# Patient Record
Sex: FEMALE | Race: BLACK OR AFRICAN AMERICAN | Employment: OTHER | URBAN - METROPOLITAN AREA
[De-identification: names, ages, dates, MRNs, and addresses within clinical notes are randomized per-mention and may not be internally consistent; named-entity substitution may affect disease eponyms.]

---

## 2018-03-01 PROBLEM — M54.42 CHRONIC BILATERAL LOW BACK PAIN WITH BILATERAL SCIATICA: Status: ACTIVE | Noted: 2018-03-01

## 2018-03-01 PROBLEM — G89.29 CHRONIC BILATERAL LOW BACK PAIN WITH BILATERAL SCIATICA: Status: ACTIVE | Noted: 2018-03-01

## 2018-03-01 PROBLEM — M54.41 CHRONIC BILATERAL LOW BACK PAIN WITH BILATERAL SCIATICA: Status: ACTIVE | Noted: 2018-03-01

## 2018-07-02 DIAGNOSIS — M79.641 RIGHT HAND PAIN: Primary | ICD-10-CM

## 2018-07-03 ENCOUNTER — OFFICE VISIT (OUTPATIENT)
Dept: ORTHOPEDIC SURGERY | Age: 68
End: 2018-07-03
Payer: MEDICARE

## 2018-07-03 VITALS — HEIGHT: 64 IN | TEMPERATURE: 98 F | WEIGHT: 230 LBS | BODY MASS INDEX: 39.27 KG/M2

## 2018-07-03 DIAGNOSIS — M65.311 TRIGGER THUMB OF RIGHT HAND: Primary | ICD-10-CM

## 2018-07-03 PROCEDURE — 1123F ACP DISCUSS/DSCN MKR DOCD: CPT | Performed by: ORTHOPAEDIC SURGERY

## 2018-07-03 PROCEDURE — 1090F PRES/ABSN URINE INCON ASSESS: CPT | Performed by: ORTHOPAEDIC SURGERY

## 2018-07-03 PROCEDURE — G8427 DOCREV CUR MEDS BY ELIG CLIN: HCPCS | Performed by: ORTHOPAEDIC SURGERY

## 2018-07-03 PROCEDURE — 99204 OFFICE O/P NEW MOD 45 MIN: CPT | Performed by: ORTHOPAEDIC SURGERY

## 2018-07-03 PROCEDURE — 4004F PT TOBACCO SCREEN RCVD TLK: CPT | Performed by: ORTHOPAEDIC SURGERY

## 2018-07-03 PROCEDURE — 3017F COLORECTAL CA SCREEN DOC REV: CPT | Performed by: ORTHOPAEDIC SURGERY

## 2018-07-03 PROCEDURE — 4040F PNEUMOC VAC/ADMIN/RCVD: CPT | Performed by: ORTHOPAEDIC SURGERY

## 2018-07-03 PROCEDURE — G8400 PT W/DXA NO RESULTS DOC: HCPCS | Performed by: ORTHOPAEDIC SURGERY

## 2018-07-03 PROCEDURE — G8417 CALC BMI ABV UP PARAM F/U: HCPCS | Performed by: ORTHOPAEDIC SURGERY

## 2018-07-03 RX ORDER — GLIPIZIDE 5 MG/1
TABLET ORAL
COMMUNITY
Start: 2018-05-09 | End: 2022-10-05

## 2018-07-03 RX ORDER — OMEPRAZOLE 20 MG/1
CAPSULE, DELAYED RELEASE ORAL
Refills: 1 | COMMUNITY
Start: 2018-05-26 | End: 2022-11-02

## 2018-07-03 RX ORDER — MELOXICAM 15 MG/1
TABLET ORAL
Refills: 0 | COMMUNITY
Start: 2018-06-05 | End: 2018-08-10 | Stop reason: ALTCHOICE

## 2018-07-03 NOTE — PROGRESS NOTES
lower extremities. Upon palpation there is no induration noted. Neurologic:    Gait: normal;  Motor exam of the upper extremities show: The reflexes in biceps/triceps/brachioradialis are equal and symmetric. Sensory exam C5-T1 are normal bilaterally. Cardiovascular: The vascular exam is normal and is well perfused to distal extremities. There are 2+ radial pulses bilaterally, and motor and sensation is intact to median, ulnar, and radial, musclocutaneus, and axillary nerve distribution and grossly symmetric bilaterally. There is cap refill noted less than two seconds in all digits. There is not edema of the bilateral lower extremities. There is not varicosities noted in the distal extremities. Lymph:    Upon palpation,  there is no lymphadenopathy noted in bilateral lower extremities. Musculoskeletal:  Gait: normal; examination of the nails and digits reveal no cyanosis or clubbing. Cervical Exam:    On physical exam, Dougie Silveira is well-developed, well-nourished, oriented to person, place and time. her gait is normal.  On evaluation of her cervical spine, she has full range of motion of the cervical spine without pain. There is no cervical tenderness to palpation. Shoulder Exam:    On evaluation of her bilaterally upper extremities, her bilateral shoulder has no deformity. There is not evidence of scapular dyskinesis. There is not muscle atrophy in shoulder girdle. The range of motion for the Right Shoulder is 160/45/T8 and for the Left shoulder is 160/45/T8. Right shoulder Motor strength is 5/5 in the supraspinatus, 5/5 internal rotation and 5/5 in external rotation, and Left shoulder motor strength 5/5 in supraspinatus, 5/5 in internal rotation, 5/5 in external rotation. Elbow exam:    Evaluation of the elbow, reveals no signs of swelling or deformity. ROM is 0-150. There is not instability with varus/valgus stresses.   Motor strength is 5/5 with compression, and elevation (RICE) therapy. I discussed the treatment options with the patient. I suggest releasing the pulley in her thumb and she agrees. We will perform a Right trigger finger release of the right thumb on 7/31/2018. The risks and benefits were reviewed with the patient such as:  DVT, infection,  injuries to blood vessels and nerves, non relief of symptoms, continued pain, worsening of symptoms.

## 2018-08-13 ENCOUNTER — ANESTHESIA EVENT (OUTPATIENT)
Dept: OPERATING ROOM | Age: 68
End: 2018-08-13
Payer: MEDICARE

## 2018-08-13 ENCOUNTER — HOSPITAL ENCOUNTER (OUTPATIENT)
Age: 68
Discharge: HOME | End: 2018-08-15
Payer: MEDICARE

## 2018-08-13 LAB
HCT VFR BLD CALC: 38.5 % (ref 34–48)
HEMOGLOBIN: 11.6 G/DL (ref 11.5–15.5)
MCH RBC QN AUTO: 25.5 PG (ref 26–35)
MCHC RBC AUTO-ENTMCNC: 30.1 % (ref 32–34.5)
MCV RBC AUTO: 84.6 FL (ref 80–99.9)
PDW BLD-RTO: 15.1 FL (ref 11.5–15)
PLATELET # BLD: 338 E9/L (ref 130–450)
PMV BLD AUTO: 10 FL (ref 7–12)
RBC # BLD: 4.55 E12/L (ref 3.5–5.5)
WBC # BLD: 7.6 E9/L (ref 4.5–11.5)

## 2018-08-13 PROCEDURE — 36415 COLL VENOUS BLD VENIPUNCTURE: CPT

## 2018-08-13 PROCEDURE — 85027 COMPLETE CBC AUTOMATED: CPT

## 2018-08-13 NOTE — ANESTHESIA PRE PROCEDURE
72 hours. Coags: No results found for: PROTIME, INR, APTT    HCG (If Applicable): No results found for: PREGTESTUR, PREGSERUM, HCG, HCGQUANT     ABGs: No results found for: PHART, PO2ART, KKU6DSM, VHL9SFQ, BEART, K8PDHGRZ     Type & Screen (If Applicable):  No results found for: LABABO, [address]    Anesthesia Evaluation  Patient summary reviewed  Airway: Mallampati: II  TM distance: >3 FB   Neck ROM: full  Mouth opening: > = 3 FB Dental:    (+) upper dentures      Pulmonary: breath sounds clear to auscultation  (+) current smoker                          ROS comment: Current Smoker . 5 ppd     Cardiovascular:  Exercise tolerance: good (>4 METS),   (+) hypertension: moderate,       ECG reviewed  Rhythm: regular  Rate: normal           Beta Blocker:  Dose within 24 Hrs         Neuro/Psych:   (+) neuromuscular disease:,              ROS comment: H/O Scoliosis. GI/Hepatic/Renal: Neg GI/Hepatic/Renal ROS            Endo/Other:    (+) DiabetesType II DM, using insulin, . Abdominal:   (+) obese,         Vascular: negative vascular ROS. Anesthesia Plan      Bendon block     ASA 3     (Medical clearance was done)  Induction: intravenous. Anesthetic plan and risks discussed with patient. Plan discussed with CRNA. Sylvie Calvillo MD   8/13/2018    DOS STAFF ADDENDUM:    Pt seen and examined, chart reviewed (including anesthesia, drug and allergy history). Anesthetic plan, risks, benefits, alternatives, and personnel involved discussed with patient. Patient verbalized an understanding and agrees to proceed. Plan discussed with care team members and agreed upon.     Mazin Sykes MD  Staff Anesthesiologist  6:26 AM

## 2018-08-14 ENCOUNTER — ANESTHESIA (OUTPATIENT)
Dept: OPERATING ROOM | Age: 68
End: 2018-08-14
Payer: MEDICARE

## 2018-08-14 ENCOUNTER — HOSPITAL ENCOUNTER (OUTPATIENT)
Age: 68
Setting detail: OUTPATIENT SURGERY
Discharge: HOME | End: 2018-08-14
Attending: ORTHOPAEDIC SURGERY | Admitting: ORTHOPAEDIC SURGERY
Payer: MEDICARE

## 2018-08-14 VITALS
WEIGHT: 237 LBS | HEART RATE: 78 BPM | RESPIRATION RATE: 14 BRPM | HEIGHT: 64 IN | OXYGEN SATURATION: 94 % | DIASTOLIC BLOOD PRESSURE: 76 MMHG | BODY MASS INDEX: 40.46 KG/M2 | SYSTOLIC BLOOD PRESSURE: 129 MMHG

## 2018-08-14 VITALS
SYSTOLIC BLOOD PRESSURE: 142 MMHG | RESPIRATION RATE: 20 BRPM | DIASTOLIC BLOOD PRESSURE: 104 MMHG | TEMPERATURE: 98.6 F | OXYGEN SATURATION: 94 %

## 2018-08-14 DIAGNOSIS — M65.311 TRIGGER THUMB OF RIGHT HAND: Primary | ICD-10-CM

## 2018-08-14 LAB — GLUCOSE BLD-MCNC: NORMAL MG/DL

## 2018-08-14 PROCEDURE — 7100000011 HC PHASE II RECOVERY - ADDTL 15 MIN: Performed by: ORTHOPAEDIC SURGERY

## 2018-08-14 PROCEDURE — 26055 INCISE FINGER TENDON SHEATH: CPT | Performed by: ORTHOPAEDIC SURGERY

## 2018-08-14 PROCEDURE — 7100000010 HC PHASE II RECOVERY - FIRST 15 MIN: Performed by: ORTHOPAEDIC SURGERY

## 2018-08-14 PROCEDURE — 2500000003 HC RX 250 WO HCPCS: Performed by: NURSE ANESTHETIST, CERTIFIED REGISTERED

## 2018-08-14 PROCEDURE — 6360000002 HC RX W HCPCS: Performed by: NURSE PRACTITIONER

## 2018-08-14 PROCEDURE — 3700000000 HC ANESTHESIA ATTENDED CARE: Performed by: ORTHOPAEDIC SURGERY

## 2018-08-14 PROCEDURE — 3600000002 HC SURGERY LEVEL 2 BASE: Performed by: ORTHOPAEDIC SURGERY

## 2018-08-14 PROCEDURE — C1713 ANCHOR/SCREW BN/BN,TIS/BN: HCPCS | Performed by: ORTHOPAEDIC SURGERY

## 2018-08-14 PROCEDURE — 2580000003 HC RX 258: Performed by: ANESTHESIOLOGY

## 2018-08-14 PROCEDURE — 82962 GLUCOSE BLOOD TEST: CPT | Performed by: ORTHOPAEDIC SURGERY

## 2018-08-14 PROCEDURE — 3600000012 HC SURGERY LEVEL 2 ADDTL 15MIN: Performed by: ORTHOPAEDIC SURGERY

## 2018-08-14 PROCEDURE — 3700000001 HC ADD 15 MINUTES (ANESTHESIA): Performed by: ORTHOPAEDIC SURGERY

## 2018-08-14 PROCEDURE — 2709999900 HC NON-CHARGEABLE SUPPLY: Performed by: ORTHOPAEDIC SURGERY

## 2018-08-14 PROCEDURE — 6360000002 HC RX W HCPCS: Performed by: NURSE ANESTHETIST, CERTIFIED REGISTERED

## 2018-08-14 RX ORDER — LIDOCAINE HYDROCHLORIDE 20 MG/ML
INJECTION, SOLUTION INFILTRATION; PERINEURAL PRN
Status: DISCONTINUED | OUTPATIENT
Start: 2018-08-14 | End: 2018-08-14 | Stop reason: SDUPTHER

## 2018-08-14 RX ORDER — KETOROLAC TROMETHAMINE 30 MG/ML
INJECTION, SOLUTION INTRAMUSCULAR; INTRAVENOUS PRN
Status: DISCONTINUED | OUTPATIENT
Start: 2018-08-14 | End: 2018-08-14 | Stop reason: SDUPTHER

## 2018-08-14 RX ORDER — MIDAZOLAM HYDROCHLORIDE 1 MG/ML
INJECTION INTRAMUSCULAR; INTRAVENOUS PRN
Status: DISCONTINUED | OUTPATIENT
Start: 2018-08-14 | End: 2018-08-14 | Stop reason: SDUPTHER

## 2018-08-14 RX ORDER — SODIUM CHLORIDE, SODIUM LACTATE, POTASSIUM CHLORIDE, CALCIUM CHLORIDE 600; 310; 30; 20 MG/100ML; MG/100ML; MG/100ML; MG/100ML
INJECTION, SOLUTION INTRAVENOUS CONTINUOUS
Status: DISCONTINUED | OUTPATIENT
Start: 2018-08-14 | End: 2018-08-14 | Stop reason: HOSPADM

## 2018-08-14 RX ORDER — HYDROCODONE BITARTRATE AND ACETAMINOPHEN 5; 325 MG/1; MG/1
1 TABLET ORAL EVERY 6 HOURS PRN
Qty: 20 TABLET | Refills: 0 | Status: SHIPPED | OUTPATIENT
Start: 2018-08-14 | End: 2018-08-19

## 2018-08-14 RX ORDER — ALFENTANIL HYDROCHLORIDE 500 UG/ML
INJECTION INTRAVENOUS PRN
Status: DISCONTINUED | OUTPATIENT
Start: 2018-08-14 | End: 2018-08-14 | Stop reason: SDUPTHER

## 2018-08-14 RX ORDER — PROPOFOL 10 MG/ML
INJECTION, EMULSION INTRAVENOUS CONTINUOUS PRN
Status: DISCONTINUED | OUTPATIENT
Start: 2018-08-14 | End: 2018-08-14 | Stop reason: SDUPTHER

## 2018-08-14 RX ADMIN — ALFENTANIL HYDROCHLORIDE 250 MCG: 500 INJECTION INTRAVENOUS at 07:27

## 2018-08-14 RX ADMIN — LIDOCAINE HYDROCHLORIDE 25 MG: 20 INJECTION, SOLUTION INFILTRATION; PERINEURAL at 07:32

## 2018-08-14 RX ADMIN — PROPOFOL 50 MCG/KG/MIN: 10 INJECTION, EMULSION INTRAVENOUS at 07:32

## 2018-08-14 RX ADMIN — SODIUM CHLORIDE, POTASSIUM CHLORIDE, SODIUM LACTATE AND CALCIUM CHLORIDE: 600; 310; 30; 20 INJECTION, SOLUTION INTRAVENOUS at 06:08

## 2018-08-14 RX ADMIN — KETOROLAC TROMETHAMINE 30 MG: 30 INJECTION, SOLUTION INTRAMUSCULAR; INTRAVENOUS at 07:48

## 2018-08-14 RX ADMIN — MIDAZOLAM 2 MG: 1 INJECTION INTRAMUSCULAR; INTRAVENOUS at 07:27

## 2018-08-14 RX ADMIN — ALFENTANIL HYDROCHLORIDE 250 MCG: 500 INJECTION INTRAVENOUS at 07:48

## 2018-08-14 RX ADMIN — ALFENTANIL HYDROCHLORIDE 250 MCG: 500 INJECTION INTRAVENOUS at 07:35

## 2018-08-14 RX ADMIN — CEFAZOLIN SODIUM 2 G: 2 SOLUTION INTRAVENOUS at 07:24

## 2018-08-14 ASSESSMENT — PULMONARY FUNCTION TESTS
PIF_VALUE: 0

## 2018-08-14 ASSESSMENT — PAIN DESCRIPTION - DESCRIPTORS: DESCRIPTORS: DISCOMFORT

## 2018-08-14 ASSESSMENT — PAIN SCALES - GENERAL
PAINLEVEL_OUTOF10: 0
PAINLEVEL_OUTOF10: 0
PAINLEVEL_OUTOF10: 3

## 2018-08-14 ASSESSMENT — PAIN - FUNCTIONAL ASSESSMENT: PAIN_FUNCTIONAL_ASSESSMENT: 0-10

## 2018-08-14 ASSESSMENT — LIFESTYLE VARIABLES: SMOKING_STATUS: 1

## 2018-08-14 NOTE — ANESTHESIA POSTPROCEDURE EVALUATION
Department of Anesthesiology  Postprocedure Note    Patient: Farrukh Garcia  MRN: [id number]  YOB: 1950  Date of evaluation: 8/14/2018  Time:  9:54 AM     Procedure Summary     Date:  08/14/18 Room / Location:  [address] [place] [address] 01 / [address] [facility]    Anesthesia Start:  0724 Anesthesia Stop:  6234    Procedure:  RIGHT THUMB TRIGGER THUMB RELEASE (Right ) Diagnosis:  (RIGHT TRIGGER THUMB)    Surgeon:  Shlomo Christian DO Responsible Provider:  Marek Jarvis MD    Anesthesia Type:  Maxwell block ASA Status:  3          Anesthesia Type: Maxwell block    Jd Phase I: Jd Score: 10    Jd Phase II: Jd Score: 10    Last vitals: Reviewed and per EMR flowsheets.        Anesthesia Post Evaluation    Patient location during evaluation: PACU  Patient participation: complete - patient participated  Level of consciousness: awake and alert  Airway patency: patent  Nausea & Vomiting: no vomiting and no nausea  Complications: no  Cardiovascular status: hemodynamically stable  Respiratory status: acceptable  Hydration status: stable

## 2018-08-20 NOTE — H&P
Chief Complaint   Patient presents with    Trigger finger       Right hand Thumb, pops, locks, and hard to bend. States of throbbing. Is Right handed.           Shaji Pepper is a 74y.o. year old  female who presents for evaluation of right hand pain. she reports this started several weeks ago with locking of her thumb.  she does not remember a specific injury that started the pain. The injury was repetitive injury. Her pain is located mainly in the thumb. The pain is worse with activity and better with rest.  The patient has tried nothing specific. The treatment has not been effective. The patient is Right hand dominant.       Past Medical History        Past Medical History:   Diagnosis Date    Chronic back pain 2018     10/10 on the pain scale    Type 2 diabetes mellitus without complication ([facility] [address]           Past Surgical History   History reviewed. No pertinent surgical history.        Current Medication      Current Outpatient Prescriptions:     glipiZIDE (GLUCOTROL) 5 MG tablet, , Disp: , Rfl:     meloxicam (MOBIC) 15 MG tablet, , Disp: , Rfl: 0    omeprazole (PRILOSEC) 20 MG delayed release capsule, TAKE ONE CAPSULE BY MOUTH EVERY DAY, NEED TO MAKE APPOINTMENT, Disp: , Rfl: 1    aspirin 325 MG tablet, Take 325 mg by mouth daily, Disp: , Rfl:     HYDROcodone-acetaminophen (NORCO) 5-325 MG per tablet, Take 1 tablet by mouth as needed for Pain., Disp: , Rfl:     metFORMIN (GLUCOPHAGE) 500 MG tablet, Take 500 mg by mouth 2 times daily (with meals), Disp: , Rfl:     quinapril-hydrochlorothiazide (ACCURETIC) 20-12.5 MG per tablet, Take 1 tablet by mouth 2 times daily, Disp: , Rfl:     topiramate (TOPAMAX) 100 MG tablet, Take 200 mg by mouth nightly, Disp: , Rfl:     atenolol (TENORMIN) 50 MG tablet, Take 50 mg by mouth 2 times daily, Disp: , Rfl:     Insulin Detemir (LEVEMIR FLEXTOUCH SC), Inject 10 mg into the skin daily, Disp: , Rfl:      No Known Allergies  Social History with patient     Impression:        Encounter Diagnosis   Name Primary?  Trigger thumb of right hand Yes         Plan: Natural history and expected course discussed. Questions answered. Educational materials distributed. Rest, ice, compression, and elevation (RICE) therapy. I discussed the treatment options with the patient. I suggest releasing the pulley in her thumb and she agrees. We will perform a Right trigger finger release of the right thumb . The risks and benefits were reviewed with the patient such as:  DVT, infection,  injuries to blood vessels and nerves, non relief of symptoms, continued pain, worsening of symptoms.

## 2018-08-29 ENCOUNTER — OFFICE VISIT (OUTPATIENT)
Dept: ORTHOPEDIC SURGERY | Age: 68
End: 2018-08-29

## 2018-08-29 VITALS — WEIGHT: 230 LBS | BODY MASS INDEX: 39.27 KG/M2 | TEMPERATURE: 98 F | HEIGHT: 64 IN

## 2018-08-29 DIAGNOSIS — M65.311 TRIGGER THUMB OF RIGHT HAND: Primary | ICD-10-CM

## 2018-08-29 PROCEDURE — 99024 POSTOP FOLLOW-UP VISIT: CPT | Performed by: NURSE PRACTITIONER

## 2021-12-22 LAB
ALBUMIN SERPL-MCNC: NORMAL G/DL
ALP BLD-CCNC: NORMAL U/L
ALT SERPL-CCNC: NORMAL U/L
ANION GAP SERPL CALCULATED.3IONS-SCNC: NORMAL MMOL/L
AST SERPL-CCNC: NORMAL U/L
BASOPHILS ABSOLUTE: NORMAL
BASOPHILS RELATIVE PERCENT: NORMAL
BILIRUB SERPL-MCNC: NORMAL MG/DL
BUN BLDV-MCNC: NORMAL MG/DL
CALCIUM SERPL-MCNC: NORMAL MG/DL
CHLORIDE BLD-SCNC: NORMAL MMOL/L
CHOLESTEROL, TOTAL: NORMAL
CHOLESTEROL/HDL RATIO: NORMAL
CO2: NORMAL
CREAT SERPL-MCNC: NORMAL MG/DL
EOSINOPHILS ABSOLUTE: NORMAL
EOSINOPHILS RELATIVE PERCENT: NORMAL
GFR CALCULATED: NORMAL
GLUCOSE BLD-MCNC: NORMAL MG/DL
HCT VFR BLD CALC: NORMAL %
HDLC SERPL-MCNC: NORMAL MG/DL
HEMOGLOBIN: NORMAL
LDL CHOLESTEROL CALCULATED: NORMAL
LYMPHOCYTES ABSOLUTE: NORMAL
LYMPHOCYTES RELATIVE PERCENT: NORMAL
MCH RBC QN AUTO: NORMAL PG
MCHC RBC AUTO-ENTMCNC: NORMAL G/DL
MCV RBC AUTO: NORMAL FL
MONOCYTES ABSOLUTE: NORMAL
MONOCYTES RELATIVE PERCENT: NORMAL
NEUTROPHILS ABSOLUTE: NORMAL
NEUTROPHILS RELATIVE PERCENT: NORMAL
NONHDLC SERPL-MCNC: NORMAL MG/DL
PDW BLD-RTO: NORMAL %
PLATELET # BLD: NORMAL 10*3/UL
PMV BLD AUTO: NORMAL FL
POTASSIUM SERPL-SCNC: NORMAL MMOL/L
RBC # BLD: NORMAL 10*6/UL
SODIUM BLD-SCNC: NORMAL MMOL/L
TOTAL PROTEIN: NORMAL
TRIGL SERPL-MCNC: NORMAL MG/DL
VLDLC SERPL CALC-MCNC: NORMAL MG/DL
WBC # BLD: NORMAL 10*3/UL

## 2022-04-08 LAB
ALBUMIN SERPL-MCNC: NORMAL G/DL
ALP BLD-CCNC: NORMAL U/L
ALT SERPL-CCNC: NORMAL U/L
ANION GAP SERPL CALCULATED.3IONS-SCNC: NORMAL MMOL/L
AST SERPL-CCNC: NORMAL U/L
AVERAGE GLUCOSE: 128
BASOPHILS ABSOLUTE: NORMAL
BASOPHILS RELATIVE PERCENT: NORMAL
BILIRUB SERPL-MCNC: NORMAL MG/DL
BUN BLDV-MCNC: NORMAL MG/DL
CALCIUM SERPL-MCNC: NORMAL MG/DL
CHLORIDE BLD-SCNC: NORMAL MMOL/L
CHOLESTEROL, TOTAL: NORMAL
CHOLESTEROL/HDL RATIO: NORMAL
CO2: NORMAL
CREAT SERPL-MCNC: NORMAL MG/DL
EOSINOPHILS ABSOLUTE: NORMAL
EOSINOPHILS RELATIVE PERCENT: NORMAL
GFR CALCULATED: NORMAL
GLUCOSE BLD-MCNC: NORMAL MG/DL
HBA1C MFR BLD: 6.1 %
HCT VFR BLD CALC: NORMAL %
HDLC SERPL-MCNC: NORMAL MG/DL
HEMOGLOBIN: NORMAL
LDL CHOLESTEROL CALCULATED: NORMAL
LYMPHOCYTES ABSOLUTE: NORMAL
LYMPHOCYTES RELATIVE PERCENT: NORMAL
MCH RBC QN AUTO: NORMAL PG
MCHC RBC AUTO-ENTMCNC: NORMAL G/DL
MCV RBC AUTO: NORMAL FL
MONOCYTES ABSOLUTE: NORMAL
MONOCYTES RELATIVE PERCENT: NORMAL
NEUTROPHILS ABSOLUTE: NORMAL
NEUTROPHILS RELATIVE PERCENT: NORMAL
NONHDLC SERPL-MCNC: NORMAL MG/DL
PLATELET # BLD: NORMAL 10*3/UL
PMV BLD AUTO: NORMAL FL
POTASSIUM SERPL-SCNC: NORMAL MMOL/L
RBC # BLD: NORMAL 10*6/UL
SODIUM BLD-SCNC: NORMAL MMOL/L
TOTAL PROTEIN: NORMAL
TRIGL SERPL-MCNC: NORMAL MG/DL
VLDLC SERPL CALC-MCNC: NORMAL MG/DL
WBC # BLD: NORMAL 10*3/UL

## 2022-09-01 ENCOUNTER — HOSPITAL ENCOUNTER (INPATIENT)
Age: 72
LOS: 12 days | Discharge: OTHER INSTITUTION | DRG: 291 | End: 2022-09-14
Attending: STUDENT IN AN ORGANIZED HEALTH CARE EDUCATION/TRAINING PROGRAM | Admitting: INTERNAL MEDICINE
Payer: MEDICARE

## 2022-09-01 ENCOUNTER — APPOINTMENT (OUTPATIENT)
Dept: GENERAL RADIOLOGY | Age: 72
DRG: 291 | End: 2022-09-01
Payer: MEDICARE

## 2022-09-01 DIAGNOSIS — I50.9 CONGESTIVE HEART FAILURE, UNSPECIFIED HF CHRONICITY, UNSPECIFIED HEART FAILURE TYPE (HCC): ICD-10-CM
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
## 2022-09-01 LAB
ANION GAP SERPL CALCULATED.3IONS-SCNC: 7 MMOL/L (ref 7–16)
ANISOCYTOSIS: ABNORMAL
BACTERIA: ABNORMAL /HPF
BASOPHILIC STIPPLING: ABNORMAL
BASOPHILS ABSOLUTE: 0 E9/L (ref 0–0.2)
BASOPHILS RELATIVE PERCENT: 0.5 % (ref 0–2)
BILIRUBIN URINE: NEGATIVE
BLOOD, URINE: ABNORMAL
BUN BLDV-MCNC: 14 MG/DL (ref 6–23)
CALCIUM SERPL-MCNC: 8.8 MG/DL (ref 8.6–10.2)
CHLORIDE BLD-SCNC: 103 MMOL/L (ref 98–107)
CLARITY: ABNORMAL
CO2: 32 MMOL/L (ref 22–29)
COLOR: YELLOW
CREAT SERPL-MCNC: 1.1 MG/DL (ref 0.5–1)
EOSINOPHILS ABSOLUTE: 0.13 E9/L (ref 0.05–0.5)
EOSINOPHILS RELATIVE PERCENT: 1.7 % (ref 0–6)
EPITHELIAL CELLS, UA: ABNORMAL /HPF
GFR AFRICAN AMERICAN: 59
GFR NON-AFRICAN AMERICAN: 59 ML/MIN/1.73
GLUCOSE BLD-MCNC: 85 MG/DL (ref 74–99)
GLUCOSE URINE: NEGATIVE MG/DL
HCT VFR BLD CALC: 33 % (ref 34–48)
HEMOGLOBIN: 8.9 G/DL (ref 11.5–15.5)
HYPOCHROMIA: ABNORMAL
KETONES, URINE: NEGATIVE MG/DL
LEUKOCYTE ESTERASE, URINE: ABNORMAL
LYMPHOCYTES ABSOLUTE: 1.09 E9/L (ref 1.5–4)
LYMPHOCYTES RELATIVE PERCENT: 13.9 % (ref 20–42)
MCH RBC QN AUTO: 19.8 PG (ref 26–35)
MCHC RBC AUTO-ENTMCNC: 27 % (ref 32–34.5)
MCV RBC AUTO: 73.3 FL (ref 80–99.9)
MONOCYTES ABSOLUTE: 0.08 E9/L (ref 0.1–0.95)
MONOCYTES RELATIVE PERCENT: 0.9 % (ref 2–12)
NEUTROPHILS ABSOLUTE: 6.55 E9/L (ref 1.8–7.3)
NEUTROPHILS RELATIVE PERCENT: 83.5 % (ref 43–80)
NITRITE, URINE: NEGATIVE
OVALOCYTES: ABNORMAL
PDW BLD-RTO: 20.2 FL (ref 11.5–15)
PH UA: 6 (ref 5–9)
PLATELET # BLD: 364 E9/L (ref 130–450)
PMV BLD AUTO: 9.8 FL (ref 7–12)
POIKILOCYTES: ABNORMAL
POLYCHROMASIA: ABNORMAL
POTASSIUM REFLEX MAGNESIUM: 3.8 MMOL/L (ref 3.5–5)
PRO-BNP: ABNORMAL PG/ML (ref 0–125)
PROTEIN UA: 30 MG/DL
RBC # BLD: 4.5 E12/L (ref 3.5–5.5)
RBC UA: ABNORMAL /HPF (ref 0–2)
SCHISTOCYTES: ABNORMAL
SODIUM BLD-SCNC: 142 MMOL/L (ref 132–146)
SPECIFIC GRAVITY UA: 1.02 (ref 1–1.03)
TARGET CELLS: ABNORMAL
TEAR DROP CELLS: ABNORMAL
TOTAL CK: 112 U/L (ref 20–180)
TROPONIN, HIGH SENSITIVITY: 27 NG/L (ref 0–9)
UROBILINOGEN, URINE: 0.2 E.U./DL
WBC # BLD: 7.8 E9/L (ref 4.5–11.5)
WBC UA: ABNORMAL /HPF (ref 0–5)

## 2022-09-01 PROCEDURE — 84484 ASSAY OF TROPONIN QUANT: CPT

## 2022-09-01 PROCEDURE — 71045 X-RAY EXAM CHEST 1 VIEW: CPT

## 2022-09-01 PROCEDURE — 81001 URINALYSIS AUTO W/SCOPE: CPT

## 2022-09-01 PROCEDURE — 80048 BASIC METABOLIC PNL TOTAL CA: CPT

## 2022-09-01 PROCEDURE — 6360000002 HC RX W HCPCS: Performed by: STUDENT IN AN ORGANIZED HEALTH CARE EDUCATION/TRAINING PROGRAM

## 2022-09-01 PROCEDURE — 96374 THER/PROPH/DIAG INJ IV PUSH: CPT

## 2022-09-01 PROCEDURE — 85025 COMPLETE CBC W/AUTO DIFF WBC: CPT

## 2022-09-01 PROCEDURE — 99285 EMERGENCY DEPT VISIT HI MDM: CPT

## 2022-09-01 PROCEDURE — 93005 ELECTROCARDIOGRAM TRACING: CPT | Performed by: STUDENT IN AN ORGANIZED HEALTH CARE EDUCATION/TRAINING PROGRAM

## 2022-09-01 PROCEDURE — 83880 ASSAY OF NATRIURETIC PEPTIDE: CPT

## 2022-09-01 PROCEDURE — 82550 ASSAY OF CK (CPK): CPT

## 2022-09-01 PROCEDURE — 87088 URINE BACTERIA CULTURE: CPT

## 2022-09-01 RX ORDER — FUROSEMIDE 10 MG/ML
40 INJECTION INTRAMUSCULAR; INTRAVENOUS ONCE
Status: COMPLETED | OUTPATIENT
Start: 2022-09-01 | End: 2022-09-01

## 2022-09-01 RX ADMIN — FUROSEMIDE 40 MG: 10 INJECTION INTRAMUSCULAR; INTRAVENOUS at 23:25

## 2022-09-01 ASSESSMENT — PAIN - FUNCTIONAL ASSESSMENT: PAIN_FUNCTIONAL_ASSESSMENT: NONE - DENIES PAIN

## 2022-09-01 NOTE — ED PROVIDER NOTES
ED  Provider Note  Admit Date/RoomTime: 9/1/2022  7:43 PM  ED Room: 9717/3178-30      History of Present Illness:  9/1/22, Time: 7:53 PM EDT  Chief Complaint   Patient presents with    Asha Sanchez at 930am was not able to get up. Estelita Mahmood is a 73 y.o. female presenting to the ED for fall at home. Patient states that her left leg gave out on her and she slowly lowered herself to the floor, denies head strike or loss consciousness, no headache vision changes neck pain or neck stiffness. No chest pain or arm/jaw/back pain. Patient has chronic shortness of breath. Positive for lower extremity edema. States that it is chronic but increased recently. Denies any focal weakness or numbness. No new back pain issue is chronic. No weakness or numbness of lower extremities, no saddle anaesthesia, no bowel or bladder incontinence, no urinary retention. No complaints at this time, fall was one episode, sudden onset, couldn't get up after. Relieved by none, exacerbated by none. Son reportedly called 911 and concerned about patient's hoarding. No dizziness lightheadedness. No vision changes. No melena or hematochezia. No diarrhea or n/v/c. Review of Systems:   A complete review of systems was performed and pertinent positives and negatives are stated within HPI, all other systems reviewed and are negative.        --------------------------------------------- PATIENT HISTORY--------------------------------------------  Past Medical History:  has a past medical history of Chronic back pain, Hypertension, Scoliosis, and Type 2 diabetes mellitus without complication ([facility] [address] Past Surgical History:  has a past surgical history that includes Hysterectomy; shoulder surgery (Right, 2002); [place] tooth extraction; Finger trigger release (Right, 08/14/2018); and pr incise finger tendon sheath (Right, 8/14/2018). Family History: family history includes Cancer in her father. . Unless otherwise noted, family history is non contributory    Social History:  reports that she has been smoking. She has been smoking an average of .5 packs per day. She has never used smokeless tobacco. She reports that she does not drink alcohol and does not use drugs. The patients home medications have been reviewed. Allergies: Patient has no known allergies. I have reviewed the past medical history, past surgical history, social history, and family history    ---------------------------------------------------PHYSICAL EXAM--------------------------------------    Constitutional/General: Alert and oriented x3  Head: Normocephalic and atraumatic  Eyes: PERRL, EOMI, sclera non icteric  ENT: Oropharynx clear, handling secretions, no trismus  Neck: Supple, full ROM, no stridor, no meningismus  Respiratory: lctab  Cardiovascular: RRR, no R/G/M, 2+ peripheral pulses  Chest: No chest wall tenderness, equal chest rise  Gastrointestinal:  sntnd  Musculoskeletal: Extremities warm and well perfused, moving all extremities, 2+ pitting edema bilateral LEs  Skin: skin warm and dry. No rashes. Neurologic: No focal deficits, strength and sensation grossly intact   Psychiatric: Normal Affect, behavior normal           -------------------------------------------------- RESULTS -------------------------------------------------  I have personally reviewed all laboratory and imaging results for this patient. Results are listed below.      LABS: (Lab results interpreted by me)  Results for orders placed or performed during the hospital encounter of 09/01/22   CBC with Auto Differential   Result Value Ref Range    WBC 7.8 4.5 - 11.5 E9/L    RBC 4.50 3.50 - 5.50 E12/L    Hemoglobin 8.9 (L) 11.5 - 15.5 g/dL    Hematocrit 33.0 (L) 34.0 - 48.0 %    MCV 73.3 (L) 80.0 - 99.9 fL    MCH 19.8 (L) 26.0 - 35.0 pg    MCHC 27.0 (L) 32.0 - 34.5 %    RDW 20.2 (H) 11.5 - 15.0 fL    Platelets 310 174 - 209 E9/L    MPV 9.8 7.0 - 12.0 fL    Neutrophils % 83.5 (H) 43.0 - 80.0 %    Lymphocytes % 13.9 (L) 20.0 - 42.0 %    Monocytes % 0.9 (L) 2.0 - 12.0 %    Eosinophils % 1.7 0.0 - 6.0 %    Basophils % 0.5 0.0 - 2.0 %    Neutrophils Absolute 6.55 1.80 - 7.30 E9/L    Lymphocytes Absolute 1.09 (L) 1.50 - 4.00 E9/L    Monocytes Absolute 0.08 (L) 0.10 - 0.95 E9/L    Eosinophils Absolute 0.13 0.05 - 0.50 E9/L    Basophils Absolute 0.00 0.00 - 0.20 E9/L    Anisocytosis 1+     Polychromasia 1+     Hypochromia 2+     Poikilocytes 1+     Schistocytes 1+     Ovalocytes 1+     Target Cells 1+     Tear Drop Cells 1+     Basophilic Stippling 1+    Basic Metabolic Panel w/ Reflex to MG   Result Value Ref Range    Sodium 142 132 - 146 mmol/L    Potassium reflex Magnesium 3.8 3.5 - 5.0 mmol/L    Chloride 103 98 - 107 mmol/L    CO2 32 (H) 22 - 29 mmol/L    Anion Gap 7 7 - 16 mmol/L    Glucose 85 74 - 99 mg/dL    BUN 14 6 - 23 mg/dL    Creatinine 1.1 (H) 0.5 - 1.0 mg/dL    GFR Non-African American 59 >=60 mL/min/1.73    GFR African American 59     Calcium 8.8 8.6 - 10.2 mg/dL   Troponin   Result Value Ref Range    Troponin, High Sensitivity 27 (H) 0 - 9 ng/L   Brain Natriuretic Peptide   Result Value Ref Range    Pro-BNP 10,646 (H) 0 - 125 pg/mL   Urinalysis with Microscopic   Result Value Ref Range    Color, UA Yellow Straw/Yellow    Clarity, UA SLCLOUDY Clear    Glucose, Ur Negative Negative mg/dL    Bilirubin Urine Negative Negative    Ketones, Urine Negative Negative mg/dL    Specific Gravity, UA 1.025 1.005 - 1.030    Blood, Urine SMALL (A) Negative    pH, UA 6.0 5.0 - 9.0    Protein, UA 30 (A) Negative mg/dL    Urobilinogen, Urine 0.2 <2.0 E.U./dL    Nitrite, Urine Negative Negative    Leukocyte Esterase, Urine TRACE (A) Negative    WBC, UA 0-1 0 - 5 /HPF    RBC, UA NONE 0 - 2 /HPF    Epithelial Cells, UA RARE /HPF    Bacteria, UA RARE (A) None Seen /HPF   CK   Result Value Ref Range    Total  20 - 180 U/L   Troponin   Result Value Ref Range    Troponin, High Sensitivity 28 (H) 0 - 9 ng/L Blood Gas, Arterial   Result Value Ref Range    Date Analyzed [id number]     Time Analyzed 0309     Source: Blood Arterial     pH, Blood Gas 7.312 (L) 7.350 - 7.450    PCO2 54.8 (H) 35.0 - 45.0 mmHg    PO2 65.3 (L) 75.0 - 100.0 mmHg    HCO3 27.1 (H) 22.0 - 26.0 mmol/L    B.E. 0.3 -3.0 - 3.0 mmol/L    O2 Sat 90.5 (L) 92.0 - 98.5 %    O2Hb 89.1 (L) 94.0 - 97.0 %    COHb 0.6 0.0 - 1.5 %    MetHb 0.9 0.0 - 1.5 %    O2 Content 13.1 mL/dL    HHb 9.4 (H) 0.0 - 5.0 %    tHb (est) 10.4 (L) 11.5 - 16.5 g/dL    Mode NC- 4 L     Date Of Collection      Time Collected      Pt Temp 37.0 C     ID [id number]     Lab [id number]     Critical(s) Notified . No Critical Values    Basic Metabolic Panel   Result Value Ref Range    Sodium 143 132 - 146 mmol/L    Potassium 3.8 3.5 - 5.0 mmol/L    Chloride 102 98 - 107 mmol/L    CO2 28 22 - 29 mmol/L    Anion Gap 13 7 - 16 mmol/L    Glucose 94 74 - 99 mg/dL    BUN 13 6 - 23 mg/dL    Creatinine 1.1 (H) 0.5 - 1.0 mg/dL    GFR Non-African American 59 >=60 mL/min/1.73    GFR African American 59     Calcium 8.8 8.6 - 10.2 mg/dL   Lipid panel   Result Value Ref Range    Cholesterol, Total 101 0 - 199 mg/dL    Triglycerides 85 0 - 149 mg/dL    HDL 38 >40 mg/dL    LDL Calculated 46 0 - 99 mg/dL    VLDL Cholesterol Calculated 17 mg/dL   POCT Glucose   Result Value Ref Range    Meter Glucose 74 74 - 99 mg/dL   POCT Glucose   Result Value Ref Range    Meter Glucose 83 74 - 99 mg/dL   POCT Glucose   Result Value Ref Range    Meter Glucose 86 74 - 99 mg/dL   POCT Glucose   Result Value Ref Range    Meter Glucose 99 74 - 99 mg/dL   EKG 12 Lead   Result Value Ref Range    Ventricular Rate 79 BPM    Atrial Rate 79 BPM    P-R Interval 132 ms    QRS Duration 82 ms    Q-T Interval 338 ms    QTc Calculation (Bazett) 387 ms    P Axis 58 degrees    R Axis 131 degrees    T Axis 42 degrees   ,       RADIOLOGY:  Imaging interpreted by Radiologist unless otherwise specified  CTA CHEST W CONTRAST   Final Result   1.  No Not Given 9/2/22 0951)   metFORMIN (GLUCOPHAGE) tablet 1,000 mg (1,000 mg Oral Given 9/2/22 1805)   ferrous sulfate (IRON 325) tablet 325 mg (325 mg Oral Given 9/2/22 1805)   cefTRIAXone (ROCEPHIN) 1,000 mg in sterile water 10 mL IV syringe (1,000 mg IntraVENous Given 9/2/22 0614)   perflutren lipid microspheres (DEFINITY) injection 1.65 mg (has no administration in time range)   pantoprazole (PROTONIX) 40 mg in sodium chloride (PF) 10 mL injection (40 mg IntraVENous Given 9/2/22 1320)   furosemide (LASIX) injection 40 mg (40 mg IntraVENous Given 9/1/22 2325)   iopamidol (ISOVUE-370) 76 % injection 75 mL (75 mLs IntraVENous Given 9/2/22 1627)       I, Dr. Allen Banuelos, am the primary provider of record    Medical Decision Making:   CHF exacerbation, pulmonary edema, patient is stable on 2 to 4 L here in the emergency department, no acute respiratory distress. Given 40 of Lasix. Will monitor urine output. Troponin 27, delta pending. Patient has no chest pain at this time or arm/jaw/back pain that is new. Discussed admission with patient and patient's son who is here to provide collateral.  They are amenable to plan for admit. Spoke to hospitalist who will admit patient. EKG interpreted by me normal sinus rhythm right axis deviation rate 79, IA interval 132 QRS duration 82 , no ST elevations or ST depressions no prior              ED Counseling: This emergency provider has spoken with the patient and any family present to discuss clinical status, results, plan of care, diagnosis and prognosis as able to be determined at this time. Any questions were answered and patient and/or family/POA are agreeable with the plan.       --------------------------------- IMPRESSION AND DISPOSITION ---------------------------------    IMPRESSION  1. Fall, initial encounter    2.  Congestive heart failure, unspecified HF chronicity, unspecified heart failure type ([facility] [address]        DISPOSITION  Disposition: Admit to med/surg

## 2022-09-02 ENCOUNTER — APPOINTMENT (OUTPATIENT)
Dept: CT IMAGING | Age: 72
DRG: 291 | End: 2022-09-02
Payer: MEDICARE

## 2022-09-02 PROBLEM — D50.0 IRON DEFICIENCY ANEMIA DUE TO CHRONIC BLOOD LOSS: Status: ACTIVE | Noted: 2022-09-02

## 2022-09-02 PROBLEM — E11.9 TYPE 2 DIABETES MELLITUS WITHOUT COMPLICATION, WITHOUT LONG-TERM CURRENT USE OF INSULIN (HCC): Status: ACTIVE | Noted: 2022-09-02

## 2022-09-02 PROBLEM — I50.9 CHF WITH UNKNOWN LVEF (HCC): Status: ACTIVE | Noted: 2022-09-02

## 2022-09-02 PROBLEM — I50.33 ACUTE ON CHRONIC CONGESTIVE HEART FAILURE WITH RIGHT VENTRICULAR DIASTOLIC DYSFUNCTION (HCC): Status: ACTIVE | Noted: 2022-09-02

## 2022-09-02 PROBLEM — M65.311 TRIGGER THUMB OF RIGHT HAND: Status: RESOLVED | Noted: 2018-07-03 | Resolved: 2022-09-02

## 2022-09-02 PROBLEM — I10 PRIMARY HYPERTENSION: Status: ACTIVE | Noted: 2022-09-02

## 2022-09-02 PROBLEM — Z72.0 TOBACCO ABUSE: Status: ACTIVE | Noted: 2022-09-02

## 2022-09-02 PROBLEM — D64.9 ANEMIA: Status: ACTIVE | Noted: 2022-09-02

## 2022-09-02 PROBLEM — N30.00 ACUTE CYSTITIS WITHOUT HEMATURIA: Status: ACTIVE | Noted: 2022-09-02

## 2022-09-02 PROBLEM — I50.82 ACUTE ON CHRONIC CONGESTIVE HEART FAILURE WITH RIGHT VENTRICULAR DIASTOLIC DYSFUNCTION (HCC): Status: ACTIVE | Noted: 2022-09-02

## 2022-09-02 PROBLEM — I27.20 PULMONARY HTN (HCC): Status: ACTIVE | Noted: 2022-09-02

## 2022-09-02 LAB
ANION GAP SERPL CALCULATED.3IONS-SCNC: 13 MMOL/L (ref 7–16)
B.E.: 0.3 MMOL/L (ref -3–3)
BUN BLDV-MCNC: 13 MG/DL (ref 6–23)
CALCIUM SERPL-MCNC: 8.8 MG/DL (ref 8.6–10.2)
CHLORIDE BLD-SCNC: 102 MMOL/L (ref 98–107)
CHOLESTEROL, TOTAL: 101 MG/DL (ref 0–199)
CO2: 28 MMOL/L (ref 22–29)
COHB: 0.6 % (ref 0–1.5)
CREAT SERPL-MCNC: 1.1 MG/DL (ref 0.5–1)
CRITICAL: ABNORMAL
DATE ANALYZED: ABNORMAL
DATE OF COLLECTION: ABNORMAL
EKG ATRIAL RATE: 79 BPM
EKG P AXIS: 58 DEGREES
EKG P-R INTERVAL: 132 MS
EKG Q-T INTERVAL: 338 MS
EKG QRS DURATION: 82 MS
EKG QTC CALCULATION (BAZETT): 387 MS
EKG R AXIS: 131 DEGREES
EKG T AXIS: 42 DEGREES
EKG VENTRICULAR RATE: 79 BPM
GFR AFRICAN AMERICAN: 59
GFR NON-AFRICAN AMERICAN: 59 ML/MIN/1.73
GLUCOSE BLD-MCNC: 94 MG/DL (ref 74–99)
HBA1C MFR BLD: 5.9 % (ref 4–5.6)
HCO3: 27.1 MMOL/L (ref 22–26)
HDLC SERPL-MCNC: 38 MG/DL
HHB: 9.4 % (ref 0–5)
LAB: ABNORMAL
LDL CHOLESTEROL CALCULATED: 46 MG/DL (ref 0–99)
LV EF: 55 %
LVEF MODALITY: NORMAL
Lab: ABNORMAL
METER GLUCOSE: 123 MG/DL (ref 74–99)
METER GLUCOSE: 74 MG/DL (ref 74–99)
METER GLUCOSE: 83 MG/DL (ref 74–99)
METER GLUCOSE: 86 MG/DL (ref 74–99)
METER GLUCOSE: 99 MG/DL (ref 74–99)
METHB: 0.9 % (ref 0–1.5)
MODE: ABNORMAL
O2 CONTENT: 13.1 ML/DL
O2 SATURATION: 90.5 % (ref 92–98.5)
O2HB: 89.1 % (ref 94–97)
OPERATOR ID: 2245
PATIENT TEMP: 37 C
PCO2: 54.8 MMHG (ref 35–45)
PH BLOOD GAS: 7.31 (ref 7.35–7.45)
PO2: 65.3 MMHG (ref 75–100)
POTASSIUM SERPL-SCNC: 3.8 MMOL/L (ref 3.5–5)
SODIUM BLD-SCNC: 143 MMOL/L (ref 132–146)
SOURCE, BLOOD GAS: ABNORMAL
THB: 10.4 G/DL (ref 11.5–16.5)
TIME ANALYZED: 309
TRIGL SERPL-MCNC: 85 MG/DL (ref 0–149)
TROPONIN, HIGH SENSITIVITY: 28 NG/L (ref 0–9)
VLDLC SERPL CALC-MCNC: 17 MG/DL

## 2022-09-02 PROCEDURE — 99223 1ST HOSP IP/OBS HIGH 75: CPT | Performed by: INTERNAL MEDICINE

## 2022-09-02 PROCEDURE — A4216 STERILE WATER/SALINE, 10 ML: HCPCS | Performed by: INTERNAL MEDICINE

## 2022-09-02 PROCEDURE — 80048 BASIC METABOLIC PNL TOTAL CA: CPT

## 2022-09-02 PROCEDURE — 6360000002 HC RX W HCPCS: Performed by: INTERNAL MEDICINE

## 2022-09-02 PROCEDURE — 82805 BLOOD GASES W/O2 SATURATION: CPT

## 2022-09-02 PROCEDURE — 80061 LIPID PANEL: CPT

## 2022-09-02 PROCEDURE — 2580000003 HC RX 258: Performed by: INTERNAL MEDICINE

## 2022-09-02 PROCEDURE — 6360000004 HC RX CONTRAST MEDICATION: Performed by: RADIOLOGY

## 2022-09-02 PROCEDURE — C9113 INJ PANTOPRAZOLE SODIUM, VIA: HCPCS | Performed by: INTERNAL MEDICINE

## 2022-09-02 PROCEDURE — 83036 HEMOGLOBIN GLYCOSYLATED A1C: CPT

## 2022-09-02 PROCEDURE — 6370000000 HC RX 637 (ALT 250 FOR IP): Performed by: INTERNAL MEDICINE

## 2022-09-02 PROCEDURE — APPSS60 APP SPLIT SHARED TIME 46-60 MINUTES: Performed by: PHYSICIAN ASSISTANT

## 2022-09-02 PROCEDURE — 71275 CT ANGIOGRAPHY CHEST: CPT

## 2022-09-02 PROCEDURE — 36415 COLL VENOUS BLD VENIPUNCTURE: CPT

## 2022-09-02 PROCEDURE — 97161 PT EVAL LOW COMPLEX 20 MIN: CPT | Performed by: PHYSICAL THERAPIST

## 2022-09-02 PROCEDURE — 82962 GLUCOSE BLOOD TEST: CPT

## 2022-09-02 PROCEDURE — 84484 ASSAY OF TROPONIN QUANT: CPT

## 2022-09-02 PROCEDURE — 93306 TTE W/DOPPLER COMPLETE: CPT

## 2022-09-02 PROCEDURE — 1200000000 HC SEMI PRIVATE

## 2022-09-02 RX ORDER — ONDANSETRON 4 MG/1
4 TABLET, ORALLY DISINTEGRATING ORAL EVERY 8 HOURS PRN
Status: DISCONTINUED | OUTPATIENT
Start: 2022-09-02 | End: 2022-09-14 | Stop reason: HOSPADM

## 2022-09-02 RX ORDER — ATENOLOL 25 MG/1
50 TABLET ORAL 2 TIMES DAILY
Status: DISCONTINUED | OUTPATIENT
Start: 2022-09-02 | End: 2022-09-11

## 2022-09-02 RX ORDER — FERROUS SULFATE 325(65) MG
325 TABLET ORAL 2 TIMES DAILY WITH MEALS
Status: DISCONTINUED | OUTPATIENT
Start: 2022-09-02 | End: 2022-09-14 | Stop reason: HOSPADM

## 2022-09-02 RX ORDER — HYDROCODONE BITARTRATE AND ACETAMINOPHEN 5; 325 MG/1; MG/1
1 TABLET ORAL EVERY 6 HOURS PRN
Status: DISCONTINUED | OUTPATIENT
Start: 2022-09-02 | End: 2022-09-06

## 2022-09-02 RX ORDER — DEXTROSE MONOHYDRATE 100 MG/ML
INJECTION, SOLUTION INTRAVENOUS CONTINUOUS PRN
Status: DISCONTINUED | OUTPATIENT
Start: 2022-09-02 | End: 2022-09-14 | Stop reason: HOSPADM

## 2022-09-02 RX ORDER — PANTOPRAZOLE SODIUM 40 MG/1
40 TABLET, DELAYED RELEASE ORAL
Refills: 1 | Status: DISCONTINUED | OUTPATIENT
Start: 2022-09-02 | End: 2022-09-02

## 2022-09-02 RX ORDER — ONDANSETRON 2 MG/ML
4 INJECTION INTRAMUSCULAR; INTRAVENOUS EVERY 6 HOURS PRN
Status: DISCONTINUED | OUTPATIENT
Start: 2022-09-02 | End: 2022-09-14 | Stop reason: HOSPADM

## 2022-09-02 RX ORDER — QUINAPRIL HCL AND HYDROCHLOROTHIAZIDE 20; 12.5 MG/1; MG/1
1 TABLET ORAL 2 TIMES DAILY
Status: DISCONTINUED | OUTPATIENT
Start: 2022-09-02 | End: 2022-09-02 | Stop reason: CLARIF

## 2022-09-02 RX ORDER — FUROSEMIDE 10 MG/ML
40 INJECTION INTRAMUSCULAR; INTRAVENOUS DAILY
Status: DISCONTINUED | OUTPATIENT
Start: 2022-09-02 | End: 2022-09-04

## 2022-09-02 RX ORDER — INSULIN LISPRO 100 [IU]/ML
0-8 INJECTION, SOLUTION INTRAVENOUS; SUBCUTANEOUS
Status: DISCONTINUED | OUTPATIENT
Start: 2022-09-02 | End: 2022-09-14 | Stop reason: HOSPADM

## 2022-09-02 RX ORDER — ASPIRIN 81 MG/1
81 TABLET, CHEWABLE ORAL DAILY
Status: DISCONTINUED | OUTPATIENT
Start: 2022-09-02 | End: 2022-09-14 | Stop reason: HOSPADM

## 2022-09-02 RX ORDER — HYDROCHLOROTHIAZIDE 12.5 MG/1
12.5 TABLET ORAL 2 TIMES DAILY
Status: DISCONTINUED | OUTPATIENT
Start: 2022-09-02 | End: 2022-09-02

## 2022-09-02 RX ORDER — LISINOPRIL 20 MG/1
20 TABLET ORAL DAILY
Status: DISCONTINUED | OUTPATIENT
Start: 2022-09-02 | End: 2022-09-13

## 2022-09-02 RX ORDER — ENOXAPARIN SODIUM 150 MG/ML
1 INJECTION SUBCUTANEOUS DAILY
Status: DISCONTINUED | OUTPATIENT
Start: 2022-09-02 | End: 2022-09-03

## 2022-09-02 RX ADMIN — FERROUS SULFATE TAB 325 MG (65 MG ELEMENTAL FE) 325 MG: 325 (65 FE) TAB at 18:05

## 2022-09-02 RX ADMIN — CEFTRIAXONE SODIUM 1000 MG: 1 INJECTION, POWDER, FOR SOLUTION INTRAMUSCULAR; INTRAVENOUS at 06:14

## 2022-09-02 RX ADMIN — IOPAMIDOL 75 ML: 755 INJECTION, SOLUTION INTRAVENOUS at 16:27

## 2022-09-02 RX ADMIN — HYDROCODONE BITARTRATE AND ACETAMINOPHEN 1 TABLET: 5; 325 TABLET ORAL at 18:06

## 2022-09-02 RX ADMIN — ONDANSETRON 4 MG: 4 TABLET, ORALLY DISINTEGRATING ORAL at 03:44

## 2022-09-02 RX ADMIN — ENOXAPARIN SODIUM 105 MG: 150 INJECTION SUBCUTANEOUS at 13:20

## 2022-09-02 RX ADMIN — PANTOPRAZOLE SODIUM 40 MG: 40 INJECTION, POWDER, FOR SOLUTION INTRAVENOUS at 13:20

## 2022-09-02 RX ADMIN — METFORMIN HYDROCHLORIDE 1000 MG: 1000 TABLET ORAL at 18:05

## 2022-09-02 RX ADMIN — ONDANSETRON 4 MG: 4 TABLET, ORALLY DISINTEGRATING ORAL at 18:05

## 2022-09-02 RX ADMIN — FUROSEMIDE 40 MG: 10 INJECTION, SOLUTION INTRAMUSCULAR; INTRAVENOUS at 09:52

## 2022-09-02 RX ADMIN — HYDROCODONE BITARTRATE AND ACETAMINOPHEN 1 TABLET: 5; 325 TABLET ORAL at 04:28

## 2022-09-02 ASSESSMENT — PAIN DESCRIPTION - LOCATION
LOCATION: LEG
LOCATION: GENERALIZED

## 2022-09-02 ASSESSMENT — PAIN DESCRIPTION - ONSET
ONSET: SUDDEN
ONSET: GRADUAL

## 2022-09-02 ASSESSMENT — PAIN SCALES - GENERAL
PAINLEVEL_OUTOF10: 9
PAINLEVEL_OUTOF10: 9
PAINLEVEL_OUTOF10: 5

## 2022-09-02 ASSESSMENT — PAIN DESCRIPTION - DESCRIPTORS
DESCRIPTORS: ACHING;DISCOMFORT;SORE
DESCRIPTORS: ACHING;DISCOMFORT

## 2022-09-02 ASSESSMENT — PAIN DESCRIPTION - ORIENTATION: ORIENTATION: RIGHT;LEFT

## 2022-09-02 ASSESSMENT — PAIN - FUNCTIONAL ASSESSMENT
PAIN_FUNCTIONAL_ASSESSMENT: PREVENTS OR INTERFERES SOME ACTIVE ACTIVITIES AND ADLS
PAIN_FUNCTIONAL_ASSESSMENT: PREVENTS OR INTERFERES SOME ACTIVE ACTIVITIES AND ADLS

## 2022-09-02 ASSESSMENT — PAIN DESCRIPTION - FREQUENCY
FREQUENCY: INTERMITTENT
FREQUENCY: INTERMITTENT

## 2022-09-02 ASSESSMENT — PAIN DESCRIPTION - PAIN TYPE
TYPE: ACUTE PAIN
TYPE: CHRONIC PAIN

## 2022-09-02 NOTE — PROGRESS NOTES
Physical Therapy  Physical Therapy Initial Evaluation/Plan of Care    Room #:  4034/5204-58  Patient Name: Basilio Parra  YOB: 1950  MRN: [id number]    Date of Service: 9/2/2022     Tentative placement recommendation: Subacute rehab  Equipment recommendation: To be determined      Evaluating Physical Therapist: Arie Camarena PT, DPT #[id number]      Specific Provider Orders/Date/Referring Provider :     09/02/22 0330    PT eval and treat  Start:  09/02/22 0330,   End:  09/02/22 0330,   ONE TIME,   Standing Count:  1 Occurrences,   Cindi Fuller MD Acknowledge New     Admitting Diagnosis:   CHF with unknown LVEF ([facility] [I50.9]      Surgery: none      Patient Active Problem List   Diagnosis    Chronic bilateral low back pain with bilateral sciatica    CHF with unknown LVEF ([facility] [address]    Primary hypertension    Type 2 diabetes mellitus without complication, without long-term current use of insulin (HCC)    Iron deficiency anemia due to chronic blood loss    Acute cystitis without hematuria        ASSESSMENT of Current Deficits Patient exhibits decreased strength, balance, and endurance impairing functional mobility, transfers, gait , gait distance, and tolerance to activity. Pt required max encouragement to participate in session and declined standing d/t tireness. Pt agreeable to seated exercises sitting EOB then wanted to be returned to bed to nap.         PHYSICAL THERAPY  PLAN OF CARE       Physical therapy plan of care is established based on physician order,  patient diagnosis and clinical assessment    Current Treatment Recommendations:    -Bed Mobility: Lower extremity exercises  and Trunk control activities   -Sitting Balance: Incorporate reaching activities to activate trunk muscles , Hands on support to maintain midline , Facilitate active trunk muscle engagement , Facilitate postural control in all planes , and Engage in core activities to allow for movement within base of support -Standing Balance: Perform strengthening exercises in standing to promote motor control with or without upper extremity support , Instruct patient on adequate base of support to maintain balance, and Challenge balance utilizing reaching  activities beyond center of gravity    -Transfers: Provide instruction on proper hand and foot position for adequate transfer of weight onto lower extremities and use of gait device if needed, Cues for hand placement, technique and safety. Provide stabilization to prevent fall , Facilitate weight shift forward on to lower extremities and provide necessary stabilization of bilateral lower extremities , Support transfer of weight on to lower extremities, and Assist with extension of knees trunk and hip to accept weight transfer   -Gait: Gait training, Standing activities to improve: base of support, weight shift, weight bearing , Exercises to improve trunk control, Exercises to improve hip and knee control, Performance of protected weight bearing activities, and Activities to increase weight bearing   -Endurance: Utilize Supervised activities to increase level of endurance to allow for safe functional mobility including transfers and gait  and Use graduated activities to promote good breathing techniques and provide support and education to maximize respiratory function  -Stairs: Stair training with instruction on proper technique and hand placement on rail    PT long term treatment goals are located in below grid    Patient and or family understand(s) diagnosis, prognosis, and plan of care. Frequency of treatments: Patient will be seen  daily.          Prior Level of Function: Patient ambulated independently   Rehab Potential: fair + for baseline    Past medical history:   Past Medical History:   Diagnosis Date    Chronic back pain 2018    10/10 on the pain scale    Hypertension     Scoliosis     Type 2 diabetes mellitus without complication ([facility] [address]      Past Surgical History: Procedure Laterality Date    FINGER TRIGGER RELEASE Right 08/14/2018    right thumb    HYSTERECTOMY (CERVIX STATUS UNKNOWN)      [place] INCISE FINGER TENDON SHEATH Right 8/14/2018    RIGHT THUMB TRIGGER THUMB RELEASE performed by Dimitris Mario DO at [facility] Right 2002    RCR    WISDOM TOOTH EXTRACTION         SUBJECTIVE:    Precautions: Up with assistance, falls, alarm, and L knee karel      Social history: Patient lives alone in a two story home bedroom and bathroom 2nd floor full flight stairs with Rail  with 1 step  to enter without [address] owned: [place],      [address] [facility] [address]   How much difficulty turning over in bed?: A Lot  How much difficulty sitting down on / standing up from a chair with arms?: Unable  How much difficulty moving from lying on back to sitting on side of bed?: A Lot  How much help from another person moving to and from a bed to a chair?: Total  How much help from another person needed to walk in hospital room?: Total  How much help from another person for climbing 3-5 steps with a railing?: Total  AM-PAC Inpatient Mobility Raw Score : 8  AM-PAC Inpatient T-Scale Score : 28.52  Mobility Inpatient CMS 0-100% Score: 86.62  Mobility Inpatient CMS G-Code Modifier : CM    Nursing cleared patient for PT evaluation. The admitting diagnosis and active problem list as listed above have been reviewed prior to the initiation of this evaluation. OBJECTIVE;   Initial Evaluation  Date: 9/2/2022 Treatment Date:     Short Term/ Long Term   Goals   Was pt agreeable to Eval/treatment? Yes  To be met in 3 days   Pain level   0/10       Bed Mobility    Rolling: Moderate assist of 1    Supine to sit: Moderate assist of 1    Sit to supine: Moderate assist of 1    Scooting: Moderate assist of 1    Rolling: Independent    Supine to sit:  Independent    Sit to supine: Independent    Scooting: Independent     Transfers Sit to stand: Not assessed  Pt declined  Sit to stand: Supervision     Ambulation    not assessed     100 feet using  least restrictive device versus no device with Supervision     Stair negotiation: ascended and descended   Not assessed    12 stairs with 1 HR with Supervision   ROM Within functional limits    Increase range of motion 10% of affected joints    Strength BUE:  refer to OT eval  RLE:  3+/5  LLE:  3+/5  Increase strength in affected mm groups by 1/3 grade   Balance Sitting EOB:  fair -  Dynamic Standing:  not assessed   Sitting EOB:  fair +  Dynamic Standing: fair      Patient is Alert & Oriented x person and place and follows directions but pleasantly confused  Sensation:  Patient  denies numbness/tingling   Edema:  no   Endurance: poor +    Vitals: room air   Blood Pressure at rest  Blood Pressure during session    Heart Rate at rest  Heart Rate during session    SPO2 at rest %  SPO2 during session %     Patient education  Patient educated on role of Physical Therapy, risks of immobility, safety and plan of care, energy conservation,  importance of mobility while in hospital , ankle pumps, quad set and glut set for edema control, blood clot prevention, importance and purpose of adaptive device and adjusted to proper height for the patient. , safety , and stair training      Patient response to education:   Pt verbalized understanding Pt demonstrated skill Pt requires further education in this area   Yes Partial Yes      Treatment:  Patient practiced and was instructed/facilitated in the following treatment: Patient Sat edge of bed 15 minutes with Minimal assist of 1 to increase dynamic sitting balance and activity tolerance. Pt performed bed mobility, transfers, sat EOB, seated exercises sitting EOB. Therapeutic Exercises:  ankle pumps, heel raises, long arc quad, and seated marching  x 10-15 reps.        At end of session, patient in bed with alarm call light and phone within reach,  all lines and tubes intact, nursing notified. Patient would benefit from continued skilled Physical Therapy to improve functional independence and quality of life. Patient's/ family goals   rehab    Time in  1200  Time out  1220    Total Treatment Time  0 minutes    Evaluation time includes thorough review of current medical information, gathering information on past medical history/social history and prior level of function, completion of standardized testing/informal observation of tasks, assessment of data, and development of Plan of care and goals.      CPT codes:  Low Complexity PT evaluation (14630)  No treatment billed    Rocco Peralta PT

## 2022-09-02 NOTE — PROGRESS NOTES
Date:2022  Patient Name: Brenda Pickens  MRN: [id number]  : 1950  ROOM #: 0428/8889-20    Occupational Therapy order received, chart reviewed and evaluation attempted x 2 this date. Patient initially declined this AM due to n/v and patient difficult to awaken from sleep when re-attempted in the PM. Will continue to follow-up.      Everardo Sanches OTR/L #[id number]

## 2022-09-02 NOTE — H&P
History and Physical      CHIEF COMPLAINT:  sob    History of Present Illness: pt is a 73years old that fell at home was brought by ems and was found to be in chf and high troponin so it was decided to admit pt ,start on diuretics and consult cardiology       Past Medical History:   Diagnosis Date    Chronic back pain 2018    10/10 on the pain scale    Hypertension     Scoliosis     Type 2 diabetes mellitus without complication ([address]        Past Surgical History:   Procedure Laterality Date    FINGER TRIGGER RELEASE Right 08/14/2018    right thumb    HYSTERECTOMY (CERVIX STATUS UNKNOWN)      [place] INCISE FINGER TENDON SHEATH Right 8/14/2018    RIGHT THUMB TRIGGER THUMB RELEASE performed by Mariah Vasquez DO at [facility] Right 2002    RCR    WISDOM TOOTH EXTRACTION         Medications Prior to Admission:    Medications Prior to Admission: glipiZIDE (GLUCOTROL) 5 MG tablet,   omeprazole (PRILOSEC) 20 MG delayed release capsule, TAKE ONE CAPSULE BY MOUTH EVERY DAY, NEED TO MAKE APPOINTMENT  aspirin 325 MG tablet, Take 325 mg by mouth daily  HYDROcodone-acetaminophen (NORCO) 5-325 MG per tablet, Take 1 tablet by mouth every 6 hours as needed for Pain.  metFORMIN (GLUCOPHAGE) 500 MG tablet, Take 500 mg by mouth 2 times daily (with meals)  quinapril-hydrochlorothiazide (ACCURETIC) 20-12.5 MG per tablet, Take 1 tablet by mouth 2 times daily  topiramate (TOPAMAX) 100 MG tablet, Take 200 mg by mouth nightly  atenolol (TENORMIN) 50 MG tablet, Take 50 mg by mouth 2 times daily  Insulin Detemir (LEVEMIR FLEXTOUCH SC), Inject 10 mg into the skin daily    Allergies:    Patient has no known allergies. Social History:    reports that she has been smoking. She has been smoking an average of .5 packs per day. She has never used smokeless tobacco. She reports that she does not drink alcohol and does not use drugs. Family History:   family history includes Cancer in her father.     REVIEW OF SYSTEMS:  As above in the HPI, otherwise negative. PHYSICAL EXAM:    Vitals:  /61   Pulse 77   Temp 97.9 °F (36.6 °C) (Oral)   Resp 22   Ht 5' 2\" (1.575 m)   Wt 245 lb (111.1 kg)   SpO2 93%   BMI 44.81 kg/m²     General:  Awake, alert, oriented X 3. Well developed, well nourished, well groomed. No apparent distress. HEENT:  Normocephalic, atraumatic. Pupils equal, round, reactive to light. No scleral icterus. No conjunctival injection. Normal lips, teeth, and gums. No nasal discharge. Neck:  Supple,positive jvd and hjr  Heart:  RRR, no murmurs, gallops, or rubs  Lungs:  bilateral rales  Abdomen:   Bowel sounds present, soft, nontender, no masses, no organomegaly, no peritoneal signs  Extremities:  No clubbing, cyanosis, or edema  Skin:  Warm and dry, no open lesions or rash  Neuro:  Cranial nerves 2-12 intact, no focal deficits  Breast: deferred  Rectal: deferred  Genitalia:  deferred    LABS:  CBC with Differential:    Lab Results   Component Value Date/Time    WBC 7.8 09/01/2022 08:24 PM    RBC 4.50 09/01/2022 08:24 PM    HGB 8.9 09/01/2022 08:24 PM    HCT 33.0 09/01/2022 08:24 PM     09/01/2022 08:24 PM    MCV 73.3 09/01/2022 08:24 PM    MCH 19.8 09/01/2022 08:24 PM    MCHC 27.0 09/01/2022 08:24 PM    RDW 20.2 09/01/2022 08:24 PM    LYMPHOPCT 13.9 09/01/2022 08:24 PM    MONOPCT 0.9 09/01/2022 08:24 PM    BASOPCT 0.5 09/01/2022 08:24 PM    MONOSABS 0.08 09/01/2022 08:24 PM    LYMPHSABS 1.09 09/01/2022 08:24 PM    EOSABS 0.13 09/01/2022 08:24 PM    BASOSABS 0.00 09/01/2022 08:24 PM     CMP:    Lab Results   Component Value Date/Time     09/01/2022 08:24 PM    K 3.8 09/01/2022 08:24 PM     09/01/2022 08:24 PM    CO2 32 09/01/2022 08:24 PM    BUN 14 09/01/2022 08:24 PM    CREATININE 1.1 09/01/2022 08:24 PM    GFRAA 59 09/01/2022 08:24 PM    LABGLOM 59 09/01/2022 08:24 PM    GLUCOSE 85 09/01/2022 08:24 PM    CALCIUM 8.8 09/01/2022 08:24 PM     Troponin:  No results found for: TROPONINI      ASSESSMENT:      Principal Problem:    CHF with unknown LVEF (HCC)  Active Problems:    Primary hypertension    Type 2 diabetes mellitus without complication, without long-term current use of insulin (HCC)    Iron deficiency anemia due to chronic blood loss    Acute cystitis without hematuria    Chronic bilateral low back pain with bilateral sciatica  Resolved Problems:    * No resolved hospital problems.  *      PLAN:    1) Chf:start on lasix and get an echo  2) iron def anemia:we will consult gi as an outpatient  3)uti:start on rocephin          Please note that over 50 minutes was spent in evaluating the patient, review of records and results, discussion with staff/family, etc.      Electronically signed by Erica Gabriel MD on 9/2/2022 at 5:50 AM

## 2022-09-02 NOTE — CONSULTS
Inpatient [facility] Consultation      Reason for Consult: CHF    Consulting Physician: Dr. Artie Caballero    Requesting Physician:  Dr. Rah Mata    Date of Consultation: 9/2/2022    HISTORY OF PRESENT ILLNESS:   Patient is a 73year old AAF not previously known to [facility]. She is being seen in consultation this hospital admission by Dr. Artie Caballero for evaluation and recommendations regarding CHF. PMH: HTN, current tobacco abuse, insulin requiring T2DM, GERD, morbid obesity, questionable CKD/chronic anemia. Denies history of HLD, SHAVONNE, CAD, MI, CHF, VHD or cardiac arrhythmia. Denies prior cardiac testing    Patient presented to [facility] on September 1, 2022 after a fall. Of note, patient is somewhat a poor historian regarding her reason for hospital presentation. States day of ED evaluation, \"she somehow ended up on the floor and couldn't get up\". She then denies fall or LOC, but is unsure how she ended up on the floor in the first place. Denies injury  Notes of [place] and peripheral edema over the past 2-3 weeks. Denies CP, nausea, diaphoresis, dizziness, palpitations, near syncope. Denies orthopnea, PND    Please note: past medical records were reviewed per electronic medical record (EMR) - see detailed reports under Past Medical/ Surgical History. PAST MEDICAL HISTORY:    Morbid obesity   GERD  Insulin requiring T2DM  HTN  Current tobacco abuse   ? CKD  ? Chronic anemia    PAST SURGICAL HISTORY:    Past Surgical History:   Procedure Laterality Date    FINGER TRIGGER RELEASE Right 08/14/2018    right thumb    HYSTERECTOMY (CERVIX STATUS UNKNOWN)      FL INCISE FINGER TENDON SHEATH Right 8/14/2018    RIGHT THUMB TRIGGER THUMB RELEASE performed by Adalgisa Schroeder DO at [facility] Right 2002    RCR    WISDOM TOOTH EXTRACTION         HOME MEDICATIONS:  Prior to Admission medications    Medication Sig Start Date End Date Taking?  Authorizing Provider   glipiZIDE (GLUCOTROL) 5 MG tablet  5/9/18   Historical Provider, MD   omeprazole (PRILOSEC) 20 MG delayed release capsule TAKE ONE CAPSULE BY MOUTH EVERY DAY, NEED TO MAKE APPOINTMENT 5/26/18   Historical Provider, MD   aspirin 325 MG tablet Take 325 mg by mouth daily    Historical Provider, MD   HYDROcodone-acetaminophen (NORCO) 5-325 MG per tablet Take 1 tablet by mouth every 6 hours as needed for Pain.     Historical Provider, MD   metFORMIN (GLUCOPHAGE) 500 MG tablet Take 500 mg by mouth 2 times daily (with meals)    Historical Provider, MD   quinapril-hydrochlorothiazide (ACCURETIC) 20-12.5 MG per tablet Take 1 tablet by mouth 2 times daily    Historical Provider, MD   topiramate (TOPAMAX) 100 MG tablet Take 200 mg by mouth nightly    Historical Provider, MD   atenolol (TENORMIN) 50 MG tablet Take 50 mg by mouth 2 times daily    Historical Provider, MD   Insulin Detemir (LEVEMIR FLEXTOUCH SC) Inject 10 mg into the skin daily    Historical Provider, MD       CURRENT MEDICATIONS:      Current Facility-Administered Medications:     enoxaparin (LOVENOX) injection 105 mg, 1 mg/kg, SubCUTAneous, Daily, Silvana Braga MD    furosemide (LASIX) injection 40 mg, 40 mg, IntraVENous, Daily, Silvana Braga MD    aspirin chewable tablet 81 mg, 81 mg, Oral, Daily, Silvana Braga MD    insulin lispro (HUMALOG) injection vial 0-8 Units, 0-8 Units, SubCUTAneous, TID WC, Silvana Braga MD    glucose chewable tablet 16 g, 4 tablet, Oral, PRN, Silvana Braga MD    dextrose bolus 10% 125 mL, 125 mL, IntraVENous, PRN **OR** dextrose bolus 10% 250 mL, 250 mL, IntraVENous, PRN, Silvana Braga MD    glucagon (rDNA) injection 1 mg, 1 mg, SubCUTAneous, PRN, Silvana Braga MD    dextrose 10 % infusion, , IntraVENous, Continuous PRN, Silvana Braga MD    ondansetron (ZOFRAN) injection 4 mg, 4 mg, IntraVENous, Q6H PRN **OR** ondansetron (ZOFRAN-ODT) disintegrating tablet 4 mg, 4 mg, Oral, Q8H PRN, [place] Mariela Braga MD, 4 mg at 09/02/22 0344    atenolol (TENORMIN) tablet 50 mg, 50 mg, Oral, BID, Silvana Braga MD    HYDROcodone-acetaminophen (NORCO) 5-325 MG per tablet 1 tablet, 1 tablet, Oral, Q6H PRN, Kanchan Rodriguez MD, 1 tablet at 09/02/22 0428    pantoprazole (PROTONIX) tablet 40 mg, 40 mg, Oral, QAM , Silvana Braga MD    lisinopril (PRINIVIL;ZESTRIL) tablet 20 mg, 20 mg, Oral, Daily **AND** hydroCHLOROthiazide (HYDRODIURIL) tablet 12.5 mg, 12.5 mg, Oral, BID, Silvana Braga MD    metFORMIN (GLUCOPHAGE) tablet 1,000 mg, 1,000 mg, Oral, BID , Silvana Braga MD    ferrous sulfate (IRON 325) tablet 325 mg, 325 mg, Oral, BID , Silvana Braga MD    cefTRIAXone (ROCEPHIN) 1,000 mg in sterile water 10 mL IV syringe, 1,000 mg, IntraVENous, Q24H, Silvana Braga MD, 1,000 mg at 09/02/22 9309      ALLERGIES:  Patient has no known allergies. SOCIAL HISTORY:    Lives alone, does not require assistance with ambulation. Current tobacco smoker, smokes 1 pack every 2-3 days x 20 years. Denies former/current alcohol or illicit drug use     FAMILY HISTORY:   Denies pertinent cardiac family medical history to me at this time       REVIEW OF SYSTEMS:     Negative except as noted above in HPI      PHYSICAL EXAM:   /62   Pulse 70   Temp 97.8 °F (36.6 °C) (Oral)   Resp 18   Ht 5' 2\" (1.575 m)   Wt 245 lb (111.1 kg)   SpO2 94%   BMI 44.81 kg/m²   CONST:  Well developed, morbidly obese AAF who appears stated age. Awake, alert, cooperative, no apparent distress. HEENT:   Head- Normocephalic, atraumatic. Eyes- Conjunctivae pink, anicteric. Neck-  No stridor, trachea midline, no apparent jugular venous distention. CHEST: Chest symmetrical and non-tender to palpation. No accessory muscle use or intercostal retractions. RESPIRATORY: Lung sounds - diminished with rare rales  CARDIOVASCULAR:     No noted carotid bruit.   Heart Ausculation- Regular rate and rhythm, no significant murmur appreciated   PV: 1-2+ bilateral lower extremity edema. Pedal pulses palpable, no clubbing or cyanosis. ABDOMEN: Soft, non-tender to light palpation. Bowel sounds present. MS: Good muscle strength and tone. No atrophy or abnormal movements. SKIN: Warm and dry. NEURO / PSYCH: Oriented to person, place and time. Speech clear and appropriate. Follows all commands. Pleasant affect. DATA:    Telemetry: SR with HR in the 70s    Diagnostic:  All diagnostic testing and lab work thus far this admission reviewed in detail. CXR 9/1/2022  Impression  Pattern of perihilar vascular congestion/interstitial edema of  mild-to-moderate degree of severity. Please correlate clinically.       Intake/Output Summary (Last 24 hours) at 9/2/2022 0742  Last data filed at 9/2/2022 0432  Gross per 24 hour   Intake --   Output 140 ml   Net -140 ml       Labs:   CBC:   Recent Labs     09/01/22 2024   WBC 7.8   HGB 8.9*   HCT 33.0*        BMP:   Recent Labs     09/01/22 2024 09/02/22  0540    143   K 3.8 3.8   CO2 32* 28   BUN 14 13   CREATININE 1.1* 1.1*   LABGLOM 59 59   CALCIUM 8.8 8.8     CARDIAC ENZYMES:  Recent Labs     09/01/22 2024   CKTOTAL 112     FASTING LIPID PANEL:  Lab Results   Component Value Date/Time    CHOL 101 09/02/2022 05:40 AM    HDL 38 09/02/2022 05:40 AM    LDLCALC 46 09/02/2022 05:40 AM    TRIG 85 09/02/2022 05:40 AM      Ref Range & Units 09/01/22 2024   Troponin, High Sensitivity 0 - 9 ng/L 27 High       Component Ref Range & Units 09/02/22 0540   Troponin, High Sensitivity 0 - 9 ng/L 28 High          Ref Range & Units 09/01/22 2024    Total CK 20 - 180 U/L 112       Ref Range & Units 09/01/22 2024   Pro-BNP 0 - 125 pg/mL 10,646 High        09/01/22 2016    Color, UA Straw/Yellow Yellow    Clarity, UA Clear SLCLOUDY    Glucose, Ur Negative mg/dL Negative    Bilirubin Urine Negative Negative    Ketones, Urine Negative mg/dL Negative    Specific Gravity, UA 1.005 - 1.030 1.025    Blood, Urine Negative SMALL Abnormal     pH, UA 5.0 - 9.0 ____________________________________________________________________  I independently interviewed and examined the patient. I have reviewed the above documentation completed by the MIKA. Please see my additional contributions to the HPI, physical exam, and assessment / medical decision making. HPI, ROS, PMH, PSH, [address], [place], and medications independently reviewed (agree; see above documentation)    History of Present Illness:  Currently with no chest pain, respiratory distress, or palpitations. SR on EKG and telemetry.     Review of Systems:   Cardiac: As per HPI  General: No fever, chills  Pulmonary: As per HPI  HEENT: No visual disturbances, difficult swallowing  GI: No nausea, vomiting  : No dysuria, hematuria  Endocrine: No thyroid disease, +DM  Musculoskeletal: CASILLAS x 4, no focal motor deficits  Skin: Intact, no rashes  Neuro: No headache, seizures  Psych: Currently with no depression, anxiety    Physical Exam:  /60   Pulse 81   Temp 97.8 °F (36.6 °C) (Oral)   Resp 18   Ht 5' 2\" (1.575 m)   Wt 243 lb 3.2 oz (110.3 kg)   SpO2 94%   BMI 44.48 kg/m²   Wt Readings from Last 3 Encounters:   09/02/22 243 lb 3.2 oz (110.3 kg)   08/29/18 230 lb (104.3 kg)   08/14/18 237 lb (107.5 kg)     Appearance: Awake, alert, no acute respiratory distress  Skin: Intact, no rash  Head: Normocephalic, atraumatic  Eyes: EOMI, no conjunctival erythema  ENMT: No pharyngeal erythema, MMM, no rhinorrhea  Neck: Supple, no carotid bruits  Lungs: Decreased BS B/L, no wheezing  Cardiac: Regular rate and rhythm, +S1S2, no murmurs apparent  Abdomen: Soft, nontender, +bowel sounds  Extremities: Moves all extremities x 4, +lower extremity edema  Neurologic: No focal motor deficits apparent, normal mood and affect    Laboratory Tests:  Recent Labs     09/01/22 2024 09/02/22  0540    143   K 3.8 3.8    102   CO2 32* 28   BUN 14 13   CREATININE 1.1* 1.1*   GLUCOSE 85 94   CALCIUM 8.8 8.8     No results found for: MG  No results for input(s): ALKPHOS, ALT, AST, PROT, BILITOT, BILIDIR, LABALBU in the last 72 hours. Recent Labs     09/01/22 2024   WBC 7.8   RBC 4.50   HGB 8.9*   HCT 33.0*   MCV 73.3*   MCH 19.8*   MCHC 27.0*   RDW 20.2*      MPV 9.8     Lab Results   Component Value Date    CKTOTAL 112 09/01/2022     Recent Labs     09/01/22 2024 09/02/22  0540   CKTOTAL 112  --    TROPHS 27* 28*       Lab Results   Component Value Date    CHOL 101 09/02/2022     Lab Results   Component Value Date    TRIG 85 09/02/2022     Lab Results   Component Value Date    HDL 38 09/02/2022     Lab Results   Component Value Date    LDLCALC 46 09/02/2022     Lab Results   Component Value Date    LABVLDL 17 09/02/2022     No results found for: [facility]  Recent Labs     09/01/22 2024   PROBNP 10,646*       Cardiac Tests:  EKG personally reviewed: SR, rate 79, NSSTT changes    Telemetry personally reviewed (date: 9/2/2022): SR, rate 60's    Echocardiogram reviewed: 9/2/22   Normal left ventricular systolic function. Ejection fraction is visually estimated at > 55%. Dilated right ventricle (RV:LV diastolic diameter ratio > 1) and reduced right ventricular function. There is doppler evidence of stage II diastolic dysfunction. Mild tricuspid regurgitation. PASP is estimated at 62 mmHg. - Results of today's echocardiogram outlined above  - Will order CTA chest  - Monitor BMP and I/O's with IV diuresis  - Outpatient sleep study  - Aggressive risk factor modifications / counseled re: tobacco cessation  - Monitor CBC  - Remainder of plan as outlined above    Thank you for allowing me to participate in your patient's care. Please feel free to contact me if you have any questions or concerns.     Thao Reed MD  [facility]) Cardiology

## 2022-09-02 NOTE — ED NOTES
Pt unable to ambulate independently. Grossly Incontinent of urine. States she lives home alone.       Kwasi Bearden RN  09/01/22 [id number]

## 2022-09-02 NOTE — CARE COORDINATION
Ss note:9/2/202211:36 AM No covid testing. Pt states she received the J [employer]J covid vaccine and one booster. Met with pt, she is alert/oriented times 3, resides home alone in 2 story 1 step to enter, bed/bath on upper level. PTA pt has cane, doesn't use it, does own homemaking chores, relays she drives. Pt is on 4 liters of oxygen at this time, does NOT have home 02. Therapy has been ordered. Pt relays she will discharge home, will await evals to revisit with pt at this time states she is not interested in rehab (medicare primary insurance.) No hx of HHC or SNF, pt relays she would be receptive to [facility] at discharge. Per pt primary decision maker if she becomes confused is her son Saurav Dacosta, ([phone]) sw used my cell phone to connect pt with her son today. Pt relays her son lives in [place] and works at [employer]. SW will follow. Plan at this time is home alone, receptive to [facility], on 4 liters NO HHOME 02, awaiting therapy evals.  MOHAMUD Rudd

## 2022-09-02 NOTE — ED NOTES
This RN assumed care of pt. Pt resting in bed, in no apparent distress. Son has left for the night.       Tayler Wong RN  09/02/22 [id number]

## 2022-09-03 ENCOUNTER — APPOINTMENT (OUTPATIENT)
Dept: ULTRASOUND IMAGING | Age: 72
DRG: 291 | End: 2022-09-03
Payer: MEDICARE

## 2022-09-03 LAB
ANION GAP SERPL CALCULATED.3IONS-SCNC: 14 MMOL/L (ref 7–16)
BUN BLDV-MCNC: 13 MG/DL (ref 6–23)
CALCIUM SERPL-MCNC: 8.4 MG/DL (ref 8.6–10.2)
CHLORIDE BLD-SCNC: 101 MMOL/L (ref 98–107)
CO2: 27 MMOL/L (ref 22–29)
CREAT SERPL-MCNC: 1.1 MG/DL (ref 0.5–1)
FERRITIN: 20 NG/ML
FOLATE: 3.9 NG/ML (ref 4.8–24.2)
GFR AFRICAN AMERICAN: 59
GFR NON-AFRICAN AMERICAN: 59 ML/MIN/1.73
GLUCOSE BLD-MCNC: 105 MG/DL (ref 74–99)
HCT VFR BLD CALC: 34.9 % (ref 34–48)
HEMOGLOBIN: 8.9 G/DL (ref 11.5–15.5)
IMMATURE RETIC FRACT: 35.9 % (ref 3–15.9)
IRON SATURATION: 23 % (ref 15–50)
IRON: 69 MCG/DL (ref 37–145)
MCH RBC QN AUTO: 19.6 PG (ref 26–35)
MCHC RBC AUTO-ENTMCNC: 25.5 % (ref 32–34.5)
MCV RBC AUTO: 76.7 FL (ref 80–99.9)
METER GLUCOSE: 108 MG/DL (ref 74–99)
METER GLUCOSE: 110 MG/DL (ref 74–99)
METER GLUCOSE: 115 MG/DL (ref 74–99)
METER GLUCOSE: 126 MG/DL (ref 74–99)
PDW BLD-RTO: 20.9 FL (ref 11.5–15)
PLATELET # BLD: 348 E9/L (ref 130–450)
PMV BLD AUTO: 10.6 FL (ref 7–12)
POTASSIUM SERPL-SCNC: 5.5 MMOL/L (ref 3.5–5)
RBC # BLD: 4.55 E12/L (ref 3.5–5.5)
RETIC HGB EQUIVALENT: 18.1 PG (ref 28.2–36.6)
RETICULOCYTE ABSOLUTE COUNT: 0.09 E12/L
RETICULOCYTE COUNT PCT: 2 % (ref 0.4–1.9)
SODIUM BLD-SCNC: 142 MMOL/L (ref 132–146)
TOTAL IRON BINDING CAPACITY: 299 MCG/DL (ref 250–450)
VITAMIN B-12: 311 PG/ML (ref 211–946)
WBC # BLD: 7.8 E9/L (ref 4.5–11.5)

## 2022-09-03 PROCEDURE — 2700000000 HC OXYGEN THERAPY PER DAY

## 2022-09-03 PROCEDURE — 82962 GLUCOSE BLOOD TEST: CPT

## 2022-09-03 PROCEDURE — 6360000002 HC RX W HCPCS: Performed by: INTERNAL MEDICINE

## 2022-09-03 PROCEDURE — 93970 EXTREMITY STUDY: CPT

## 2022-09-03 PROCEDURE — 36415 COLL VENOUS BLD VENIPUNCTURE: CPT

## 2022-09-03 PROCEDURE — 82746 ASSAY OF FOLIC ACID SERUM: CPT

## 2022-09-03 PROCEDURE — 83540 ASSAY OF IRON: CPT

## 2022-09-03 PROCEDURE — 82728 ASSAY OF FERRITIN: CPT

## 2022-09-03 PROCEDURE — 83550 IRON BINDING TEST: CPT

## 2022-09-03 PROCEDURE — 85027 COMPLETE CBC AUTOMATED: CPT

## 2022-09-03 PROCEDURE — C9113 INJ PANTOPRAZOLE SODIUM, VIA: HCPCS | Performed by: INTERNAL MEDICINE

## 2022-09-03 PROCEDURE — A4216 STERILE WATER/SALINE, 10 ML: HCPCS | Performed by: INTERNAL MEDICINE

## 2022-09-03 PROCEDURE — 85045 AUTOMATED RETICULOCYTE COUNT: CPT

## 2022-09-03 PROCEDURE — 2580000003 HC RX 258: Performed by: INTERNAL MEDICINE

## 2022-09-03 PROCEDURE — 82607 VITAMIN B-12: CPT

## 2022-09-03 PROCEDURE — 99233 SBSQ HOSP IP/OBS HIGH 50: CPT | Performed by: INTERNAL MEDICINE

## 2022-09-03 PROCEDURE — 1200000000 HC SEMI PRIVATE

## 2022-09-03 PROCEDURE — 80048 BASIC METABOLIC PNL TOTAL CA: CPT

## 2022-09-03 PROCEDURE — 6370000000 HC RX 637 (ALT 250 FOR IP): Performed by: INTERNAL MEDICINE

## 2022-09-03 RX ORDER — ENOXAPARIN SODIUM 100 MG/ML
40 INJECTION SUBCUTANEOUS DAILY
Status: DISCONTINUED | OUTPATIENT
Start: 2022-09-04 | End: 2022-09-06

## 2022-09-03 RX ADMIN — HYDROCODONE BITARTRATE AND ACETAMINOPHEN 1 TABLET: 5; 325 TABLET ORAL at 02:38

## 2022-09-03 RX ADMIN — FERROUS SULFATE TAB 325 MG (65 MG ELEMENTAL FE) 325 MG: 325 (65 FE) TAB at 16:50

## 2022-09-03 RX ADMIN — METFORMIN HYDROCHLORIDE 1000 MG: 1000 TABLET ORAL at 16:51

## 2022-09-03 RX ADMIN — CEFTRIAXONE SODIUM 1000 MG: 1 INJECTION, POWDER, FOR SOLUTION INTRAMUSCULAR; INTRAVENOUS at 05:34

## 2022-09-03 RX ADMIN — HYDROCODONE BITARTRATE AND ACETAMINOPHEN 1 TABLET: 5; 325 TABLET ORAL at 23:39

## 2022-09-03 RX ADMIN — HYDROCODONE BITARTRATE AND ACETAMINOPHEN 1 TABLET: 5; 325 TABLET ORAL at 16:50

## 2022-09-03 RX ADMIN — PANTOPRAZOLE SODIUM 40 MG: 40 INJECTION, POWDER, FOR SOLUTION INTRAVENOUS at 10:38

## 2022-09-03 RX ADMIN — FUROSEMIDE 40 MG: 10 INJECTION, SOLUTION INTRAMUSCULAR; INTRAVENOUS at 10:38

## 2022-09-03 RX ADMIN — HYDROCODONE BITARTRATE AND ACETAMINOPHEN 1 TABLET: 5; 325 TABLET ORAL at 10:37

## 2022-09-03 RX ADMIN — FERROUS SULFATE TAB 325 MG (65 MG ELEMENTAL FE) 325 MG: 325 (65 FE) TAB at 09:02

## 2022-09-03 RX ADMIN — ASPIRIN 81 MG CHEWABLE TABLET 81 MG: 81 TABLET CHEWABLE at 09:02

## 2022-09-03 RX ADMIN — ATENOLOL 50 MG: 25 TABLET ORAL at 09:00

## 2022-09-03 RX ADMIN — LISINOPRIL 20 MG: 20 TABLET ORAL at 09:02

## 2022-09-03 RX ADMIN — METFORMIN HYDROCHLORIDE 1000 MG: 1000 TABLET ORAL at 09:02

## 2022-09-03 RX ADMIN — ENOXAPARIN SODIUM 105 MG: 150 INJECTION SUBCUTANEOUS at 09:02

## 2022-09-03 ASSESSMENT — PAIN DESCRIPTION - ORIENTATION
ORIENTATION: LOWER;MID
ORIENTATION: RIGHT
ORIENTATION: RIGHT
ORIENTATION: OTHER (COMMENT)
ORIENTATION: RIGHT

## 2022-09-03 ASSESSMENT — PAIN SCALES - GENERAL
PAINLEVEL_OUTOF10: 9
PAINLEVEL_OUTOF10: 8
PAINLEVEL_OUTOF10: 0
PAINLEVEL_OUTOF10: 0
PAINLEVEL_OUTOF10: 10

## 2022-09-03 ASSESSMENT — PAIN DESCRIPTION - LOCATION
LOCATION: LEG
LOCATION: BACK
LOCATION: LEG
LOCATION: HEAD;LEG
LOCATION: HIP

## 2022-09-03 ASSESSMENT — PAIN DESCRIPTION - ONSET: ONSET: GRADUAL

## 2022-09-03 ASSESSMENT — PAIN DESCRIPTION - DESCRIPTORS
DESCRIPTORS: ACHING;DISCOMFORT;CRAMPING
DESCRIPTORS: ACHING;SORE
DESCRIPTORS: ACHING;DISCOMFORT;DULL
DESCRIPTORS: ACHING;DISCOMFORT;DULL;TIGHTNESS

## 2022-09-03 ASSESSMENT — PAIN DESCRIPTION - FREQUENCY: FREQUENCY: INTERMITTENT

## 2022-09-03 ASSESSMENT — PAIN - FUNCTIONAL ASSESSMENT
PAIN_FUNCTIONAL_ASSESSMENT: ACTIVITIES ARE NOT PREVENTED
PAIN_FUNCTIONAL_ASSESSMENT: PREVENTS OR INTERFERES SOME ACTIVE ACTIVITIES AND ADLS

## 2022-09-03 NOTE — PROGRESS NOTES
°C); Max - Temp  Av.5 °F (36.9 °C)  Min: 97.8 °F (36.6 °C)  Max: 98.8 °F (37.1 °C)  RESPIRATIONS RANGE: Resp  Av  Min: 16  Max: 18  PULSE RANGE: Pulse  Av.7  Min: 75  Max: 85  BLOOD PRESSURE RANGE:  Systolic (29IAM), XDD:346 , Min:111 , QSS:559   ; Diastolic (40QVA), HGQ:46, Min:51, Max:54    PULSE OXIMETRY RANGE: SpO2  Av %  Min: 94 %  Max: 97 %  24HR INTAKE/OUTPUT:    Intake/Output Summary (Last 24 hours) at 9/3/2022 1141  Last data filed at 9/3/2022 0437  Gross per 24 hour   Intake 600 ml   Output 2400 ml   Net -1800 ml       CONSTITUTIONAL: Awake, no distress, appears as stated age. HEENT: Normocephalic atraumatic. Pupils are equal and reactive bilaterally. Extraocular movements are intact. Pallor+  NECK: Supple, no JVD , no lymphadenopathy, no bruits, no thyromegaly  LUNGS: Diminished over the bases bilaterally. CARDIOVASCULAR: Regular rate and rhythm, no murmur rub or gallop. ABDOMEN: Soft, nontender, bowel sounds are positive, no organomegaly appreciated. Obese. NEUROLOGIC: Awake, alert, oriented x 3 , no focal deficits noted. EXT: 2+ edema bilateral lower extremities.     CBC with Differential:    Lab Results   Component Value Date/Time    WBC 7.8 2022 08:24 PM    RBC 4.50 2022 08:24 PM    HGB 8.9 2022 08:24 PM    HCT 33.0 2022 08:24 PM     2022 08:24 PM    MCV 73.3 2022 08:24 PM    MCH 19.8 2022 08:24 PM    MCHC 27.0 2022 08:24 PM    RDW 20.2 2022 08:24 PM    LYMPHOPCT 13.9 2022 08:24 PM    MONOPCT 0.9 2022 08:24 PM    BASOPCT 0.5 2022 08:24 PM    MONOSABS 0.08 2022 08:24 PM    LYMPHSABS 1.09 2022 08:24 PM    EOSABS 0.13 2022 08:24 PM    BASOSABS 0.00 2022 08:24 PM     CMP:    Lab Results   Component Value Date/Time     2022 05:45 AM    K 5.5 2022 05:45 AM    K 3.8 2022 08:24 PM     2022 05:45 AM    CO2 27 2022 05:45 AM    BUN 13 2022 05:45 AM    CREATININE 1.1 09/03/2022 05:45 AM    GFRAA 59 09/03/2022 05:45 AM    LABGLOM 59 09/03/2022 05:45 AM    GLUCOSE 105 09/03/2022 05:45 AM    CALCIUM 8.4 09/03/2022 05:45 AM             ASSESSMENT AND PLAN  1. Acute on chronic diastolic heart failure. Her 2D echo reveals an ejection fraction of 56% with diastolic dysfunction and right-sided heart failure with pulmonary hypertension. Continue with IV Lasix per cardiology. Her fluid balance is negative by about 1900 cc. Patient feels better today. Will monitor creatinine closely on IV diuretics. 2.  Acute respiratory failure secondary to above. She is requiring oxygen at 4 L/min. CTA of the chest without any evidence of pulmonary embolism. Discontinue therapeutic doses of Lovenox and put her on DVT prophylaxis if ultrasound of the legs is negative. 3.  Right-sided heart failure. Questionable secondary to obstructive sleep apnea. Patient will need a sleep study as outpatient. 4.  Diabetes mellitus type 2. Would continue with metformin p.o. And sliding scale insulin  5. Anemia most likely iron deficiency in which is chronic. Will check iron studies serum ferritin level B12 and folate level and occult blood in the stools. She will probably need a GI work-up as an outpatient. 6  Hypertension monitor blood pressure closely. 7.  Edema of bilateral lower extremities. Will check ultrasound rule out DVT.     Delfina Davis MD, MD.  11:41 AM  9/3/2022

## 2022-09-03 NOTE — PROGRESS NOTES
INPATIENT CARDIOLOGY FOLLOW-UP    Name: Gem Zapata    Age: 73 y.o. Date of Admission: 9/1/2022  7:43 PM    Date of Service: 9/3/2022    Chief Complaint: Follow-up for acute HFpEF/right-sided CHF, PHTN    Interim History:  No new overnight cardiac complaints. Currently with no complaints of CP, SOB, palpitations, dizziness, or lightheadedness. SR on EKG and telemetry.     Review of Systems:   Cardiac: As per HPI  Cardiac: As per HPI  General: No fever, chills  Pulmonary: As per HPI  HEENT: No visual disturbances, difficult swallowing  GI: No nausea, vomiting  : No dysuria, hematuria  Endocrine: No thyroid disease, +DM  Musculoskeletal: CASILLAS x 4, no focal motor deficits  Skin: Intact, no rashes  Neuro: No headache, seizures  Psych: Currently with no depression, anxiety    Problem List:  Patient Active Problem List   Diagnosis    Chronic bilateral low back pain with bilateral sciatica    CHF with unknown LVEF (HCC)    Primary hypertension    Type 2 diabetes mellitus without complication, without long-term current use of insulin (HCC)    Iron deficiency anemia due to chronic blood loss    Acute cystitis without hematuria    Acute on chronic congestive heart failure with right ventricular diastolic dysfunction (HCC)    Pulmonary HTN (HCC)    Tobacco abuse    Anemia       Allergies:  No Known Allergies    Current Medications:  Current Facility-Administered Medications   Medication Dose Route Frequency Provider Last Rate Last Admin    enoxaparin (LOVENOX) injection 105 mg  1 mg/kg SubCUTAneous Daily Silvana Braga MD   105 mg at 09/02/22 1320    furosemide (LASIX) injection 40 mg  40 mg IntraVENous Daily Silvana Braga MD   40 mg at 09/02/22 8889    aspirin chewable tablet 81 mg  81 mg Oral Daily Silvana Braga MD        insulin lispro (HUMALOG) injection vial 0-8 Units  0-8 Units SubCUTAneous TID  Silvana Braga MD        glucose chewable tablet 16 g  4 tablet Oral PRN Kassie Herring MD dextrose bolus 10% 125 mL  125 mL IntraVENous PRN Silvana Braga MD        Or    dextrose bolus 10% 250 mL  250 mL IntraVENous PRN Silvana Braga MD        glucagon (rDNA) injection 1 mg  1 mg SubCUTAneous PRN Silvana Braga MD        dextrose 10 % infusion   IntraVENous Continuous PRN Silvana Braga MD        ondansetron (ZOFRAN) injection 4 mg  4 mg IntraVENous Q6H PRN Silvana Braga MD        Or    ondansetron (ZOFRAN-ODT) disintegrating tablet 4 mg  4 mg Oral Q8H PRN Clyde Cavazos MD   4 mg at 09/02/22 1805    atenolol (TENORMIN) tablet 50 mg  50 mg Oral BID Clyde Cavazos MD        HYDROcodone-acetaminophen (NORCO) 5-325 MG per tablet 1 tablet  1 tablet Oral Q6H PRN lCyde Cavazos MD   1 tablet at 09/03/22 0238    lisinopril (PRINIVIL;ZESTRIL) tablet 20 mg  20 mg Oral Daily Silvana Braga MD        metFORMIN (GLUCOPHAGE) tablet 1,000 mg  1,000 mg Oral BID  Silvana Braga MD   1,000 mg at 09/02/22 1805    ferrous sulfate (IRON 325) tablet 325 mg  325 mg Oral BID  Silvana Braga MD   325 mg at 09/02/22 1805    cefTRIAXone (ROCEPHIN) 1,000 mg in sterile water 10 mL IV syringe  1,000 mg IntraVENous Q24H Marcial Braga MD   1,000 mg at 09/03/22 0534    perflutren lipid microspheres (DEFINITY) injection 1.65 mg  1.5 mL IntraVENous ONCE PRN Zane Pineda PA-C        pantoprazole (PROTONIX) 40 mg in sodium chloride (PF) 10 mL injection  40 mg IntraVENous Daily Silvana Braga MD   40 mg at 09/02/22 1320      dextrose         Physical Exam:  BP (!) 117/54   Pulse 85   Temp 98.8 °F (37.1 °C) (Oral)   Resp 16   Ht 5' 2\" (1.575 m)   Wt 243 lb 3.2 oz (110.3 kg)   SpO2 94%   BMI 44.48 kg/m²   Wt Readings from Last 3 Encounters:   09/02/22 243 lb 3.2 oz (110.3 kg)   08/29/18 230 lb (104.3 kg)   08/14/18 237 lb (107.5 kg)     Appearance: Awake, alert, no acute respiratory distress  Skin: Intact, no rash  Head: Normocephalic, atraumatic  Eyes: EOMI, no conjunctival erythema  ENMT: No reviewed: 9/2/22   Normal left ventricular systolic function. Ejection fraction is visually estimated at > 55%. Dilated right ventricle (RV:LV diastolic diameter ratio > 1) and reduced right ventricular function. There is doppler evidence of stage II diastolic dysfunction. Mild tricuspid regurgitation. PASP is estimated at 62 mmHg. CXR: 9/1/22  Pattern of perihilar vascular congestion/interstitial edema of   mild-to-moderate degree of severity. Please correlate clinically. CTA chest: 9/2/22  1. No acute pulmonary embolus is identified   2.  Suspicious for interstitial pulmonary edema, and a suggestion of prominent   subcutaneous edema laterally most evident in the abdomen       ASSESSMENT / PLAN:  Acute HFpEF/right-sided CHF, PHTN  No prior TTE for review  Appears hypervolemic on exam, proBNP 10,000  Acute hypoxic and hypercapnic respiratory failure, currently on 4L NC  Mildly elevated hs-troponin (27 --> 28) -- no chest pain  Coronary artery calcifications on CTA chest  Fall with inability to get up prior to admission  MIKAELA versus CKD -- Cr 1.1 --> 1.1 --> 1.1  Anemia -- Hgb 8.9  HTN, controlled  Insulin requiring T2DM  Morbid obesity   Probable SHAVONNE  GERD   Ongoing tobacco abuse  Mild TR  Pulmonary HTN  Hyperkalemia -- K 3.8 --> 3.8 --> 5.5 (hemolyzed specimen)     - Results of 9/2/22 echocardiogram and 9/2/22 CTA chest reviewed with the patient today  - LE US pending  - Anemia work-up / monitor CBC  - Monitor BMP and I/O's with ongoing IV diuresis (net negative 2.1 L)  - Continue BB / on ACE-I (already received dose this AM --> will hold further doses until repeat K performed; K 5.5 this AM, but specimen hemolyzed)  - Outpatient sleep study  - Aggressive risk factor modifications / counseled re: tobacco cessation  - Telemetry reviewed (SR)  - Serial echocardiograms  - Reassess for ischemic work-up once clinically improved    Greater than 35 minutes was spent counseling the patient, reviewing the rationale for the above recommendations and reviewing the patient's current medication list, problem list and results of all previously ordered testing.     Niecy Bettencourt MD  [facility]) Cardiology

## 2022-09-04 LAB
ALBUMIN SERPL-MCNC: 2.8 G/DL (ref 3.5–5.2)
ALP BLD-CCNC: 71 U/L (ref 35–104)
ALT SERPL-CCNC: 8 U/L (ref 0–32)
AMPHETAMINE SCREEN, URINE: NOT DETECTED
ANION GAP SERPL CALCULATED.3IONS-SCNC: 6 MMOL/L (ref 7–16)
ANISOCYTOSIS: ABNORMAL
AST SERPL-CCNC: 11 U/L (ref 0–31)
BARBITURATE SCREEN URINE: NOT DETECTED
BASOPHILS ABSOLUTE: 0 E9/L (ref 0–0.2)
BASOPHILS RELATIVE PERCENT: 0.4 % (ref 0–2)
BENZODIAZEPINE SCREEN, URINE: NOT DETECTED
BILIRUB SERPL-MCNC: <0.2 MG/DL (ref 0–1.2)
BUN BLDV-MCNC: 13 MG/DL (ref 6–23)
CALCIUM SERPL-MCNC: 8.5 MG/DL (ref 8.6–10.2)
CANNABINOID SCREEN URINE: NOT DETECTED
CHLORIDE BLD-SCNC: 97 MMOL/L (ref 98–107)
CO2: 34 MMOL/L (ref 22–29)
COCAINE METABOLITE SCREEN URINE: NOT DETECTED
CREAT SERPL-MCNC: 1.5 MG/DL (ref 0.5–1)
EOSINOPHILS ABSOLUTE: 0 E9/L (ref 0.05–0.5)
EOSINOPHILS RELATIVE PERCENT: 1.5 % (ref 0–6)
FENTANYL SCREEN, URINE: NOT DETECTED
GFR AFRICAN AMERICAN: 42
GFR NON-AFRICAN AMERICAN: 42 ML/MIN/1.73
GLUCOSE BLD-MCNC: 96 MG/DL (ref 74–99)
HCT VFR BLD CALC: 33.4 % (ref 34–48)
HEMOGLOBIN: 8.5 G/DL (ref 11.5–15.5)
HYPOCHROMIA: ABNORMAL
LYMPHOCYTES ABSOLUTE: 0.81 E9/L (ref 1.5–4)
LYMPHOCYTES RELATIVE PERCENT: 10.6 % (ref 20–42)
Lab: ABNORMAL
MAGNESIUM: 1.4 MG/DL (ref 1.6–2.6)
MCH RBC QN AUTO: 19.4 PG (ref 26–35)
MCHC RBC AUTO-ENTMCNC: 25.4 % (ref 32–34.5)
MCV RBC AUTO: 76.3 FL (ref 80–99.9)
METER GLUCOSE: 115 MG/DL (ref 74–99)
METER GLUCOSE: 86 MG/DL (ref 74–99)
METER GLUCOSE: 97 MG/DL (ref 74–99)
METHADONE SCREEN, URINE: NOT DETECTED
MONOCYTES ABSOLUTE: 0.07 E9/L (ref 0.1–0.95)
MONOCYTES RELATIVE PERCENT: 0.9 % (ref 2–12)
NEUTROPHILS ABSOLUTE: 6.59 E9/L (ref 1.8–7.3)
NEUTROPHILS RELATIVE PERCENT: 88.5 % (ref 43–80)
OPIATE SCREEN URINE: POSITIVE
OXYCODONE URINE: NOT DETECTED
PDW BLD-RTO: 20.5 FL (ref 11.5–15)
PHENCYCLIDINE SCREEN URINE: NOT DETECTED
PLATELET # BLD: 328 E9/L (ref 130–450)
PMV BLD AUTO: 9.7 FL (ref 7–12)
POIKILOCYTES: ABNORMAL
POLYCHROMASIA: ABNORMAL
POTASSIUM SERPL-SCNC: 3.4 MMOL/L (ref 3.5–5)
RBC # BLD: 4.38 E12/L (ref 3.5–5.5)
SODIUM BLD-SCNC: 137 MMOL/L (ref 132–146)
TARGET CELLS: ABNORMAL
TOTAL PROTEIN: 5.9 G/DL (ref 6.4–8.3)
URINE CULTURE, ROUTINE: NORMAL
WBC # BLD: 7.4 E9/L (ref 4.5–11.5)

## 2022-09-04 PROCEDURE — 97165 OT EVAL LOW COMPLEX 30 MIN: CPT

## 2022-09-04 PROCEDURE — 6370000000 HC RX 637 (ALT 250 FOR IP): Performed by: INTERNAL MEDICINE

## 2022-09-04 PROCEDURE — 99233 SBSQ HOSP IP/OBS HIGH 50: CPT | Performed by: INTERNAL MEDICINE

## 2022-09-04 PROCEDURE — 80053 COMPREHEN METABOLIC PANEL: CPT

## 2022-09-04 PROCEDURE — 2580000003 HC RX 258: Performed by: INTERNAL MEDICINE

## 2022-09-04 PROCEDURE — 97110 THERAPEUTIC EXERCISES: CPT

## 2022-09-04 PROCEDURE — 83735 ASSAY OF MAGNESIUM: CPT

## 2022-09-04 PROCEDURE — 97530 THERAPEUTIC ACTIVITIES: CPT

## 2022-09-04 PROCEDURE — 80307 DRUG TEST PRSMV CHEM ANLYZR: CPT

## 2022-09-04 PROCEDURE — 2700000000 HC OXYGEN THERAPY PER DAY

## 2022-09-04 PROCEDURE — 6360000002 HC RX W HCPCS: Performed by: INTERNAL MEDICINE

## 2022-09-04 PROCEDURE — 82962 GLUCOSE BLOOD TEST: CPT

## 2022-09-04 PROCEDURE — 85025 COMPLETE CBC W/AUTO DIFF WBC: CPT

## 2022-09-04 PROCEDURE — 1200000000 HC SEMI PRIVATE

## 2022-09-04 PROCEDURE — 97535 SELF CARE MNGMENT TRAINING: CPT

## 2022-09-04 PROCEDURE — 36415 COLL VENOUS BLD VENIPUNCTURE: CPT

## 2022-09-04 RX ORDER — BUMETANIDE 1 MG/1
1 TABLET ORAL DAILY
Status: DISCONTINUED | OUTPATIENT
Start: 2022-09-04 | End: 2022-09-14 | Stop reason: HOSPADM

## 2022-09-04 RX ORDER — POTASSIUM CHLORIDE 20 MEQ/1
40 TABLET, EXTENDED RELEASE ORAL ONCE
Status: COMPLETED | OUTPATIENT
Start: 2022-09-04 | End: 2022-09-04

## 2022-09-04 RX ADMIN — HYDROCODONE BITARTRATE AND ACETAMINOPHEN 1 TABLET: 5; 325 TABLET ORAL at 06:08

## 2022-09-04 RX ADMIN — POTASSIUM CHLORIDE 40 MEQ: 1500 TABLET, EXTENDED RELEASE ORAL at 11:19

## 2022-09-04 RX ADMIN — ASPIRIN 81 MG CHEWABLE TABLET 81 MG: 81 TABLET CHEWABLE at 08:24

## 2022-09-04 RX ADMIN — BUMETANIDE 1 MG: 1 TABLET ORAL at 11:19

## 2022-09-04 RX ADMIN — ENOXAPARIN SODIUM 40 MG: 100 INJECTION SUBCUTANEOUS at 08:24

## 2022-09-04 RX ADMIN — CEFTRIAXONE SODIUM 1000 MG: 1 INJECTION, POWDER, FOR SOLUTION INTRAMUSCULAR; INTRAVENOUS at 06:09

## 2022-09-04 RX ADMIN — FERROUS SULFATE TAB 325 MG (65 MG ELEMENTAL FE) 325 MG: 325 (65 FE) TAB at 08:24

## 2022-09-04 RX ADMIN — HYDROCODONE BITARTRATE AND ACETAMINOPHEN 1 TABLET: 5; 325 TABLET ORAL at 19:43

## 2022-09-04 RX ADMIN — METFORMIN HYDROCHLORIDE 1000 MG: 1000 TABLET ORAL at 16:24

## 2022-09-04 RX ADMIN — METFORMIN HYDROCHLORIDE 1000 MG: 1000 TABLET ORAL at 08:24

## 2022-09-04 RX ADMIN — FERROUS SULFATE TAB 325 MG (65 MG ELEMENTAL FE) 325 MG: 325 (65 FE) TAB at 16:25

## 2022-09-04 ASSESSMENT — PAIN SCALES - GENERAL: PAINLEVEL_OUTOF10: 7

## 2022-09-04 ASSESSMENT — PAIN DESCRIPTION - ORIENTATION: ORIENTATION: LOWER;MID

## 2022-09-04 ASSESSMENT — PAIN DESCRIPTION - DESCRIPTORS: DESCRIPTORS: ACHING;THROBBING

## 2022-09-04 ASSESSMENT — PAIN DESCRIPTION - LOCATION: LOCATION: BACK

## 2022-09-04 NOTE — PROGRESS NOTES
Department of Internal Medicine  General Internal Medicine  Attending Progress Note      SUBJECTIVE:    Patient seen and examined. Breathing is getting better. No chest pain. No cough  No nausea vomiting or diarrhea. No urinary complaints. Medications    Current Facility-Administered Medications: bumetanide (BUMEX) tablet 1 mg, 1 mg, Oral, Daily  enoxaparin (LOVENOX) injection 40 mg, 40 mg, SubCUTAneous, Daily  aspirin chewable tablet 81 mg, 81 mg, Oral, Daily  insulin lispro (HUMALOG) injection vial 0-8 Units, 0-8 Units, SubCUTAneous, TID   glucose chewable tablet 16 g, 4 tablet, Oral, PRN  dextrose bolus 10% 125 mL, 125 mL, IntraVENous, PRN **OR** dextrose bolus 10% 250 mL, 250 mL, IntraVENous, PRN  glucagon (rDNA) injection 1 mg, 1 mg, SubCUTAneous, PRN  dextrose 10 % infusion, , IntraVENous, Continuous PRN  ondansetron (ZOFRAN) injection 4 mg, 4 mg, IntraVENous, Q6H PRN **OR** ondansetron (ZOFRAN-ODT) disintegrating tablet 4 mg, 4 mg, Oral, Q8H PRN  atenolol (TENORMIN) tablet 50 mg, 50 mg, Oral, BID  HYDROcodone-acetaminophen (NORCO) 5-325 MG per tablet 1 tablet, 1 tablet, Oral, Q6H PRN  [Held by provider] lisinopril (PRINIVIL;ZESTRIL) tablet 20 mg, 20 mg, Oral, Daily **AND** [DISCONTINUED] hydroCHLOROthiazide (HYDRODIURIL) tablet 12.5 mg, 12.5 mg, Oral, BID  metFORMIN (GLUCOPHAGE) tablet 1,000 mg, 1,000 mg, Oral, BID   ferrous sulfate (IRON 325) tablet 325 mg, 325 mg, Oral, BID   perflutren lipid microspheres (DEFINITY) injection 1.65 mg, 1.5 mL, IntraVENous, ONCE PRN  pantoprazole (PROTONIX) 40 mg in sodium chloride (PF) 10 mL injection, 40 mg, IntraVENous, Daily    Physical    VITALS:  BP (!) 93/40 Comment: TREVER parada aware  Pulse 74   Temp 98.4 °F (36.9 °C) (Oral)   Resp 17   Ht 5' 2\" (1.575 m)   Wt 243 lb 6.4 oz (110.4 kg)   SpO2 100%   BMI 44.52 kg/m²   TEMPERATURE:  Current - Temp: 98.4 °F (36.9 °C);  Max - Temp  Av.2 °F (36.8 °C)  Min: 98 °F (36.7 °C)  Max: 98.4 °F (36.9 °C)  RESPIRATIONS RANGE: Resp  Av.8  Min: 17  Max: 18  PULSE RANGE: Pulse  Av  Min: 74  Max: 88  BLOOD PRESSURE RANGE:  Systolic (27ASY), QIY:389 , Min:93 , SNN:157   ; Diastolic (12HPX), CUN:33, Min:40, Max:62    PULSE OXIMETRY RANGE: SpO2  Av %  Min: 94 %  Max: 100 %  24HR INTAKE/OUTPUT:    Intake/Output Summary (Last 24 hours) at 2022 1355  Last data filed at 2022 1126  Gross per 24 hour   Intake 600 ml   Output 1200 ml   Net -600 ml       CONSTITUTIONAL: Awake, no distress, appears as stated age. HEENT: Normocephalic atraumatic. Pupils are equal and reactive bilaterally. Extraocular movements are intact. Pallor+  NECK: Supple, no JVD , no lymphadenopathy, no bruits, no thyromegaly  LUNGS: Diminished over the bases bilaterally. CARDIOVASCULAR: Regular rate and rhythm, no murmur rub or gallop. ABDOMEN: Soft, nontender, bowel sounds are positive, no organomegaly appreciated. Obese. NEUROLOGIC: Awake, alert, oriented x 3 , no focal deficits noted. EXT: 1+ edema bilateral lower extremities.     CBC with Differential:    Lab Results   Component Value Date/Time    WBC 7.4 2022 04:58 AM    RBC 4.38 2022 04:58 AM    HGB 8.5 2022 04:58 AM    HCT 33.4 2022 04:58 AM     2022 04:58 AM    MCV 76.3 2022 04:58 AM    MCH 19.4 2022 04:58 AM    MCHC 25.4 2022 04:58 AM    RDW 20.5 2022 04:58 AM    LYMPHOPCT 10.6 2022 04:58 AM    MONOPCT 0.9 2022 04:58 AM    BASOPCT 0.4 2022 04:58 AM    MONOSABS 0.07 2022 04:58 AM    LYMPHSABS 0.81 2022 04:58 AM    EOSABS 0.00 2022 04:58 AM    BASOSABS 0.00 2022 04:58 AM     CMP:    Lab Results   Component Value Date/Time     2022 04:58 AM    K 3.4 2022 04:58 AM    K 3.8 2022 08:24 PM    CL 97 2022 04:58 AM    CO2 34 2022 04:58 AM    BUN 13 2022 04:58 AM    CREATININE 1.5 2022 04:58 AM    GFRAA 42 2022 04:58 AM LABGLOM 42 09/04/2022 04:58 AM    GLUCOSE 96 09/04/2022 04:58 AM    PROT 5.9 09/04/2022 04:58 AM    LABALBU 2.8 09/04/2022 04:58 AM    CALCIUM 8.5 09/04/2022 04:58 AM    BILITOT <0.2 09/04/2022 04:58 AM    ALKPHOS 71 09/04/2022 04:58 AM    AST 11 09/04/2022 04:58 AM    ALT 8 09/04/2022 04:58 AM             ASSESSMENT AND PLAN  1. Acute on chronic diastolic heart failure. Her 2D echo reveals an ejection fraction of 84% with diastolic dysfunction and right-sided heart failure with pulmonary hypertension. Her fluid balance is negative by about 2800 cc  Patient better today. Creatinine up to 1.5 and iv Lasix changed to po bumex per Cardiology  2. Acute respiratory failure secondary to above. She is requiring oxygen at 4 L/min. CTA of the chest without any evidence of pulmonary embolism. Ultrasound of lower extremities negative for DVT. Lovenox therapeutic dose discontinued and placed on Lovenox for DVT prophylaxis. 3.  Right-sided heart failure. Questionable secondary to obstructive sleep apnea. Patient will need a sleep study as outpatient. 4.  Diabetes mellitus type 2. Would continue with metformin p.o. And sliding scale insulin  5. Anemia most likely iron deficiency in which is chronic. Iron studies with low ferritin and normal TIBC and iron levels. She will need a GI evaluation to rule out GI etiology of her iron deficiency anemia. 6  Hypertension monitor blood pressure closely. Lisinopril on hold because of her renal insufficiency. 7.  Edema of bilateral lower extremities. Ultrasound lower extremities negative for DVT. 8.  Acute kidney injury secondary to IV diuretics. Creatinine up to 1.5. Lasix IV discontinued and changed to Bumex p.o. Recheck labs in AM.  9.  Hypokalemia. Replace potassium.     Rebeca Metcalf MD, MD.  1:55 PM  9/4/2022

## 2022-09-04 NOTE — PROGRESS NOTES
INPATIENT CARDIOLOGY FOLLOW-UP    Name: Surjit Fu    Age: 73 y.o. Date of Admission: 9/1/2022  7:43 PM    Date of Service: 9/4/2022    Chief Complaint: Follow-up for acute HFpEF/right-sided CHF, PHTN    Interim History:  No new overnight cardiac complaints. Currently with no complaints of CP, SOB, palpitations, dizziness, or lightheadedness. SR on EKG and telemetry.     Review of Systems:   Cardiac: As per HPI  Cardiac: As per HPI  General: No fever, chills  Pulmonary: As per HPI  HEENT: No visual disturbances, difficult swallowing  GI: No nausea, vomiting  : No dysuria, hematuria  Endocrine: No thyroid disease, +DM  Musculoskeletal: CASILLAS x 4, no focal motor deficits  Skin: Intact, no rashes  Neuro: No headache, seizures  Psych: Currently with no depression, anxiety    Problem List:  Patient Active Problem List   Diagnosis    Chronic bilateral low back pain with bilateral sciatica    CHF with unknown LVEF (HCC)    Primary hypertension    Type 2 diabetes mellitus without complication, without long-term current use of insulin (HCC)    Iron deficiency anemia due to chronic blood loss    Acute cystitis without hematuria    Acute on chronic congestive heart failure with right ventricular diastolic dysfunction (HCC)    Pulmonary HTN (HCC)    Tobacco abuse    Anemia       Allergies:  No Known Allergies    Current Medications:  Current Facility-Administered Medications   Medication Dose Route Frequency Provider Last Rate Last Admin    potassium chloride (KLOR-CON M) extended release tablet 40 mEq  40 mEq Oral Once Elier Terry MD        enoxaparin (LOVENOX) injection 40 mg  40 mg SubCUTAneous Daily Elier Terry MD   40 mg at 09/04/22 0824    furosemide (LASIX) injection 40 mg  40 mg IntraVENous Daily Silvana Braga MD   40 mg at 09/03/22 1038    aspirin chewable tablet 81 mg  81 mg Oral Daily Silvana Braga MD   81 mg at 09/04/22 0824    insulin lispro (HUMALOG) injection vial 0-8 Units  0-8 Units SubCUTAneous TID  Silvana Braga MD        glucose chewable tablet 16 g  4 tablet Oral PRN Silvana Braga MD        dextrose bolus 10% 125 mL  125 mL IntraVENous PRN Silvana Braga MD        Or    dextrose bolus 10% 250 mL  250 mL IntraVENous PRN Silvana Braga MD        glucagon (rDNA) injection 1 mg  1 mg SubCUTAneous PRN Silvana Braga MD        dextrose 10 % infusion   IntraVENous Continuous PRN Silvana Braga MD        ondansetron (ZOFRAN) injection 4 mg  4 mg IntraVENous Q6H PRN Silvana Braga MD        Or    ondansetron (ZOFRAN-ODT) disintegrating tablet 4 mg  4 mg Oral Q8H PRN Basilio Hussein MD   4 mg at 09/02/22 1805    atenolol (TENORMIN) tablet 50 mg  50 mg Oral BID Bernadette Braga MD   50 mg at 09/03/22 0900    HYDROcodone-acetaminophen (NORCO) 5-325 MG per tablet 1 tablet  1 tablet Oral Q6H PRN Basilio Hussein MD   1 tablet at 09/04/22 0608    [Held by provider] lisinopril (PRINIVIL;ZESTRIL) tablet 20 mg  20 mg Oral Daily Silvana Braga MD   20 mg at 09/03/22 0902    metFORMIN (GLUCOPHAGE) tablet 1,000 mg  1,000 mg Oral BID  Silvana Braga MD   1,000 mg at 09/04/22 0824    ferrous sulfate (IRON 325) tablet 325 mg  325 mg Oral BID  Silvana Braga MD   325 mg at 09/04/22 0824    perflutren lipid microspheres (DEFINITY) injection 1.65 mg  1.5 mL IntraVENous ONCE PRN [facility] MICHELL Pineda        pantoprazole (PROTONIX) 40 mg in sodium chloride (PF) 10 mL injection  40 mg IntraVENous Daily Silvana Braga MD   40 mg at 09/03/22 1038      dextrose         Physical Exam:  BP (!) 93/40 Comment: TREVER parada aware  Pulse 74   Temp 98.4 °F (36.9 °C) (Oral)   Resp 17   Ht 5' 2\" (1.575 m)   Wt 243 lb 6.4 oz (110.4 kg)   SpO2 100%   BMI 44.52 kg/m²   Wt Readings from Last 3 Encounters:   09/03/22 243 lb 6.4 oz (110.4 kg)   08/29/18 230 lb (104.3 kg)   08/14/18 237 lb (107.5 kg)     Appearance: Awake, alert, no acute respiratory distress  Skin: Intact, no rash  Head: Normocephalic, atraumatic  Eyes: EOMI, no conjunctival erythema  ENMT: No pharyngeal erythema, MMM, no rhinorrhea  Neck: Supple, no carotid bruits  Lungs: Decreased BS B/L, no wheezing  Cardiac: Regular rate and rhythm, +S1S2, no murmurs apparent  Abdomen: Soft, nontender, +bowel sounds  Extremities: Moves all extremities x 4, +lower extremity edema  Neurologic: No focal motor deficits apparent, normal mood and affect    Intake/Output:    Intake/Output Summary (Last 24 hours) at 9/4/2022 0903  Last data filed at 9/4/2022 0639  Gross per 24 hour   Intake 485 ml   Output 1200 ml   Net -715 ml     No intake/output data recorded.     Laboratory Tests:  Recent Labs     09/02/22 0540 09/03/22 0545 09/04/22 0458    142 137   K 3.8 5.5* 3.4*    101 97*   CO2 28 27 34*   BUN 13 13 13   CREATININE 1.1* 1.1* 1.5*   GLUCOSE 94 105* 96   CALCIUM 8.8 8.4* 8.5*     No results found for: MG  Recent Labs     09/04/22 0458   ALKPHOS 71   ALT 8   AST 11   PROT 5.9*   BILITOT <0.2   LABALBU 2.8*     Recent Labs     09/01/22 2024 09/03/22 0545 09/04/22 0458   WBC 7.8 7.8 7.4   RBC 4.50 4.55 4.38   HGB 8.9* 8.9* 8.5*   HCT 33.0* 34.9 33.4*   MCV 73.3* 76.7* 76.3*   MCH 19.8* 19.6* 19.4*   MCHC 27.0* 25.5* 25.4*   RDW 20.2* 20.9* 20.5*    348 328   MPV 9.8 10.6 9.7     Lab Results   Component Value Date    CKTOTAL 112 09/01/2022     Recent Labs     09/01/22 2024 09/02/22  0540   CKTOTAL 112  --    TROPHS 27* 28*     No results found for: INR, PROTIME  No results found for: TSHFT4, TSH  Lab Results   Component Value Date    LABA1C 5.9 (H) 09/02/2022     No results found for: EAG  Lab Results   Component Value Date    CHOL 101 09/02/2022     Lab Results   Component Value Date    TRIG 85 09/02/2022     Lab Results   Component Value Date    HDL 38 09/02/2022     Lab Results   Component Value Date    LDLCALC 46 09/02/2022     Lab Results   Component Value Date    LABVLDL 17 09/02/2022     No results found for: [facility]  Recent Labs     09/01/22 2024   PROBNP 10,646*       Cardiac Tests:  EKG personally reviewed: SR, rate 79, NSSTT changes     Telemetry personally reviewed (date: 9/4/2022): SR, rate 80's     Echocardiogram reviewed: 9/2/22   Normal left ventricular systolic function. Ejection fraction is visually estimated at > 55%. Dilated right ventricle (RV:LV diastolic diameter ratio > 1) and reduced right ventricular function. There is doppler evidence of stage II diastolic dysfunction. Mild tricuspid regurgitation. PASP is estimated at 62 mmHg. CXR: 9/1/22  Pattern of perihilar vascular congestion/interstitial edema of   mild-to-moderate degree of severity. Please correlate clinically. CTA chest: 9/2/22  1. No acute pulmonary embolus is identified   2. Suspicious for interstitial pulmonary edema, and a suggestion of prominent   subcutaneous edema laterally most evident in the abdomen     LE ultrasound: 9/3/22  No evidence of DVT in either lower extremity.     ASSESSMENT / PLAN:  Acute HFpEF/right-sided CHF  Hypervolemic on presentation, proBNP 10,646  Acute hypoxic and hypercapnic respiratory failure -- currently on 4L NC  Pulmonary HTN -- estimated PASP 62 mmHg  Mildly elevated hs-troponin (27 --> 28) -- no chest pain  Coronary artery calcifications on CTA chest  Fall with inability to get up prior to admission  Acute on CKD -- Cr 1.1 --> 1.1 --> 1.1 --> 1.5  Anemia -- Hgb 8.9 --> 8.5  HTN -- controlled  Insulin requiring T2DM -- Hgb A1c 5.9  Morbid obesity   Probable SHAVONNE  GERD   Ongoing tobacco abuse  Mild TR  Pulmonary HTN  Hyperkalemia -- K 3.8 --> 3.8 --> 5.5 (hemolyzed specimen) --> 3.4     - Results of 9/2/22 echocardiogram and 9/2/22 CTA chest reviewed with the patient  - Anemia work-up / monitor CBC  - Monitor BMP and I/O's with ongoing diuresis (net negative 2.8 L) -- will switch to po diuretic  - Continue BB / on ACE-I (hold until renal function stabilizes)  - Replace K today / will check Mg level  - Outpatient sleep study  - Aggressive risk factor modifications / counseled re: tobacco cessation  - Telemetry reviewed (SR)  - Serial echocardiograms  - Reassess for ischemic work-up once clinically improved    Greater than 35 minutes was spent counseling the patient, reviewing the rationale for the above recommendations and reviewing the patient's current medication list, problem list and results of all previously ordered testing.     Loretta Villarreal MD  [facility]) Cardiology

## 2022-09-04 NOTE — PROGRESS NOTES
[address] [facility]  [facility] [address]        Date:2022                                                  Patient Name: Vianey Wu    MRN: [id number]    : 1950    Room: [address] [place], [place] [id number]-      Evaluating OT: Jose Galvan OTR/L; [id number]     Referring Provider and Specific Provider Orders/Date:      22  OT eval and treat  Start:  22,   End:  22,   ONE TIME,   Standing Count:  1 Occurrences,   Pastora Pham MD      Placement Recommendation: Subacute, currently home is not a safe discharge d/t level of physical assistance needed. Diagnosis:   1. Fall, initial encounter    2.  Congestive heart failure, unspecified HF chronicity, unspecified heart failure type [facility])         Surgery: none       Pertinent Medical History:       Past Medical History:   Diagnosis Date    Chronic back pain 2018    10/10 on the pain scale    Hypertension     Scoliosis     Type 2 diabetes mellitus without complication [facility])          Past Surgical History:   Procedure Laterality Date    FINGER TRIGGER RELEASE Right 2018    right thumb    HYSTERECTOMY (CERVIX STATUS UNKNOWN)      NC INCISE FINGER TENDON SHEATH Right 2018    RIGHT THUMB TRIGGER THUMB RELEASE performed by My Vizcarra DO at [facility] [address] Right 2002    RCR    WISDOM TOOTH EXTRACTION        Precautions:  Fall Risk, alarm, lethargy, maximal verbal cues to keep eyes open      Assessment of current deficits    [x] Functional mobility  [x]ADLs  [x] Strength               []Cognition    [x] Functional transfers   [x] IADLs         [x] Safety Awareness   [x]Endurance    [] Fine Coordination              [x] Balance      [] Vision/perception   []Sensation     []Gross Motor Coordination  [x] ROM  [] Delirium                   [] Motor Control     OT PLAN OF CARE   OT POC based on physician Lot  How much help for taking care of personal grooming?: A Little  How much help for eating meals?: A Little  AM-PAC Inpatient Daily Activity Raw Score: 11  AM-PAC Inpatient ADL T-Scale Score : 29.04  ADL Inpatient CMS 0-100% Score: 70.42  ADL Inpatient CMS G-Code Modifier : CL     Functional Assessment:    Initial Eval Status  Date: 9/4/22 Treatment Status  Date: STGs = LTGs  Time frame: 10-14 days   Feeding Minimal Assist    Independent    Grooming Minimal Assist   Independent    UB Dressing Maximal Assist  to [facility] and don hospital gown   Independent    LB Dressing Dependent   Minimal Assist    Bathing Dependent to sponge bathe while supine, side lying and seated EOB. Dependent to apply deodorant and baby powder  Minimal Assist    Toileting Dependent   Minimal Assist    Bed Mobility  Supine to sit: Moderate Assist   Sit to supine: Moderate Assist x 2   Supine to sit: Supervision   Sit to supine: Supervision    Functional Transfers Moderate Assist x 2 from EOB to wheeled walker with bed height elevated x 2 reps. Transfer training with verbal cues for hand placement throughout session to improve safety. Minimal Assist    Functional Mobility Moderate Assist x 2 with wheeled walker to improve balance to side step towards head of bed, verbal cues for walker sequence and safety. Minimal Assist    Balance Sitting:     Static: fair at EOB    Dynamic: fair minus  Standing: fair minus with wheeled walker     Activity Tolerance Fair minus  good    Visual/  Perceptual Glasses: yes                 Hand Dominance: right      AROM (PROM) Strength Additional Info:    RUE  WFL 3/5 good  and wfl FMC/dexterity noted during ADL tasks     LUE WFL 3/5 good  and wfl FMC/dexterity noted during ADL tasks       Hearing: [facility]   Sensation:  No c/o numbness or tingling  Tone: WFL   Edema: yes, B LE's     Comments: Upon arrival the patient was supine.   At end of session, patient was supine and left in care of PTA with call light and phone within reach, all lines and tubes intact. Overall patient demonstrated decreased independence and safety during completion of ADL/functional transfer/mobility tasks. Pt would benefit from continued skilled OT to increase safety and independence with completion of ADL/IADL tasks for functional independence and quality of life. Treatment: OT treatment provided this date includes:   Instruction/training on safety and adapted techniques for completion of ADLs   Instruction/training on safe functional mobility/transfer techniques   Instruction/training on energy conservation/work simplification for completion of ADLs   Proper Positioning/Alignment    Rehab Potential: Good for established goals. Patient / Family Goal: return home       Patient and/or family were instructed on functional diagnosis, prognosis/goals and OT plan of care. Demonstrated good understanding. Eval Complexity: Low    Time In: 10:15am  Time Out: 11:02am    Total Treatment Time: 23      Min Units   OT Eval Low 97165  X  1    OT Eval Medium 47735      OT Eval High 13644      OT Re-Eval 36024            ADL/Self Care 75305  15  1    Therapeutic Activities 12897  8  1    Therapeutic Ex 97633       Orthotic Management 83765       Manual [id number]     Neuro Re-Ed 80021       Non-Billable Time        Evaluation Time additionally includes thorough review of current medical information, gathering information on past medical history/social history and prior level of function, interpretation of standardized testing/informal observation of tasks, assessment of data and development of plan of care and goals.         Evaluating OT: Valeria Rodriguez OTR/L; [id number]

## 2022-09-04 NOTE — PROGRESS NOTES
Physical Therapy  Physical Therapy Treatment Note/Plan of Care    Room #:  4053/7612-05  Patient Name: Dagmar Anthony  YOB: 1950  MRN: [id number]    Date of Service: 9/4/2022     Tentative placement recommendation: Subacute rehab  Equipment recommendation: To be determined      Evaluating Physical Therapist: Yasir Worthington PT, DPT #[id number]      Specific Provider Orders/Date/Referring Provider :     09/02/22 0330    PT eval and treat  Start:  09/02/22 0330,   End:  09/02/22 0330,   ONE TIME,   Standing Count:  1 Occurrences,   Donald Bright MD Acknowledge New     Admitting Diagnosis:   CHF with unknown LVEF ([facility] [address] [I50.9]      Surgery: none  Visit Diagnoses         Codes    Fall, initial encounter    -  Primary W19. XXXA    Congestive heart failure, unspecified HF chronicity, unspecified heart failure type ([address]     I50.9            Patient Active Problem List   Diagnosis    Chronic bilateral low back pain with bilateral sciatica    CHF with unknown LVEF ([facility] [address]    Primary hypertension    Type 2 diabetes mellitus without complication, without long-term current use of insulin (HCC)    Iron deficiency anemia due to chronic blood loss    Acute cystitis without hematuria    Acute on chronic congestive heart failure with right ventricular diastolic dysfunction (HCC)    Pulmonary HTN (HCC)    Tobacco abuse    Anemia        ASSESSMENT of Current Deficits Patient exhibits decreased strength, balance, and endurance impairing functional mobility, transfers, gait , gait distance, and tolerance to activity. Partial cotreat with OT. Pt required max encouragement to participate in session, patient very tired cues for eyes open throughout session. Progressed this session with increased tolerance to sitting edge of bed and able to perform multiple sit to stands with assist of 2, tactile cues needed for taking steps at edge of bed; max walker control.  Patient needing subacute rehab at time of discharge due to patient unsafe due to level of physical assistance needed to complete tasks. PHYSICAL THERAPY  PLAN OF CARE       Physical therapy plan of care is established based on physician order,  patient diagnosis and clinical assessment    Current Treatment Recommendations:    -Bed Mobility: Lower extremity exercises  and Trunk control activities   -Sitting Balance: Incorporate reaching activities to activate trunk muscles , Hands on support to maintain midline , Facilitate active trunk muscle engagement , Facilitate postural control in all planes , and Engage in core activities to allow for movement within base of support   -Standing Balance: Perform strengthening exercises in standing to promote motor control with or without upper extremity support , Instruct patient on adequate base of support to maintain balance, and Challenge balance utilizing reaching  activities beyond center of gravity    -Transfers: Provide instruction on proper hand and foot position for adequate transfer of weight onto lower extremities and use of gait device if needed, Cues for hand placement, technique and safety.  Provide stabilization to prevent fall , Facilitate weight shift forward on to lower extremities and provide necessary stabilization of bilateral lower extremities , Support transfer of weight on to lower extremities, and Assist with extension of knees trunk and hip to accept weight transfer   -Gait: Gait training, Standing activities to improve: base of support, weight shift, weight bearing , Exercises to improve trunk control, Exercises to improve hip and knee control, Performance of protected weight bearing activities, and Activities to increase weight bearing   -Endurance: Utilize Supervised activities to increase level of endurance to allow for safe functional mobility including transfers and gait  and Use graduated activities to promote good breathing techniques and provide support and education to maximize respiratory function  -Stairs: Stair training with instruction on proper technique and hand placement on rail    PT long term treatment goals are located in below grid    Patient and or family understand(s) diagnosis, prognosis, and plan of care. Frequency of treatments: Patient will be seen  daily. Prior Level of Function: Patient ambulated independently   Rehab Potential: fair + for baseline    Past medical history:   Past Medical History:   Diagnosis Date    Chronic back pain 2018    10/10 on the pain scale    Hypertension     Scoliosis     Type 2 diabetes mellitus without complication [facility])      Past Surgical History:   Procedure Laterality Date    FINGER TRIGGER RELEASE Right 08/14/2018    right thumb    HYSTERECTOMY (CERVIX STATUS UNKNOWN)      MN INCISE FINGER TENDON SHEATH Right 8/14/2018    RIGHT THUMB TRIGGER THUMB RELEASE performed by Franca Bowman DO at [place] [id number] Right 2002    RCR    WISDOM TOOTH EXTRACTION         SUBJECTIVE:    Precautions:  Up with assistance, falls, alarm, and L knee karel      Social history: Patient lives alone in a two story home bedroom and bathroom 2nd floor full flight stairs with Rail  with 1 step  to enter without [address]: [address] [facility] [address]   How much difficulty turning over in bed?: A Lot  How much difficulty sitting down on / standing up from a chair with arms?: A Lot  How much difficulty moving from lying on back to sitting on side of bed?: A Lot  How much help from another person moving to and from a bed to a chair?: A Lot  How much help from another person needed to walk in hospital room?: A Lot  How much help from another person for climbing 3-5 steps with a railing?: Total  AM-PAC Inpatient Mobility Raw Score : 11  AM-PAC Inpatient T-Scale Score : 33.86  Mobility Inpatient CMS 0-100% Score: 72.57  Mobility Inpatient CMS G-Code Modifier : CL    Nursing cleared patient for PT treatment. OBJECTIVE;   Initial Evaluation  Date: 9/2/2022 Treatment Date:  9/4/2022     Short Term/ Long Term   Goals   Was pt agreeable to Eval/treatment? Yes Yes  To be met in 3 days   Pain level   0/10   5/10    Bed Mobility    Rolling: Moderate assist of 1    Supine to sit: Moderate assist of 1    Sit to supine: Moderate assist of 1    Scooting: Moderate assist of 1   Rolling: Moderate assist of  2   Supine to sit: Moderate assist of  2   Sit to supine: Moderate assist of  2   Scooting: Moderate assist of  2    Rolling: Independent    Supine to sit: Independent    Sit to supine: Independent    Scooting: Independent     Transfers Sit to stand: Not assessed  Pt declined Sit to stand: Moderate assist of  2 cues for hand placement.    Sit to stand: Supervision     Ambulation    not assessed  2x5-6 side steps using  wheeled walker with Moderate assist of  2   for walker control, balance, weight shift, and safety and cues for sequencing, walker approximation, safety, and proper hand placement     100 feet using  least restrictive device versus no device with Supervision     Stair negotiation: ascended and descended   Not assessed  not assessed    12 stairs with 1 HR with Supervision   ROM Within functional limits    Increase range of motion 10% of affected joints    Strength BUE:  refer to OT eval  RLE:  3+/5  LLE:  3+/5  Increase strength in affected mm groups by 1/3 grade   Balance Sitting EOB:  fair -  Dynamic Standing:  not assessed  Sitting EOB: fair minus   Dynamic Standing: fair  minus    Sitting EOB:  fair +  Dynamic Standing: fair      Patient is Alert & Oriented x person and place and follows directions but pleasantly confused  Sensation:  Patient  denies numbness/tingling   Edema:  no   Endurance: poor +    Vitals: room air   Blood Pressure at rest  Blood Pressure during session    Heart Rate at rest  Heart Rate during session    SPO2 at rest %  SPO2 during session %     Patient education  Patient educated on role of Physical Therapy, risks of immobility, safety and plan of care, energy conservation,  importance of mobility while in hospital , ankle pumps, quad set and glut set for edema control, blood clot prevention, importance and purpose of adaptive device and adjusted to proper height for the patient. , safety , and stair training      Patient response to education:   Pt verbalized understanding Pt demonstrated skill Pt requires further education in this area   Yes Partial Yes      Treatment:  Patient practiced and was instructed/facilitated in the following treatment: Patient Sat edge of bed 15 minutes with Minimal assist of 1 to increase dynamic sitting balance and activity tolerance. Pt performed supine exercise, bed mobility, transfers, ambulation as in chart. Therapeutic Exercises:  ankle pumps, quad sets, glut sets, heel slide, hip abduction/adduction, and straight leg raise  x 10-15 reps. At end of session, patient in bed with alarm call light and phone within reach,  all lines and tubes intact, nursing notified. Patient would benefit from continued skilled Physical Therapy to improve functional independence and quality of life.          Patient's/ family goals   rehab    Time in  1040  Time out  1118    Total Treatment Time   38 minutes    CPT codes:    Therapeutic activities (78878)   10 minutes  1 unit(s)  Therapeutic exercises (57436)   13 minutes  1 unit(s)  Non billable time 15 minutes    JOSE Barahona [facility]#[id number]

## 2022-09-05 ENCOUNTER — APPOINTMENT (OUTPATIENT)
Dept: GENERAL RADIOLOGY | Age: 72
DRG: 291 | End: 2022-09-05
Payer: MEDICARE

## 2022-09-05 ENCOUNTER — APPOINTMENT (OUTPATIENT)
Dept: CT IMAGING | Age: 72
DRG: 291 | End: 2022-09-05
Payer: MEDICARE

## 2022-09-05 PROBLEM — J96.21 ACUTE ON CHRONIC RESPIRATORY FAILURE WITH HYPOXIA AND HYPERCAPNIA (HCC): Status: ACTIVE | Noted: 2022-09-05

## 2022-09-05 PROBLEM — J96.22 ACUTE ON CHRONIC RESPIRATORY FAILURE WITH HYPOXIA AND HYPERCAPNIA (HCC): Status: ACTIVE | Noted: 2022-09-05
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
## 2022-09-05 LAB
AADO2: 106.6 MMHG
AADO2: 63.5 MMHG
ALBUMIN SERPL-MCNC: 2.8 G/DL (ref 3.5–5.2)
ALP BLD-CCNC: 69 U/L (ref 35–104)
ALT SERPL-CCNC: 8 U/L (ref 0–32)
ANION GAP SERPL CALCULATED.3IONS-SCNC: 9 MMOL/L (ref 7–16)
ANISOCYTOSIS: ABNORMAL
AST SERPL-CCNC: 13 U/L (ref 0–31)
B.E.: 13.9 MMOL/L (ref -3–3)
B.E.: 19.5 MMOL/L (ref -3–3)
B.E.: 20.3 MMOL/L (ref -3–3)
B.E.: 20.6 MMOL/L (ref -3–3)
BASOPHILS ABSOLUTE: 0.06 E9/L (ref 0–0.2)
BASOPHILS RELATIVE PERCENT: 0.9 % (ref 0–2)
BILIRUB SERPL-MCNC: 0.4 MG/DL (ref 0–1.2)
BUN BLDV-MCNC: 12 MG/DL (ref 6–23)
CALCIUM SERPL-MCNC: 8.9 MG/DL (ref 8.6–10.2)
CHLORIDE BLD-SCNC: 100 MMOL/L (ref 98–107)
CO2: 34 MMOL/L (ref 22–29)
COHB: 0.3 % (ref 0–1.5)
COHB: 0.3 % (ref 0–1.5)
COHB: 0.4 % (ref 0–1.5)
COHB: 0.5 % (ref 0–1.5)
COMMENT: ABNORMAL
COMMENT: ABNORMAL
CREAT SERPL-MCNC: 1.1 MG/DL (ref 0.5–1)
CRITICAL: ABNORMAL
DATE ANALYZED: ABNORMAL
DATE OF COLLECTION: ABNORMAL
EOSINOPHILS ABSOLUTE: 0.06 E9/L (ref 0.05–0.5)
EOSINOPHILS RELATIVE PERCENT: 0.9 % (ref 0–6)
FIO2: 30 %
FIO2: 80 %
GFR AFRICAN AMERICAN: 59
GFR NON-AFRICAN AMERICAN: 59 ML/MIN/1.73
GLUCOSE BLD-MCNC: 69 MG/DL (ref 74–99)
HCO3: 40.4 MMOL/L (ref 22–26)
HCO3: 46.7 MMOL/L (ref 22–26)
HCO3: 47.3 MMOL/L (ref 22–26)
HCO3: 49.6 MMOL/L (ref 22–26)
HCT VFR BLD CALC: 32.3 % (ref 34–48)
HEMOGLOBIN: 8.5 G/DL (ref 11.5–15.5)
HHB: 0.6 % (ref 0–5)
HHB: 10.9 % (ref 0–5)
HHB: 19.7 % (ref 0–5)
HHB: 9.1 % (ref 0–5)
HYPOCHROMIA: ABNORMAL
LAB: ABNORMAL
LYMPHOCYTES ABSOLUTE: 0.58 E9/L (ref 1.5–4)
LYMPHOCYTES RELATIVE PERCENT: 7.8 % (ref 20–42)
Lab: ABNORMAL
MAGNESIUM: 1.3 MG/DL (ref 1.6–2.6)
MCH RBC QN AUTO: 19.3 PG (ref 26–35)
MCHC RBC AUTO-ENTMCNC: 26.3 % (ref 32–34.5)
MCV RBC AUTO: 73.4 FL (ref 80–99.9)
METER GLUCOSE: 109 MG/DL (ref 74–99)
METER GLUCOSE: 126 MG/DL (ref 74–99)
METER GLUCOSE: 82 MG/DL (ref 74–99)
METER GLUCOSE: 94 MG/DL (ref 74–99)
METHB: 0.8 % (ref 0–1.5)
METHB: 0.9 % (ref 0–1.5)
METHB: 0.9 % (ref 0–1.5)
METHB: 1 % (ref 0–1.5)
MODE: ABNORMAL
MONOCYTES ABSOLUTE: 0.29 E9/L (ref 0.1–0.95)
MONOCYTES RELATIVE PERCENT: 3.5 % (ref 2–12)
NEUTROPHILS ABSOLUTE: 6.26 E9/L (ref 1.8–7.3)
NEUTROPHILS RELATIVE PERCENT: 87 % (ref 43–80)
O2 CONTENT: 10.9 ML/DL
O2 CONTENT: 11.8 ML/DL
O2 CONTENT: 12 ML/DL
O2 CONTENT: 14 ML/DL
O2 SATURATION: 80.1 % (ref 92–98.5)
O2 SATURATION: 88.9 % (ref 92–98.5)
O2 SATURATION: 90.8 % (ref 92–98.5)
O2 SATURATION: 99.4 % (ref 92–98.5)
O2HB: 79.1 % (ref 94–97)
O2HB: 87.7 % (ref 94–97)
O2HB: 89.7 % (ref 94–97)
O2HB: 98.1 % (ref 94–97)
OPERATOR ID: 2873
OPERATOR ID: 8097
OPERATOR ID: ABNORMAL
OPERATOR ID: ABNORMAL
OVALOCYTES: ABNORMAL
PATIENT TEMP: 37 C
PCO2: 63.9 MMHG (ref 35–45)
PCO2: 70 MMHG (ref 35–45)
PCO2: 72.1 MMHG (ref 35–45)
PCO2: 91.9 MMHG (ref 35–45)
PDW BLD-RTO: 20.2 FL (ref 11.5–15)
PEEP/CPAP: 10 CMH2O
PEEP/CPAP: 10 CMH2O
PFO2: 1.94 MMHG/%
PFO2: 4.34 MMHG/%
PH BLOOD GAS: 7.35 (ref 7.35–7.45)
PH BLOOD GAS: 7.42 (ref 7.35–7.45)
PH BLOOD GAS: 7.43 (ref 7.35–7.45)
PH BLOOD GAS: 7.45 (ref 7.35–7.45)
PIP: 20 CMH2O
PLATELET # BLD: 324 E9/L (ref 130–450)
PMV BLD AUTO: 9.7 FL (ref 7–12)
PO2: 347.3 MMHG (ref 75–100)
PO2: 46.2 MMHG (ref 75–100)
PO2: 58.2 MMHG (ref 75–100)
PO2: 64.7 MMHG (ref 75–100)
POIKILOCYTES: ABNORMAL
POLYCHROMASIA: ABNORMAL
POTASSIUM SERPL-SCNC: 3.8 MMOL/L (ref 3.5–5)
PS: 10 CMH20
PS: 20 CMH20
RBC # BLD: 4.4 E12/L (ref 3.5–5.5)
RI(T): 0.31
RI(T): 1.09
RR MECHANICAL: 20 B/MIN
SODIUM BLD-SCNC: 143 MMOL/L (ref 132–146)
SOURCE, BLOOD GAS: ABNORMAL
TARGET CELLS: ABNORMAL
TEAR DROP CELLS: ABNORMAL
THB: 9.5 G/DL (ref 11.5–16.5)
THB: 9.8 G/DL (ref 11.5–16.5)
TIME ANALYZED: 1748
TIME ANALYZED: 2056
TIME ANALYZED: 2310
TIME ANALYZED: 932
TOTAL PROTEIN: 6.1 G/DL (ref 6.4–8.3)
WBC # BLD: 7.2 E9/L (ref 4.5–11.5)

## 2022-09-05 PROCEDURE — 99233 SBSQ HOSP IP/OBS HIGH 50: CPT | Performed by: INTERNAL MEDICINE

## 2022-09-05 PROCEDURE — 82962 GLUCOSE BLOOD TEST: CPT

## 2022-09-05 PROCEDURE — 6370000000 HC RX 637 (ALT 250 FOR IP): Performed by: INTERNAL MEDICINE

## 2022-09-05 PROCEDURE — 94660 CPAP INITIATION&MGMT: CPT

## 2022-09-05 PROCEDURE — 6360000002 HC RX W HCPCS: Performed by: INTERNAL MEDICINE

## 2022-09-05 PROCEDURE — 36600 WITHDRAWAL OF ARTERIAL BLOOD: CPT

## 2022-09-05 PROCEDURE — 80053 COMPREHEN METABOLIC PANEL: CPT

## 2022-09-05 PROCEDURE — 2700000000 HC OXYGEN THERAPY PER DAY

## 2022-09-05 PROCEDURE — 2580000003 HC RX 258: Performed by: INTERNAL MEDICINE

## 2022-09-05 PROCEDURE — 2000000000 HC ICU R&B

## 2022-09-05 PROCEDURE — 71046 X-RAY EXAM CHEST 2 VIEWS: CPT

## 2022-09-05 PROCEDURE — 85025 COMPLETE CBC W/AUTO DIFF WBC: CPT

## 2022-09-05 PROCEDURE — 71045 X-RAY EXAM CHEST 1 VIEW: CPT

## 2022-09-05 PROCEDURE — 82805 BLOOD GASES W/O2 SATURATION: CPT

## 2022-09-05 PROCEDURE — 83735 ASSAY OF MAGNESIUM: CPT

## 2022-09-05 PROCEDURE — 70450 CT HEAD/BRAIN W/O DYE: CPT

## 2022-09-05 PROCEDURE — 99291 CRITICAL CARE FIRST HOUR: CPT | Performed by: INTERNAL MEDICINE

## 2022-09-05 PROCEDURE — 36415 COLL VENOUS BLD VENIPUNCTURE: CPT

## 2022-09-05 RX ORDER — SODIUM CHLORIDE 0.9 % (FLUSH) 0.9 %
5-40 SYRINGE (ML) INJECTION PRN
Status: DISCONTINUED | OUTPATIENT
Start: 2022-09-05 | End: 2022-09-14 | Stop reason: HOSPADM

## 2022-09-05 RX ORDER — HEPARIN SODIUM (PORCINE) LOCK FLUSH IV SOLN 100 UNIT/ML 100 UNIT/ML
1 SOLUTION INTRAVENOUS PRN
Status: DISCONTINUED | OUTPATIENT
Start: 2022-09-05 | End: 2022-09-14 | Stop reason: HOSPADM

## 2022-09-05 RX ORDER — HEPARIN SODIUM (PORCINE) LOCK FLUSH IV SOLN 100 UNIT/ML 100 UNIT/ML
1 SOLUTION INTRAVENOUS EVERY 12 HOURS SCHEDULED
Status: DISCONTINUED | OUTPATIENT
Start: 2022-09-05 | End: 2022-09-14 | Stop reason: HOSPADM

## 2022-09-05 RX ORDER — SODIUM CHLORIDE 9 MG/ML
INJECTION, SOLUTION INTRAVENOUS PRN
Status: DISCONTINUED | OUTPATIENT
Start: 2022-09-05 | End: 2022-09-14 | Stop reason: HOSPADM

## 2022-09-05 RX ORDER — SODIUM CHLORIDE 0.9 % (FLUSH) 0.9 %
5-40 SYRINGE (ML) INJECTION EVERY 12 HOURS SCHEDULED
Status: DISCONTINUED | OUTPATIENT
Start: 2022-09-05 | End: 2022-09-14 | Stop reason: HOSPADM

## 2022-09-05 RX ORDER — MAGNESIUM SULFATE IN WATER 40 MG/ML
2000 INJECTION, SOLUTION INTRAVENOUS ONCE
Status: COMPLETED | OUTPATIENT
Start: 2022-09-05 | End: 2022-09-05

## 2022-09-05 RX ORDER — ALBUTEROL SULFATE 2.5 MG/3ML
2.5 SOLUTION RESPIRATORY (INHALATION) EVERY 4 HOURS PRN
Status: DISCONTINUED | OUTPATIENT
Start: 2022-09-05 | End: 2022-09-14 | Stop reason: HOSPADM

## 2022-09-05 RX ORDER — NALOXONE HYDROCHLORIDE 0.4 MG/ML
0.4 INJECTION, SOLUTION INTRAMUSCULAR; INTRAVENOUS; SUBCUTANEOUS EVERY 5 MIN PRN
Status: DISCONTINUED | OUTPATIENT
Start: 2022-09-05 | End: 2022-09-14 | Stop reason: HOSPADM

## 2022-09-05 RX ORDER — PANTOPRAZOLE SODIUM 40 MG/1
40 TABLET, DELAYED RELEASE ORAL
Status: DISCONTINUED | OUTPATIENT
Start: 2022-09-06 | End: 2022-09-08

## 2022-09-05 RX ADMIN — ASPIRIN 81 MG CHEWABLE TABLET 81 MG: 81 TABLET CHEWABLE at 08:39

## 2022-09-05 RX ADMIN — BUMETANIDE 1 MG: 1 TABLET ORAL at 08:39

## 2022-09-05 RX ADMIN — ENOXAPARIN SODIUM 40 MG: 100 INJECTION SUBCUTANEOUS at 08:39

## 2022-09-05 RX ADMIN — ATENOLOL 50 MG: 25 TABLET ORAL at 08:39

## 2022-09-05 RX ADMIN — NALOXONE HYDROCHLORIDE 0.4 MG/HR: 1 INJECTION PARENTERAL at 21:07

## 2022-09-05 RX ADMIN — FERROUS SULFATE TAB 325 MG (65 MG ELEMENTAL FE) 325 MG: 325 (65 FE) TAB at 08:39

## 2022-09-05 RX ADMIN — MAGNESIUM SULFATE HEPTAHYDRATE 2000 MG: 40 INJECTION, SOLUTION INTRAVENOUS at 12:06

## 2022-09-05 RX ADMIN — NALOXONE HYDROCHLORIDE 0.4 MG: 0.4 INJECTION, SOLUTION INTRAMUSCULAR; INTRAVENOUS; SUBCUTANEOUS at 20:47

## 2022-09-05 RX ADMIN — NALOXONE HYDROCHLORIDE 0.4 MG: 0.4 INJECTION, SOLUTION INTRAMUSCULAR; INTRAVENOUS; SUBCUTANEOUS at 20:36

## 2022-09-05 RX ADMIN — NALOXONE HYDROCHLORIDE 0.4 MG: 0.4 INJECTION, SOLUTION INTRAMUSCULAR; INTRAVENOUS; SUBCUTANEOUS at 20:40

## 2022-09-05 RX ADMIN — METFORMIN HYDROCHLORIDE 1000 MG: 1000 TABLET ORAL at 10:02

## 2022-09-05 NOTE — PROGRESS NOTES
INPATIENT CARDIOLOGY FOLLOW-UP    Name: Tahira Purdy    Age: 73 y.o. Date of Admission: 9/1/2022  7:43 PM    Date of Service: 9/5/2022    Chief Complaint: Follow-up for acute HFpEF/right-sided CHF, PHTN    Interim History:  No new overnight cardiac complaints. Currently with no complaints of CP, SOB, palpitations, dizziness, or lightheadedness. SR on EKG and telemetry.     Review of Systems:   Cardiac: As per HPI  Cardiac: As per HPI  General: No fever, chills  Pulmonary: As per HPI  HEENT: No visual disturbances, difficult swallowing  GI: No nausea, vomiting  : No dysuria, hematuria  Endocrine: No thyroid disease, +DM  Musculoskeletal: CASILLAS x 4, no focal motor deficits  Skin: Intact, no rashes  Neuro: No headache, seizures  Psych: Currently with no depression, anxiety    Problem List:  Patient Active Problem List   Diagnosis    Chronic bilateral low back pain with bilateral sciatica    CHF with unknown LVEF (HCC)    Primary hypertension    Type 2 diabetes mellitus without complication, without long-term current use of insulin (HCC)    Iron deficiency anemia due to chronic blood loss    Acute cystitis without hematuria    Acute on chronic congestive heart failure with right ventricular diastolic dysfunction (HCC)    Pulmonary HTN (HCC)    Tobacco abuse    Anemia       Allergies:  No Known Allergies    Current Medications:  Current Facility-Administered Medications   Medication Dose Route Frequency Provider Last Rate Last Admin    [START ON 9/6/2022] pantoprazole (PROTONIX) tablet 40 mg  40 mg Oral QAM AC Bahaa A Awadalla, MD        bumetanide (BUMEX) tablet 1 mg  1 mg Oral Daily Beth Espitia MD   1 mg at 09/05/22 0839    enoxaparin (LOVENOX) injection 40 mg  40 mg SubCUTAneous Daily Myron Shahid MD   40 mg at 09/05/22 0839    aspirin chewable tablet 81 mg  81 mg Oral Daily Silvana Braga MD   81 mg at 09/05/22 0839    insulin lispro (HUMALOG) injection vial 0-8 Units  0-8 Units SubCUTAneous TID  Silvana Braga MD        glucose chewable tablet 16 g  4 tablet Oral PRN Silvana Braga MD        dextrose bolus 10% 125 mL  125 mL IntraVENous PRN Silvana Braga MD        Or    dextrose bolus 10% 250 mL  250 mL IntraVENous PRN Silvana Braga MD        glucagon (rDNA) injection 1 mg  1 mg SubCUTAneous PRN Silvana Braga MD        dextrose 10 % infusion   IntraVENous Continuous PRN Silvana Braga MD        ondansetron (ZOFRAN) injection 4 mg  4 mg IntraVENous Q6H PRN Silvana Braga MD        Or    ondansetron (ZOFRAN-ODT) disintegrating tablet 4 mg  4 mg Oral Q8H PRN Sade Wells MD   4 mg at 09/02/22 1805    atenolol (TENORMIN) tablet 50 mg  50 mg Oral BID Sade Wells MD   50 mg at 09/05/22 0839    HYDROcodone-acetaminophen (NORCO) 5-325 MG per tablet 1 tablet  1 tablet Oral Q6H PRN Sade Wells MD   1 tablet at 09/04/22 1943    [Held by provider] lisinopril (PRINIVIL;ZESTRIL) tablet 20 mg  20 mg Oral Daily Silvaan Braga MD   20 mg at 09/03/22 0902    metFORMIN (GLUCOPHAGE) tablet 1,000 mg  1,000 mg Oral BID  Silvana Braga MD   1,000 mg at 09/04/22 1624    ferrous sulfate (IRON 325) tablet 325 mg  325 mg Oral BID  Silvana Braga MD   325 mg at 09/05/22 0839    perflutren lipid microspheres (DEFINITY) injection 1.65 mg  1.5 mL IntraVENous ONCE PRN Bo Pineda PA-C          dextrose         Physical Exam:  BP (!) 142/76   Pulse 68   Temp 98.8 °F (37.1 °C) (Oral)   Resp 18   Ht 5' 2\" (1.575 m)   Wt 243 lb 6.4 oz (110.4 kg)   SpO2 98%   BMI 44.52 kg/m²   Wt Readings from Last 3 Encounters:   09/03/22 243 lb 6.4 oz (110.4 kg)   08/29/18 230 lb (104.3 kg)   08/14/18 237 lb (107.5 kg)     Appearance: Awake, alert, no acute respiratory distress  Skin: Intact, no rash  Head: Normocephalic, atraumatic  Eyes: EOMI, no conjunctival erythema  ENMT: No pharyngeal erythema, MMM, no rhinorrhea  Neck: Supple, no carotid bruits  Lungs: Decreased BS B/L, no wheezing  Cardiac: Regular rate and rhythm, +S1S2, no murmurs apparent  Abdomen: Soft, nontender, +bowel sounds  Extremities: Moves all extremities x 4, +lower extremity edema  Neurologic: No focal motor deficits apparent, normal mood and affect    Intake/Output:    Intake/Output Summary (Last 24 hours) at 9/5/2022 0932  Last data filed at 9/5/2022 0805  Gross per 24 hour   Intake 240 ml   Output 2800 ml   Net -2560 ml     I/O this shift:  In: -   Out: 2800 [Urine:2800]    Laboratory Tests:  Recent Labs     09/03/22 0545 09/04/22 0458 09/05/22  0503    137 143   K 5.5* 3.4* 3.8    97* 100   CO2 27 34* 34*   BUN 13 13 12   CREATININE 1.1* 1.5* 1.1*   GLUCOSE 105* 96 69*   CALCIUM 8.4* 8.5* 8.9     Lab Results   Component Value Date/Time    MG 1.4 09/04/2022 04:58 AM     Recent Labs     09/04/22 0458 09/05/22  0503   ALKPHOS 71 69   ALT 8 8   AST 11 13   PROT 5.9* 6.1*   BILITOT <0.2 0.4   LABALBU 2.8* 2.8*     Recent Labs     09/03/22 0545 09/04/22  0458 09/05/22  0503   WBC 7.8 7.4 7.2   RBC 4.55 4.38 4.40   HGB 8.9* 8.5* 8.5*   HCT 34.9 33.4* 32.3*   MCV 76.7* 76.3* 73.4*   MCH 19.6* 19.4* 19.3*   MCHC 25.5* 25.4* 26.3*   RDW 20.9* 20.5* 20.2*    328 324   MPV 10.6 9.7 9.7     Lab Results   Component Value Date    CKTOTAL 112 09/01/2022     No results for input(s): CKTOTAL, CKMB, CKMBINDEX, TROPHS in the last 72 hours. No results found for: INR, PROTIME  No results found for: TSHFT4, TSH  Lab Results   Component Value Date    LABA1C 5.9 (H) 09/02/2022     No results found for: EAG  Lab Results   Component Value Date    CHOL 101 09/02/2022     Lab Results   Component Value Date    TRIG 85 09/02/2022     Lab Results   Component Value Date    HDL 38 09/02/2022     Lab Results   Component Value Date    LDLCALC 46 09/02/2022     Lab Results   Component Value Date    LABVLDL 17 09/02/2022     No results found for: CHOLHDLRATIO  No results for input(s): PROBNP in the last 72 hours.       Cardiac Tests:  EKG personally reviewed: SR, rate 79, NSSTT changes     Telemetry personally reviewed (date: 9/5/2022): SR, rate 80's     Echocardiogram reviewed: 9/2/22   Normal left ventricular systolic function. Ejection fraction is visually estimated at > 55%. Dilated right ventricle (RV:LV diastolic diameter ratio > 1) and reduced right ventricular function. There is doppler evidence of stage II diastolic dysfunction. Mild tricuspid regurgitation. PASP is estimated at 62 mmHg. CXR: 9/1/22  Pattern of perihilar vascular congestion/interstitial edema of   mild-to-moderate degree of severity. Please correlate clinically. CTA chest: 9/2/22  1. No acute pulmonary embolus is identified   2. Suspicious for interstitial pulmonary edema, and a suggestion of prominent   subcutaneous edema laterally most evident in the abdomen     LE ultrasound: 9/3/22  No evidence of DVT in either lower extremity.     ASSESSMENT / PLAN:  Acute HFpEF/right-sided CHF  Hypervolemic on presentation, proBNP 10,646  Acute hypoxic and hypercapnic respiratory failure -- currently on 4L NC  Pulmonary HTN -- estimated PASP 62 mmHg  Mildly elevated hs-troponin (27 --> 28) -- no chest pain  Coronary artery calcifications on CTA chest  Fall with inability to get up prior to admission  Acute on CKD -- Cr 1.1 --> 1.1 --> 1.1 --> 1.5 --> 1.1  Anemia -- Hgb 8.9 --> 8.5 --> 8.5  HTN -- controlled  Insulin requiring T2DM -- Hgb A1c 5.9  Morbid obesity   Probable SHAVONNE  GERD   Ongoing tobacco abuse  Mild TR  Pulmonary HTN  Hyperkalemia -- K 3.8 --> 3.8 --> 5.5 (hemolyzed specimen) --> 3.4 --> 3.8 (Mg 1.4)     - Results of 9/2/22 echocardiogram and 9/2/22 CTA chest reviewed with the patient  - Anemia work-up per primary service / monitor CBC  - Monitor BMP and I/O's with ongoing diuresis (net negative 5.4 L) -- switched to po diuretic on 9/4/22 (adequate diuresis overnight)  - Continue BB / on ACE-I (on hold until renal function stabilizes)  - Will replace Mg today / continue to replace K as needed  - Outpatient sleep study  - Aggressive risk factor modifications / counseled re: tobacco cessation  - Telemetry reviewed (SR)  - Serial echocardiograms  - Reassess for ischemic work-up once clinically improved    Greater than 35 minutes was spent counseling the patient, reviewing the rationale for the above recommendations and reviewing the patient's current medication list, problem list and results of all previously ordered testing.     Nazanin Murphy MD  [facility]) Cardiology

## 2022-09-05 NOTE — PROGRESS NOTES
Department of Internal Medicine  General Internal Medicine  Attending Progress Note      SUBJECTIVE:    Patient seen and examined. Lethargic this morning, but easy to arouse  Denies any shortness of breath or chest pains.     Medications    Current Facility-Administered Medications: [START ON 9/6/2022] pantoprazole (PROTONIX) tablet 40 mg, 40 mg, Oral, QAM AC  magnesium sulfate 2000 mg in 50 mL IVPB premix, 2,000 mg, IntraVENous, Once  lidocaine 1 % injection 5 mL, 5 mL, IntraDERmal, Once  sodium chloride flush 0.9 % injection 5-40 mL, 5-40 mL, IntraVENous, 2 times per day  sodium chloride flush 0.9 % injection 5-40 mL, 5-40 mL, IntraVENous, PRN  0.9 % sodium chloride infusion, , IntraVENous, PRN  heparin flush 100 UNIT/ML injection 100 Units, 1 mL, IntraVENous, 2 times per day  heparin flush 100 UNIT/ML injection 100 Units, 1 mL, IntraCATHeter, PRN  bumetanide (BUMEX) tablet 1 mg, 1 mg, Oral, Daily  enoxaparin (LOVENOX) injection 40 mg, 40 mg, SubCUTAneous, Daily  aspirin chewable tablet 81 mg, 81 mg, Oral, Daily  insulin lispro (HUMALOG) injection vial 0-8 Units, 0-8 Units, SubCUTAneous, TID WC  glucose chewable tablet 16 g, 4 tablet, Oral, PRN  dextrose bolus 10% 125 mL, 125 mL, IntraVENous, PRN **OR** dextrose bolus 10% 250 mL, 250 mL, IntraVENous, PRN  glucagon (rDNA) injection 1 mg, 1 mg, SubCUTAneous, PRN  dextrose 10 % infusion, , IntraVENous, Continuous PRN  ondansetron (ZOFRAN) injection 4 mg, 4 mg, IntraVENous, Q6H PRN **OR** ondansetron (ZOFRAN-ODT) disintegrating tablet 4 mg, 4 mg, Oral, Q8H PRN  atenolol (TENORMIN) tablet 50 mg, 50 mg, Oral, BID  HYDROcodone-acetaminophen (NORCO) 5-325 MG per tablet 1 tablet, 1 tablet, Oral, Q6H PRN  [Held by provider] lisinopril (PRINIVIL;ZESTRIL) tablet 20 mg, 20 mg, Oral, Daily **AND** [DISCONTINUED] hydroCHLOROthiazide (HYDRODIURIL) tablet 12.5 mg, 12.5 mg, Oral, BID  metFORMIN (GLUCOPHAGE) tablet 1,000 mg, 1,000 mg, Oral, BID   ferrous sulfate (IRON 325) tablet 325 mg, 325 mg, Oral, BID WC  perflutren lipid microspheres (DEFINITY) injection 1.65 mg, 1.5 mL, IntraVENous, ONCE PRN    Physical    VITALS:  BP (!) 142/76   Pulse 68   Temp 98.8 °F (37.1 °C) (Oral)   Resp 18   Ht 5' 2\" (1.575 m)   Wt 243 lb 6.4 oz (110.4 kg)   SpO2 98%   BMI 44.52 kg/m²   TEMPERATURE:  Current - Temp: 98.8 °F (37.1 °C); Max - Temp  Av.5 °F (37.5 °C)  Min: 98.8 °F (37.1 °C)  Max: 100.1 °F (37.8 °C)  RESPIRATIONS RANGE: Resp  Av  Min: 18  Max: 18  PULSE RANGE: Pulse  Av.5  Min: 68  Max: 73  BLOOD PRESSURE RANGE:  Systolic (97IYT), XEV:103 , Min:125 , YWI:475   ; Diastolic (36TMP), NYU:34, Min:57, Max:76    PULSE OXIMETRY RANGE: SpO2  Av.5 %  Min: 98 %  Max: 99 %  24HR INTAKE/OUTPUT:    Intake/Output Summary (Last 24 hours) at 2022 1133  Last data filed at 2022 0805  Gross per 24 hour   Intake 0 ml   Output 2800 ml   Net -2800 ml       CONSTITUTIONAL: lethargic but easy to arouse , no distress, appears as stated age. HEENT: Normocephalic atraumatic. Pupils are equal and reactive bilaterally. Extraocular movements are intact. Pallor+  NECK: Supple, no JVD , no lymphadenopathy, no bruits, no thyromegaly  LUNGS: Diminished over the bases bilaterally. CARDIOVASCULAR: Regular rate and rhythm, no murmur rub or gallop. ABDOMEN: Soft, nontender, bowel sounds are positive, no organomegaly appreciated. Obese. NEUROLOGIC: lethargic, easy to arouse , no focal deficits noted. EXT: 1+ edema bilateral lower extremities.     CBC with Differential:    Lab Results   Component Value Date/Time    WBC 7.2 2022 05:03 AM    RBC 4.40 2022 05:03 AM    HGB 8.5 2022 05:03 AM    HCT 32.3 2022 05:03 AM     2022 05:03 AM    MCV 73.4 2022 05:03 AM    MCH 19.3 2022 05:03 AM    MCHC 26.3 2022 05:03 AM    RDW 20.2 2022 05:03 AM    LYMPHOPCT 7.8 2022 05:03 AM    MONOPCT 3.5 2022 05:03 AM    BASOPCT 0.9 2022 05:03 AM MONOSABS 0.29 09/05/2022 05:03 AM    LYMPHSABS 0.58 09/05/2022 05:03 AM    EOSABS 0.06 09/05/2022 05:03 AM    BASOSABS 0.06 09/05/2022 05:03 AM     CMP:    Lab Results   Component Value Date/Time     09/05/2022 05:03 AM    K 3.8 09/05/2022 05:03 AM    K 3.8 09/01/2022 08:24 PM     09/05/2022 05:03 AM    CO2 34 09/05/2022 05:03 AM    BUN 12 09/05/2022 05:03 AM    CREATININE 1.1 09/05/2022 05:03 AM    GFRAA 59 09/05/2022 05:03 AM    LABGLOM 59 09/05/2022 05:03 AM    GLUCOSE 69 09/05/2022 05:03 AM    PROT 6.1 09/05/2022 05:03 AM    LABALBU 2.8 09/05/2022 05:03 AM    CALCIUM 8.9 09/05/2022 05:03 AM    BILITOT 0.4 09/05/2022 05:03 AM    ALKPHOS 69 09/05/2022 05:03 AM    AST 13 09/05/2022 05:03 AM    ALT 8 09/05/2022 05:03 AM             ASSESSMENT AND PLAN  1. Acute on chronic diastolic heart failure. Her 2D echo reveals an ejection fraction of 16% with diastolic dysfunction and right-sided heart failure with pulmonary hypertension. Her fluid balance is negative by about 2800 cc    On po bumex per Cardiology  2. Acute respiratory failure secondary to above. She is requiring oxygen at 4 L/min. CTA of the chest without any evidence of pulmonary embolism. Ultrasound of lower extremities negative for DVT. Lovenox therapeutic dose discontinued and placed on Lovenox for DVT prophylaxis. Check ABG's today as she is lethargic rule out CO2 narcosis  3. Right-sided heart failure. Questionable secondary to obstructive sleep apnea. Patient will need a sleep study as outpatient. 4.  Diabetes mellitus type 2. Would continue with metformin p.o. And sliding scale insulin  5. Anemia most likely iron deficiency in which is chronic. Iron studies with low ferritin and normal TIBC and iron levels. She will need a GI evaluation to rule out GI etiology of her iron deficiency anemia. 6  Hypertension monitor blood pressure closely. Lisinopril on hold because of her renal insufficiency.   7.  Edema of bilateral lower extremities. Ultrasound lower extremities negative for DVT. 8.  Acute kidney injury secondary to IV diuretics. Creatinine 1.1 today. Now on po diuretics. 9.  Hypokalemia. Potassium 3.8 today  10. Lethargy, possible CO2 narcosis , check ABG's and CXR     Matias King MD, MD.  11:33 AM  9/5/2022

## 2022-09-05 NOTE — PROGRESS NOTES
ABG drawn x 1 from Right Radial. Patient had NormalAllen's Test.  Patient was on 4 liters/min via nasal cannula  at time of puncture. Pressure held for 5. No bleeding or bruising noted at puncture site.   Patient tolerated procedure with difficulty      Performed by Melanie Giron RCP

## 2022-09-05 NOTE — SIGNIFICANT EVENT
[address] [place], [place] [address]ist RRT/Code Blue Note    Subjective:    Called to bedside for RRT for patient with decreased level of consciousness as well as respiratory distress. Patient had just been put on Bipap 14/8 20-30 minutes prior to RRT. RT changed over to AVAPS as she was not taking in adequate TV. RT then trial higher pressure with Bipap ie 20/10 with rate of 22. With this change, her O2 sats improved. TV were 300-350 when more stimulated but only 260 range when more somnolent. ICU team including RN and CNP up to RRT. They concur that needs to be transferred to ICU for closer monitoring. With current Bipap settings can hold off on intubation but will need to see what next ABG shows. [START ON 9/6/2022] pantoprazole  40 mg Oral QAM AC    lidocaine  5 mL IntraDERmal Once    sodium chloride flush  5-40 mL IntraVENous 2 times per day    heparin flush  1 mL IntraVENous 2 times per day    bumetanide  1 mg Oral Daily    enoxaparin  40 mg SubCUTAneous Daily    aspirin  81 mg Oral Daily    insulin lispro  0-8 Units SubCUTAneous TID WC    atenolol  50 mg Oral BID    [Held by provider] lisinopril  20 mg Oral Daily    metFORMIN  1,000 mg Oral BID WC    ferrous sulfate  325 mg Oral BID WC     sodium chloride flush, 5-40 mL, PRN  sodium chloride, , PRN  heparin flush, 1 mL, PRN  albuterol, 2.5 mg, Q4H PRN  glucose, 4 tablet, PRN  dextrose bolus, 125 mL, PRN   Or  dextrose bolus, 250 mL, PRN  glucagon (rDNA), 1 mg, PRN  dextrose, , Continuous PRN  ondansetron, 4 mg, Q6H PRN   Or  ondansetron, 4 mg, Q8H PRN  HYDROcodone-acetaminophen, 1 tablet, Q6H PRN  perflutren lipid microspheres, 1.5 mL, ONCE PRN         Objective:    /60   Pulse 76   Temp 98.9 °F (37.2 °C) (Oral)   Resp 20   Ht 5' 2\" (1.575 m)   Wt 243 lb 6.4 oz (110.4 kg)   SpO2 92%   BMI 44.52 kg/m²   General Appearance: wearing Bipap.  She is somnolent but responds to noxious stimuli  Skin: warm and dry, no rash or erythema  Head: normocephalic and atraumatic  Eyes: pupils equal, round, and reactive to light, extraocular eye movements intact, conjunctivae normal  ENT: external ear and ear canal normal bilaterally, nose without deformity  Neck: supple and non-tender without mass, no cervical lymphadenopathy  Pulmonary/Chest: coarse moist breath sounds. Cardiovascular: normal rate, regular rhythm, normal S1 and S2, no murmurs, rubs, clicks, or gallops  Abdomen: soft, non-tender, non-distended, normal bowel sounds, no masses or organomegaly  Extremities: no cyanosis, clubbing or edema  Musculoskeletal: normal range of motion, no joint swelling, deformity or tenderness  Neurologic: reflexes normal and symmetric, no cranial nerve deficit, gait, coordination and speech normal        Recent Labs     09/03/22 0545 09/04/22  0458 09/05/22  0503    137 143   K 5.5* 3.4* 3.8    97* 100   CO2 27 34* 34*   BUN 13 13 12   CREATININE 1.1* 1.5* 1.1*   GLUCOSE 105* 96 69*   CALCIUM 8.4* 8.5* 8.9       Recent Labs     09/03/22 0545 09/04/22  0458 09/05/22  0503   WBC 7.8 7.4 7.2   RBC 4.55 4.38 4.40   HGB 8.9* 8.5* 8.5*   HCT 34.9 33.4* 32.3*   MCV 76.7* 76.3* 73.4*   MCH 19.6* 19.4* 19.3*   MCHC 25.5* 25.4* 26.3*   RDW 20.9* 20.5* 20.2*    328 324   MPV 10.6 9.7 9.7       Blood Gas, Arterial [[id number]] (Abnormal) Resulted: 09/05/22 1748     Specimen: Blood Updated: 09/05/22 1756      Date Analyzed [id number]      Time Analyzed 1748      Source: Blood Arterial      pH, Blood Gas 7.448      PCO2 70.0 mmHg       PO2 46.2 mmHg       HCO3 47.3 mmol/L       B.E. 20.3 mmol/L       O2 Sat 80.1 %       O2Hb 79.1 %       COHb 0.4 %       MetHb 0.8 %       O2 Content 10.9 mL/dL       HHb 19.7 %       tHb (est) 9.8 g/dL       Mode NC- 4 L      Comment with readback. SpO2 was 88%. Good pulsed flow w/ABG.       Date Of Collection --      Time Collected --      Pt Temp 37.0 C        ID [id number]      Lab 14414      Critical(s) Notified Called to Dr. Deric Lindsey         I/O last 3 completed shifts: In: 240 [P.O.:240]  Out: 2800 [Urine:2800]  I/O this shift:  In: -   Out: [address]      Radiology: pCXR pending. Assessment:    Principal Problem:    CHF with unknown LVEF (HCC)  Active Problems:    Primary hypertension    Type 2 diabetes mellitus without complication, without long-term current use of insulin (HCC)    Iron deficiency anemia due to chronic blood loss    Acute cystitis without hematuria    Acute on chronic congestive heart failure with right ventricular diastolic dysfunction (HCC)    Pulmonary HTN (HCC)    Tobacco abuse    Anemia    Chronic bilateral low back pain with bilateral sciatica  Resolved Problems:    * No resolved hospital problems. *      Plan:    Acute on chronic hypoxic and hypercapnic respiratory failure  -continue Bipap with higher setting of 20/10. She will remain on continuous Bipap. Repeat ABG 30-60 min after last setting changes.  -Transfer ICU as may very well need intubation  -CXR from earlier today reviewed. Case discussed with critical care CNP in the room who will update intensivist      Critical care time 31 minutes not including procedures. NOTE: This report was transcribed using voice recognition software. Every effort was made to ensure accuracy; however, inadvertent computerized transcription errors may be present.      Electronically signed by Suzy Pyle MD on 9/5/2022 at 7:50 PM

## 2022-09-05 NOTE — CONSULTS
PULMONARY MEDICINE CONSULT    Consult requested by: Dr Tricia Lazcano  Reason for consult : Abnormal ABG    HPI  73year old person with PMH of morbid obesity with BMI 44.5, hypertension, type 2 DM, nicotine dependence, pulmonary hypertension, HFpEF as described below admitted to hospital for management of acute heart failure. We are consulted for evaluation of acute hypoxemic and hypercapnic respiratory failure. The patient has been managed with diuresis (acetazolamide and bumetanide), afterload reduction and oxygen supplementation. Today I was called for evaluation of abnormal ABG and possible SHAVONNE. The patient had and ABG demonstrating respiratory acidosis with pH 7.31 and pCO2 of 54 while on 4L NC on 9/2/22 at 3:09; today the ABG demonstrate normal pH of 7.41 and pCO2 of 63, now compensated. Lab work remarkable for microcytic anemia, hypomagnesemia, hypoproteinemia. CXR with bibasilar interstitial prominence with possible left pleural effusion. Review of Systems -Unable to obtain due to altered mentation    The patient was placed on BPAP as trial but did not improve. To receive naloxone and head CT scan.        BP (!) 142/76   Pulse 68   Temp 98.8 °F (37.1 °C) (Oral)   Resp 18   Ht 5' 2\" (1.575 m)   Wt 243 lb 6.4 oz (110.4 kg)   SpO2 98%   BMI 44.52 kg/m²   General: Lethargic, wakes up when touched  HEENT: No head lesions, PERRL, EOMI, mouth without lesions, no nasal lesions, no cervical adenopathy palpated  Respiratory: Lungs with equal breath sounds bilaterally, no adventitious sounds auscultated, no accessory muscle use  CV: Regular rate, no murmurs, JVD up to ear lobe, 1+ leg edema  Abdomen: Soft, non tender, + bowel sounds, no lesions  Skin: Hydrated, adequate turgor, no rash, capillary refill <2 seconds  Extremities: Muscular strength 2/5 in 4 limbs, moves 4 limbs when stimulated with pain, distal pulses present  Neurology: Lethargic, moves 4 limbs when stimulated with pain, neck is supple, no meningitic signs present. A/P:  1) Acute hypoxemic and hypercapnic respiratory failure secondary to acute on chronic diastolic dysfunction with pulmonary edema and pleural effusion. --Oxygen supplementation to maintain sats > 89%. If her mentation worsens, then please obtain ABG and start BPAP 14/8 if worsening respiratory acidosis. Last ABG does not warrant use of BPAP. --Albuterol PRN  --Diuresis and afterload reduction as per cardiology  --Pleural effusions are probably secondary to heart failure. If euvolemic and pleural effusion persists, then will need thoracentesis.      2) Possible obstructive sleep apnea/obesity hypoventilation syndrome  --Treat with oxygen as needed  --Recommend to keep patient in continuous pulse oximetry  --Avoid opioids and benzodiazepines as these medications cause hypoventilation and respiratory failure in patients with SHAVONNE/OHS  --Needs polysomnogram    3) Encephalopathy -Toxic/metabolic  --Suspect secondary to opioid use vs neurological disease, needs head CT  --Trial of naloxone    DVT prophylaxis with enoxaparin    4) Diabetes mellitus  --Management with insulin administration     Full code    Past Medical History:   Diagnosis Date    Chronic back pain 2018    10/10 on the pain scale    Hypertension     Scoliosis     Type 2 diabetes mellitus without complication ([facility] [address]      Family History   Problem Relation Age of Onset    Cancer Father         Pancreatic     Social History     Socioeconomic History    Marital status: Single     Spouse name: Not on file    Number of children: Not on file    Years of education: Not on file    Highest education level: Not on file   Occupational History    Not on file   Tobacco Use    Smoking status: Every Day     Packs/day: 0.50     Types: Cigarettes    Smokeless tobacco: Never   Substance and Sexual Activity    Alcohol use: No    Drug use: No    Sexual activity: Not on file   Other Topics Concern    Not on file   Social History Narrative    Not on file     Social Determinants of Health     Financial Resource Strain: Not on file   Food Insecurity: Not on file   Transportation Needs: Not on file   Physical Activity: Not on file   Stress: Not on file   Social Connections: Not on file   Intimate Partner Violence: Not on file   Housing Stability: Not on file     Current Facility-Administered Medications   Medication Dose Route Frequency Provider Last Rate Last Admin    [START ON 9/6/2022] pantoprazole (PROTONIX) tablet 40 mg  40 mg Oral QAM AC Porsche Gonzales MD        magnesium sulfate 2000 mg in 50 mL IVPB premix  2,000 mg IntraVENous Once Renee Anaya MD 25 mL/hr at 09/05/22 1206 2,000 mg at 09/05/22 1206    lidocaine 1 % injection 5 mL  5 mL IntraDERmal Once Porsche Gonzales MD        sodium chloride flush 0.9 % injection 5-40 mL  5-40 mL IntraVENous 2 times per day Porsche Gonzales MD        sodium chloride flush 0.9 % injection 5-40 mL  5-40 mL IntraVENous PRN Porsche Gonzales MD        0.9 % sodium chloride infusion   IntraVENous PRN Porsche Gonzales MD        heparin flush 100 UNIT/ML injection 100 Units  1 mL IntraVENous 2 times per day Porsche Gonzales MD        heparin flush 100 UNIT/ML injection 100 Units  1 mL IntraCATHeter PRN Porsche Gonzales MD        bumetanide (BUMEX) tablet 1 mg  1 mg Oral Daily Renee Anaya MD   1 mg at 09/05/22 0839    enoxaparin (LOVENOX) injection 40 mg  40 mg SubCUTAneous Daily Porsche Gonzales MD   40 mg at 09/05/22 9130    aspirin chewable tablet 81 mg  81 mg Oral Daily Silvana Braga MD   81 mg at 09/05/22 0839    insulin lispro (HUMALOG) injection vial 0-8 Units  0-8 Units SubCUTAneous TID  Silvana Braga MD        glucose chewable tablet 16 g  4 tablet Oral PRN Silvana Braga MD        dextrose bolus 10% 125 mL  125 mL IntraVENous PRN Silvana Braga MD        Or    dextrose bolus 10% 250 mL  250 mL IntraVENous PRN Silvana Braga MD        glucagon (rDNA) injection 1 mg  1 mg SubCUTAneous PRN Silvana Braga MD        dextrose 10 % infusion   IntraVENous Continuous PRN Silvana Braga MD        ondansetron (ZOFRAN) injection 4 mg  4 mg IntraVENous Q6H PRN Christen Baron MD        Or    ondansetron (ZOFRAN-ODT) disintegrating tablet 4 mg  4 mg Oral Q8H PRN Christen Baron MD   4 mg at 09/02/22 1805    atenolol (TENORMIN) tablet 50 mg  50 mg Oral BID Christen Baron MD   50 mg at 09/05/22 0839    HYDROcodone-acetaminophen (NORCO) 5-325 MG per tablet 1 tablet  1 tablet Oral Q6H PRN Christen Baron MD   1 tablet at 09/04/22 1943    [Held by provider] lisinopril (PRINIVIL;ZESTRIL) tablet 20 mg  20 mg Oral Daily Silvana Braga MD   20 mg at 09/03/22 0902    metFORMIN (GLUCOPHAGE) tablet 1,000 mg  1,000 mg Oral BID  Silvana Braga MD   1,000 mg at 09/05/22 1002    ferrous sulfate (IRON 325) tablet 325 mg  325 mg Oral BID  Silvana Braga MD   325 mg at 09/05/22 0839    perflutren lipid microspheres (DEFINITY) injection 1.65 mg  1.5 mL IntraVENous ONCE PRN Jose Ansari PA-C       MEDICATIONS:   [START ON 9/6/2022] pantoprazole  40 mg Oral QAM AC    magnesium sulfate  2,000 mg IntraVENous Once    lidocaine  5 mL IntraDERmal Once    sodium chloride flush  5-40 mL IntraVENous 2 times per day    heparin flush  1 mL IntraVENous 2 times per day    bumetanide  1 mg Oral Daily    enoxaparin  40 mg SubCUTAneous Daily    aspirin  81 mg Oral Daily    insulin lispro  0-8 Units SubCUTAneous TID     atenolol  50 mg Oral BID    [Held by provider] lisinopril  20 mg Oral Daily    metFORMIN  1,000 mg Oral BID     ferrous sulfate  325 mg Oral BID       sodium chloride      dextrose       sodium chloride flush, sodium chloride, heparin flush, glucose, dextrose bolus **OR** dextrose bolus, glucagon (rDNA), dextrose, ondansetron **OR** ondansetron, HYDROcodone-acetaminophen, perflutren lipid microspheres    OBJECTIVE:  Vitals:    09/05/22 0746   BP: (!) 142/76   Pulse: 68   Resp: 18   Temp: 98.8 °F (37.1 °C)   SpO2: 98%        O2 Flow Rate (L/min):  (4)  O2 Device: Nasal cannula        LABS:  WBC   Date Value Ref Range Status   09/05/2022 7.2 4.5 - 11.5 E9/L Final   09/04/2022 7.4 4.5 - 11.5 E9/L Final   09/03/2022 7.8 4.5 - 11.5 E9/L Final     Hemoglobin   Date Value Ref Range Status   09/05/2022 8.5 (L) 11.5 - 15.5 g/dL Final   09/04/2022 8.5 (L) 11.5 - 15.5 g/dL Final   09/03/2022 8.9 (L) 11.5 - 15.5 g/dL Final     Hematocrit   Date Value Ref Range Status   09/05/2022 32.3 (L) 34.0 - 48.0 % Final   09/04/2022 33.4 (L) 34.0 - 48.0 % Final   09/03/2022 34.9 34.0 - 48.0 % Final     MCV   Date Value Ref Range Status   09/05/2022 73.4 (L) 80.0 - 99.9 fL Final   09/04/2022 76.3 (L) 80.0 - 99.9 fL Final   09/03/2022 76.7 (L) 80.0 - 99.9 fL Final     Platelets   Date Value Ref Range Status   09/05/2022 324 130 - 450 E9/L Final   09/04/2022 328 130 - 450 E9/L Final   09/03/2022 348 130 - 450 E9/L Final     Sodium   Date Value Ref Range Status   09/05/2022 143 132 - 146 mmol/L Final   09/04/2022 137 132 - 146 mmol/L Final   09/03/2022 142 132 - 146 mmol/L Final     Potassium   Date Value Ref Range Status   09/05/2022 3.8 3.5 - 5.0 mmol/L Final   09/04/2022 3.4 (L) 3.5 - 5.0 mmol/L Final   09/03/2022 5.5 (H) 3.5 - 5.0 mmol/L Final     Comment:     Specimen is moderately Hemolyzed. Result may be artificially increased.      Potassium reflex Magnesium   Date Value Ref Range Status   09/01/2022 3.8 3.5 - 5.0 mmol/L Final     Chloride   Date Value Ref Range Status   09/05/2022 100 98 - 107 mmol/L Final   09/04/2022 97 (L) 98 - 107 mmol/L Final   09/03/2022 101 98 - 107 mmol/L Final     CO2   Date Value Ref Range Status   09/05/2022 34 (H) 22 - 29 mmol/L Final   09/04/2022 34 (H) 22 - 29 mmol/L Final   09/03/2022 27 22 - 29 mmol/L Final     BUN   Date Value Ref Range Status   09/05/2022 12 6 - 23 mg/dL Final   09/04/2022 13 6 - 23 mg/dL Final   09/03/2022 13 6 - 23 mg/dL Final     Creatinine   Date Value Ref Range Status   09/05/2022 1.1 (H) 0.5 - 1.0 mg/dL Final   09/04/2022 1.5 (H) 0.5 - 1.0 mg/dL Final   09/03/2022 1.1 (H) 0.5 - 1.0 mg/dL Final     Glucose   Date Value Ref Range Status   09/05/2022 69 (L) 74 - 99 mg/dL Final   09/04/2022 96 74 - 99 mg/dL Final   09/03/2022 105 (H) 74 - 99 mg/dL Final     Calcium   Date Value Ref Range Status   09/05/2022 8.9 8.6 - 10.2 mg/dL Final   09/04/2022 8.5 (L) 8.6 - 10.2 mg/dL Final   09/03/2022 8.4 (L) 8.6 - 10.2 mg/dL Final     Total Protein   Date Value Ref Range Status   09/05/2022 6.1 (L) 6.4 - 8.3 g/dL Final   09/04/2022 5.9 (L) 6.4 - 8.3 g/dL Final     Albumin   Date Value Ref Range Status   09/05/2022 2.8 (L) 3.5 - 5.2 g/dL Final   09/04/2022 2.8 (L) 3.5 - 5.2 g/dL Final     Total Bilirubin   Date Value Ref Range Status   09/05/2022 0.4 0.0 - 1.2 mg/dL Final   09/04/2022 <0.2 0.0 - 1.2 mg/dL Final     Alkaline Phosphatase   Date Value Ref Range Status   09/05/2022 69 35 - 104 U/L Final   09/04/2022 71 35 - 104 U/L Final     AST   Date Value Ref Range Status   09/05/2022 13 0 - 31 U/L Final   09/04/2022 11 0 - 31 U/L Final     ALT   Date Value Ref Range Status   09/05/2022 8 0 - 32 U/L Final   09/04/2022 8 0 - 32 U/L Final     GFR Non-   Date Value Ref Range Status   09/05/2022 59 >=60 mL/min/1.73 Final     Comment:     Chronic Kidney Disease: less than 60 ml/min/1.73 sq.m. Kidney Failure: less than 15 ml/min/1.73 sq.m. Results valid for patients 18 years and older. 09/04/2022 42 >=60 mL/min/1.73 Final     Comment:     Chronic Kidney Disease: less than 60 ml/min/1.73 sq.m. Kidney Failure: less than 15 ml/min/1.73 sq.m. Results valid for patients 18 years and older. 09/03/2022 59 >=60 mL/min/1.73 Final     Comment:     Chronic Kidney Disease: less than 60 ml/min/1.73 sq.m. Kidney Failure: less than 15 ml/min/1.73 sq.m. Results valid for patients 18 years and older.        GFR    Date Value Ref Range Status   09/05/2022 59  Final   09/04/2022 42  Final   09/03/2022 59  Final     Magnesium   Date Value Ref Range Status   09/05/2022 1.3 (L) 1.6 - 2.6 mg/dL Final   09/04/2022 1.4 (L) 1.6 - 2.6 mg/dL Final     No results found for: PHOS  Recent Labs     09/05/22  0932   PH 7.419   PO2 64.7*   PCO2 63.9*   HCO3 40.4*   BE 13.9*   O2SAT 90.8*       RADIOLOGY:  XR CHEST (2 VW)   Final Result   Opacifications at the left lung base somewhat gradient or layering could   represent a layering small to moderate left pleural effusion with adjacent   atelectasis with background interstitial prominence of likely interstitial   edema pattern. US DUP LOWER EXTREMITIES BILATERAL VENOUS   Final Result   No evidence of DVT in either lower extremity. CTA CHEST W CONTRAST   Final Result   1. No acute pulmonary embolus is identified   2. Suspicious for interstitial pulmonary edema, and a suggestion of prominent   subcutaneous edema laterally most evident in the abdomen         XR CHEST 1 VIEW   Final Result   Pattern of perihilar vascular congestion/interstitial edema of   mild-to-moderate degree of severity. Please correlate clinically. PROBLEM LIST:  Principal Problem:    CHF with unknown LVEF (HCC)  Active Problems:    Primary hypertension    Type 2 diabetes mellitus without complication, without long-term current use of insulin (HCC)    Iron deficiency anemia due to chronic blood loss    Acute cystitis without hematuria    Acute on chronic congestive heart failure with right ventricular diastolic dysfunction (HCC)    Pulmonary HTN (HCC)    Tobacco abuse    Anemia    Chronic bilateral low back pain with bilateral sciatica  Resolved Problems:    * No resolved hospital problems.  *          Rafael Velez MD  Pulmonary and Critical Care Medicine

## 2022-09-05 NOTE — PROGRESS NOTES
[employer] Jase from respiratory came to the nurses station and said due to respiratory distress while on continuous bipap that an RRT needed to be called on the patient. Rapid Response initiated. Patient being transferred to ICU; report given.

## 2022-09-06 PROBLEM — E87.6 HYPOKALEMIA: Status: ACTIVE | Noted: 2022-09-06

## 2022-09-06 PROBLEM — E66.01 MORBID OBESITY (HCC): Status: ACTIVE | Noted: 2022-09-06

## 2022-09-06 PROBLEM — G47.33 OBSTRUCTIVE SLEEP APNEA: Status: ACTIVE | Noted: 2022-09-06

## 2022-09-06 LAB
AADO2: 61.3 MMHG
ANION GAP SERPL CALCULATED.3IONS-SCNC: 7 MMOL/L (ref 7–16)
ANISOCYTOSIS: ABNORMAL
B.E.: 18.8 MMOL/L (ref -3–3)
BASOPHILS ABSOLUTE: 0 E9/L (ref 0–0.2)
BASOPHILS RELATIVE PERCENT: 0.6 % (ref 0–2)
BUN BLDV-MCNC: 9 MG/DL (ref 6–23)
CALCIUM SERPL-MCNC: 8.4 MG/DL (ref 8.6–10.2)
CHLORIDE BLD-SCNC: 96 MMOL/L (ref 98–107)
CO2: 40 MMOL/L (ref 22–29)
COHB: 0.3 % (ref 0–1.5)
CREAT SERPL-MCNC: 0.8 MG/DL (ref 0.5–1)
CRITICAL: ABNORMAL
DATE ANALYZED: ABNORMAL
DATE OF COLLECTION: ABNORMAL
EOSINOPHILS ABSOLUTE: 0 E9/L (ref 0.05–0.5)
EOSINOPHILS RELATIVE PERCENT: 0.2 % (ref 0–6)
FIO2: 30 %
GFR AFRICAN AMERICAN: >60
GFR NON-AFRICAN AMERICAN: >60 ML/MIN/1.73
GLUCOSE BLD-MCNC: 83 MG/DL (ref 74–99)
HCO3: 45 MMOL/L (ref 22–26)
HCT VFR BLD CALC: 30.1 % (ref 34–48)
HEMOGLOBIN: 8.2 G/DL (ref 11.5–15.5)
HHB: 7.2 % (ref 0–5)
HYPOCHROMIA: ABNORMAL
LAB: ABNORMAL
LYMPHOCYTES ABSOLUTE: 0.49 E9/L (ref 1.5–4)
LYMPHOCYTES RELATIVE PERCENT: 8.7 % (ref 20–42)
Lab: ABNORMAL
MAGNESIUM: 1.3 MG/DL (ref 1.6–2.6)
MCH RBC QN AUTO: 19.7 PG (ref 26–35)
MCHC RBC AUTO-ENTMCNC: 27.2 % (ref 32–34.5)
MCV RBC AUTO: 72.2 FL (ref 80–99.9)
METER GLUCOSE: 76 MG/DL (ref 74–99)
METER GLUCOSE: 82 MG/DL (ref 74–99)
METER GLUCOSE: 98 MG/DL (ref 74–99)
METHB: 0.8 % (ref 0–1.5)
MODE: ABNORMAL
MONOCYTES ABSOLUTE: 0.38 E9/L (ref 0.1–0.95)
MONOCYTES RELATIVE PERCENT: 7 % (ref 2–12)
NEUTROPHILS ABSOLUTE: 4.54 E9/L (ref 1.8–7.3)
NEUTROPHILS RELATIVE PERCENT: 84.3 % (ref 43–80)
O2 CONTENT: 12.1 ML/DL
O2 SATURATION: 92.7 % (ref 92–98.5)
O2HB: 91.7 % (ref 94–97)
OPERATOR ID: 797
PATIENT TEMP: 37 C
PCO2: 63.7 MMHG (ref 35–45)
PDW BLD-RTO: 20.5 FL (ref 11.5–15)
PEEP/CPAP: 10 CMH2O
PFO2: 2.34 MMHG/%
PH BLOOD GAS: 7.47 (ref 7.35–7.45)
PHOSPHORUS: 3 MG/DL (ref 2.5–4.5)
PIP: 20 CMH2O
PLATELET # BLD: 276 E9/L (ref 130–450)
PMV BLD AUTO: 9.7 FL (ref 7–12)
PO2: 70.3 MMHG (ref 75–100)
POIKILOCYTES: ABNORMAL
POLYCHROMASIA: ABNORMAL
POTASSIUM SERPL-SCNC: 3.3 MMOL/L (ref 3.5–5)
RBC # BLD: 4.17 E12/L (ref 3.5–5.5)
RI(T): 0.87
RR MECHANICAL: 20 B/MIN
SODIUM BLD-SCNC: 143 MMOL/L (ref 132–146)
SOURCE, BLOOD GAS: ABNORMAL
TARGET CELLS: ABNORMAL
THB: 9.3 G/DL (ref 11.5–16.5)
TIME ANALYZED: 1016
TSH SERPL DL<=0.05 MIU/L-ACNC: 2.46 UIU/ML (ref 0.27–4.2)
WBC # BLD: 5.4 E9/L (ref 4.5–11.5)

## 2022-09-06 PROCEDURE — 82805 BLOOD GASES W/O2 SATURATION: CPT

## 2022-09-06 PROCEDURE — 82962 GLUCOSE BLOOD TEST: CPT

## 2022-09-06 PROCEDURE — 80048 BASIC METABOLIC PNL TOTAL CA: CPT

## 2022-09-06 PROCEDURE — 2700000000 HC OXYGEN THERAPY PER DAY

## 2022-09-06 PROCEDURE — 2500000003 HC RX 250 WO HCPCS: Performed by: INTERNAL MEDICINE

## 2022-09-06 PROCEDURE — C1751 CATH, INF, PER/CENT/MIDLINE: HCPCS

## 2022-09-06 PROCEDURE — 05HC33Z INSERTION OF INFUSION DEVICE INTO LEFT BASILIC VEIN, PERCUTANEOUS APPROACH: ICD-10-PCS | Performed by: INTERNAL MEDICINE

## 2022-09-06 PROCEDURE — 84100 ASSAY OF PHOSPHORUS: CPT

## 2022-09-06 PROCEDURE — 76937 US GUIDE VASCULAR ACCESS: CPT

## 2022-09-06 PROCEDURE — 6360000002 HC RX W HCPCS: Performed by: INTERNAL MEDICINE

## 2022-09-06 PROCEDURE — 2580000003 HC RX 258: Performed by: INTERNAL MEDICINE

## 2022-09-06 PROCEDURE — 85025 COMPLETE CBC W/AUTO DIFF WBC: CPT

## 2022-09-06 PROCEDURE — 87081 CULTURE SCREEN ONLY: CPT

## 2022-09-06 PROCEDURE — 83735 ASSAY OF MAGNESIUM: CPT

## 2022-09-06 PROCEDURE — 84443 ASSAY THYROID STIM HORMONE: CPT

## 2022-09-06 PROCEDURE — 6370000000 HC RX 637 (ALT 250 FOR IP): Performed by: INTERNAL MEDICINE

## 2022-09-06 PROCEDURE — 99232 SBSQ HOSP IP/OBS MODERATE 35: CPT | Performed by: INTERNAL MEDICINE

## 2022-09-06 PROCEDURE — 94660 CPAP INITIATION&MGMT: CPT

## 2022-09-06 PROCEDURE — 36410 VNPNXR 3YR/> PHY/QHP DX/THER: CPT

## 2022-09-06 PROCEDURE — 87040 BLOOD CULTURE FOR BACTERIA: CPT

## 2022-09-06 PROCEDURE — 1200000000 HC SEMI PRIVATE

## 2022-09-06 RX ORDER — POTASSIUM CHLORIDE 7.45 MG/ML
10 INJECTION INTRAVENOUS
Status: COMPLETED | OUTPATIENT
Start: 2022-09-06 | End: 2022-09-06

## 2022-09-06 RX ORDER — ENOXAPARIN SODIUM 100 MG/ML
30 INJECTION SUBCUTANEOUS 2 TIMES DAILY
Status: DISCONTINUED | OUTPATIENT
Start: 2022-09-06 | End: 2022-09-14 | Stop reason: HOSPADM

## 2022-09-06 RX ORDER — ENOXAPARIN SODIUM 100 MG/ML
0.5 INJECTION SUBCUTANEOUS 2 TIMES DAILY
Status: DISCONTINUED | OUTPATIENT
Start: 2022-09-06 | End: 2022-09-06 | Stop reason: DRUGHIGH

## 2022-09-06 RX ORDER — MAGNESIUM SULFATE IN WATER 40 MG/ML
2000 INJECTION, SOLUTION INTRAVENOUS ONCE
Status: COMPLETED | OUTPATIENT
Start: 2022-09-06 | End: 2022-09-06

## 2022-09-06 RX ADMIN — POTASSIUM CHLORIDE 10 MEQ: 7.46 INJECTION, SOLUTION INTRAVENOUS at 11:13

## 2022-09-06 RX ADMIN — Medication 10 ML: at 10:58

## 2022-09-06 RX ADMIN — PANTOPRAZOLE SODIUM 40 MG: 40 TABLET, DELAYED RELEASE ORAL at 10:47

## 2022-09-06 RX ADMIN — POTASSIUM CHLORIDE 10 MEQ: 7.46 INJECTION, SOLUTION INTRAVENOUS at 12:51

## 2022-09-06 RX ADMIN — ASPIRIN 81 MG CHEWABLE TABLET 81 MG: 81 TABLET CHEWABLE at 10:45

## 2022-09-06 RX ADMIN — LIDOCAINE HYDROCHLORIDE 5 ML: 10 SOLUTION INTRAVENOUS at 08:12

## 2022-09-06 RX ADMIN — ENOXAPARIN SODIUM 30 MG: 100 INJECTION SUBCUTANEOUS at 21:31

## 2022-09-06 RX ADMIN — POTASSIUM CHLORIDE 10 MEQ: 7.46 INJECTION, SOLUTION INTRAVENOUS at 13:44

## 2022-09-06 RX ADMIN — BUMETANIDE 1 MG: 1 TABLET ORAL at 10:47

## 2022-09-06 RX ADMIN — MAGNESIUM SULFATE HEPTAHYDRATE 2000 MG: 40 INJECTION, SOLUTION INTRAVENOUS at 12:55

## 2022-09-06 RX ADMIN — NALOXONE HYDROCHLORIDE 0.4 MG/HR: 1 INJECTION PARENTERAL at 03:21

## 2022-09-06 RX ADMIN — FERROUS SULFATE TAB 325 MG (65 MG ELEMENTAL FE) 325 MG: 325 (65 FE) TAB at 17:06

## 2022-09-06 RX ADMIN — HEPARIN 100 UNITS: 100 SYRINGE at 21:35

## 2022-09-06 RX ADMIN — ATENOLOL 50 MG: 25 TABLET ORAL at 21:30

## 2022-09-06 RX ADMIN — ENOXAPARIN SODIUM 40 MG: 100 INJECTION SUBCUTANEOUS at 10:49

## 2022-09-06 RX ADMIN — Medication 10 ML: at 21:35

## 2022-09-06 RX ADMIN — METFORMIN HYDROCHLORIDE 1000 MG: 1000 TABLET ORAL at 17:06

## 2022-09-06 RX ADMIN — FERROUS SULFATE TAB 325 MG (65 MG ELEMENTAL FE) 325 MG: 325 (65 FE) TAB at 10:47

## 2022-09-06 RX ADMIN — NALOXONE HYDROCHLORIDE 0.4 MG/HR: 1 INJECTION PARENTERAL at 09:01

## 2022-09-06 RX ADMIN — POTASSIUM CHLORIDE 10 MEQ: 7.46 INJECTION, SOLUTION INTRAVENOUS at 14:43

## 2022-09-06 ASSESSMENT — PAIN SCALES - GENERAL
PAINLEVEL_OUTOF10: 0

## 2022-09-06 NOTE — PROCEDURES
SL Power Midline  Placement 9/6/2022    Product number: [id number]   Lot Number: [id number]      Ultrasound: yes   L Basilic      Upper Arm Circumference: 32CM    Size: 4.5F/15CM    Exposed Length: 0    Internal Length: 10CM   Cut: 5CM   Vein Measurement: 0.42CM  Brisk blood return noted, NS flushed, clamped and alcohol cap applied with label. Pt shaheen well, site reinforced on outside of dressing for small amt of oozing.   RN aware okay to use     Cassie Kiran RN  9/6/2022  8:13 AM

## 2022-09-06 NOTE — PLAN OF CARE
Problem: Skin/Tissue Integrity  Goal: Absence of new skin breakdown  Description: 1. Monitor for areas of redness and/or skin breakdown  2. Assess vascular access sites hourly  3. Every 4-6 hours minimum:  Change oxygen saturation probe site  4. Every 4-6 hours:  If on nasal continuous positive airway pressure, respiratory therapy assess nares and determine need for appliance change or resting period.   Outcome: Progressing     Problem: Safety - Adult  Goal: Free from fall injury  Outcome: Progressing     Problem: Pain  Goal: Verbalizes/displays adequate comfort level or baseline comfort level  Outcome: Progressing     Problem: Discharge Planning  Goal: Discharge to home or other facility with appropriate resources  Outcome: Progressing     Problem: ABCDS Injury Assessment  Goal: Absence of physical injury  Outcome: Progressing     Problem: Chronic Conditions and Co-morbidities  Goal: Patient's chronic conditions and co-morbidity symptoms are monitored and maintained or improved  Outcome: Progressing

## 2022-09-06 NOTE — PROGRESS NOTES
Transported patient while on V60 BiPAP from ICU to CT scan and back again. No problems encountered. Total time 40 minutes.

## 2022-09-06 NOTE — PROGRESS NOTES
Subjective: The patient is awake and alert on BiPAP . Denies chest pain, angina, and dyspnea. Denies abdominal pain. No nausea or vomiting. Was transferred to ICU because of hypoxemic and hypercapnic acute respiratory failure. Patient is a smoker. She has also COPD. And obstructive sleep apnea.   RRT was called overnight and transferred to ICU because of the above    Objective:    BP (!) 137/57   Pulse 64   Temp 100 °F (37.8 °C) (Bladder)   Resp 20   Ht 5' 2\" (1.575 m)   Wt 243 lb 6.4 oz (110.4 kg)   SpO2 90%   BMI 44.52 kg/m²   Head and Neck: normal atraumatic, no neck masses, normal thyroid, no jvd  Heart:  regular rate and rhythm, S1, S2 normal, no murmur, click, rub or gallop  Lungs:  chest clear, no wheezing, rales, normal symmetric air entry,  no chest wall deformities or tenderness  Abd: soft, non-tender, without masses or organomegaly  Extrem:  No clubbing, cyanosis, or edema  Neuro:Normal, no focal neurological deficit     CBC with Differential:    Lab Results   Component Value Date/Time    WBC 5.4 09/06/2022 06:18 AM    RBC 4.17 09/06/2022 06:18 AM    HGB 8.2 09/06/2022 06:18 AM    HCT 30.1 09/06/2022 06:18 AM     09/06/2022 06:18 AM    MCV 72.2 09/06/2022 06:18 AM    MCH 19.7 09/06/2022 06:18 AM    MCHC 27.2 09/06/2022 06:18 AM    RDW 20.5 09/06/2022 06:18 AM    LYMPHOPCT 8.7 09/06/2022 06:18 AM    MONOPCT 7.0 09/06/2022 06:18 AM    BASOPCT 0.6 09/06/2022 06:18 AM    MONOSABS 0.38 09/06/2022 06:18 AM    LYMPHSABS 0.49 09/06/2022 06:18 AM    EOSABS 0.00 09/06/2022 06:18 AM    BASOSABS 0.00 09/06/2022 06:18 AM     CMP:    Lab Results   Component Value Date/Time     09/06/2022 06:18 AM    K 3.3 09/06/2022 06:18 AM    K 3.8 09/01/2022 08:24 PM    CL 96 09/06/2022 06:18 AM    CO2 40 09/06/2022 06:18 AM    BUN 9 09/06/2022 06:18 AM    CREATININE 0.8 09/06/2022 06:18 AM    GFRAA >60 09/06/2022 06:18 AM    LABGLOM >60 09/06/2022 06:18 AM    GLUCOSE 83 09/06/2022 06:18 AM    PROT 6.1 09/05/2022 05:03 AM    LABALBU 2.8 09/05/2022 05:03 AM    CALCIUM 8.4 09/06/2022 06:18 AM    BILITOT 0.4 09/05/2022 05:03 AM    ALKPHOS 69 09/05/2022 05:03 AM    AST 13 09/05/2022 05:03 AM    ALT 8 09/05/2022 05:03 AM     ABG:    Lab Results   Component Value Date/Time    PH 7.467 09/06/2022 10:16 AM    PCO2 63.7 09/06/2022 10:16 AM    PO2 70.3 09/06/2022 10:16 AM    HCO3 45.0 09/06/2022 10:16 AM    BE 18.8 09/06/2022 10:16 AM    O2SAT 92.7 09/06/2022 10:16 AM       CT HEAD WO CONTRAST [[id number]] Collected: 09/05/22 2321     Order Status: Completed Updated: 09/05/22 2327     Narrative:       EXAMINATION:   CT OF THE HEAD WITHOUT CONTRAST  9/5/2022 9:13 pm     TECHNIQUE:   CT of the head was performed without the administration of intravenous   contrast. Automated exposure control, iterative reconstruction, and/or weight   based adjustment of the mA/kV was utilized to reduce the radiation dose to as   low as reasonably achievable. COMPARISON:   None. HISTORY:   ORDERING SYSTEM PROVIDED HISTORY: Altered mentation, evaluate for stroke   TECHNOLOGIST PROVIDED HISTORY:   Reason for exam:->Altered mentation, evaluate for stroke   Has a \"code stroke\" or \"stroke alert\" been called? ->No     FINDINGS:   BRAIN/VENTRICLES: There is no acute intracranial hemorrhage, mass effect or   midline shift. No abnormal extra-axial fluid collection. The gray-white   differentiation is maintained without evidence of an acute infarct. There is   no evidence of hydrocephalus. ORBITS: The visualized portion of the orbits demonstrate no acute abnormality. SINUSES: The visualized paranasal sinuses and mastoid air cells demonstrate   no acute abnormality. SOFT TISSUES/SKULL:  No acute abnormality of the visualized skull or soft   tissues. Impression:       No acute intracranial abnormality.      For clinical suspicion of acute ischemia, recommend MRI brain with   diffusion-weighted imaging for further evaluation will discuss with the patient about follow-up after discharge    Tobacco abuse patient reported that she has been smoking less than 1 pack/day. Until this admission I discussed with her that this is what caused her to have problems with her breathing.   I told her about quitting smoking and she kind of showed interest I am not sure how long this can last but we will keep discussing this with her    Gradie Meckel, MD  10:33 AM  9/6/2022

## 2022-09-06 NOTE — PROGRESS NOTES
CRITICAL CARE PROGRESS NOTE    Ms Carmen Trujillo is off naloxone infusion, on 3L NC, fully awake and alert. I think the patient can transfer to Medicine floor.     Dr. Kim Paula notified    Grayson Elizondo MD  Pulmonary and Critical Care Medicine

## 2022-09-06 NOTE — ACP (ADVANCE CARE PLANNING)
Advance Care Planning   Healthcare Decision Maker:    Primary Decision Maker: Stanislav Guadarrama - [facility] - [phone]    As reviewed with pt by ss notes.          Electronically signed by JERALD Ledesma on 9/6/2022 at 10:15 AM

## 2022-09-06 NOTE — PROGRESS NOTES
HPI  73year old person with PMH of morbid obesity with BMI 44.5, hypertension, type 2 DM, nicotine dependence, pulmonary hypertension, HFpEF as described below admitted to hospital for management of acute heart failure. We are consulted for evaluation of acute hypoxemic and hypercapnic respiratory failure. The patient has been managed with diuresis (acetazolamide and bumetanide), afterload reduction and oxygen supplementation. We are managing toxic encephalopathy due to opioid overdose and treating with naloxone infusion. Now with alkalosis, to stop BPAP and diuresis is on hold  CT head with no acute abnormalities  Receiving replacement for hypokalemia and hypomagnesemia      BP (!) 131/54   Pulse 58   Temp 100 °F (37.8 °C) (Bladder)   Resp 20   Ht 5' 2\" (1.575 m)   Wt 243 lb 6.4 oz (110.4 kg)   SpO2 90%   BMI 44.52 kg/m²     General: Awake,oriented to place, time and person  HEENT: No head lesions, PERRL, EOMI, mouth without lesions, no nasal lesions, no cervical adenopathy palpated  Respiratory: Lungs with equal breath sounds bilaterally, no adventitious sounds auscultated, no accessory muscle use  CV: Regular rate, no murmurs, no JVD, 1+ leg edema  Abdomen: Soft, non tender, + bowel sounds, no lesions  Skin: Hydrated, adequate turgor, no rash, capillary refill <2 seconds  Extremities: Muscular strength 3/5 in 4 limbs, moves 4 limbs spontaneously, distal pulses present  Neurology: Awake and alert, follows commands, moves 4 limbs on command and spontaneously, neck is supple, no meningitic signs present. A/P:  1) Acute hypoxemic and hypercapnic respiratory failure secondary to acute on chronic diastolic dysfunction with pulmonary edema and pleural effusion. --Oxygen supplementation to maintain sats > 89%. Stop BPAP due to alkalosis   --Albuterol PRN  --Diuresis on hold due to metabolic alkalosis, atenolol stopped due to bradycardia.   --Pleural effusions are probably secondary to heart failure. If euvolemic and pleural effusion persists, then will need thoracentesis. 2) Possible obstructive sleep apnea/obesity hypoventilation syndrome  --Treat with oxygen as needed  --Recommend to keep patient monitored withcontinuous pulse oximetry  --Avoid opioids and benzodiazepines as these medications cause hypoventilation and respiratory failure in patients with SHAVONNE/OHS, just as it is happening at this point  --Needs polysomnogram as outpatient    3) Encephalopathy -Toxic/metabolic due to opoid overdose  --On Narcan drip, we will stop today. Restart as needed    4) Hypokalemia and hypomagnesemia  --Receiving electrolyte supplementation     5) Diabetes mellitus  --Management with insulin administration     DVT prophylaxis with enoxaparin    Full code    Can transfer to Telemetry     Due to clinical improvement, we will not follow daily but PRN  Please call intensivist on call if the patient's condition were to worsen.    Thank you for allowing us to participate in the care of Ms Jenelle Pate        Past Medical History:   Diagnosis Date    Chronic back pain 2018    10/10 on the pain scale    Hypertension     Scoliosis     Type 2 diabetes mellitus without complication ([facility] [address]      Family History   Problem Relation Age of Onset    Cancer Father         Pancreatic     Social History     Socioeconomic History    Marital status: Single     Spouse name: Not on file    Number of children: Not on file    Years of education: Not on file    Highest education level: Not on file   Occupational History    Not on file   Tobacco Use    Smoking status: Every Day     Packs/day: 0.50     Types: Cigarettes    Smokeless tobacco: Never   Substance and Sexual Activity    Alcohol use: No    Drug use: No    Sexual activity: Not on file   Other Topics Concern    Not on file   Social History Narrative    Not on file     Social Determinants of Health     Financial Resource Strain: Not on file   Food Insecurity: Not on file Transportation Needs: Not on file   Physical Activity: Not on file   Stress: Not on file   Social Connections: Not on file   Intimate Partner Violence: Not on file   Housing Stability: Not on file     Current Facility-Administered Medications   Medication Dose Route Frequency Provider Last Rate Last Admin    potassium chloride 10 mEq/100 mL IVPB (Peripheral Line)  10 mEq IntraVENous Q2H Swetha Braga  mL/hr at 09/06/22 1113 10 mEq at 09/06/22 1113    pantoprazole (PROTONIX) tablet 40 mg  40 mg Oral QAM AC Bahaa A Awadalla, MD   40 mg at 09/06/22 1047    sodium chloride flush 0.9 % injection 5-40 mL  5-40 mL IntraVENous 2 times per day Austin Griffin MD   10 mL at 09/06/22 1058    sodium chloride flush 0.9 % injection 5-40 mL  5-40 mL IntraVENous PRN Austin Griffin MD        0.9 % sodium chloride infusion   IntraVENous PRN Austin Griffin MD        heparin flush 100 UNIT/ML injection 100 Units  1 mL IntraVENous 2 times per day Austin Griffin MD        heparin flush 100 UNIT/ML injection 100 Units  1 mL IntraCATHeter PRN Austin Griffin MD        albuterol (PROVENTIL) nebulizer solution 2.5 mg  2.5 mg Nebulization Q4H PRN Joy Natarajan MD        naloxone [facility]) injection 0.4 mg  0.4 mg IntraVENous Q5 Min PRN Joy Natarajan MD   0.4 mg at 09/05/22 2047    naloxone (NARCAN) 2 mg in sodium chloride 0.9 % 500 mL infusion  0.4 mg/hr IntraVENous Continuous Joy Natarajan  mL/hr at 09/06/22 0901 0.4 mg/hr at 09/06/22 0901    bumetanide (BUMEX) tablet 1 mg  1 mg Oral Daily David Espitia MD   1 mg at 09/06/22 1047    enoxaparin (LOVENOX) injection 40 mg  40 mg SubCUTAneous Daily Austin Griffin MD   40 mg at 09/06/22 1049    aspirin chewable tablet 81 mg  81 mg Oral Daily Silvana Braga MD   81 mg at 09/06/22 1045    insulin lispro (HUMALOG) injection vial 0-8 Units  0-8 Units SubCUTAneous TID  Silvana Braga MD        glucose chewable tablet 16 g  4 tablet Oral PRN Silvana Braga MD        dextrose bolus 10% 125 mL  125 mL IntraVENous PRN Silvana Braga MD        Or    dextrose bolus 10% 250 mL  250 mL IntraVENous PRN Silvana Braga MD        glucagon (rDNA) injection 1 mg  1 mg SubCUTAneous PRN Silvana Braga MD        dextrose 10 % infusion   IntraVENous Continuous PRN Silvana Braga MD        ondansetron (ZOFRAN) injection 4 mg  4 mg IntraVENous Q6H PRN Silvana Braga MD        Or    ondansetron (ZOFRAN-ODT) disintegrating tablet 4 mg  4 mg Oral Q8H PRN Elicia Johnston MD   4 mg at 09/02/22 1805    atenolol (TENORMIN) tablet 50 mg  50 mg Oral BID Elicia Johnston MD   50 mg at 09/05/22 0839    HYDROcodone-acetaminophen (NORCO) 5-325 MG per tablet 1 tablet  1 tablet Oral Q6H PRN Elicia Johnston MD   1 tablet at 09/04/22 1943    [Held by provider] lisinopril (PRINIVIL;ZESTRIL) tablet 20 mg  20 mg Oral Daily Silvana Braga MD   20 mg at 09/03/22 0902    metFORMIN (GLUCOPHAGE) tablet 1,000 mg  1,000 mg Oral BID  Silvana Braga MD   1,000 mg at 09/05/22 1002    ferrous sulfate (IRON 325) tablet 325 mg  325 mg Oral BID  Silvana Braga MD   325 mg at 09/06/22 1047    perflutren lipid microspheres (DEFINITY) injection 1.65 mg  1.5 mL IntraVENous ONCE PRN Jose Ansari PA-C       MEDICATIONS:   potassium chloride  10 mEq IntraVENous Q2H    pantoprazole  40 mg Oral QAM AC    sodium chloride flush  5-40 mL IntraVENous 2 times per day    heparin flush  1 mL IntraVENous 2 times per day    bumetanide  1 mg Oral Daily    enoxaparin  40 mg SubCUTAneous Daily    aspirin  81 mg Oral Daily    insulin lispro  0-8 Units SubCUTAneous TID     atenolol  50 mg Oral BID    [Held by provider] lisinopril  20 mg Oral Daily    metFORMIN  1,000 mg Oral BID     ferrous sulfate  325 mg Oral BID       sodium chloride      naloxone (NARCAN) infusion 0.4 mg/hr (09/06/22 0901)    dextrose       sodium chloride flush, sodium chloride, heparin flush, albuterol, naloxone, glucose, dextrose bolus **OR** dextrose bolus, glucagon (rDNA), dextrose, ondansetron **OR** ondansetron, HYDROcodone-acetaminophen, perflutren lipid microspheres    OBJECTIVE:  Vitals:    09/06/22 1057   BP: (!) 131/54   Pulse: 58   Resp:    Temp:    SpO2:      FiO2 : 30 %  O2 Flow Rate (L/min): 4 L/min  O2 Device: PAP (positive airway pressure)        LABS:  WBC   Date Value Ref Range Status   09/06/2022 5.4 4.5 - 11.5 E9/L Final   09/05/2022 7.2 4.5 - 11.5 E9/L Final   09/04/2022 7.4 4.5 - 11.5 E9/L Final     Hemoglobin   Date Value Ref Range Status   09/06/2022 8.2 (L) 11.5 - 15.5 g/dL Final   09/05/2022 8.5 (L) 11.5 - 15.5 g/dL Final   09/04/2022 8.5 (L) 11.5 - 15.5 g/dL Final     Hematocrit   Date Value Ref Range Status   09/06/2022 30.1 (L) 34.0 - 48.0 % Final   09/05/2022 32.3 (L) 34.0 - 48.0 % Final   09/04/2022 33.4 (L) 34.0 - 48.0 % Final     MCV   Date Value Ref Range Status   09/06/2022 72.2 (L) 80.0 - 99.9 fL Final   09/05/2022 73.4 (L) 80.0 - 99.9 fL Final   09/04/2022 76.3 (L) 80.0 - 99.9 fL Final     Platelets   Date Value Ref Range Status   09/06/2022 276 130 - 450 E9/L Final   09/05/2022 324 130 - 450 E9/L Final   09/04/2022 328 130 - 450 E9/L Final     Sodium   Date Value Ref Range Status   09/06/2022 143 132 - 146 mmol/L Final   09/05/2022 143 132 - 146 mmol/L Final   09/04/2022 137 132 - 146 mmol/L Final     Potassium   Date Value Ref Range Status   09/06/2022 3.3 (L) 3.5 - 5.0 mmol/L Final   09/05/2022 3.8 3.5 - 5.0 mmol/L Final   09/04/2022 3.4 (L) 3.5 - 5.0 mmol/L Final     Potassium reflex Magnesium   Date Value Ref Range Status   09/01/2022 3.8 3.5 - 5.0 mmol/L Final     Chloride   Date Value Ref Range Status   09/06/2022 96 (L) 98 - 107 mmol/L Final   09/05/2022 100 98 - 107 mmol/L Final   09/04/2022 97 (L) 98 - 107 mmol/L Final     CO2   Date Value Ref Range Status   09/06/2022 40 (H) 22 - 29 mmol/L Final   09/05/2022 34 (H) 22 - 29 mmol/L Final   09/04/2022 34 (H) 22 - 29 mmol/L Final     BUN   Date Value Ref Range Status   09/06/2022 9 6 - 23 mg/dL Final   09/05/2022 12 6 - 23 mg/dL Final   09/04/2022 13 6 - 23 mg/dL Final     Creatinine   Date Value Ref Range Status   09/06/2022 0.8 0.5 - 1.0 mg/dL Final   09/05/2022 1.1 (H) 0.5 - 1.0 mg/dL Final   09/04/2022 1.5 (H) 0.5 - 1.0 mg/dL Final     Glucose   Date Value Ref Range Status   09/06/2022 83 74 - 99 mg/dL Final   09/05/2022 69 (L) 74 - 99 mg/dL Final   09/04/2022 96 74 - 99 mg/dL Final     Calcium   Date Value Ref Range Status   09/06/2022 8.4 (L) 8.6 - 10.2 mg/dL Final   09/05/2022 8.9 8.6 - 10.2 mg/dL Final   09/04/2022 8.5 (L) 8.6 - 10.2 mg/dL Final     Total Protein   Date Value Ref Range Status   09/05/2022 6.1 (L) 6.4 - 8.3 g/dL Final   09/04/2022 5.9 (L) 6.4 - 8.3 g/dL Final     Albumin   Date Value Ref Range Status   09/05/2022 2.8 (L) 3.5 - 5.2 g/dL Final   09/04/2022 2.8 (L) 3.5 - 5.2 g/dL Final     Total Bilirubin   Date Value Ref Range Status   09/05/2022 0.4 0.0 - 1.2 mg/dL Final   09/04/2022 <0.2 0.0 - 1.2 mg/dL Final     Alkaline Phosphatase   Date Value Ref Range Status   09/05/2022 69 35 - 104 U/L Final   09/04/2022 71 35 - 104 U/L Final     AST   Date Value Ref Range Status   09/05/2022 13 0 - 31 U/L Final   09/04/2022 11 0 - 31 U/L Final     ALT   Date Value Ref Range Status   09/05/2022 8 0 - 32 U/L Final   09/04/2022 8 0 - 32 U/L Final     GFR Non-   Date Value Ref Range Status   09/06/2022 >60 >=60 mL/min/1.73 Final     Comment:     Chronic Kidney Disease: less than 60 ml/min/1.73 sq.m. Kidney Failure: less than 15 ml/min/1.73 sq.m. Results valid for patients 18 years and older. 09/05/2022 59 >=60 mL/min/1.73 Final     Comment:     Chronic Kidney Disease: less than 60 ml/min/1.73 sq.m. Kidney Failure: less than 15 ml/min/1.73 sq.m. Results valid for patients 18 years and older.      09/04/2022 42 >=60 mL/min/1.73 Final     Comment:     Chronic Kidney Disease: less than 60 ml/min/1.73 sq.m. Kidney Failure: less than 15 ml/min/1.73 sq.m. Results valid for patients 18 years and older. GFR    Date Value Ref Range Status   09/06/2022 >60  Final   09/05/2022 59  Final   09/04/2022 42  Final     Magnesium   Date Value Ref Range Status   09/06/2022 1.3 (L) 1.6 - 2.6 mg/dL Final   09/05/2022 1.3 (L) 1.6 - 2.6 mg/dL Final   09/04/2022 1.4 (L) 1.6 - 2.6 mg/dL Final     Phosphorus   Date Value Ref Range Status   09/06/2022 3.0 2.5 - 4.5 mg/dL Final     Recent Labs     09/06/22  1016   PH 7.467*   PO2 70.3*   PCO2 63.7*   HCO3 45.0*   BE 18.8*   O2SAT 92.7         RADIOLOGY:  XR CHEST PORTABLE   Final Result   Areas of subsegmental atelectasis in the right lung. No superimposed acute   cardiopulmonary process. CT HEAD WO CONTRAST   Final Result   No acute intracranial abnormality. For clinical suspicion of acute ischemia, recommend MRI brain with   diffusion-weighted imaging for further evaluation         XR CHEST (2 VW)   Final Result   Opacifications at the left lung base somewhat gradient or layering could   represent a layering small to moderate left pleural effusion with adjacent   atelectasis with background interstitial prominence of likely interstitial   edema pattern. US DUP LOWER EXTREMITIES BILATERAL VENOUS   Final Result   No evidence of DVT in either lower extremity. CTA CHEST W CONTRAST   Final Result   1. No acute pulmonary embolus is identified   2. Suspicious for interstitial pulmonary edema, and a suggestion of prominent   subcutaneous edema laterally most evident in the abdomen         XR CHEST 1 VIEW   Final Result   Pattern of perihilar vascular congestion/interstitial edema of   mild-to-moderate degree of severity. Please correlate clinically.              PROBLEM LIST:  Principal Problem:    Acute on chronic respiratory failure with hypoxia and hypercapnia (HCC)  Active Problems:    CHF with unknown LVEF [facility])    Primary hypertension    Type 2 diabetes mellitus without complication, without long-term current use of insulin (HCC)    Iron deficiency anemia due to chronic blood loss    Acute cystitis without hematuria    Acute on chronic congestive heart failure with right ventricular diastolic dysfunction ([facility] [address]    Pulmonary HTN ([facility] [address]    Tobacco abuse    Anemia    Fall    Morbid obesity ([facility] [address]    Obstructive sleep apnea    Hypokalemia    Chronic bilateral low back pain with bilateral sciatica  Resolved Problems:    * No resolved hospital problems.  *          Isabella Ken MD  Pulmonary and Critical Care Medicine

## 2022-09-06 NOTE — CARE COORDINATION
9/6/2022 CM transition of care: ICU, RRT 9/5- decreased level of consciousness, sitter 1:1, bipap continuous, picc. Pt from home would agree to [facility] (needs choiced), legal nok Son listed. Watch for oxygen needs at discharge, will need DME choice. CM following.  Electronically signed by Juany Sagastume RN-BC on 9/6/2022 at 10:21 AM

## 2022-09-06 NOTE — PROGRESS NOTES
Patient transported to ICU w/ RT. Patient belongings including Jose Daniel Gang, clothing taken to ICU bed 2 along with patient, placed in bedside closet.

## 2022-09-07 LAB
ANION GAP SERPL CALCULATED.3IONS-SCNC: 4 MMOL/L (ref 7–16)
ANISOCYTOSIS: ABNORMAL
BASOPHILIC STIPPLING: ABNORMAL
BASOPHILS ABSOLUTE: 0.04 E9/L (ref 0–0.2)
BASOPHILS RELATIVE PERCENT: 0.9 % (ref 0–2)
BUN BLDV-MCNC: 8 MG/DL (ref 6–23)
CALCIUM SERPL-MCNC: 8.3 MG/DL (ref 8.6–10.2)
CHLORIDE BLD-SCNC: 94 MMOL/L (ref 98–107)
CO2: 44 MMOL/L (ref 22–29)
CREAT SERPL-MCNC: 0.8 MG/DL (ref 0.5–1)
EOSINOPHILS ABSOLUTE: 0.04 E9/L (ref 0.05–0.5)
EOSINOPHILS RELATIVE PERCENT: 0.9 % (ref 0–6)
GFR AFRICAN AMERICAN: >60
GFR NON-AFRICAN AMERICAN: >60 ML/MIN/1.73
GLUCOSE BLD-MCNC: 93 MG/DL (ref 74–99)
HCT VFR BLD CALC: 29 % (ref 34–48)
HEMOGLOBIN: 7.7 G/DL (ref 11.5–15.5)
HYPOCHROMIA: ABNORMAL
LYMPHOCYTES ABSOLUTE: 0.64 E9/L (ref 1.5–4)
LYMPHOCYTES RELATIVE PERCENT: 13.9 % (ref 20–42)
MAGNESIUM: 1.5 MG/DL (ref 1.6–2.6)
MCH RBC QN AUTO: 19.4 PG (ref 26–35)
MCHC RBC AUTO-ENTMCNC: 26.6 % (ref 32–34.5)
MCV RBC AUTO: 73 FL (ref 80–99.9)
METER GLUCOSE: 81 MG/DL (ref 74–99)
METER GLUCOSE: 84 MG/DL (ref 74–99)
METER GLUCOSE: 90 MG/DL (ref 74–99)
MONOCYTES ABSOLUTE: 0.41 E9/L (ref 0.1–0.95)
MONOCYTES RELATIVE PERCENT: 8.7 % (ref 2–12)
MRSA CULTURE ONLY: NORMAL
NEUTROPHILS ABSOLUTE: 3.5 E9/L (ref 1.8–7.3)
NEUTROPHILS RELATIVE PERCENT: 75.7 % (ref 43–80)
OVALOCYTES: ABNORMAL
PDW BLD-RTO: 21 FL (ref 11.5–15)
PHOSPHORUS: 3.1 MG/DL (ref 2.5–4.5)
PLATELET # BLD: 227 E9/L (ref 130–450)
PMV BLD AUTO: 9.3 FL (ref 7–12)
POIKILOCYTES: ABNORMAL
POTASSIUM SERPL-SCNC: 3.9 MMOL/L (ref 3.5–5)
RBC # BLD: 3.97 E12/L (ref 3.5–5.5)
SCHISTOCYTES: ABNORMAL
SODIUM BLD-SCNC: 142 MMOL/L (ref 132–146)
SPHEROCYTES: ABNORMAL
TARGET CELLS: ABNORMAL
TEAR DROP CELLS: ABNORMAL
WBC # BLD: 4.6 E9/L (ref 4.5–11.5)

## 2022-09-07 PROCEDURE — 6360000002 HC RX W HCPCS: Performed by: INTERNAL MEDICINE

## 2022-09-07 PROCEDURE — 2580000003 HC RX 258: Performed by: INTERNAL MEDICINE

## 2022-09-07 PROCEDURE — 82962 GLUCOSE BLOOD TEST: CPT

## 2022-09-07 PROCEDURE — 94660 CPAP INITIATION&MGMT: CPT

## 2022-09-07 PROCEDURE — 83735 ASSAY OF MAGNESIUM: CPT

## 2022-09-07 PROCEDURE — 85025 COMPLETE CBC W/AUTO DIFF WBC: CPT

## 2022-09-07 PROCEDURE — 97530 THERAPEUTIC ACTIVITIES: CPT | Performed by: PHYSICAL THERAPIST

## 2022-09-07 PROCEDURE — 99233 SBSQ HOSP IP/OBS HIGH 50: CPT | Performed by: INTERNAL MEDICINE

## 2022-09-07 PROCEDURE — 6370000000 HC RX 637 (ALT 250 FOR IP): Performed by: INTERNAL MEDICINE

## 2022-09-07 PROCEDURE — 1200000000 HC SEMI PRIVATE

## 2022-09-07 PROCEDURE — 6360000002 HC RX W HCPCS

## 2022-09-07 PROCEDURE — 2700000000 HC OXYGEN THERAPY PER DAY

## 2022-09-07 PROCEDURE — 84100 ASSAY OF PHOSPHORUS: CPT

## 2022-09-07 PROCEDURE — 97164 PT RE-EVAL EST PLAN CARE: CPT | Performed by: PHYSICAL THERAPIST

## 2022-09-07 PROCEDURE — 80048 BASIC METABOLIC PNL TOTAL CA: CPT

## 2022-09-07 RX ORDER — MAGNESIUM SULFATE IN WATER 40 MG/ML
2000 INJECTION, SOLUTION INTRAVENOUS ONCE
Status: COMPLETED | OUTPATIENT
Start: 2022-09-07 | End: 2022-09-07

## 2022-09-07 RX ADMIN — HEPARIN 100 UNITS: 100 SYRINGE at 21:15

## 2022-09-07 RX ADMIN — ENOXAPARIN SODIUM 30 MG: 100 INJECTION SUBCUTANEOUS at 21:14

## 2022-09-07 RX ADMIN — PANTOPRAZOLE SODIUM 40 MG: 40 TABLET, DELAYED RELEASE ORAL at 07:53

## 2022-09-07 RX ADMIN — ENOXAPARIN SODIUM 30 MG: 100 INJECTION SUBCUTANEOUS at 07:53

## 2022-09-07 RX ADMIN — Medication 10 ML: at 07:54

## 2022-09-07 RX ADMIN — ATENOLOL 50 MG: 25 TABLET ORAL at 07:52

## 2022-09-07 RX ADMIN — HEPARIN 100 UNITS: 100 SYRINGE at 09:20

## 2022-09-07 RX ADMIN — ASPIRIN 81 MG CHEWABLE TABLET 81 MG: 81 TABLET CHEWABLE at 07:53

## 2022-09-07 RX ADMIN — METFORMIN HYDROCHLORIDE 1000 MG: 1000 TABLET ORAL at 07:54

## 2022-09-07 RX ADMIN — Medication 10 ML: at 21:20

## 2022-09-07 RX ADMIN — METFORMIN HYDROCHLORIDE 1000 MG: 1000 TABLET ORAL at 17:05

## 2022-09-07 RX ADMIN — MAGNESIUM SULFATE HEPTAHYDRATE 2000 MG: 40 INJECTION, SOLUTION INTRAVENOUS at 06:13

## 2022-09-07 RX ADMIN — FERROUS SULFATE TAB 325 MG (65 MG ELEMENTAL FE) 325 MG: 325 (65 FE) TAB at 07:53

## 2022-09-07 RX ADMIN — ATENOLOL 50 MG: 25 TABLET ORAL at 21:14

## 2022-09-07 RX ADMIN — FERROUS SULFATE TAB 325 MG (65 MG ELEMENTAL FE) 325 MG: 325 (65 FE) TAB at 17:05

## 2022-09-07 ASSESSMENT — PAIN SCALES - GENERAL
PAINLEVEL_OUTOF10: 0

## 2022-09-07 NOTE — PLAN OF CARE
Problem: Skin/Tissue Integrity  Goal: Absence of new skin breakdown  Description: 1. Monitor for areas of redness and/or skin breakdown  2. Assess vascular access sites hourly  3. Every 4-6 hours minimum:  Change oxygen saturation probe site  4. Every 4-6 hours:  If on nasal continuous positive airway pressure, respiratory therapy assess nares and determine need for appliance change or resting period.   9/7/2022 1137 by Foreign Ceja RN  Outcome: Progressing     Problem: Pain  Goal: Verbalizes/displays adequate comfort level or baseline comfort level  Outcome: Progressing     Problem: Safety - Adult  Goal: Free from fall injury  9/7/2022 1137 by Foreign Ceja RN  Outcome: Progressing No

## 2022-09-07 NOTE — PROGRESS NOTES
Subjective: The patient is awake and alert. No problems overnight patient is seen with physical therapy at bedside physical therapy reported to me that the patient needs a lot of help getting out of bed it took him a very long time to encourage the patient to get out of bed. Patient is walking with a walker with one-person assist but she needs a lot of help to get up. In her recommendation the patient should be going to rehab she cannot go home. Patient reported that she lives all by herself.   Objective:    /63   Pulse 59   Temp 99.3 °F (37.4 °C) (Bladder)   Resp 19   Ht 5' 2\" (1.575 m)   Wt 243 lb 6.4 oz (110.4 kg)   SpO2 93%   BMI 44.52 kg/m²   Head and Neck: normal atraumatic, no neck masses, normal thyroid, no jvd  Heart:  regular rate and rhythm, S1, S2 normal, no murmur, click, rub or gallop  Lungs:  chest clear, no wheezing, rales, normal symmetric air entry,  no chest wall deformities or tenderness  Abd: soft, non-tender, without masses or organomegaly  Extrem:  No clubbing, cyanosis, or edema  Neuro:Normal, no focal neurological deficit    CBC with Differential:    Lab Results   Component Value Date/Time    WBC 4.6 09/07/2022 04:16 AM    RBC 3.97 09/07/2022 04:16 AM    HGB 7.7 09/07/2022 04:16 AM    HCT 29.0 09/07/2022 04:16 AM     09/07/2022 04:16 AM    MCV 73.0 09/07/2022 04:16 AM    MCH 19.4 09/07/2022 04:16 AM    MCHC 26.6 09/07/2022 04:16 AM    RDW 21.0 09/07/2022 04:16 AM    LYMPHOPCT 13.9 09/07/2022 04:16 AM    MONOPCT 8.7 09/07/2022 04:16 AM    BASOPCT 0.9 09/07/2022 04:16 AM    MONOSABS 0.41 09/07/2022 04:16 AM    LYMPHSABS 0.64 09/07/2022 04:16 AM    EOSABS 0.04 09/07/2022 04:16 AM    BASOSABS 0.04 09/07/2022 04:16 AM     BMP:    Lab Results   Component Value Date/Time     09/07/2022 04:16 AM    K 3.9 09/07/2022 04:16 AM    K 3.8 09/01/2022 08:24 PM    CL 94 09/07/2022 04:16 AM    CO2 44 09/07/2022 04:16 AM    BUN 8 09/07/2022 04:16 AM    LABALBU 2.8 09/05/2022 05:03 AM    CREATININE 0.8 09/07/2022 04:16 AM    CALCIUM 8.3 09/07/2022 04:16 AM    GFRAA >60 09/07/2022 04:16 AM    LABGLOM >60 09/07/2022 04:16 AM    GLUCOSE 93 09/07/2022 04:16 AM      Glucose, Random  94   105   96   69   83   93   Glucose, Random     Hemoglobin A1C  5.9                       Assessment:    Principal Problem:    Acute on chronic respiratory failure with hypoxia and hypercapnia (HCC)  Active Problems:    CHF with unknown LVEF (HCC)    Primary hypertension    Type 2 diabetes mellitus without complication, without long-term current use of insulin (HCC)    Iron deficiency anemia due to chronic blood loss    Acute cystitis without hematuria    Acute on chronic congestive heart failure with right ventricular diastolic dysfunction ([address]    Pulmonary HTN ([address]    Tobacco abuse    Anemia    Fall    Morbid obesity ([address]    Obstructive sleep apnea    Hypokalemia    Chronic bilateral low back pain with bilateral sciatica  Resolved Problems:    * No resolved hospital problems. *        Plan:  Acute on chronic respiratory failure patient is using oxygen she did not used to have oxygen at home before. Patient agreed to use BiPAP while sleeping. Tobacco abuse patient has not been smoking since admit to the hospital and I discussed with her that she needs to quit smoking completely and she is in agreement.     Generalized weakness PT and OT continue to work with the patient I discussed with the physical therapist as above and patient is willing to go to rehab I offered rehab that her doctor goes to that she can have continue to of care and she agreed this has been discussed with the  and social service will follow    Diabetes mellitus type 2 blood sugar has been running okay continue the same treatment    CHF more stable monitor labs monitor patient progress    Christian Burciaga MD  9:17 AM  9/7/2022

## 2022-09-07 NOTE — PROGRESS NOTES
Physical Therapy  Physical Therapy RE-Evaluation/Plan of Care    Room #:  0755/2867-25  Patient Name: Jacky Johansen  YOB: 1950  MRN: [id number]    Date of Service: 9/7/2022     Tentative placement recommendation: Subacute rehab  Equipment recommendation: To be determined      Re Evaluating Physical Therapist: Edith Weaver  #[id number]      Specific Provider Orders/Date/Referring Provider :     09/02/22 0330    PT eval and treat  Start:  09/02/22 0330,   End:  09/02/22 0330,   ONE TIME,   Standing Count:  1 Occurrences,   R         Edison Cooley MD Acknowledge New   09/07/22 0830    PT eval and treat  (PT/OT CONSULT PANEL)  ONE TIME     Complete  Discontinue     Question: Reason for PT? Answer: evaluate and treat for discharge planning    Julia Souza MD  Admitting Diagnosis:   CHF with unknown LVEF ([address] [I50.9]   toxic encephalopathy due to opioid overdose  Rrt 9/5 decreased level of consciousness, bipap for hypoxemia icu for 2 days  Surgery: none  Visit Diagnoses         Codes    Fall, initial encounter    -  Primary W19. XXXA    Congestive heart failure, unspecified HF chronicity, unspecified heart failure type ([address]     I50.9            Patient Active Problem List   Diagnosis    Chronic bilateral low back pain with bilateral sciatica    CHF with unknown LVEF ([address]    Primary hypertension    Type 2 diabetes mellitus without complication, without long-term current use of insulin (HCC)    Iron deficiency anemia due to chronic blood loss    Acute cystitis without hematuria    Acute on chronic congestive heart failure with right ventricular diastolic dysfunction (HCC)    Pulmonary HTN (HCC)    Tobacco abuse    Anemia    Acute on chronic respiratory failure with hypoxia and hypercapnia (HCC)    Fall    Morbid obesity ([address]    Obstructive sleep apnea    Hypokalemia        ASSESSMENT of Current Deficits Patient exhibits decreased strength, balance, and endurance impairing functional mobility, transfers, gait , gait distance, and tolerance to activity. . Pt required max encouragement to participate in session,  max walker control especially during turns and obstacles Patient needing subacute rehab at time of discharge due to patient unsafe due to level of physical assistance needed to complete tasks. Pt with shortness of breath with gait and too weak for stair training this session      PHYSICAL THERAPY  PLAN OF CARE       Physical therapy plan of care is established based on physician order,  patient diagnosis and clinical assessment    Current Treatment Recommendations:    -Bed Mobility: Lower extremity exercises  and Trunk control activities   -Sitting Balance: Incorporate reaching activities to activate trunk muscles , Hands on support to maintain midline , Facilitate active trunk muscle engagement , Facilitate postural control in all planes , and Engage in core activities to allow for movement within base of support   -Standing Balance: Perform strengthening exercises in standing to promote motor control with or without upper extremity support , Instruct patient on adequate base of support to maintain balance, and Challenge balance utilizing reaching  activities beyond center of gravity    -Transfers: Provide instruction on proper hand and foot position for adequate transfer of weight onto lower extremities and use of gait device if needed, Cues for hand placement, technique and safety.  Provide stabilization to prevent fall , Facilitate weight shift forward on to lower extremities and provide necessary stabilization of bilateral lower extremities , Support transfer of weight on to lower extremities, and Assist with extension of knees trunk and hip to accept weight transfer   -Gait: Gait training, Standing activities to improve: base of support, weight shift, weight bearing , Exercises to improve trunk control, Exercises to improve hip and knee control, Performance of protected weight bearing activities, and Activities to increase weight bearing   -Endurance: Utilize Supervised activities to increase level of endurance to allow for safe functional mobility including transfers and gait  and Use graduated activities to promote good breathing techniques and provide support and education to maximize respiratory function  -Stairs: Stair training with instruction on proper technique and hand placement on rail    PT long term treatment goals are located in below grid    Patient and or family understand(s) diagnosis, prognosis, and plan of care. Frequency of treatments: Patient will be seen  daily. Prior Level of Function: Patient ambulated independently   Rehab Potential: fair + for baseline    Past medical history:   Past Medical History:   Diagnosis Date    Chronic back pain 2018    10/10 on the pain scale    Hypertension     Scoliosis     Type 2 diabetes mellitus without complication [facility])      Past Surgical History:   Procedure Laterality Date    FINGER TRIGGER RELEASE Right 08/14/2018    right thumb    HYSTERECTOMY (CERVIX STATUS UNKNOWN)      OK INCISE FINGER TENDON SHEATH Right 8/14/2018    RIGHT THUMB TRIGGER THUMB RELEASE performed by Nader Rosales DO at [place] [id number] Right 2002    RCR    WISDOM TOOTH EXTRACTION         SUBJECTIVE:    Precautions:  Up with assistance, falls, alarm, and L knee karel  new o2 user    Social history: Patient lives alone in a two story home bedroom and bathroom 2nd floor full flight stairs with Rail  with 1 step  to enter without [address] owned: [address] [facility] [place],      [address] [facility]   How much difficulty turning over in bed?: A Little  How much difficulty sitting down on / standing up from a chair with arms?: A Lot  How much difficulty moving from lying on back to sitting on side of bed?: A Lot  How much help from another person moving to and from a bed to a chair?: A Lot  How much help from another person needed to walk in hospital room?: A Lot  How much help from another person for climbing 3-5 steps with a railing?: Total  AM-PAC Inpatient Mobility Raw Score : 12  AM-PAC Inpatient T-Scale Score : 35.33  Mobility Inpatient CMS 0-100% Score: 68.66  Mobility Inpatient CMS G-Code Modifier : CL    Nursing cleared patient for PT Re-evaluation. OBJECTIVE;   Initial Evaluation  Date: 9/2/2022 Re eval  9/7/22 Treatment Date:       Short Term/ Long Term   Goals   Was pt agreeable to Eval/treatment? Yes yes Yes  To be met in 3 days   Pain level   0/10   5/10 5/10    Bed Mobility    Rolling: Moderate assist of 1    Supine to sit: Moderate assist of 1    Sit to supine: Moderate assist of 1    Scooting: Moderate assist of 1   Rolling: Minimal assist of 1   Supine to sit: Moderate assist of 1   Sit to supine: Not assessed patient in chair   Scooting: Minimal assist of 1   Rolling: Moderate assist of  2   Supine to sit: Moderate assist of  2   Sit to supine: Moderate assist of  2   Scooting: Moderate assist of  2    Rolling: Independent    Supine to sit: Independent    Sit to supine: Independent    Scooting: Independent     Transfers Sit to stand: Not assessed  Pt declined Sit to stand: Moderate progressing to minimal assist x 3 reps  Sit to stand: Moderate assist of  2 cues for hand placement.    Sit to stand: Supervision     Ambulation    not assessed  2x30 feet using  wheeled walker with Moderate progressing to minimal assist   for walker approximation, upright, safety, and O2 line management  and cues for upright posture, walker approximation, safety, and proper hand placement  2x5-6 side steps using  wheeled walker with Moderate assist of  2   for walker control, balance, weight shift, and safety and cues for sequencing, walker approximation, safety, and proper hand placement     100 feet using  least restrictive device versus no device with Supervision     Stair negotiation: ascended and descended   Not assessed  not assessed  not assessed    12 stairs with 1 HR with Supervision   ROM Within functional limits     Increase range of motion 10% of affected joints    Strength BUE:  refer to OT eval  RLE:  3+/5  LLE:  3+/5   Increase strength in affected mm groups by 1/3 grade   Balance Sitting EOB:  fair -  Dynamic Standing:  not assessed   Sitting EOB: fair minus   Dynamic Standing: fair  minus    Sitting EOB:  fair +  Dynamic Standing: fair      Patient is Alert & Oriented x person and place and follows directions but pleasantly confused  Sensation:  Patient  denies numbness/tingling   Edema:  no   Endurance: poor +    Vitals:  5 liters nasal cannula   Blood Pressure at rest  Blood Pressure during session    Heart Rate at rest 59 Heart Rate during session 78   SPO2 at rest % doesnot read SPO2 during session %     Patient education  Patient educated on  importance of mobility while in hospital , importance and purpose of adaptive device and adjusted to proper height for the patient. , and safety      Patient response to education:   Pt verbalized understanding Pt demonstrated skill Pt requires further education in this area   Yes Partial Yes      Treatment:  Patient practiced and was instructed/facilitated in the following treatment: Patient Sat edge of bed 10 minutes with Supervision  to increase dynamic sitting balance and activity tolerance. Pt performed   bed mobility, transfers, ambulation  along with seated and standing challenges      Therapeutic Exercises:  ankle pumps  x 10     At end of session, patient in chair with     call light and phone within reach,  all lines and tubes intact, nursing notified. Patient would benefit from continued skilled Physical Therapy to improve functional independence and quality of life.          Patient's/ family goals   rehab    Time in  1116  Time out  1146    Total Treatment Time   30 minutes    CPT codes:  [facility] (17428)    Therapeutic activities (19902) 10 minutes  1 unit(s)  Gait Training (76080) 10 minutes 0 unit(s)    Alonzo Corral, PT

## 2022-09-07 NOTE — CARE COORDINATION
9/7/2022 SS consult for placement- \"pt agrees\". Cm met with patient after transfer to telemetry. She will \"think about\" which [facility] place she wants after she speaks to her Son- he will be visiting later. CM left Medicare approved listing and provided pt with a few locations that Dr. Radha Weems goes to. Pt is vaccinated and had one booster, she will be \"ok\" with the second booster if this is recommended for her discharge. CM/SS assigned to follow up with pt and possibly son tomorrow after they have time to review the listing provided. Pt states she does live alone. If HHC is not needed at discharge please call [facility] and update them. Electronically signed by JERALD Dowd on 9/7/2022 at 2:17 PM        The Plan for Transition of Care is related to the following treatment goals: [facility]    The Patient was provided with a choice of provider and agrees   with the discharge plan. [x] Yes [] No    Freedom of choice list was provided with basic dialogue that supports the patient's individualized plan of care/goals, treatment preferences and shares the quality data associated with the providers. [x] Yes [] No      Possibly after she speaks to her son Adelaida Parks.  Electronically signed by JERALD Dowd on 9/7/2022 at 2:18 PM

## 2022-09-07 NOTE — PLAN OF CARE
Problem: Skin/Tissue Integrity  Goal: Absence of new skin breakdown  Description: 1. Monitor for areas of redness and/or skin breakdown  2. Assess vascular access sites hourly  3. Every 4-6 hours minimum:  Change oxygen saturation probe site  4. Every 4-6 hours:  If on nasal continuous positive airway pressure, respiratory therapy assess nares and determine need for appliance change or resting period.   9/7/2022 0033 by Drake Nieto RN  Outcome: Progressing  9/6/2022 1845 by Iesha Gan RN  Outcome: Progressing     Problem: Safety - Adult  Goal: Free from fall injury  9/7/2022 0033 by Drake Nieto RN  Outcome: Progressing  9/6/2022 1845 by Iesha Gan RN  Outcome: Progressing     Problem: Pain  Goal: Verbalizes/displays adequate comfort level or baseline comfort level  9/6/2022 1845 by Iesha Gan RN  Outcome: Progressing     Problem: Discharge Planning  Goal: Discharge to home or other facility with appropriate resources  9/6/2022 1845 by Iesha Gan RN  Outcome: Progressing     Problem: ABCDS Injury Assessment  Goal: Absence of physical injury  9/7/2022 0033 by Drake Nieto RN  Outcome: Progressing  9/6/2022 1845 by Iesha Gan RN  Outcome: Progressing     Problem: Chronic Conditions and Co-morbidities  Goal: Patient's chronic conditions and co-morbidity symptoms are monitored and maintained or improved  9/6/2022 1845 by Iesha Gan RN  Outcome: Progressing

## 2022-09-07 NOTE — PLAN OF CARE
Problem: Skin/Tissue Integrity  Goal: Absence of new skin breakdown  Description: 1. Monitor for areas of redness and/or skin breakdown  2. Assess vascular access sites hourly  3. Every 4-6 hours minimum:  Change oxygen saturation probe site  4. Every 4-6 hours:  If on nasal continuous positive airway pressure, respiratory therapy assess nares and determine need for appliance change or resting period.   9/7/2022 1659 by Nya Bermeo RN  Outcome: Progressing     Problem: Safety - Adult  Goal: Free from fall injury  9/7/2022 1659 by Nya Bermeo RN  Outcome: Progressing     Problem: Pain  Goal: Verbalizes/displays adequate comfort level or baseline comfort level  9/7/2022 1659 by Nya Bermeo RN  Outcome: Progressing

## 2022-09-07 NOTE — CARE COORDINATION
9/7/2022 CM transition of care: Planned Transfer to Telemetry, PT/OT re-consulted, pt declines any [facility], choice HHC- (need orders) [facility] referral placed with start of care on Monday, possible DME with [facility] choiced if needed for home, possible oxygen at discharge will need evaluated closer to discharge- referral to [facility]- faxed to office the potential. Pt lives alone in two story home, has to access the secondary level for living. CM/SS assigned to follow. Electronically signed by JERALD Peraza on 9/7/2022 at 8:40 AM      The Plan for Transition of Care is related to the following treatment goals: HHC, DME    The Patient was provided with a choice of provider and agrees   with the discharge plan. [x] Yes [] No    Freedom of choice list was provided with basic dialogue that supports the patient's individualized plan of care/goals, treatment preferences and shares the quality data associated with the providers. [x] Yes [] No      Declined any [facility]- only home she states.  Electronically signed by JERALD Peraza on 9/7/2022 at 8:45 AM See task under lab

## 2022-09-08 LAB
ANION GAP SERPL CALCULATED.3IONS-SCNC: 4 MMOL/L (ref 7–16)
ANISOCYTOSIS: ABNORMAL
BACTERIA: ABNORMAL /HPF
BASOPHILS ABSOLUTE: 0 E9/L (ref 0–0.2)
BASOPHILS RELATIVE PERCENT: 0.4 % (ref 0–2)
BILIRUBIN URINE: ABNORMAL
BLOOD, URINE: ABNORMAL
BUN BLDV-MCNC: 9 MG/DL (ref 6–23)
CALCIUM SERPL-MCNC: 8.3 MG/DL (ref 8.6–10.2)
CHLORIDE BLD-SCNC: 95 MMOL/L (ref 98–107)
CLARITY: ABNORMAL
CO2: 43 MMOL/L (ref 22–29)
COLOR: ABNORMAL
CREAT SERPL-MCNC: 0.7 MG/DL (ref 0.5–1)
EOSINOPHILS ABSOLUTE: 0 E9/L (ref 0.05–0.5)
EOSINOPHILS RELATIVE PERCENT: 1.3 % (ref 0–6)
EPITHELIAL CELLS, UA: ABNORMAL /HPF
GFR AFRICAN AMERICAN: >60
GFR NON-AFRICAN AMERICAN: >60 ML/MIN/1.73
GLUCOSE BLD-MCNC: 86 MG/DL (ref 74–99)
GLUCOSE URINE: NEGATIVE MG/DL
HCT VFR BLD CALC: 28.7 % (ref 34–48)
HEMOGLOBIN: 7.5 G/DL (ref 11.5–15.5)
HYPOCHROMIA: ABNORMAL
INFLUENZA A: NOT DETECTED
INFLUENZA B: NOT DETECTED
KETONES, URINE: ABNORMAL MG/DL
LEUKOCYTE ESTERASE, URINE: ABNORMAL
LYMPHOCYTES ABSOLUTE: 0.23 E9/L (ref 1.5–4)
LYMPHOCYTES RELATIVE PERCENT: 5.2 % (ref 20–42)
MAGNESIUM: 1.7 MG/DL (ref 1.6–2.6)
MCH RBC QN AUTO: 19.5 PG (ref 26–35)
MCHC RBC AUTO-ENTMCNC: 26.1 % (ref 32–34.5)
MCV RBC AUTO: 74.7 FL (ref 80–99.9)
METER GLUCOSE: 89 MG/DL (ref 74–99)
METER GLUCOSE: 90 MG/DL (ref 74–99)
METER GLUCOSE: 91 MG/DL (ref 74–99)
MONOCYTES ABSOLUTE: 0.23 E9/L (ref 0.1–0.95)
MONOCYTES RELATIVE PERCENT: 5.2 % (ref 2–12)
NEUTROPHILS ABSOLUTE: 4.14 E9/L (ref 1.8–7.3)
NEUTROPHILS RELATIVE PERCENT: 89.6 % (ref 43–80)
NITRITE, URINE: POSITIVE
OCCULT BLOOD DIAGNOSTIC: NORMAL
OVALOCYTES: ABNORMAL
PDW BLD-RTO: 21.4 FL (ref 11.5–15)
PH UA: 7 (ref 5–9)
PHOSPHORUS: 2.9 MG/DL (ref 2.5–4.5)
PLATELET # BLD: 202 E9/L (ref 130–450)
PMV BLD AUTO: 9.5 FL (ref 7–12)
POIKILOCYTES: ABNORMAL
POLYCHROMASIA: ABNORMAL
POTASSIUM SERPL-SCNC: 3.4 MMOL/L (ref 3.5–5)
PROTEIN UA: >=300 MG/DL
RBC # BLD: 3.84 E12/L (ref 3.5–5.5)
RBC UA: ABNORMAL /HPF (ref 0–2)
SARS-COV-2 RNA, RT PCR: DETECTED
SCHISTOCYTES: ABNORMAL
SODIUM BLD-SCNC: 142 MMOL/L (ref 132–146)
SPECIFIC GRAVITY UA: 1.02 (ref 1–1.03)
TARGET CELLS: ABNORMAL
TEAR DROP CELLS: ABNORMAL
UROBILINOGEN, URINE: 2 E.U./DL
WBC # BLD: 4.6 E9/L (ref 4.5–11.5)
WBC UA: ABNORMAL /HPF (ref 0–5)

## 2022-09-08 PROCEDURE — 6370000000 HC RX 637 (ALT 250 FOR IP): Performed by: INTERNAL MEDICINE

## 2022-09-08 PROCEDURE — 97530 THERAPEUTIC ACTIVITIES: CPT

## 2022-09-08 PROCEDURE — 87636 SARSCOV2 & INF A&B AMP PRB: CPT

## 2022-09-08 PROCEDURE — 80048 BASIC METABOLIC PNL TOTAL CA: CPT

## 2022-09-08 PROCEDURE — 2580000003 HC RX 258: Performed by: HOSPITALIST

## 2022-09-08 PROCEDURE — 81001 URINALYSIS AUTO W/SCOPE: CPT

## 2022-09-08 PROCEDURE — C9113 INJ PANTOPRAZOLE SODIUM, VIA: HCPCS | Performed by: HOSPITALIST

## 2022-09-08 PROCEDURE — A4216 STERILE WATER/SALINE, 10 ML: HCPCS | Performed by: HOSPITALIST

## 2022-09-08 PROCEDURE — 82272 OCCULT BLD FECES 1-3 TESTS: CPT

## 2022-09-08 PROCEDURE — 2700000000 HC OXYGEN THERAPY PER DAY

## 2022-09-08 PROCEDURE — 1200000000 HC SEMI PRIVATE

## 2022-09-08 PROCEDURE — 84100 ASSAY OF PHOSPHORUS: CPT

## 2022-09-08 PROCEDURE — 2580000003 HC RX 258: Performed by: INTERNAL MEDICINE

## 2022-09-08 PROCEDURE — 87088 URINE BACTERIA CULTURE: CPT

## 2022-09-08 PROCEDURE — 6360000002 HC RX W HCPCS: Performed by: INTERNAL MEDICINE

## 2022-09-08 PROCEDURE — 6360000002 HC RX W HCPCS: Performed by: HOSPITALIST

## 2022-09-08 PROCEDURE — 94660 CPAP INITIATION&MGMT: CPT

## 2022-09-08 PROCEDURE — 82962 GLUCOSE BLOOD TEST: CPT

## 2022-09-08 PROCEDURE — 99233 SBSQ HOSP IP/OBS HIGH 50: CPT | Performed by: INTERNAL MEDICINE

## 2022-09-08 PROCEDURE — 85025 COMPLETE CBC W/AUTO DIFF WBC: CPT

## 2022-09-08 PROCEDURE — 83735 ASSAY OF MAGNESIUM: CPT

## 2022-09-08 PROCEDURE — 36415 COLL VENOUS BLD VENIPUNCTURE: CPT

## 2022-09-08 RX ORDER — ACETAMINOPHEN 325 MG/1
650 TABLET ORAL EVERY 6 HOURS PRN
Status: DISCONTINUED | OUTPATIENT
Start: 2022-09-08 | End: 2022-09-14 | Stop reason: HOSPADM

## 2022-09-08 RX ORDER — ACETAMINOPHEN 325 MG/1
650 TABLET ORAL EVERY 4 HOURS PRN
Status: DISCONTINUED | OUTPATIENT
Start: 2022-09-08 | End: 2022-09-08

## 2022-09-08 RX ADMIN — HEPARIN 100 UNITS: 100 SYRINGE at 21:35

## 2022-09-08 RX ADMIN — ACETAMINOPHEN 650 MG: 325 TABLET ORAL at 13:03

## 2022-09-08 RX ADMIN — FERROUS SULFATE TAB 325 MG (65 MG ELEMENTAL FE) 325 MG: 325 (65 FE) TAB at 17:14

## 2022-09-08 RX ADMIN — ENOXAPARIN SODIUM 30 MG: 100 INJECTION SUBCUTANEOUS at 21:35

## 2022-09-08 RX ADMIN — Medication 10 ML: at 08:29

## 2022-09-08 RX ADMIN — ATENOLOL 50 MG: 25 TABLET ORAL at 21:35

## 2022-09-08 RX ADMIN — METFORMIN HYDROCHLORIDE 1000 MG: 1000 TABLET ORAL at 17:14

## 2022-09-08 RX ADMIN — PANTOPRAZOLE SODIUM 40 MG: 40 TABLET, DELAYED RELEASE ORAL at 05:22

## 2022-09-08 RX ADMIN — Medication 10 ML: at 21:36

## 2022-09-08 RX ADMIN — METFORMIN HYDROCHLORIDE 1000 MG: 1000 TABLET ORAL at 08:30

## 2022-09-08 RX ADMIN — HEPARIN 100 UNITS: 100 SYRINGE at 08:29

## 2022-09-08 RX ADMIN — ATENOLOL 50 MG: 25 TABLET ORAL at 08:28

## 2022-09-08 RX ADMIN — FERROUS SULFATE TAB 325 MG (65 MG ELEMENTAL FE) 325 MG: 325 (65 FE) TAB at 08:29

## 2022-09-08 RX ADMIN — ASPIRIN 81 MG CHEWABLE TABLET 81 MG: 81 TABLET CHEWABLE at 08:28

## 2022-09-08 RX ADMIN — ENOXAPARIN SODIUM 30 MG: 100 INJECTION SUBCUTANEOUS at 08:29

## 2022-09-08 RX ADMIN — PANTOPRAZOLE SODIUM 40 MG: 40 INJECTION, POWDER, FOR SOLUTION INTRAVENOUS at 21:34

## 2022-09-08 ASSESSMENT — PAIN SCALES - GENERAL: PAINLEVEL_OUTOF10: 5

## 2022-09-08 NOTE — PROGRESS NOTES
Physical Therapy  Physical Therapy Treatment Note/Plan of Care    Room #:  7044/0951-41  Patient Name: Felicia Arteaga  YOB: 1950  MRN: [id number]    Date of Service: 9/8/2022     Tentative placement recommendation: Subacute rehab  Equipment recommendation: To be determined      Re Evaluating Physical Therapist: Dannie Rosa, [address] [facility]  #[id number]      Specific Provider Orders/Date/Referring Provider :     09/02/22 0330    PT eval and treat  Start:  09/02/22 0330,   End:  09/02/22 0330,   ONE TIME,   Standing Count:  1 Occurrences,   R         Jonathan Canada MD Acknowledge New   09/07/22 0830    PT eval and treat  (PT/OT CONSULT PANEL)  ONE TIME     Complete  Discontinue     Question: Reason for PT? Answer: evaluate and treat for discharge planning    Blake Desouza MD  Admitting Diagnosis:   CHF with unknown LVEF ([address] [I50.9]   toxic encephalopathy due to opioid overdose  Rrt 9/5 decreased level of consciousness, bipap for hypoxemia icu for 2 days  Surgery: none  Visit Diagnoses         Codes    Fall, initial encounter    -  Primary W19. XXXA    Congestive heart failure, unspecified HF chronicity, unspecified heart failure type ([address]     I50.9            Patient Active Problem List   Diagnosis    Chronic bilateral low back pain with bilateral sciatica    CHF with unknown LVEF ([address]    Primary hypertension    Type 2 diabetes mellitus without complication, without long-term current use of insulin (HCC)    Iron deficiency anemia due to chronic blood loss    Acute cystitis without hematuria    Acute on chronic congestive heart failure with right ventricular diastolic dysfunction (HCC)    Pulmonary HTN (HCC)    Tobacco abuse    Anemia    Acute on chronic respiratory failure with hypoxia and hypercapnia (HCC)    Fall    Morbid obesity ([address]    Obstructive sleep apnea    Hypokalemia        ASSESSMENT of Current Deficits Patient exhibits decreased strength, balance, and endurance impairing functional mobility, transfers, gait , gait distance, and tolerance to activity. Pt with decreased assist level for bed mobility and transfers today. Patient largely limited by reoccuring BM that patient had no control over, causing her to be more fatigued. Patient also reports stomach pain. Patient on 4L at 86% at start of session, increased to 5L and maintained SPO2 beween 90-91%. Patient needing subacute rehab at time of discharge due to patient unsafe due to level of physical assistance needed to complete tasks. Pt with shortness of breath with gait and too weak for stair training this session      PHYSICAL THERAPY  PLAN OF CARE       Physical therapy plan of care is established based on physician order,  patient diagnosis and clinical assessment    Current Treatment Recommendations:    -Bed Mobility: Lower extremity exercises  and Trunk control activities   -Sitting Balance: Incorporate reaching activities to activate trunk muscles , Hands on support to maintain midline , Facilitate active trunk muscle engagement , Facilitate postural control in all planes , and Engage in core activities to allow for movement within base of support   -Standing Balance: Perform strengthening exercises in standing to promote motor control with or without upper extremity support , Instruct patient on adequate base of support to maintain balance, and Challenge balance utilizing reaching  activities beyond center of gravity    -Transfers: Provide instruction on proper hand and foot position for adequate transfer of weight onto lower extremities and use of gait device if needed, Cues for hand placement, technique and safety.  Provide stabilization to prevent fall , Facilitate weight shift forward on to lower extremities and provide necessary stabilization of bilateral lower extremities , Support transfer of weight on to lower extremities, and Assist with extension of knees trunk and hip to accept weight transfer   -Gait: Gait training, Standing activities to improve: base of support, weight shift, weight bearing , Exercises to improve trunk control, Exercises to improve hip and knee control, Performance of protected weight bearing activities, and Activities to increase weight bearing   -Endurance: Utilize Supervised activities to increase level of endurance to allow for safe functional mobility including transfers and gait  and Use graduated activities to promote good breathing techniques and provide support and education to maximize respiratory function  -Stairs: Stair training with instruction on proper technique and hand placement on rail    PT long term treatment goals are located in below grid    Patient and or family understand(s) diagnosis, prognosis, and plan of care. Frequency of treatments: Patient will be seen  daily. Prior Level of Function: Patient ambulated independently   Rehab Potential: fair + for baseline    Past medical history:   Past Medical History:   Diagnosis Date    Chronic back pain 2018    10/10 on the pain scale    Hypertension     Scoliosis     Type 2 diabetes mellitus without complication [facility])      Past Surgical History:   Procedure Laterality Date    FINGER TRIGGER RELEASE Right 08/14/2018    right thumb    HYSTERECTOMY (CERVIX STATUS UNKNOWN)      NC INCISE FINGER TENDON SHEATH Right 8/14/2018    RIGHT THUMB TRIGGER THUMB RELEASE performed by Sg Avalos DO at [facility] [address] Right 2002    RCR    WISDOM TOOTH EXTRACTION         SUBJECTIVE:    Precautions:  Up with assistance, falls, alarm, and L knee karel  new o2 user    Social history: Patient lives alone in a two story home bedroom and bathroom 2nd floor full flight stairs with Rail  with 1 step  to enter without [employer] owned: [address]   How much difficulty turning over in bed?: A Little  How much difficulty sitting down on / standing up from a chair with arms?: A posture, safety, and proper hand placement     100 feet using  least restrictive device versus no device with Supervision     Stair negotiation: ascended and descended   Not assessed  not assessed  not assessed    12 stairs with 1 HR with Supervision   ROM Within functional limits     Increase range of motion 10% of affected joints    Strength BUE:  refer to OT eval  RLE:  3+/5  LLE:  3+/5   Increase strength in affected mm groups by 1/3 grade   Balance Sitting EOB:  fair -  Dynamic Standing:  not assessed   Sitting EOB: fair +   Dynamic Standing: fair  with wheeled walker    Sitting EOB:  fair +  Dynamic Standing: fair      Patient is Alert & Oriented x person and place and follows directions but pleasantly confused  Sensation:  Patient  denies numbness/tingling   Edema:  no   Endurance: poor +    Vitals:  5 liters nasal cannula was on 4L at start, inc to 5L  Blood Pressure at rest  Blood Pressure during session    Heart Rate at rest  Heart Rate during session 78   SPO2 at rest 86% on 4L SPO2 during session 86-92%     Patient education  Patient educated on role of Physical Therapy, risks of immobility, safety and plan of care, energy conservation,  importance of mobility while in hospital , importance and purpose of adaptive device and adjusted to proper height for the patient. , and safety      Patient response to education:   Pt verbalized understanding Pt demonstrated skill Pt requires further education in this area   Yes Partial Yes      Treatment:  Patient practiced and was instructed/facilitated in the following treatment: Patient transferred to edge of bed and felt the urge for a BM, stood for bedpan placement. Patient Sat edge of bed 15 minutes with Supervision  to increase dynamic sitting balance and activity tolerance. Patient stood to remove bedpan and assist with hygiene and took steps to head of bed. Patient declined further activity due to frequency of BM and fatigue. Patient assisted back to bed. Therapeutic Exercises:  not performed       At end of session, patient in bed with alarm call light and phone within reach,  all lines and tubes intact, nursing notified. Patient would benefit from continued skilled Physical Therapy to improve functional independence and quality of life.          Patient's/ family goals   rehab    Time in  114  Time out  200    Total Treatment Time  46 minutes    CPT codes:  Therapeutic activities (94712)   38 minutes  3 unit(s)  Non billable time 8 minutes patient on bedpan    Federica Lorenzana, [facility]    #[id number]

## 2022-09-08 NOTE — PROGRESS NOTES
Subjective: The patient is awake and alert. No problems overnight. Denies chest pain, angina, and dyspnea. Denies abdominal pain. Tolerating diet. No nausea or vomiting. was supposed to go nh but tested positive with covid with no symptoms    Objective:    BP (!) 178/76   Pulse 60   Temp 98 °F (36.7 °C) (Oral)   Resp 19   Ht 5' 2\" (1.575 m)   Wt 243 lb 6.4 oz (110.4 kg)   SpO2 93%   BMI 44.52 kg/m²   Head and Neck: normal atraumatic, no neck masses, normal thyroid, no jvd  Heart:  regular rate and rhythm, S1, S2 normal, no murmur, click, rub or gallop  Lungs:  chest clear, no wheezing, rales, normal symmetric air entry, pulse oximetry on oxygen is 93%, no chest wall deformities or tenderness  Abd: soft, non-tender, without masses or organomegaly  Extrem:  No clubbing, cyanosis, or edema  Neuro:Normal, no focal neurological deficit and DTRs Brisk    CBC with Differential:    Lab Results   Component Value Date/Time    WBC 4.6 09/08/2022 05:20 AM    RBC 3.84 09/08/2022 05:20 AM    HGB 7.5 09/08/2022 05:20 AM    HCT 28.7 09/08/2022 05:20 AM     09/08/2022 05:20 AM    MCV 74.7 09/08/2022 05:20 AM    MCH 19.5 09/08/2022 05:20 AM    MCHC 26.1 09/08/2022 05:20 AM    RDW 21.4 09/08/2022 05:20 AM    LYMPHOPCT 5.2 09/08/2022 05:20 AM    MONOPCT 5.2 09/08/2022 05:20 AM    BASOPCT 0.4 09/08/2022 05:20 AM    MONOSABS 0.23 09/08/2022 05:20 AM    LYMPHSABS 0.23 09/08/2022 05:20 AM    EOSABS 0.00 09/08/2022 05:20 AM    BASOSABS 0.00 09/08/2022 05:20 AM     CMP:    Lab Results   Component Value Date/Time     09/08/2022 05:20 AM    K 3.4 09/08/2022 05:20 AM    K 3.8 09/01/2022 08:24 PM    CL 95 09/08/2022 05:20 AM    CO2 43 09/08/2022 05:20 AM    BUN 9 09/08/2022 05:20 AM    CREATININE 0.7 09/08/2022 05:20 AM    GFRAA >60 09/08/2022 05:20 AM    LABGLOM >60 09/08/2022 05:20 AM    GLUCOSE 86 09/08/2022 05:20 AM    PROT 6.1 09/05/2022 05:03 AM    LABALBU 2.8 09/05/2022 05:03 AM    CALCIUM 8.3 09/08/2022 05:20 AM    BILITOT 0.4 09/05/2022 05:03 AM    ALKPHOS 69 09/05/2022 05:03 AM    AST 13 09/05/2022 05:03 AM    ALT 8 09/05/2022 05:03 AM       Assessment:    Patient Active Problem List   Diagnosis Code    Chronic bilateral low back pain with bilateral sciatica M54.42, M54.41, G89.29    CHF with unknown LVEF ([facility]) I50.9    Primary hypertension I10    Type 2 diabetes mellitus without complication, without long-term current use of insulin ([facility]) E11.9    Iron deficiency anemia due to chronic blood loss D50.0    Acute cystitis without hematuria N30.00    Acute on chronic congestive heart failure with right ventricular diastolic dysfunction ([facility]) I50.82, I50.33    Pulmonary HTN ([facility]) I27.20    Tobacco abuse Z72.0    Anemia D64.9    Acute on chronic respiratory failure with hypoxia and hypercapnia ([facility]) J96.21, J96.22    Fall W19. XXXA    Morbid obesity ([facility] E66.01    Obstructive sleep apnea G47.33    Hypokalemia E87.6         Plan:  1) respiratory failure: doing better  2)chronic pain:off percoset  3) covid consult id  4)weakness: we will dc to rehab    Juan Sharma MD  4:05 PM  9/8/2022

## 2022-09-08 NOTE — PROGRESS NOTES
250cc removed from saucedo catheter at end of shift; patient had bladder spasms which caused leakage and unmeasured output.

## 2022-09-08 NOTE — PROGRESS NOTES
Date: 09/07/2022    Time: 10:22 PM    Patient Placed On BIPAP/CPAP/ Non-Invasive Ventilation?   No    If no must comment    Comments: patient refuses to wear BIPAP        Kenton Akers RCP

## 2022-09-08 NOTE — PROGRESS NOTES
Date:2022  Patient Name: Basilio Parra  MRN: [id number]  : 1950  ROOM #: [id number]    Occupational Therapy order received, chart reviewed and evaluation attempted this date. Patient is unavailable for OT re-evaluation due to having just worked with physical therapy and currently having frequent diarrhea, pt requested we try back in the morning. Will attempt OT evaluation at another time. Thank you.    Cortez Dennis OTR/L #[id number]

## 2022-09-08 NOTE — CARE COORDINATION
SW spoke with patient in her room to follow up regarding [facility] choice. She states she wants to speak with her son, Sandip Poe first and then give choices and he will not be in today. SW asked her permission to call him and discuss because we need to get a referral out for bed availability. She said it was ok to call him. Call placed to Sandip Poe [phone]. We discussed [facility] choices and where Dr Marianne Garcia goes. He states he doesn't have a preference, wherever his mom wants is fine with him. Requested he speak with his mom ASAP so that they can discuss because she told SW this is what she is waiting for, he states he will call her. JAIRO asked son if it was ok to at least make tentative referrals to [facility] tentatively while they decide on their top choice, he said that is ok  referrals out to all three, await responses.

## 2022-09-08 NOTE — CARE COORDINATION
[facility] is still reviewing and has not yet formally accepted. [facility] also still reviewing and not yet accepted. Spoke with Christiano Carpenter at [facility] they are able to accept. Attempted to reach son, Charanjit Jackson to notify him that of the 3 facilities [facility] is the one that can formally accept as of now, he did not answer, left a VM message. Need to confirm with patient and son their final choice but again, [facility] is the only one that has accepted so far. NO PRECERT is needed, [facility] will require a PCR covid test (ordered). [facility] needs signed HENS will need done.

## 2022-09-09 LAB
C-REACTIVE PROTEIN: 5.2 MG/DL (ref 0–0.4)
D DIMER: 587 NG/ML DDU
FERRITIN: 53 NG/ML
FIBRINOGEN: 423 MG/DL (ref 200–400)
INR BLD: 1.1
LACTATE DEHYDROGENASE: 273 U/L (ref 135–214)
LACTIC ACID: 0.8 MMOL/L (ref 0.5–2.2)
METER GLUCOSE: 125 MG/DL (ref 74–99)
METER GLUCOSE: 157 MG/DL (ref 74–99)
METER GLUCOSE: 81 MG/DL (ref 74–99)
METER GLUCOSE: 83 MG/DL (ref 74–99)
PROCALCITONIN: 0.07 NG/ML (ref 0–0.08)
PROTHROMBIN TIME: 12.6 SEC (ref 9.3–12.4)
SEDIMENTATION RATE, ERYTHROCYTE: 35 MM/HR (ref 0–20)
TOTAL CK: 48 U/L (ref 20–180)
TROPONIN, HIGH SENSITIVITY: 67 NG/L (ref 0–9)

## 2022-09-09 PROCEDURE — 82728 ASSAY OF FERRITIN: CPT

## 2022-09-09 PROCEDURE — 82550 ASSAY OF CK (CPK): CPT

## 2022-09-09 PROCEDURE — 6370000000 HC RX 637 (ALT 250 FOR IP): Performed by: INTERNAL MEDICINE

## 2022-09-09 PROCEDURE — 6370000000 HC RX 637 (ALT 250 FOR IP): Performed by: SPECIALIST

## 2022-09-09 PROCEDURE — 6360000002 HC RX W HCPCS: Performed by: INTERNAL MEDICINE

## 2022-09-09 PROCEDURE — A4216 STERILE WATER/SALINE, 10 ML: HCPCS | Performed by: HOSPITALIST

## 2022-09-09 PROCEDURE — 2580000003 HC RX 258: Performed by: HOSPITALIST

## 2022-09-09 PROCEDURE — 85378 FIBRIN DEGRADE SEMIQUANT: CPT

## 2022-09-09 PROCEDURE — 2580000003 HC RX 258: Performed by: INTERNAL MEDICINE

## 2022-09-09 PROCEDURE — 99232 SBSQ HOSP IP/OBS MODERATE 35: CPT | Performed by: INTERNAL MEDICINE

## 2022-09-09 PROCEDURE — 86140 C-REACTIVE PROTEIN: CPT

## 2022-09-09 PROCEDURE — 83615 LACTATE (LD) (LDH) ENZYME: CPT

## 2022-09-09 PROCEDURE — 6360000002 HC RX W HCPCS: Performed by: HOSPITALIST

## 2022-09-09 PROCEDURE — 97168 OT RE-EVAL EST PLAN CARE: CPT | Performed by: OCCUPATIONAL THERAPIST

## 2022-09-09 PROCEDURE — 97530 THERAPEUTIC ACTIVITIES: CPT | Performed by: OCCUPATIONAL THERAPIST

## 2022-09-09 PROCEDURE — 36592 COLLECT BLOOD FROM PICC: CPT

## 2022-09-09 PROCEDURE — 82962 GLUCOSE BLOOD TEST: CPT

## 2022-09-09 PROCEDURE — 83605 ASSAY OF LACTIC ACID: CPT

## 2022-09-09 PROCEDURE — 85610 PROTHROMBIN TIME: CPT

## 2022-09-09 PROCEDURE — 97110 THERAPEUTIC EXERCISES: CPT

## 2022-09-09 PROCEDURE — 36415 COLL VENOUS BLD VENIPUNCTURE: CPT

## 2022-09-09 PROCEDURE — 85384 FIBRINOGEN ACTIVITY: CPT

## 2022-09-09 PROCEDURE — C9113 INJ PANTOPRAZOLE SODIUM, VIA: HCPCS | Performed by: HOSPITALIST

## 2022-09-09 PROCEDURE — 84145 PROCALCITONIN (PCT): CPT

## 2022-09-09 PROCEDURE — 94660 CPAP INITIATION&MGMT: CPT

## 2022-09-09 PROCEDURE — 97530 THERAPEUTIC ACTIVITIES: CPT

## 2022-09-09 PROCEDURE — 2700000000 HC OXYGEN THERAPY PER DAY

## 2022-09-09 PROCEDURE — 84484 ASSAY OF TROPONIN QUANT: CPT

## 2022-09-09 PROCEDURE — 1200000000 HC SEMI PRIVATE

## 2022-09-09 PROCEDURE — 85651 RBC SED RATE NONAUTOMATED: CPT

## 2022-09-09 RX ORDER — DEXAMETHASONE 6 MG/1
6 TABLET ORAL DAILY
Status: DISCONTINUED | OUTPATIENT
Start: 2022-09-09 | End: 2022-09-14 | Stop reason: HOSPADM

## 2022-09-09 RX ADMIN — PANTOPRAZOLE SODIUM 40 MG: 40 INJECTION, POWDER, FOR SOLUTION INTRAVENOUS at 16:26

## 2022-09-09 RX ADMIN — Medication 10 ML: at 22:33

## 2022-09-09 RX ADMIN — HEPARIN 100 UNITS: 100 SYRINGE at 07:52

## 2022-09-09 RX ADMIN — BARICITINIB 4 MG: 2 TABLET, FILM COATED ORAL at 17:43

## 2022-09-09 RX ADMIN — Medication 10 ML: at 07:52

## 2022-09-09 RX ADMIN — HEPARIN 100 UNITS: 100 SYRINGE at 22:45

## 2022-09-09 RX ADMIN — PANTOPRAZOLE SODIUM 40 MG: 40 INJECTION, POWDER, FOR SOLUTION INTRAVENOUS at 03:58

## 2022-09-09 RX ADMIN — METFORMIN HYDROCHLORIDE 1000 MG: 1000 TABLET ORAL at 17:44

## 2022-09-09 RX ADMIN — DEXAMETHASONE 6 MG: 6 TABLET ORAL at 10:15

## 2022-09-09 RX ADMIN — FERROUS SULFATE TAB 325 MG (65 MG ELEMENTAL FE) 325 MG: 325 (65 FE) TAB at 17:43

## 2022-09-09 RX ADMIN — ENOXAPARIN SODIUM 30 MG: 100 INJECTION SUBCUTANEOUS at 22:39

## 2022-09-09 ASSESSMENT — PAIN SCALES - GENERAL: PAINLEVEL_OUTOF10: 0

## 2022-09-09 NOTE — CONSULTS
[address] [id number] [facility], [address] [place] [address]  Phone [phone]   [phone]      Admit Date: 2022  7:43 PM  Pt Name: Estelita Mahmood  MRN: [id number]  : 1950  Reason for Consult:    Chief Complaint   Patient presents with    Asha Laura at 930am was not able to get up. Requesting Physician:  Lacho Leo MD  PCP: Lacho Leo MD  History Obtained From:  patient, chart   ID consulted for CHF with unknown LVEF ([facility] [address] [I50.9]  on hospital day 19 Maye Pepper       Chief Complaint   Patient presents with    Asha Laura at 930am was not able to get up. HISTORYOF PRESENT ILLNESS   Estelita Mahmood is a 73 y.o. female who presents with   has a past medical history of Chronic back pain, Hypertension, Scoliosis, and Type 2 diabetes mellitus without complication ([facility] [address] ED TRIAGEVITALS  BP: (!) 120/50, Temp: 98.2 °F (36.8 °C), Heart Rate: 55, Resp: 18, SpO2: 100 %  HPI  Pt presented to ER on 2022 with diagnosis of  CHF with unknown LVEF (HCC) [I50.9]  ID was consulted on 22 for infection management/covid TESTED +  PT PRESENETD WITH CHF FOUND AT HOMES/P FALL  SEEN BY CARDIOLOGY   SEEN BY PULM FOR acute hypoxemic and hypercapnic respiratory failure.  S/PP RRT  TRANSFERRED TO ICU   HAD FEVERS ON  HAS BEEN ON 4l  MIKAELA RESOLVED WBC HAS BEEN STABLE   Ua RED CLOUDY LE/BACTERIA  COVID SCREEN  +  Mrsa NEG   BLOOD C neg  URINE CX  MIXED gpo/pROTEUS   Currently on NO ATBX  WAS ON CEFTRIAXONE -     Pt was working with pt  In bed and dec o2 sat with exertion she has some clear phlegm no f/c/n/v/d/  No urinary complaints HAS BACTERURIA/HEMATURIA    REVIEW OF SYSTEMS     CONSTITUTIONAL:   No fever, chills, weight loss  ALLERGIES:    No rash or itch  EYES:     No visual changes  ENT:      No  hearing loss or sore throat  CARDIOVASCULAR:   No chest pain or palpitations  RESPIRATORY:    cough, sob  ENDOCRINE:    No increase thirst, urination HEME-LYMPH:   No easy bruising or bleeding  GI:     No nausea, vomiting or diarrhea  :     No urinary complaints  NEURO:    No seizures, stroke, HA  MUSCULOSKELETAL:  No muscle aches or pain, no joint pain  SKIN:     No rash ulcers or wounds  PSYCH:    No depression or anxiety    Medications Prior to Admission: glipiZIDE (GLUCOTROL) 5 MG tablet,   omeprazole (PRILOSEC) 20 MG delayed release capsule, TAKE ONE CAPSULE BY MOUTH EVERY DAY, NEED TO MAKE APPOINTMENT  aspirin 325 MG tablet, Take 325 mg by mouth daily  HYDROcodone-acetaminophen (NORCO) 5-325 MG per tablet, Take 1 tablet by mouth every 6 hours as needed for Pain.  metFORMIN (GLUCOPHAGE) 500 MG tablet, Take 500 mg by mouth 2 times daily (with meals)  quinapril-hydrochlorothiazide (ACCURETIC) 20-12.5 MG per tablet, Take 1 tablet by mouth 2 times daily  topiramate (TOPAMAX) 100 MG tablet, Take 200 mg by mouth nightly  atenolol (TENORMIN) 50 MG tablet, Take 50 mg by mouth 2 times daily  Insulin Detemir (LEVEMIR FLEXTOUCH SC), Inject 10 mg into the skin daily  CURRENT MEDICATIONS     Current Facility-Administered Medications:     acetaminophen (TYLENOL) tablet 650 mg, 650 mg, Oral, Q6H PRN, Silvana Braga MD    pantoprazole (PROTONIX) 40 mg in sodium chloride (PF) 10 mL injection, 40 mg, IntraVENous, Q12H, Tonia Burnham MD, 40 mg at 09/09/22 0358    enoxaparin Sodium (LOVENOX) injection 30 mg, 30 mg, SubCUTAneous, BID, Elina Sosa MD, 30 mg at 09/08/22 2135    sodium chloride flush 0.9 % injection 5-40 mL, 5-40 mL, IntraVENous, 2 times per day, Tawanna Villalta MD, 10 mL at 09/09/22 0752    sodium chloride flush 0.9 % injection 5-40 mL, 5-40 mL, IntraVENous, PRN, Bahaa A Awadalla, MD    0.9 % sodium chloride infusion, , IntraVENous, PRN, Tawanna Villalta MD    heparin flush 100 UNIT/ML injection 100 Units, 1 mL, IntraVENous, 2 times per day, Tawanna Villalta MD, 100 Units at 09/09/22 0752    heparin flush 100 UNIT/ML injection 100 Units, 1 mL, IntraCATHeter, PRN, Phyllis Omer MD    albuterol (PROVENTIL) nebulizer solution 2.5 mg, 2.5 mg, Nebulization, Q4H PRN, Jeffery Villalpando MD    naloxone [facility]) injection 0.4 mg, 0.4 mg, IntraVENous, Q5 Min PRN, Jeffery Villalpando MD, 0.4 mg at 09/05/22 2047    [Held by provider] bumetanide (BUMEX) tablet 1 mg, 1 mg, Oral, Daily, Keven Liu MD, 1 mg at 09/06/22 1047    aspirin chewable tablet 81 mg, 81 mg, Oral, Daily, Ermias Braga MD, 81 mg at 09/08/22 6541    insulin lispro (HUMALOG) injection vial 0-8 Units, 0-8 Units, SubCUTAneous, TID WC, Silvana Braga MD    glucose chewable tablet 16 g, 4 tablet, Oral, PRN, Silvana Braga MD    dextrose bolus 10% 125 mL, 125 mL, IntraVENous, PRN **OR** dextrose bolus 10% 250 mL, 250 mL, IntraVENous, PRN, Silvana Braga MD    glucagon (rDNA) injection 1 mg, 1 mg, SubCUTAneous, PRN, Silvana Braga MD    dextrose 10 % infusion, , IntraVENous, Continuous PRN, Silvana Braga MD    ondansetron (ZOFRAN) injection 4 mg, 4 mg, IntraVENous, Q6H PRN **OR** ondansetron (ZOFRAN-ODT) disintegrating tablet 4 mg, 4 mg, Oral, Q8H PRN, Silvana Braga MD, 4 mg at 09/02/22 1805    atenolol (TENORMIN) tablet 50 mg, 50 mg, Oral, BID, Silvana Braga MD, 50 mg at 09/08/22 2135    [Held by provider] lisinopril (PRINIVIL;ZESTRIL) tablet 20 mg, 20 mg, Oral, Daily, 20 mg at 09/03/22 0902 **AND** [DISCONTINUED] hydroCHLOROthiazide (HYDRODIURIL) tablet 12.5 mg, 12.5 mg, Oral, BID, Silvana Braga MD    metFORMIN (GLUCOPHAGE) tablet 1,000 mg, 1,000 mg, Oral, BID , Silvana Braga MD, 1,000 mg at 09/08/22 1714    ferrous sulfate (IRON 325) tablet 325 mg, 325 mg, Oral, BID Silvana MD, 325 mg at 09/08/22 1714    perflutren lipid microspheres (DEFINITY) injection 1.65 mg, 1.5 mL, IntraVENous, ONCE PRN, Jose Ansari PA-C  ALLERGIES     Patient has no known allergies.   Immunization History   Administered Date(s) Administered    COVID-19, J&J, (age 18y+), IM, 0.5 mL 03/18/2021      Internal Administration   First Dose COVID-19, J&J, (age 18y+), IM, 0.5 mL  03/18/2021   Second Dose           Last COVID Lab SARS-CoV-2 RNA, RT PCR (no units)   Date Value   09/08/2022 DETECTED (A)          PAST MEDICAL HISTORY     Past Medical History:   Diagnosis Date    Chronic back pain 2018    10/10 on the pain scale    Hypertension     Scoliosis     Type 2 diabetes mellitus without complication ([facility] [address]      SURGICAL HISTORY       Past Surgical History:   Procedure Laterality Date    FINGER TRIGGER RELEASE Right 08/14/2018    right thumb    HYSTERECTOMY (CERVIX STATUS UNKNOWN)      ND INCISE FINGER TENDON SHEATH Right 8/14/2018    RIGHT THUMB TRIGGER THUMB RELEASE performed by Dimitris Mario DO at [facility] [address] Right 2002    RCR    WISDOM TOOTH EXTRACTION       FAMILY HISTORY       Family History   Problem Relation Age of Onset    Cancer Father         Pancreatic     SOCIAL HISTORY       Social History     Socioeconomic History    Marital status: Single     Spouse name: None    Number of children: None    Years of education: None    Highest education level: None   Tobacco Use    Smoking status: Every Day     Packs/day: 0.50     Types: Cigarettes    Smokeless tobacco: Never   Substance and Sexual Activity    Alcohol use: No    Drug use: No     PHYSICAL EXAM       ED Triage Vitals [09/01/22 1951]   BP Temp Temp Source Heart Rate Resp SpO2 Height Weight   121/61 98.7 °F (37.1 °C) Oral 81 18 100 % 5' 2\" (1.575 m) 245 lb (111.1 kg)     Vitals:    Vitals:    09/08/22 1930 09/09/22 0400 09/09/22 0407 09/09/22 0744   BP: 134/65   (!) 120/50   Pulse: 62   55   Resp:    18   Temp: 99.3 °F (37.4 °C)   98.2 °F (36.8 °C)   TempSrc: Oral   Oral   SpO2:    100%   Weight:  214 lb 8 oz (97.3 kg) 214 lb 8 oz (97.3 kg)    Height:         Physical Exam   Constitutional/General: Alert and oriented, NAD  Head: NC/AT  Eyes: PERRL, EOMI  Mouth:  no thrush   Neck: Supple   Pulmonary: Lungs clear to auscultation bilaterally. Not in respiratory distress  Cardiovascular:  Regular rate and rhythm, no murmurs, gallops, or rubs. Abdomen: Soft, + BS.   distension. Nontender. Extremities: Moves all extremities x 4. Warm and well perfused edema  Pulses:  Distal pulses  dec   Skin: Warm and dry without rash  Neurologic:    No focal deficits  Psych: Normal Affect          Peripherally Inserted Central Catheter:  Midline Single Lumen 13/34/54 Left Basilic (Active)   Criteria for Appropriate Use Long term IV/antibiotic administration 09/08/22 2257   Site Assessment Clean, dry & intact 09/08/22 2257   Phlebitis Assessment No symptoms 09/08/22 2257   Infiltration Assessment 0 09/08/22 2257   Extremity Circumference (cm) 32 cm 09/08/22 1141   Lumen Color/Status Pink;Blood return noted; Flushed;Normal saline locked 09/08/22 1141   Line Care Cap changed; Connections checked and tightened 09/08/22 2257   Alcohol Cap Used Yes 09/08/22 2257   Date of Last Dressing Change 09/06/22 09/08/22 1141   Dressing Type Transparent 09/08/22 2257   Dressing Status Clean, dry & intact 09/08/22 2257   Dressing Intervention Other (Comment) 09/08/22 1141        Peripheral Intravenous Line:  Peripheral IV 09/05/22 Left Antecubital (Active)   Site Assessment Clean, dry & intact 09/08/22 2258   Line Status Capped;Flushed 09/08/22 [address] changed; Connections checked and tightened 09/08/22 2258   Phlebitis Assessment No symptoms 09/08/22 2258   Infiltration Assessment 0 09/08/22 2258   Alcohol Cap Used Yes 09/08/22 2258   Dressing Status Clean, dry & intact 09/08/22 2258   Dressing Type Transparent 09/08/22 2258   Dressing Intervention Other (Comment) 09/08/22 1141       DIAGNOSTIC RESULTS   RADIOLOGY:   Echo Complete    Result Date: 9/2/2022  Transthoracic Echocardiography Report (TTE)  Demographics   Patient Name     [facility]         Gender             Female                   [address] [facility] Medical Record   [id number]        Room Number        3422  Number   Account #        [de-identified]       Procedure Date     09/02/2022   Corporate ID                     Ordering Physician Francisco Diaz   Accession Number [id number]      Referring                                   Physician   Date of Birth    1950      Tabitha Prakash RDCS   Age              73 year(s)      Interpreting       Bailey Robles MD                                   Physician                                    Any Other  Procedure Type of Study   TTE procedure  Procedure Date Date: 09/02/2022 Start: 10:08 AM Study Location: Portable Technical Quality: Adequate visualization Indications:Congestive heart failure. Patient Status: Routine Height: 62 inches Weight: 245 pounds BSA: 2.08 m^2 BMI: 44.81 kg/m^2 Rhythm: Within normal limits HR: 70 bpm  Findings   Left Ventricle  Left ventricle size is normal.  Normal left ventricular wall thickness. Normal left ventricular systolic function. Ejection fraction is visually estimated at > 55%. There is doppler evidence of stage II diastolic dysfunction. Right Ventricle  Dilated right ventricle (RV:LV diastolic diameter ratio > 1) and reduced  right ventricular function. Left Atrium  Normal sized left atrium by volume index. Right Atrium  Dilated right atrium. Mitral Valve  Physiologic and/or trace mitral regurgitation. No evidence of hemodynamically significant mitral stenosis. Tricuspid Valve  Mild tricuspid regurgitation. PASP is estimated at 62 mmHg. Aortic Valve  No evidence of hemodynamically significant aortic regurgitation or  stenosis. Pulmonic Valve  The pulmonic valve was not well visualized. Pericardial Effusion  No evidence of a hemodynamically significant pericardial effusion. Aorta  Aortic root dimension within normal limits.    Conclusions   Summary  Normal left ventricular systolic function. Ejection fraction is visually estimated at > 55%. Dilated right ventricle (RV:LV diastolic diameter ratio > 1) and reduced  right ventricular function. There is doppler evidence of stage II diastolic dysfunction. Mild tricuspid regurgitation. PASP is estimated at 62 mmHg.    Signature   ----------------------------------------------------------------  Electronically signed by Alaina Moreno MD(Interpreting  physician) on 09/02/2022 02:57 PM  ----------------------------------------------------------------  M-Mode/2D Measurements & Calculations   LV Diastolic    LV Systolic Dimension: 2.4   AV Cusp Separation: 2.1 cmLA  Dimension: 3.4  cm                           Dimension: 3.3 cmAO Root  cm              LV Volume Diastolic: 71.6 ml Dimension: 2.6 cm  LV FS:29.4 %    LV Volume Systolic: 92.1 ml  LV PW           LV EDV/LV EDV Index: 21.8  Diastolic: 0.9  SQ/50 QM/A^9QT ESV/LV ESV  cm              Index: 20.5 ml/10ml/ m^2     RV Diastolic Dimension: 4.2  Septum          EF Calculated: 56.8 %        cm  Diastolic: 1.1  LV Mass Index: 48 l/min*m^2  cm  CO: 5.71 l/min                               LA volume/Index: 42.6 ml  CI: 2.75        LVOT: 2 cm  l/m*m^2  LV Mass: 98.91  g  Doppler Measurements & Calculations   MV Peak E-Wave: 1.08 AV Peak Velocity:     LVOT Peak Velocity: 1.13 m/s  m/s                  1.84 m/s              LVOT Mean Velocity: 0.75 m/s  MV Peak A-Wave: 0.91 AV Peak Gradient:     LVOT Peak Gradient: 5.1  m/s                  13.51 mmHg            mmHgLVOT Mean Gradient: 2.7  MV E/A Ratio: 1.18   AV Mean Velocity: 1.3 mmHg  MV Peak Gradient:    m/s                   Estimated RVSP: 63.7 mmHg  4.9 mmHg             AV Mean Gradient: 7.6 Estimated RAP:10 mmHg  MV Mean Gradient:    mmHg  2.2 mmHg             AV VTI: 36.9 cm  MV Mean Velocity:    AV Area               TR Velocity:3.66 m/s  0.69 m/s             (Continuity):2.21     TR Gradient:53.67 mmHg  MV Deceleration      cm^2 PV Peak Velocity: 0.81 m/s  Time: 187.6 msec                           PV Peak Gradient: 2.63 mmHg  MV P1/2t: 58.4 msec  LVOT VTI: 26 cm       PV Mean Velocity: 0.61 m/s  MVA by PHT:3.77 cm^2 IVRT: 90 msec         PV Mean Gradient: 1.6 mmHg  MV Area              Estimated PASP: 63.67  (continuity): 2.8    mmHg  cm^2  [email or web address] [id number]    XR CHEST (2 VW)    Result Date: 9/5/2022  EXAMINATION: TWO XRAY VIEWS OF THE CHEST 9/5/2022 9:36 am COMPARISON: None. HISTORY: ORDERING SYSTEM PROVIDED HISTORY: sob TECHNOLOGIST PROVIDED HISTORY: Reason for exam:->sob FINDINGS: Cardiac size enlarged. Opacifications at the left lung base somewhat gradient or layering could represent a layering small to moderate left pleural effusion with adjacent atelectasis with background interstitial prominence of likely interstitial edema pattern. No pneumothorax     Opacifications at the left lung base somewhat gradient or layering could represent a layering small to moderate left pleural effusion with adjacent atelectasis with background interstitial prominence of likely interstitial edema pattern. CT HEAD WO CONTRAST    Result Date: 9/5/2022  EXAMINATION: CT OF THE HEAD WITHOUT CONTRAST  9/5/2022 9:13 pm TECHNIQUE: CT of the head was performed without the administration of intravenous contrast. Automated exposure control, iterative reconstruction, and/or weight based adjustment of the mA/kV was utilized to reduce the radiation dose to as low as reasonably achievable. COMPARISON: None. HISTORY: ORDERING SYSTEM PROVIDED HISTORY: Altered mentation, evaluate for stroke TECHNOLOGIST PROVIDED HISTORY: Reason for exam:->Altered mentation, evaluate for stroke Has a \"code stroke\" or \"stroke alert\" been called? ->No FINDINGS: BRAIN/VENTRICLES: There is no acute intracranial hemorrhage, mass effect or midline shift.   No abnormal extra-axial fluid collection. The gray-white differentiation is maintained without evidence of an acute infarct. There is no evidence of hydrocephalus. ORBITS: The visualized portion of the orbits demonstrate no acute abnormality. SINUSES: The visualized paranasal sinuses and mastoid air cells demonstrate no acute abnormality. SOFT TISSUES/SKULL:  No acute abnormality of the visualized skull or soft tissues. No acute intracranial abnormality. For clinical suspicion of acute ischemia, recommend MRI brain with diffusion-weighted imaging for further evaluation     XR CHEST PORTABLE    Result Date: 9/5/2022  EXAMINATION: ONE XRAY VIEW OF THE CHEST 9/5/2022 9:30 pm COMPARISON: CT chest September 2nd. Plain radiographs of the chest September 1st September 5 same-day performed 09:38 a.m. HISTORY: ORDERING SYSTEM PROVIDED HISTORY: hypoxia TECHNOLOGIST PROVIDED HISTORY: Reason for exam:->hypoxia FINDINGS: Areas of atelectasis are seen in the right infrahilar region and towards the right suprahilar area. The heart is normal size. Minimal blunting of the costophrenic sulcus are seen. No sizable infiltrates or consolidation of acute type are seen. There is no perihilar vascular congestion. No pneumothorax on the right or on the left. Surgical clips seen above and below the right clavicle and the right axilla region. Areas of subsegmental atelectasis in the right lung. No superimposed acute cardiopulmonary process. XR CHEST 1 VIEW    Result Date: 9/1/2022  EXAMINATION: ONE XRAY VIEW OF THE CHEST 9/1/2022 8:54 pm COMPARISON: None. HISTORY: ORDERING SYSTEM PROVIDED HISTORY: LE edema TECHNOLOGIST PROVIDED HISTORY: Reason for exam:->LE edema FINDINGS: Surgical clips are seen on the right-side above and below the clavicle projection and in the right axilla region. Heart is normal size. There is perihilar vascular congestion with interstitial edema in mild-to-moderate degree of severity.   No conspicuous pleural effusions are observed. No pneumothorax on the right on the left. Pattern of perihilar vascular congestion/interstitial edema of mild-to-moderate degree of severity. Please correlate clinically. CTA CHEST W CONTRAST    Result Date: 9/2/2022  EXAMINATION: CTA OF THE CHEST 9/2/2022 4:26 pm TECHNIQUE: CTA of the chest was performed after the administration of intravenous contrast.  Multiplanar reformatted images are provided for review. MIP images are provided for review. Automated exposure control, iterative reconstruction, and/or weight based adjustment of the mA/kV was utilized to reduce the radiation dose to as low as reasonably achievable. COMPARISON: None. HISTORY: ORDERING SYSTEM PROVIDED HISTORY: rule out PE TECHNOLOGIST PROVIDED HISTORY: Verbal order as per Dr. Georgia Kolb Reason for exam:->rule out PE FINDINGS: Pulmonary Arteries: No PE defects identified Mediastinum: Cardiomegaly and coronary artery calcifications. Small hiatal hernia Lungs/pleura: Interstitial septal thickening most evident in the apices suggestive of edema. Mild densities most evident in the bases are suggestive of atelectasis Upper Abdomen: No gross acute abnormality within the abdomen on limited evaluation with left renal cyst noted. Prominent soft tissue density is suggestive of edema laterally Soft Tissues/Bones: Shoulder degenerative changes worse on the right, scoliosis and spinal mild degenerative changes overall. Postop changes on the right     1. No acute pulmonary embolus is identified 2.  Suspicious for interstitial pulmonary edema, and a suggestion of prominent subcutaneous edema laterally most evident in the abdomen     US DUP LOWER EXTREMITIES BILATERAL VENOUS    Result Date: 9/3/2022  EXAMINATION: DUPLEX VENOUS ULTRASOUND OF THE BILATERAL LOWER EXTREMITIES9/3/2022 9:22 am TECHNIQUE: Duplex ultrasound using B-mode/gray scaled imaging, Doppler spectral analysis and color flow Doppler was obtained of the deep venous structures of the lower bilateral extremities. COMPARISON: None. HISTORY: ORDERING SYSTEM PROVIDED HISTORY: edema b/l lower extremities rule out dvt TECHNOLOGIST PROVIDED HISTORY: Reason for exam:->edema b/l lower extremities rule out dvt What reading provider will be dictating this exam?->CRC FINDINGS: The visualized veins of the bilateral lower extremities are patent and free of echogenic thrombus. The veins demonstrate good compressibility with normal color flow study and spectral analysis. No evidence of DVT in either lower extremity. LABS  Recent Labs     09/07/22 0416 09/08/22 0520   WBC 4.6 4.6   HGB 7.7* 7.5*   HCT 29.0* 28.7*   MCV 73.0* 74.7*    202     Recent Labs     09/07/22 0416 09/08/22 0520    142   K 3.9 3.4*   CL 94* 95*   CO2 44* 43*   BUN 8 9   CREATININE 0.8 0.7   GFRAA >60 >60   LABGLOM >60 >60   GLUCOSE 93 86   CALCIUM 8.3* 8.3*        Lab Results   Component Value Date/Time    COVID19 DETECTED 09/08/2022 04:20 PM           Lab Results   Component Value Date/Time    CHOL 101 09/02/2022 05:40 AM    TRIG 85 09/02/2022 05:40 AM    HDL 38 09/02/2022 05:40 AM    LDLCALC 46 09/02/2022 05:40 AM    LABVLDL 17 09/02/2022 05:40 AM          MICROBIOLOGY:         Lab Results   Component Value Date/Time    BC 24 Hours no growth 09/06/2022 12:25 AM     Lab Results   Component Value Date/Time    BLOODCULT2 24 Hours no growth 09/06/2022 12:25 AM        Urine Culture, Routine   Date Value Ref Range Status   09/01/2022   Final    <10,000 CFU/mL  Proteus species  Mixed gram positive organisms       MRSA Culture Only   Date Value Ref Range Status   09/06/2022 Methicillin resistant Staph aureus not isolated  Final     No results for input(s): CDIFFTOXANT in the last 72 hours. FINAL IMPRESSION    Patient is a 73 y.o. female who presented with   Chief Complaint   Patient presents with    Nigel Lostine at 930am was not able to get up.      and admitted for CHF with unknown LVEF ([address] [I50.9]  Covid ON 4l pt desat with exertion   VACCINATED COVID  Fevers better   -have pharmacy evaluate for rx   -CHECK COVID BIOMARKERS  DECADRON    ASYMPTOMATIC BACTERURIA AWAIT FINAL CX     enoxaparin Sodium (LOVENOX) injection 30 mg, BID       Follow resp status   Cont mvi     To follow ordered labs, cultures and imaging. Imaging and labs were reviewed per medical records and any ID pertinent labs were addressed with the patient. The patient/FAMILY  was educated about the diagnosis, prognosis, indications, risks and benefits of treatment. An opportunity to ask questions was given to the patient/FAMILY and questions were answered. Thank you for involving me in the care of Frank Ruvalcabaelizabeth Medel. Please do not hesitate to call for any questions or concerns.          Electronically signed by Carlos Nicole MD on 9/9/2022 at 8:49 AM

## 2022-09-09 NOTE — ACP (ADVANCE CARE PLANNING)
Advance Care Planning     Advance Care Planning Activator (Inpatient)  Conversation Note      Date of ACP Conversation: 9/9/2022     Conversation Conducted with: Patient with Decision Making Capacity    ACP Activator: Henri Guillen, 1 Tarun Pepper Maker:     Current Designated Health Care Decision Maker:     Primary Decision Maker: Josee Culp - Child - [phone]  Click here to complete Healthcare Decision Makers including section of the Healthcare Decision Maker Relationship (ie \"Primary\")      Care Preferences    Ventilation: \"If you were in your present state of health and suddenly became very ill and were unable to breathe on your own, what would your preference be about the use of a ventilator (breathing machine) if it were available to you? \"      Would the patient desire the use of ventilator (breathing machine)?: yes    \"If your health worsens and it becomes clear that your chance of recovery is unlikely, what would your preference be about the use of a ventilator (breathing machine) if it were available to you? \"     Would the patient desire the use of ventilator (breathing machine)?: Yes      Resuscitation  \"CPR works best to restart the heart when there is a sudden event, like a heart attack, in someone who is otherwise healthy. Unfortunately, CPR does not typically restart the heart for people who have serious health conditions or who are very sick. \"    \"In the event your heart stopped as a result of an underlying serious health condition, would you want attempts to be made to restart your heart (answer \"yes\" for attempt to resuscitate) or would you prefer a natural death (answer \"no\" for do not attempt to resuscitate)? \" yes       [] Yes   [x] No   Educated Patient / Leanora Chill regarding differences between Advance Directives and portable DNR orders.     Length of ACP Conversation in minutes:  5 minutes    Conversation Outcomes:  [x] ACP discussion completed  [] Existing advance directive reviewed with patient; no changes to patient's previously recorded wishes  [] New Advance Directive completed  [] Portable Do Not Rescitate prepared for Provider review and signature  [] POLST/POST/MOLST/MOST prepared for Provider review and signature      Follow-up plan:    [] Schedule follow-up conversation to continue planning  [] Referred individual to Provider for additional questions/concerns   [] Advised patient/agent/surrogate to review completed ACP document and update if needed with changes in condition, patient preferences or care setting    [x] This note routed to one or more involved healthcare providers

## 2022-09-09 NOTE — CARE COORDINATION
9/9/2022 1507 CM note: POSITIVE COVID 9/8/22. [facility]. Pt agreeable for rehab and prefers [facility] IF her son Mita Tracey is agreeable. CM spoke with [facility] regarding above and he is agreeable for [facility] for short term rehab. Referral placed to liaanand Pichardo, pt accepted for skilled care and bed is available this weekend if medically ready. For SNF, NO PRECERT, will need HENS and signed LES. Pt's son [facility] ([phone])EMGU need notified when pt is discharged.  Eli PERERA

## 2022-09-09 NOTE — PROGRESS NOTES
Physician Progress Note      Phillip Gregory  [facility] #:                  [id number]  :                       1950  ADMIT DATE:       2022 7:43 PM  [address] [place] DATE:  RESPONDING  PROVIDER #:        Mt Delaney MD          QUERY TEXT:    Patient admitted with Acute on chronic CHF. Noted documentation of:   Encephalopathy -Toxic/metabolic--Suspect secondary to opioid use vs   neurological disease by Critical care consultant . If possible, please   document in progress notes and discharge summary if you are evaluating and/or   treating any of the following: The medical record reflects the following:  Risk Factors: RRT  for AMS, respiratory distress. Pt [place] for chronic   pain. Clinical Indicators:   UDS + opiates, CT head: No acute intracranial   abnormality , lethargy,  Treatment: Narcan drip, Bipap, hold Norco, ICU transfer, Critical care   consult.     Thank you, Judy Barrera RN -[phone]  Options provided:  -- Adverse effect of Norco, properly administered  -- Accidental overdose of Norco  -- Intentional overdose of Norco  -- Other - I will add my own diagnosis  -- Disagree - Not applicable / Not valid  -- Disagree - Clinically unable to determine / Unknown  -- Refer to Clinical Documentation Reviewer    PROVIDER RESPONSE TEXT:    Toxic/metabolic encephalopathy are due to an adverse effect of properly   administered Norco.    Query created by: Rossana Sanderson on 2022 12:29 PM      Electronically signed by:  Mt Delaney MD 2022 3:41 PM

## 2022-09-09 NOTE — PROGRESS NOTES
12.5 mg, 12.5 mg, Oral, BID  metFORMIN (GLUCOPHAGE) tablet 1,000 mg, 1,000 mg, Oral, BID WC  ferrous sulfate (IRON 325) tablet 325 mg, 325 mg, Oral, BID   perflutren lipid microspheres (DEFINITY) injection 1.65 mg, 1.5 mL, IntraVENous, ONCE PRN    Physical    VITALS:  BP (!) 146/64   Pulse 55   Temp 98.4 °F (36.9 °C) (Oral)   Resp 14   Ht 5' 2\" (1.575 m)   Wt 214 lb 8 oz (97.3 kg)   SpO2 92%   BMI 39.23 kg/m²   TEMPERATURE:  Current - Temp: 98.4 °F (36.9 °C); Max - Temp  Av.6 °F (37 °C)  Min: 98.2 °F (36.8 °C)  Max: 99.3 °F (37.4 °C)  RESPIRATIONS RANGE: Resp  Av  Min: 14  Max: 18  PULSE RANGE: Pulse  Av.5  Min: 55  Max: 62  BLOOD PRESSURE RANGE:  Systolic (52BWM), PH , Min:120 , LPO:695   ; Diastolic (34GXY), VPU:95, Min:50, Max:65    PULSE OXIMETRY RANGE: SpO2  Av.8 %  Min: 86 %  Max: 100 %  24HR INTAKE/OUTPUT:    Intake/Output Summary (Last 24 hours) at 2022 1911  Last data filed at 2022 1819  Gross per 24 hour   Intake 240 ml   Output --   Net 240 ml       CONSTITUTIONAL: awake, alert  no distress, appears as stated age. HEENT: Normocephalic atraumatic. Pupils are equal and reactive bilaterally. Extraocular movements are intact. Pallor+  NECK: Supple, no JVD , no lymphadenopathy, no bruits, no thyromegaly  LUNGS: Diminished over the bases bilaterally. CARDIOVASCULAR: Regular rate and rhythm, no murmur rub or gallop. ABDOMEN: Soft, nontender, bowel sounds are positive, no organomegaly appreciated. Obese. NEUROLOGIC: awake, alert  , no focal deficits noted. EXT: 1+ edema bilateral lower extremities.     CBC with Differential:    Lab Results   Component Value Date/Time    WBC 4.6 2022 05:20 AM    RBC 3.84 2022 05:20 AM    HGB 7.5 2022 05:20 AM    HCT 28.7 2022 05:20 AM     2022 05:20 AM    MCV 74.7 2022 05:20 AM    MCH 19.5 2022 05:20 AM    MCHC 26.1 2022 05:20 AM    RDW 21.4 2022 05:20 AM    LYMPHOPCT 5.2 09/08/2022 05:20 AM    MONOPCT 5.2 09/08/2022 05:20 AM    BASOPCT 0.4 09/08/2022 05:20 AM    MONOSABS 0.23 09/08/2022 05:20 AM    LYMPHSABS 0.23 09/08/2022 05:20 AM    EOSABS 0.00 09/08/2022 05:20 AM    BASOSABS 0.00 09/08/2022 05:20 AM     CMP:    Lab Results   Component Value Date/Time     09/08/2022 05:20 AM    K 3.4 09/08/2022 05:20 AM    K 3.8 09/01/2022 08:24 PM    CL 95 09/08/2022 05:20 AM    CO2 43 09/08/2022 05:20 AM    BUN 9 09/08/2022 05:20 AM    CREATININE 0.7 09/08/2022 05:20 AM    GFRAA >60 09/08/2022 05:20 AM    LABGLOM >60 09/08/2022 05:20 AM    GLUCOSE 86 09/08/2022 05:20 AM    PROT 6.1 09/05/2022 05:03 AM    LABALBU 2.8 09/05/2022 05:03 AM    CALCIUM 8.3 09/08/2022 05:20 AM    BILITOT 0.4 09/05/2022 05:03 AM    ALKPHOS 69 09/05/2022 05:03 AM    AST 13 09/05/2022 05:03 AM    ALT 8 09/05/2022 05:03 AM             ASSESSMENT AND PLAN  1. Acute on chronic diastolic heart failure. Her 2D echo reveals an ejection fraction of 14% with diastolic dysfunction and right-sided heart failure with pulmonary hypertension. Off diuretics at this point. 2.  Acute respiratory failure secondary to above. She is requiring oxygen at 4 L/min. CTA of the chest without any evidence of pulmonary embolism. Ultrasound of lower extremities negative for DVT. 3.  Right-sided heart failure. Questionable secondary to obstructive sleep apnea. Patient will need a sleep study as outpatient. 4.  Diabetes mellitus type 2. Metformin 1000 mg bid . Continue sliding scale insulin  5. Anemia most likely iron deficiency in which is chronic. Iron studies with low ferritin and normal TIBC and iron levels. She will need a GI evaluation to rule out GI etiology of her iron deficiency anemia. GI consulted and will observe for now considering overall condition and positive COVID test.  6  Hypertension monitor blood pressure closely. Lisinopril on hold because of her renal insufficiency. 7.  Edema of bilateral lower extremities. Ultrasound lower extremities negative for DVT. 8.  Acute kidney injury secondary to IV diuretics. Creatinine 0.7  10. Metabolic encephalopathy secondary to CO2 narcosis and possible her pain meds. Neuro  status at baseline  11. COVID positive On Barcitinib , decadron and lovenox , ID following closely    Samuel Jorge MD, MD.  7:11 PM  9/9/2022

## 2022-09-09 NOTE — DISCHARGE INSTR - COC
Continuity of Care Form    Patient Name: Madison Dennis   :  1950  MRN:  [id number]    Admit date:  2022  Discharge date:  22    Code Status Order: Full Code   Advance Directives:     Admitting Physician:  Tariq Martin MD  PCP: Tariq Martin MD    Discharging Nurse: [facility] Unit/Room#: 1100/9250-49  Discharging Unit Phone Number: [phone]    Emergency Contact:   Extended Emergency Contact Information  Primary Emergency Contact: Whitney Duncan  Address: [address] Phone: [phone]  Relation: Other  Secondary Emergency Contact: Rodney Duncan  Address: [address], [address] [place] [address] Phone: [phone]  Relation: Child    Past Surgical History:  Past Surgical History:   Procedure Laterality Date    FINGER TRIGGER RELEASE Right 2018    right thumb    HYSTERECTOMY (CERVIX STATUS UNKNOWN)      IN INCISE FINGER TENDON SHEATH Right 2018    RIGHT THUMB TRIGGER THUMB RELEASE performed by Colletta Paterson, DO at [place] [address] Right 2002    RCR    WISDOM TOOTH EXTRACTION         Immunization History:   Immunization History   Administered Date(s) Administered    COVID-19, J&J, (age 18y+), IM, 0.5 mL 2021       Active Problems:  Patient Active Problem List   Diagnosis Code    Chronic bilateral low back pain with bilateral sciatica M54.42, M54.41, G89.29    CHF with unknown LVEF (HCC) I50.9    Primary hypertension I10    Type 2 diabetes mellitus without complication, without long-term current use of insulin (HCC) E11.9    Iron deficiency anemia due to chronic blood loss D50.0    Acute cystitis without hematuria N30.00    Acute on chronic congestive heart failure with right ventricular diastolic dysfunction (HCC) I50.82, I50.33    Pulmonary HTN (HCC) I27.20    Tobacco abuse Z72.0    Anemia D64.9    Acute on chronic respiratory failure with hypoxia and hypercapnia (HCC) J96.21, J96.22    Fall W19. XXXA    Morbid obesity ([address] E66.01    Obstructive sleep apnea G47.33    Hypokalemia E87.6       Isolation/Infection:   Isolation            Droplet Plus          Patient Infection Status       Infection Onset Added Last Indicated Last Indicated By Review Planned Expiration Resolved Resolved By    COVID-19 09/08/22 09/08/22 09/08/22 COVID-19 & Influenza Combo 09/18/22 09/22/22      Resolved    COVID-19 (Rule Out) 09/08/22 09/08/22 09/08/22 COVID-19 & Influenza Combo (Ordered)   09/08/22 Rule-Out Test Resulted            Nurse Assessment:  Last Vital Signs: BP (!) 120/50   Pulse 55   Temp 98.2 °F (36.8 °C) (Oral)   Resp 18   Ht 5' 2\" (1.575 m)   Wt 214 lb 8 oz (97.3 kg)   SpO2 100%   BMI 39.23 kg/m²     Last documented pain score (0-10 scale): Pain Level: 5  Last Weight:   Wt Readings from Last 1 Encounters:   09/09/22 214 lb 8 oz (97.3 kg)     Mental Status:  oriented    IV Access:  - None    Nursing Mobility/ADLs:  Walking   Dependent  Transfer  Dependent  Bathing  Assisted  Dressing  Assisted  Toileting  Dependent  Feeding  Assisted  Med Admin  Assisted  Med Delivery   whole    Wound Care Documentation and Therapy:  Incision 08/14/18 Hand Right (Active)   Number of days: 9902        Elimination:  Continence: Bowel: No  Bladder: No  Urinary Catheter: None   Colostomy/Ileostomy/Ileal Conduit: No       Date of Last BM: 9/14/22    Intake/Output Summary (Last 24 hours) at 9/9/2022 1510  Last data filed at 9/9/2022 0744  Gross per 24 hour   Intake 0 ml   Output --   Net 0 ml     I/O last 3 completed shifts: In: 360 [P.O.:360]  Out: 300 [Urine:300]    Safety Concerns:      At Risk for Falls    Impairments/Disabilities:      None    Nutrition Therapy:  Current Nutrition Therapy:   - Oral Diet:  General    Routes of Feeding: Oral  Liquids: No Restrictions  Daily Fluid Restriction: no  Last Modified Barium Swallow with Video (Video Swallowing Test): not done    Treatments at the Time of Hospital Discharge:   Respiratory Treatments: albuterol nebulized as needed Q4  Oxygen Therapy:  is not on home oxygen therapy. Ventilator:    - No ventilator support    Rehab Therapies: Physical Therapy  Weight Bearing Status/Restrictions: No weight bearing restrictions  Other Medical Equipment (for information only, NOT a DME order): Other Treatments:     Patient's personal belongings (please select all that are sent with patient):  Blas PERERA SIGNATURE:      CASE MANAGEMENT/SOCIAL WORK SECTION    Inpatient Status Date: 9/2/2022    Readmission Risk Assessment Score:  Readmission Risk              Risk of Unplanned Readmission:  21           Discharging to Facility/ Agency   Name: [facility] [place]  Address: [address] [place] [address] [facility]  Phone: [phone]  Fax:    Dialysis Facility (if applicable)   Name:  Address:  Dialysis Schedule:  Phone:  Fax:    / signature: Electronically signed by Belinda Arevalo RN on 9/9/22 at 3:12 PM EDT    PHYSICIAN SECTION    Prognosis: Good    Condition at Discharge: Stable    Rehab Potential (if transferring to Rehab): Good    Recommended Labs or Other Treatments After Discharge:   PT/OT  BIPAP at hs  40%fio2  86/23    Physician Certification: I certify the above information and transfer of Harjit Craig  is necessary for the continuing treatment of the diagnosis listed and that she requires [facility] for less 30 days.      Update Admission H&P: No change in H&P    PHYSICIAN SIGNATURE:  Electronically signed by Frances Nation MD on 9/14/22 at 11:32 AM EDT

## 2022-09-09 NOTE — PROGRESS NOTES
Physical Therapy  Physical Therapy Treatment Note/Plan of Care    Room #:  7959/1271-89  Patient Name: Jae Waters  YOB: 1950  MRN: [id number]    Date of Service: 9/9/2022     Tentative placement recommendation: Subacute rehab  Equipment recommendation: To be determined      Re Evaluating Physical Therapist: Edith Benz  #[id number]      Specific Provider Orders/Date/Referring Provider :     09/02/22 0330    PT eval and treat  Start:  09/02/22 0330,   End:  09/02/22 0330,   ONE TIME,   Standing Count:  1 Occurrences,   R         Kanchan Rodriguez MD Acknowledge New   09/07/22 0830    PT eval and treat  (PT/OT CONSULT PANEL)  ONE TIME     Complete  Discontinue     Question: Reason for PT? Answer: evaluate and treat for discharge planning    Pooja Montano MD  Admitting Diagnosis:   CHF with unknown LVEF ([facility] [I50.9]   toxic encephalopathy due to opioid overdose  Rrt 9/5 decreased level of consciousness, bipap for hypoxemia icu for 2 days  Surgery: none  Visit Diagnoses         Codes    Fall, initial encounter    -  Primary W19. XXXA    Congestive heart failure, unspecified HF chronicity, unspecified heart failure type ([address]     I50.9            Patient Active Problem List   Diagnosis    Chronic bilateral low back pain with bilateral sciatica    CHF with unknown LVEF ([address]    Primary hypertension    Type 2 diabetes mellitus without complication, without long-term current use of insulin (HCC)    Iron deficiency anemia due to chronic blood loss    Acute cystitis without hematuria    Acute on chronic congestive heart failure with right ventricular diastolic dysfunction (HCC)    Pulmonary HTN (HCC)    Tobacco abuse    Anemia    Acute on chronic respiratory failure with hypoxia and hypercapnia (HCC)    Fall    Morbid obesity ([address]    Obstructive sleep apnea    Hypokalemia        ASSESSMENT of Current Deficits Patient exhibits decreased strength, balance, and endurance impairing functional mobility, transfers, gait , gait distance, and tolerance to activity. Patient required minimal assist for bed mobility, declined transfers and gait this session due to being fatigued from being up with nursing aid earlier. Patient able to maintain spo2 levels above 90% while at edge of bed, once patient laid completely supine spo2 dropped to 80%, increased to 10L with cues for pursed lip breathing, head of bed elevated. Patient able to increased the saturation with time given, nursing notified. Nursing left patient on 6L in supine with head of bed elevated. Patient needing subacute rehab at time of discharge due to patient unsafe due to level of physical assistance needed to complete tasks. Pt with shortness of breath with gait and too weak for stair training this session      PHYSICAL THERAPY  PLAN OF CARE       Physical therapy plan of care is established based on physician order,  patient diagnosis and clinical assessment    Current Treatment Recommendations:    -Bed Mobility: Lower extremity exercises  and Trunk control activities   -Sitting Balance: Incorporate reaching activities to activate trunk muscles , Hands on support to maintain midline , Facilitate active trunk muscle engagement , Facilitate postural control in all planes , and Engage in core activities to allow for movement within base of support   -Standing Balance: Perform strengthening exercises in standing to promote motor control with or without upper extremity support , Instruct patient on adequate base of support to maintain balance, and Challenge balance utilizing reaching  activities beyond center of gravity    -Transfers: Provide instruction on proper hand and foot position for adequate transfer of weight onto lower extremities and use of gait device if needed, Cues for hand placement, technique and safety.  Provide stabilization to prevent fall , Facilitate weight shift forward on to lower extremities and provide necessary stabilization of bilateral lower extremities , Support transfer of weight on to lower extremities, and Assist with extension of knees trunk and hip to accept weight transfer   -Gait: Gait training, Standing activities to improve: base of support, weight shift, weight bearing , Exercises to improve trunk control, Exercises to improve hip and knee control, Performance of protected weight bearing activities, and Activities to increase weight bearing   -Endurance: Utilize Supervised activities to increase level of endurance to allow for safe functional mobility including transfers and gait  and Use graduated activities to promote good breathing techniques and provide support and education to maximize respiratory function  -Stairs: Stair training with instruction on proper technique and hand placement on rail    PT long term treatment goals are located in below grid    Patient and or family understand(s) diagnosis, prognosis, and plan of care. Frequency of treatments: Patient will be seen  daily. Prior Level of Function: Patient ambulated independently   Rehab Potential: fair + for baseline    Past medical history:   Past Medical History:   Diagnosis Date    Chronic back pain 2018    10/10 on the pain scale    Hypertension     Scoliosis     Type 2 diabetes mellitus without complication [facility])      Past Surgical History:   Procedure Laterality Date    FINGER TRIGGER RELEASE Right 08/14/2018    right thumb    HYSTERECTOMY (CERVIX STATUS UNKNOWN)      MO INCISE FINGER TENDON SHEATH Right 8/14/2018    RIGHT THUMB TRIGGER THUMB RELEASE performed by My Vizcarra DO at [facility] Right 2002    RCR    WISDOM TOOTH EXTRACTION         SUBJECTIVE:    Precautions:  Up with assistance, falls, alarm, and L knee karel  new o2 user    Social history: Patient lives alone in a two story home bedroom and bathroom 2nd floor full flight stairs with Rail  with 1 step  to enter without [employer] owned: Júnior Albert, approximation, upright, safety, and O2 line management  and cues for upright posture, walker approximation, safety, and proper hand placement  not assessed     100 feet using  least restrictive device versus no device with Supervision     Stair negotiation: ascended and descended   Not assessed  not assessed  not assessed    12 stairs with 1 HR with Supervision   ROM Within functional limits     Increase range of motion 10% of affected joints    Strength BUE:  refer to OT eval  RLE:  3+/5  LLE:  3+/5   Increase strength in affected mm groups by 1/3 grade   Balance Sitting EOB:  fair -  Dynamic Standing:  not assessed   Sitting EOB: fair +   Dynamic Standing: not assessed     Sitting EOB:  fair +  Dynamic Standing: fair      Patient is Alert & Oriented x person and place and follows directions but pleasantly confused  Sensation:  Patient  denies numbness/tingling   Edema:  no   Endurance: poor +    Vitals:  patient needing to be turned up to 10 liters due to saturation while supine, nursing put o2 at 6L at end of session. Blood Pressure at rest  Blood Pressure during session    Heart Rate at rest  Heart Rate during session    SPO2 at rest 95% on 4L SPO2 during session 80-93%     Patient education  Patient educated on role of Physical Therapy, risks of immobility, safety and plan of care, energy conservation,  importance of mobility while in hospital , importance and purpose of adaptive device and adjusted to proper height for the patient. , and safety      Patient response to education:   Pt verbalized understanding Pt demonstrated skill Pt requires further education in this area   Yes Partial Yes      Treatment:  Patient practiced and was instructed/facilitated in the following treatment: performed supine exercise. Patient transferred to edge of bed  Patient Sat edge of bed 15 minutes with Supervision  to increase dynamic sitting balance and activity tolerance. Patient performed seated exercise.  Assisted back to supine, bridging to head of bed x1 patient o2 saturated head of bed elevated immediately with o2 increased to 10 liters. Therapeutic Exercises:  ankle pumps, quad sets, glut sets, heel slide, and straight leg raise x 12 reps LAQ x 8 reps       At end of session, patient in bed with alarm left with nursing and wound care call light and phone within reach,  all lines and tubes intact, nursing notified. Patient would benefit from continued skilled Physical Therapy to improve functional independence and quality of life.          Patient's/ family goals   rehab    Time in  1056  Time out  1123    Total Treatment Time  27 minutes    CPT codes:  Therapeutic activities (93017)   17 minutes  1 unit(s)  Therapeutic exercises (19566)   10 minutes  1 unit(s)     Mell Robertson, \[facility]\""    #[id number]

## 2022-09-09 NOTE — PROGRESS NOTES
Patient NPO items removed from bedside table. Patient refuses bi-pap. This nurse explained benefits of using. Patient states \" I am a grown woman and if I don't want to wear it, it won't\" Honored patient right to refuse.

## 2022-09-09 NOTE — CONSULTS
Juan Caballero M.D. [facility]  Dr. Heber Hernandez M.D.,  Dr. Elliot Alcantara M.D.,  POLINA VickersO.,  Dr. Elliot Coello D.O. ,  Dr. Tyrese Ramirez M.D.,  Dr. Cecilio Jackson, Dennis Hdez  73 y.o.  female      Re: Anemia  Requesting physician: Dr. Vinicio Hernandez  Date:11:12 AM 9/9/2022          HPI: 72-year-old female patient seen in the hospital for above-described issue. Patient is admitted since 2 September when she presented with shortness of breath and found to be in CHF with elevated troponins. Patient was found to be anemic on presentation with slowly decreasing H&H throughout hospital stay. .  Patient reports that she has recollection that she has been anemic \"all her life\". She denies previous upper endoscopy or colonoscopy. Patient denies noticing blood in the stool or melena. She denies nausea vomiting. She denies hematemesis emesis of coffee-ground material.  During this hospital admission patient was also found to be positive for COVID-19 and was started on dexamethasone today. Patient has been receiving daily PPI initially which yesterday was increased to twice a day.     Information sources:   -Patient  -medical record  -health care team    PMHx:  Past Medical History:   Diagnosis Date    Chronic back pain 2018    10/10 on the pain scale    Hypertension     Scoliosis     Type 2 diabetes mellitus without complication ([facility] [address]        PSHx:  Past Surgical History:   Procedure Laterality Date    FINGER TRIGGER RELEASE Right 08/14/2018    right thumb    HYSTERECTOMY (CERVIX STATUS UNKNOWN)      [place] INCISE FINGER TENDON SHEATH Right 8/14/2018    RIGHT THUMB TRIGGER THUMB RELEASE performed by Danay Hendrix DO at [facility] Right 2002    RCR    WISDOM TOOTH EXTRACTION         Meds:  Current Facility-Administered Medications   Medication Dose Route Frequency Provider Last Rate Last Admin    dexamethasone (DECADRON) tablet 6 mg  6 mg Oral IntraVENous Q6H PRN Eduar Sánchez MD        Or    ondansetron (ZOFRAN-ODT) disintegrating tablet 4 mg  4 mg Oral Q8H PRN Eduar Sánchez MD   4 mg at 09/02/22 1805    atenolol (TENORMIN) tablet 50 mg  50 mg Oral BID Eduar Sánchez MD   50 mg at 09/08/22 2135    [Held by provider] lisinopril (PRINIVIL;ZESTRIL) tablet 20 mg  20 mg Oral Daily Silvana Braga MD   20 mg at 09/03/22 0902    metFORMIN (GLUCOPHAGE) tablet 1,000 mg  1,000 mg Oral BID  Silvana Braga MD   1,000 mg at 09/08/22 1714    ferrous sulfate (IRON 325) tablet 325 mg  325 mg Oral BID  Silvana Braga MD   325 mg at 09/08/22 1714    perflutren lipid microspheres (DEFINITY) injection 1.65 mg  1.5 mL IntraVENous ONCE PRN Jose Ansari PA-C            SocHx:  Social History     Socioeconomic History    Marital status: Single     Spouse name: Not on file    Number of children: Not on file    Years of education: Not on file    Highest education level: Not on file   Occupational History    Not on file   Tobacco Use    Smoking status: Every Day     Packs/day: 0.50     Types: Cigarettes    Smokeless tobacco: Never   Substance and Sexual Activity    Alcohol use: No    Drug use: No    Sexual activity: Not on file   Other Topics Concern    Not on file   Social History Narrative    Not on file     Social Determinants of Health     Financial Resource Strain: Not on file   Food Insecurity: Not on file   Transportation Needs: Not on file   Physical Activity: Not on file   Stress: Not on file   Social Connections: Not on file   Intimate Partner Violence: Not on file   Housing Stability: Not on file       FamHx:  Family History   Problem Relation Age of Onset    Cancer Father         Pancreatic       Allergy:No Known Allergies      ROS: As described in the HPI and in addition is negative upon detailed review of systems or unobtainable unless otherwise stated in this dictation.     PE:  BP (!) 120/50   Pulse 55   Temp 98.2 °F (36.8 °C) (Oral)   Resp 18   Ht 5' 2\" (1.575 m)   Wt 214 lb 8 oz (97.3 kg)   SpO2 100%   BMI 39.23 kg/m²     Gen.: NAD/ obese -American female. AAOx3  Head: Atraumatic/normocephalic  Eyes: EOMI/no conjunctival erythema  ENT: Moist oral mucosa/no discharge nose ears  Neck: Supple/trachea midline  Chest: Symmetric excursion/nonlabored respirations. Nasal cannula supplemental oxygen  Cor: Regular/S1-S2  Abd.: Soft and obese.   Nontender  Extr.:  BLE 1-2+ edema  Muscles: Decreased muscle tone and bulk throughout  Skin: Warm and dry/anicteric        DATA:  Stool (measured) : 0 mL  Lab Results   Component Value Date/Time    WBC 4.6 09/08/2022 05:20 AM    RBC 3.84 09/08/2022 05:20 AM    HGB 7.5 09/08/2022 05:20 AM    HCT 28.7 09/08/2022 05:20 AM    MCV 74.7 09/08/2022 05:20 AM    MCH 19.5 09/08/2022 05:20 AM    MCHC 26.1 09/08/2022 05:20 AM    RDW 21.4 09/08/2022 05:20 AM     09/08/2022 05:20 AM    MPV 9.5 09/08/2022 05:20 AM     Lab Results   Component Value Date/Time     09/08/2022 05:20 AM    K 3.4 09/08/2022 05:20 AM    K 3.8 09/01/2022 08:24 PM    CL 95 09/08/2022 05:20 AM    CO2 43 09/08/2022 05:20 AM    BUN 9 09/08/2022 05:20 AM    CREATININE 0.7 09/08/2022 05:20 AM    CALCIUM 8.3 09/08/2022 05:20 AM    PROT 6.1 09/05/2022 05:03 AM    LABALBU 2.8 09/05/2022 05:03 AM    BILITOT 0.4 09/05/2022 05:03 AM    ALKPHOS 69 09/05/2022 05:03 AM    AST 13 09/05/2022 05:03 AM    ALT 8 09/05/2022 05:03 AM     No results found for: LIPASE  No results found for: AMYLASE      ASSESSMENT/PLAN:  Patient Active Problem List   Diagnosis    Chronic bilateral low back pain with bilateral sciatica    CHF with unknown LVEF ([facility] [address]    Primary hypertension    Type 2 diabetes mellitus without complication, without long-term current use of insulin (HCC)    Iron deficiency anemia due to chronic blood loss    Acute cystitis without hematuria    Acute on chronic congestive heart failure with right ventricular diastolic dysfunction ([address] Pulmonary HTN (HCC)    Tobacco abuse    Anemia    Acute on chronic respiratory failure with hypoxia and hypercapnia (HCC)    Fall    Morbid obesity ([address]    Obstructive sleep apnea    Hypokalemia     1. Anemia  -Acute on chronic  -Microcytic  -No significant iron deficiency  -Patient will require further evaluation with upper endoscopy and colonoscopy -discussion below    2. CHF/elevated troponin  -Per admitting/cardiology    3. COVID-19 infection  -Per admitting/pulmonology    At this time slightly decreasing H&H which is likely multifactorial without evidence of overt bleed. Given patient COVID-19 infection and respiratory failure as well as comorbidities, will hold off endoscopy unless overt bleed/precipitous drop in H&H. Await FOBT. Monitor H&H and defer transfusion to admitting. Continue PPI twice a day dosing. Patient to be considered for emergent procedure in case of precipitous drop or overt bleed, otherwise plan for outpatient endoscopies after resolution of acute issues. Thank you for the opportunity see this patient in consultation    Derrell Montague MD  9/9/2022  11:12 AM    NOTE:  This report was transcribed using voice recognition software. Every effort was made to ensure accuracy; however, inadvertent computerized transcription errors may be present.

## 2022-09-09 NOTE — PROGRESS NOTES
[address] [facility]  [place]. [place]         Date:2022                                                   Patient Name: Morro Santo     MRN: [id number]     : 1950     Room: [address] [place] [address]    Evaluating OT: Carey Monte OTR/L; [id number]  Referring Provider/Orders/Date:    OT eval and treat  Start:  22,   End:  22,   ONE TIME,   Standing Count:  1 Occurrences,   Deonte Venegas MD     Diagnosis:   1. Fall, initial encounter    2.  Congestive heart failure, unspecified HF chronicity, unspecified heart failure type [facility])         Surgery: none      Pertinent Medical History:        Past Medical History:   Diagnosis Date    Chronic back pain 2018    10/10 on the pain scale    Hypertension     Scoliosis     Type 2 diabetes mellitus without complication [facility])           Past Surgical History:   Procedure Laterality Date    FINGER TRIGGER RELEASE Right 2018    right thumb    HYSTERECTOMY (CERVIX STATUS UNKNOWN)      [place] INCISE FINGER TENDON SHEATH Right 2018    RIGHT THUMB TRIGGER THUMB RELEASE performed by Tanya Almonet DO at [place] [address] Right 2002    RCR    WISDOM TOOTH EXTRACTION         Precautions:  Fall Risk, covid+ with droplet, 4L     Recommended placement: subacute    Assessment of current deficits     [x] Functional mobility  [x]ADLs  [x] Strength               []Cognition     [x] Functional transfers   [x] IADLs         [x] Safety Awareness   [x]Endurance     [] Fine Coordination              [x] Balance      [] Vision/perception   []Sensation      [x]Gross Motor Coordination  [] ROM  [] Delirium                   [] Motor Control     OT PLAN OF CARE   OT POC based on physician orders, patient diagnosis and results of clinical assessment    Frequency/Duration 1-3 days/wk for 2 weeks PRN   Specific OT Treatment Interventions to fair-               Judgement/safety: fair-     [facility]   AM-PAC Daily Activity Inpatient   How much help for putting on and taking off regular lower body clothing?: Total  How much help for Bathing?: A Lot  How much help for Toileting?: Total  How much help for putting on and taking off regular upper body clothing?: A Lot  How much help for taking care of personal grooming?: A Little  How much help for eating meals?: A Little  AM-PAC Inpatient Daily Activity Raw Score: 12  AM-PAC Inpatient ADL T-Scale Score : 30.6  ADL Inpatient CMS 0-100% Score: 66.57  ADL Inpatient CMS G-Code Modifier : CL                  Functional Assessment:     Initial Eval Status  Date: 9/4/22 Re-eval status Date: 9/9/22 Treatment Status  Date:  STGs = LTGs  Time frame: 10-14 days   Feeding Minimal Assist   Set up and SBA with simulation, but NPO on eval.    Independent    Grooming Minimal Assist  SBA sitting EOB for oral care, face wash and hand wash   Independent    UB Dressing Maximal Assist  to doff and don hospital gown  Mod A with gown management   Independent    LB Dressing Dependent  Dep with socks from sitting EOB    Minimal Assist    Bathing Dependent to sponge bathe while supine, side lying and seated EOB. Dependent to apply deodorant and baby powder Max A with sponge bathing from sitting/standing with assist for LB and buttocks/faina care. Minimal Assist    Toileting Dependent  Dep with all parts on 3:1   Minimal Assist    Bed Mobility  Supine to sit: Moderate Assist   Sit to supine: Moderate Assist x 2  Supine to sit: Min Assist   Sit to supine: Moderate Assist for LB management   Supine to sit: Supervision   Sit to supine: Supervision    Functional Transfers Moderate Assist x 2 from EOB to wheeled walker with bed height elevated x 2 reps. Transfer training with verbal cues for hand placement throughout session to improve safety. Min A with walker from bed and 3:1 with impulsivity overall.     Minimal Assist    Functional Mobility Moderate Assist x 2 with wheeled walker to improve balance to side step towards head of bed, verbal cues for walker sequence and safety. Mod A with walker for safety, line management and walker management for short distance functional mobility throughout the room to simulate house hold distances. Minimal Assist    Balance Sitting:     Static: fair at EOB    Dynamic: fair minus  Standing: fair minus with wheeled walker Sitting:     Static: fair at EOB    Dynamic: fair minus  Standing: fair minus with wheeled walker       Activity Tolerance Fair minus Fair- with SOB and 4L O2 ranging from 84-91% with ADLs and activity   good    Visual/  Perceptual Glasses: yes                          Hand Dominance: right        AROM (PROM) Strength Additional Info:    RUE  WFL 3+/5 good  and wfl FMC/dexterity noted during ADL tasks      LUE WFL 3+/5 good  and wfl FMC/dexterity noted during ADL tasks         Hearing: WFL   Sensation:  No c/o numbness or tingling  Tone: WFL   Edema: yes, B LE's     Comments: Re-evaluation required due to change in medical status, new physician orders or change in physical status. Upon arrival patient supine in bed. Pt required mod A for most UB ADLs and dep A LB ADLs tasks. Limited with min A for standing during LB ADLs and functional transfers. The biggest barriers reflect that of functional transfers, functional mobility, UB/LB ADLs, cognition, activity tolerance, balance, safety and strengthening. At end of session, patient supine with call light and phone within reach, all lines and tubes intact. Overall patient demonstrated  decreased independence and safety during completion of ADL/functional transfer/mobility tasks. Nursing updated on pt position and status following OT eval. Pt would benefit from continued skilled OT to increase safety and independence with completion of ADL/IADL tasks for functional independence and quality of life.     Treatment: OT treatment provided this date includes:  Instruction, education and training on safe facilitation and adapted techniques for completion of ADLs. These include neuromuscular reeducation to facilitate balance/righting reactions,safe functional transfer techniques, proper positioning/alignment to improve interaction with environment and overall function and on adapted techniques/work simplification for completion of ADLs. Education provided on hand/feet placement with bed rails, walker, 3:1 and body mechanics for fall prevention. Cues for energy conservation and safety for in the home at DC, including modifications and DME. Extended time to complete all tasks, including skilled monitoring of patient's response during treatment session and vital signs. Prior to and at the end of session, environmental modifications / line management completed for patients safety and efficiency of treatment session. See above for further details. Rehab Potential: Good for established goals     Patient / Family Goal: Wean from O2 and increase activity tolerance      Patient and/or family were instructed on functional diagnosis, prognosis/goals and OT plan of care. Demonstrated good understanding. Eval Complexity: Re-evaluation    Time In: 6451  Time Out: 0925  Total Treatment Time: 12    Min Units   OT Eval Low 07367       OT Eval Medium 70400      OT Eval High 50191      OT Re-Eval 97168  x  1   Therapeutic Ex 00321       Therapeutic Activities 97228  12 1    ADL/Self Care 02557       Orthotic Management 81762       Manual 95105     Neuro Re-Ed 97500       Non-Billable Time          Evaluation Time additionally includes thorough review of current medical information, gathering information on past medical history/social history and prior level of function, interpretation of standardized testing/informal observation of tasks, assessment of data and development of plan of care and goals.             Marbin Tesfaye OTR/L; [id number]

## 2022-09-09 NOTE — PROGRESS NOTES
Pharmacy Consultation Note    Consult date: 9/9/2022  Physician/provider: Dr. Carlitos Burt has been consulted to evaluate criteria for Baricitinib therapy. Based on the algorithm, the patient DOES currently meet [facility] P&T approved Covid-19 treatment criteria for Baricitinib.     Thank you for the consult,  Mary Han, [facility]

## 2022-09-10 LAB
ALBUMIN SERPL-MCNC: 2.6 G/DL (ref 3.5–5.2)
ALP BLD-CCNC: 54 U/L (ref 35–104)
ALT SERPL-CCNC: 10 U/L (ref 0–32)
ANION GAP SERPL CALCULATED.3IONS-SCNC: 6 MMOL/L (ref 7–16)
ANISOCYTOSIS: ABNORMAL
AST SERPL-CCNC: 12 U/L (ref 0–31)
BASOPHILS ABSOLUTE: 0 E9/L (ref 0–0.2)
BASOPHILS RELATIVE PERCENT: 0.4 % (ref 0–2)
BILIRUB SERPL-MCNC: 0.2 MG/DL (ref 0–1.2)
BUN BLDV-MCNC: 9 MG/DL (ref 6–23)
BURR CELLS: ABNORMAL
CALCIUM SERPL-MCNC: 8.7 MG/DL (ref 8.6–10.2)
CHLORIDE BLD-SCNC: 97 MMOL/L (ref 98–107)
CO2: 41 MMOL/L (ref 22–29)
CREAT SERPL-MCNC: 0.8 MG/DL (ref 0.5–1)
EKG ATRIAL RATE: 62 BPM
EKG P AXIS: 92 DEGREES
EKG P-R INTERVAL: 130 MS
EKG Q-T INTERVAL: 382 MS
EKG QRS DURATION: 82 MS
EKG QTC CALCULATION (BAZETT): 387 MS
EKG R AXIS: 124 DEGREES
EKG T AXIS: 89 DEGREES
EKG VENTRICULAR RATE: 62 BPM
EOSINOPHILS ABSOLUTE: 0 E9/L (ref 0.05–0.5)
EOSINOPHILS RELATIVE PERCENT: 0 % (ref 0–6)
GFR AFRICAN AMERICAN: >60
GFR NON-AFRICAN AMERICAN: >60 ML/MIN/1.73
GLUCOSE BLD-MCNC: 89 MG/DL (ref 74–99)
HCT VFR BLD CALC: 27.4 % (ref 34–48)
HCT VFR BLD CALC: 28.8 % (ref 34–48)
HEMOGLOBIN: 7 G/DL (ref 11.5–15.5)
HEMOGLOBIN: 7.5 G/DL (ref 11.5–15.5)
HYPOCHROMIA: ABNORMAL
LYMPHOCYTES ABSOLUTE: 0.76 E9/L (ref 1.5–4)
LYMPHOCYTES RELATIVE PERCENT: 27.4 % (ref 20–42)
MCH RBC QN AUTO: 19.4 PG (ref 26–35)
MCHC RBC AUTO-ENTMCNC: 25.5 % (ref 32–34.5)
MCV RBC AUTO: 75.9 FL (ref 80–99.9)
METER GLUCOSE: 101 MG/DL (ref 74–99)
METER GLUCOSE: 108 MG/DL (ref 74–99)
METER GLUCOSE: 116 MG/DL (ref 74–99)
METER GLUCOSE: 86 MG/DL (ref 74–99)
MONOCYTES ABSOLUTE: 0.14 E9/L (ref 0.1–0.95)
MONOCYTES RELATIVE PERCENT: 5.3 % (ref 2–12)
NEUTROPHILS ABSOLUTE: 1.88 E9/L (ref 1.8–7.3)
NEUTROPHILS RELATIVE PERCENT: 67.3 % (ref 43–80)
OVALOCYTES: ABNORMAL
PDW BLD-RTO: 21.7 FL (ref 11.5–15)
PLATELET # BLD: 180 E9/L (ref 130–450)
PMV BLD AUTO: 10.5 FL (ref 7–12)
POIKILOCYTES: ABNORMAL
POLYCHROMASIA: ABNORMAL
POTASSIUM SERPL-SCNC: 3.8 MMOL/L (ref 3.5–5)
RBC # BLD: 3.61 E12/L (ref 3.5–5.5)
SODIUM BLD-SCNC: 144 MMOL/L (ref 132–146)
TOTAL PROTEIN: 5.8 G/DL (ref 6.4–8.3)
URINE CULTURE, ROUTINE: NORMAL
WBC # BLD: 2.8 E9/L (ref 4.5–11.5)

## 2022-09-10 PROCEDURE — 1200000000 HC SEMI PRIVATE

## 2022-09-10 PROCEDURE — 80053 COMPREHEN METABOLIC PANEL: CPT

## 2022-09-10 PROCEDURE — 6360000002 HC RX W HCPCS: Performed by: INTERNAL MEDICINE

## 2022-09-10 PROCEDURE — 36415 COLL VENOUS BLD VENIPUNCTURE: CPT

## 2022-09-10 PROCEDURE — 99232 SBSQ HOSP IP/OBS MODERATE 35: CPT | Performed by: INTERNAL MEDICINE

## 2022-09-10 PROCEDURE — 2580000003 HC RX 258: Performed by: INTERNAL MEDICINE

## 2022-09-10 PROCEDURE — 2580000003 HC RX 258: Performed by: HOSPITALIST

## 2022-09-10 PROCEDURE — 6370000000 HC RX 637 (ALT 250 FOR IP): Performed by: INTERNAL MEDICINE

## 2022-09-10 PROCEDURE — 82962 GLUCOSE BLOOD TEST: CPT

## 2022-09-10 PROCEDURE — 85025 COMPLETE CBC W/AUTO DIFF WBC: CPT

## 2022-09-10 PROCEDURE — 6370000000 HC RX 637 (ALT 250 FOR IP): Performed by: SPECIALIST

## 2022-09-10 PROCEDURE — 85014 HEMATOCRIT: CPT

## 2022-09-10 PROCEDURE — C9113 INJ PANTOPRAZOLE SODIUM, VIA: HCPCS | Performed by: HOSPITALIST

## 2022-09-10 PROCEDURE — 2700000000 HC OXYGEN THERAPY PER DAY

## 2022-09-10 PROCEDURE — A4216 STERILE WATER/SALINE, 10 ML: HCPCS | Performed by: HOSPITALIST

## 2022-09-10 PROCEDURE — 94660 CPAP INITIATION&MGMT: CPT

## 2022-09-10 PROCEDURE — 85018 HEMOGLOBIN: CPT

## 2022-09-10 PROCEDURE — 36592 COLLECT BLOOD FROM PICC: CPT

## 2022-09-10 PROCEDURE — 6360000002 HC RX W HCPCS: Performed by: HOSPITALIST

## 2022-09-10 RX ORDER — GAUZE BANDAGE 2" X 2"
100 BANDAGE TOPICAL DAILY
Status: DISCONTINUED | OUTPATIENT
Start: 2022-09-10 | End: 2022-09-14 | Stop reason: HOSPADM

## 2022-09-10 RX ORDER — ZINC GLUCONATE 50 MG
50 TABLET ORAL DAILY
Status: DISCONTINUED | OUTPATIENT
Start: 2022-09-10 | End: 2022-09-14 | Stop reason: HOSPADM

## 2022-09-10 RX ORDER — VITAMIN B COMPLEX
1000 TABLET ORAL DAILY
Status: DISCONTINUED | OUTPATIENT
Start: 2022-09-10 | End: 2022-09-14 | Stop reason: HOSPADM

## 2022-09-10 RX ORDER — ASCORBIC ACID 500 MG
500 TABLET ORAL DAILY
Status: DISCONTINUED | OUTPATIENT
Start: 2022-09-10 | End: 2022-09-14 | Stop reason: HOSPADM

## 2022-09-10 RX ADMIN — FERROUS SULFATE TAB 325 MG (65 MG ELEMENTAL FE) 325 MG: 325 (65 FE) TAB at 17:16

## 2022-09-10 RX ADMIN — FERROUS SULFATE TAB 325 MG (65 MG ELEMENTAL FE) 325 MG: 325 (65 FE) TAB at 09:25

## 2022-09-10 RX ADMIN — Medication 10 ML: at 09:28

## 2022-09-10 RX ADMIN — METFORMIN HYDROCHLORIDE 1000 MG: 1000 TABLET ORAL at 17:13

## 2022-09-10 RX ADMIN — Medication 1000 UNITS: at 21:38

## 2022-09-10 RX ADMIN — PANTOPRAZOLE SODIUM 40 MG: 40 INJECTION, POWDER, FOR SOLUTION INTRAVENOUS at 17:16

## 2022-09-10 RX ADMIN — Medication 100 MG: at 21:37

## 2022-09-10 RX ADMIN — PANTOPRAZOLE SODIUM 40 MG: 40 INJECTION, POWDER, FOR SOLUTION INTRAVENOUS at 05:20

## 2022-09-10 RX ADMIN — DEXAMETHASONE 6 MG: 6 TABLET ORAL at 09:25

## 2022-09-10 RX ADMIN — ASPIRIN 81 MG CHEWABLE TABLET 81 MG: 81 TABLET CHEWABLE at 09:25

## 2022-09-10 RX ADMIN — ATENOLOL 50 MG: 25 TABLET ORAL at 09:25

## 2022-09-10 RX ADMIN — ENOXAPARIN SODIUM 30 MG: 100 INJECTION SUBCUTANEOUS at 09:26

## 2022-09-10 RX ADMIN — Medication 10 ML: at 21:44

## 2022-09-10 RX ADMIN — Medication 50 MG: at 21:38

## 2022-09-10 RX ADMIN — HEPARIN 100 UNITS: 100 SYRINGE at 21:37

## 2022-09-10 RX ADMIN — ENOXAPARIN SODIUM 30 MG: 100 INJECTION SUBCUTANEOUS at 21:36

## 2022-09-10 RX ADMIN — METFORMIN HYDROCHLORIDE 1000 MG: 1000 TABLET ORAL at 09:29

## 2022-09-10 RX ADMIN — OXYCODONE HYDROCHLORIDE AND ACETAMINOPHEN 500 MG: 500 TABLET ORAL at 21:38

## 2022-09-10 RX ADMIN — HEPARIN 100 UNITS: 100 SYRINGE at 09:26

## 2022-09-10 RX ADMIN — BARICITINIB 4 MG: 2 TABLET, FILM COATED ORAL at 09:25

## 2022-09-10 ASSESSMENT — PAIN SCALES - GENERAL: PAINLEVEL_OUTOF10: 0

## 2022-09-10 NOTE — PROGRESS NOTES
NAME: Estelita Mahmood  MR:  [id number]  :   1950  DATE OF SERVICE:09/10/22    This is a face to face encounter with Estelita Mahmood 73 y.o. female on 09/10/22  Elements of this note, including Diagnosis,  Interval History, Past Medical/Surgical/Family/Social Histories, ROS, physical exam, and Assessment and Plan were copied and pasted from Previous. Updates have been made where noted and reflect current exam and medical decision making from the DOS of this encounter. CHIEF COMPLAINT     ID following for   Chief Complaint   Patient presents with    Asha Sanchez at 930am was not able to get up. HISTORY OF PRESENT ILLNESS     Pt seen and examined  09/10/22  In bed has  no c/o f/c/nv/d      Patient is tolerating medications. No reported adverse drug reactions. REVIEW OF SYSTEMS     As stated above in the chief complaint, otherwise negative.   CURRENT MEDICATIONS     Current Facility-Administered Medications:     dexamethasone (DECADRON) tablet 6 mg, 6 mg, Oral, Daily, Cruz Remy MD, 6 mg at 09/10/22 0925    baricitinib (OLUMIANT) tablet 4 mg, 4 mg, Oral, Daily, Cruz Remy MD, 4 mg at 09/10/22 0046    acetaminophen (TYLENOL) tablet 650 mg, 650 mg, Oral, Q6H PRN, Silvana Braga MD    pantoprazole (PROTONIX) 40 mg in sodium chloride (PF) 10 mL injection, 40 mg, IntraVENous, Q12H, Ata Gomez MD, 40 mg at 09/10/22 1716    enoxaparin Sodium (LOVENOX) injection 30 mg, 30 mg, SubCUTAneous, BID, Mervin Petersen MD, 30 mg at 09/10/22 0926    sodium chloride flush 0.9 % injection 5-40 mL, 5-40 mL, IntraVENous, 2 times per day, Porsche Gonzales MD, 10 mL at 09/10/22 0928    sodium chloride flush 0.9 % injection 5-40 mL, 5-40 mL, IntraVENous, PRN, Porsche Gonzales MD    0.9 % sodium chloride infusion, , IntraVENous, PRN, Porsche Gonzales MD    heparin flush 100 UNIT/ML injection 100 Units, 1 mL, IntraVENous, 2 times per day, Porsche Gonzales MD, 100 Units at 09/10/22 8134    heparin flush 100 UNIT/ML injection 100 Units, 1 mL, IntraCATHeter, PRN, Aminata Vicente MD    albuterol (PROVENTIL) nebulizer solution 2.5 mg, 2.5 mg, Nebulization, Q4H PRN, Delmy Robison MD    naloxone [facility]) injection 0.4 mg, 0.4 mg, IntraVENous, Q5 Min PRN, Delmy Robison MD, 0.4 mg at 09/05/22 2047    [Held by provider] bumetanide (BUMEX) tablet 1 mg, 1 mg, Oral, Daily, Karmen Mason MD, 1 mg at 09/06/22 1047    aspirin chewable tablet 81 mg, 81 mg, Oral, Daily, Tasha Braga MD, 81 mg at 09/10/22 0925    insulin lispro (HUMALOG) injection vial 0-8 Units, 0-8 Units, SubCUTAneous, TID WC, Silvana Braga MD    glucose chewable tablet 16 g, 4 tablet, Oral, PRN, Silvana Braga MD    dextrose bolus 10% 125 mL, 125 mL, IntraVENous, PRN **OR** dextrose bolus 10% 250 mL, 250 mL, IntraVENous, PRN, Silvana Braga MD    glucagon (rDNA) injection 1 mg, 1 mg, SubCUTAneous, PRN, Silvana Braga MD    dextrose 10 % infusion, , IntraVENous, Continuous PRN, Silvana Braga MD    ondansetron (ZOFRAN) injection 4 mg, 4 mg, IntraVENous, Q6H PRN **OR** ondansetron (ZOFRAN-ODT) disintegrating tablet 4 mg, 4 mg, Oral, Q8H PRN, Tasha Braga MD, 4 mg at 09/02/22 1805    atenolol (TENORMIN) tablet 50 mg, 50 mg, Oral, BID, Tasha Braga MD, 50 mg at 09/10/22 0925    [Held by provider] lisinopril (PRINIVIL;ZESTRIL) tablet 20 mg, 20 mg, Oral, Daily, 20 mg at 09/03/22 0902 **AND** [DISCONTINUED] hydroCHLOROthiazide (HYDRODIURIL) tablet 12.5 mg, 12.5 mg, Oral, BID, Silvana Braga MD    metFORMIN (GLUCOPHAGE) tablet 1,000 mg, 1,000 mg, Oral, BID , Silvana Braga MD, 1,000 mg at 09/10/22 1713    ferrous sulfate (IRON 325) tablet 325 mg, 325 mg, Oral, BID Silvana MD, 325 mg at 09/10/22 1716    perflutren lipid microspheres (DEFINITY) injection 1.65 mg, 1.5 mL, IntraVENous, ONCE PRN, [facility] MICHELL Pineda  PHYSICAL EXAM     /60   Pulse 64   Temp (!) 96 °F (35.6 °C) (Oral)   Resp 16   Ht 5' 2\" (1.575 m)   Wt 217 lb 3 oz (98.5 kg)   SpO2 100%   BMI 39.72 kg/m²   Temp  Av °F (36.1 °C)  Min: 96 °F (35.6 °C)  Max: 97.9 °F (36.6 °C)  Constitutional:  The patient is awake, alert, and oriented. Skin:    Warm and dry. No rashes were noted. HEENT:   Round and reactive pupils. AT/NC  Neck:    Supple to movements. Chest:   No use of accessory muscles to breathe. Symmetrical expansion. Dec bs on o2  Cardiovascular:  S1 and S2 are rhythmic and regular. No murmurs appreciated. Abdomen:   Positive bowel sounds to auscultation. Benign to palpation. Extremities:   No clubbing, no cyanosis,   edema. CNS    AAxO   Lines:          Peripherally Inserted Central Catheter:  Midline Single Lumen  Left Basilic (Active)   Criteria for Appropriate Use Long term IV/antibiotic administration 09/10/22 1146   Site Assessment Clean, dry & intact 09/10/22 1146   Phlebitis Assessment No symptoms 09/10/22 1146   Infiltration Assessment 0 09/10/22 1146   Extremity Circumference (cm) 32 cm 22 1141   Lumen Color/Status Normal saline locked 09/10/22 [address] changed; Connections checked and tightened 09/10/22 1146   Alcohol Cap Used Yes 09/10/22 1146   Date of Last Dressing Change 22 1141   Dressing Type Transparent 09/10/22 1146   Dressing Status Clean, dry & intact 09/10/22 1146   Dressing Intervention Other (Comment) 22 1615     Central Venous Catheter:     Venous Access Device:     Peripheral Intravenous Line:  Peripheral IV 22 Left Antecubital (Active)   Site Assessment Clean, dry & intact 09/10/22 1146   Line Status Capped 09/10/22 [address] [facility] Connections checked and tightened 09/10/22 1146   Phlebitis Assessment No symptoms 09/10/22 1146   Infiltration Assessment 0 09/10/22 1146   Alcohol Cap Used Yes 09/10/22 1146   Dressing Status Clean, dry & intact 09/10/22 1146   Dressing Type Transparent 09/10/22 1146   Dressing Intervention Other (Comment) 09/10/22 0339        DIAGNOSTIC RESULTS   Radiology:    Recent Labs     09/08/22  0520 09/10/22  0558 09/10/22  1720   WBC 4.6 2.8*  --    RBC 3.84 3.61  --    HGB 7.5* 7.0* 7.5*   HCT 28.7* 27.4* 28.8*   MCV 74.7* 75.9*  --    MCH 19.5* 19.4*  --    MCHC 26.1* 25.5*  --    RDW 21.4* 21.7*  --     180  --    MPV 9.5 10.5  --      Recent Labs     09/08/22  0520 09/10/22  0558    144   K 3.4* 3.8   CL 95* 97*   CO2 43* 41*   BUN 9 9   CREATININE 0.7 0.8   GLUCOSE 86 89   PROT  --  5.8*   LABALBU  --  2.6*   CALCIUM 8.3* 8.7   BILITOT  --  0.2   ALKPHOS  --  54   AST  --  12   ALT  --  10     Lab Results   Component Value Date    CRP 5.2 (H) 09/09/2022     Lab Results   Component Value Date    SEDRATE 35 (H) 09/09/2022     Recent Labs     09/09/22  1020 09/10/22  0558   CRP 5.2*  --    PROCAL 0.07  --    FERRITIN 53  --    *  --    DDIMER 587  --    FIBRINOGEN 423*  --    INR 1.1  --    PROTIME 12.6*  --    AST  --  12   ALT  --  10     Lab Results   Component Value Date/Time    CHOL 101 09/02/2022 05:40 AM    TRIG 85 09/02/2022 05:40 AM    HDL 38 09/02/2022 05:40 AM    LDLCALC 46 09/02/2022 05:40 AM    LABVLDL 17 09/02/2022 05:40 AM     No results found for: [address]    Microbiology:   Recent Labs     09/08/22  1620   COVID19 DETECTED*     Lab Results   Component Value Date/Time    BC 24 Hours no growth 09/06/2022 12:25 AM    BLOODCULT2 24 Hours no growth 09/06/2022 12:25 AM        MRSA Culture Only   Date Value Ref Range Status   09/06/2022 Methicillin resistant Staph aureus not isolated  Final      Recent Labs     09/08/22  [address] not present              FINAL IMPRESSION    Pt had   Chief Complaint   Patient presents with    Sarika Sarkar at 930am was not able to get up.      Admitted for CHF with unknown LVEF ([facility] [address] [I50.9]  On treatment for   Covid  Leukopenia  Asymptomatic bacteruria f/u ruien cx neg     dexamethasone (DECADRON) tablet 6 mg, Daily  baricitinib (OLUMIANT) tablet 4 mg, Daily     Add MVI       To follow labs, cultures and imaging. Imaging and labs were reviewed per medical records. The patient was educated about the diagnosis, prognosis, indications, risks and benefits of treatment. An opportunity to ask questions was given to the patient/FAMILY. Thank you for involving me in the care of Morro Santo I will continue to follow. Please do not hesitate to call for any questions or concerns.     Electronically signed by Chilango Leigh MD on 9/10/2022 at 7:01 PM

## 2022-09-10 NOTE — PROGRESS NOTES
Department of Internal Medicine  General Internal Medicine  Attending Progress Note      SUBJECTIVE:    Patient seen and examined. Awake alert  Not short of breath  No chest pains  No nausea or vomiting.     Medications    Current Facility-Administered Medications: dexamethasone (DECADRON) tablet 6 mg, 6 mg, Oral, Daily  baricitinib (OLUMIANT) tablet 4 mg, 4 mg, Oral, Daily  acetaminophen (TYLENOL) tablet 650 mg, 650 mg, Oral, Q6H PRN  pantoprazole (PROTONIX) 40 mg in sodium chloride (PF) 10 mL injection, 40 mg, IntraVENous, Q12H  enoxaparin Sodium (LOVENOX) injection 30 mg, 30 mg, SubCUTAneous, BID  sodium chloride flush 0.9 % injection 5-40 mL, 5-40 mL, IntraVENous, 2 times per day  sodium chloride flush 0.9 % injection 5-40 mL, 5-40 mL, IntraVENous, PRN  0.9 % sodium chloride infusion, , IntraVENous, PRN  heparin flush 100 UNIT/ML injection 100 Units, 1 mL, IntraVENous, 2 times per day  heparin flush 100 UNIT/ML injection 100 Units, 1 mL, IntraCATHeter, PRN  albuterol (PROVENTIL) nebulizer solution 2.5 mg, 2.5 mg, Nebulization, Q4H PRN  naloxone (NARCAN) injection 0.4 mg, 0.4 mg, IntraVENous, Q5 Min PRN  [Held by provider] bumetanide (BUMEX) tablet 1 mg, 1 mg, Oral, Daily  aspirin chewable tablet 81 mg, 81 mg, Oral, Daily  insulin lispro (HUMALOG) injection vial 0-8 Units, 0-8 Units, SubCUTAneous, TID WC  glucose chewable tablet 16 g, 4 tablet, Oral, PRN  dextrose bolus 10% 125 mL, 125 mL, IntraVENous, PRN **OR** dextrose bolus 10% 250 mL, 250 mL, IntraVENous, PRN  glucagon (rDNA) injection 1 mg, 1 mg, SubCUTAneous, PRN  dextrose 10 % infusion, , IntraVENous, Continuous PRN  ondansetron (ZOFRAN) injection 4 mg, 4 mg, IntraVENous, Q6H PRN **OR** ondansetron (ZOFRAN-ODT) disintegrating tablet 4 mg, 4 mg, Oral, Q8H PRN  atenolol (TENORMIN) tablet 50 mg, 50 mg, Oral, BID  [Held by provider] lisinopril (PRINIVIL;ZESTRIL) tablet 20 mg, 20 mg, Oral, Daily **AND** [DISCONTINUED] hydroCHLOROthiazide (HYDRODIURIL) tablet 12.5 mg, 12.5 mg, Oral, BID  metFORMIN (GLUCOPHAGE) tablet 1,000 mg, 1,000 mg, Oral, BID WC  ferrous sulfate (IRON 325) tablet 325 mg, 325 mg, Oral, BID   perflutren lipid microspheres (DEFINITY) injection 1.65 mg, 1.5 mL, IntraVENous, ONCE PRN    Physical    VITALS:  /60   Pulse 64   Temp (!) 96 °F (35.6 °C) (Oral)   Resp 16   Ht 5' 2\" (1.575 m)   Wt 217 lb 3 oz (98.5 kg)   SpO2 100%   BMI 39.72 kg/m²   TEMPERATURE:  Current - Temp: (!) 96 °F (35.6 °C); Max - Temp  Av.4 °F (36.3 °C)  Min: 96 °F (35.6 °C)  Max: 98.4 °F (36.9 °C)  RESPIRATIONS RANGE: Resp  Avg: 15.3  Min: 14  Max: 16  PULSE RANGE: Pulse  Av  Min: 56  Max: 64  BLOOD PRESSURE RANGE:  Systolic (11DEK), VZD:677 , Min:123 , FTI:935   ; Diastolic (25OMV), NU, Min:59, Max:64    PULSE OXIMETRY RANGE: SpO2  Av.2 %  Min: 86 %  Max: 100 %  24HR INTAKE/OUTPUT:    Intake/Output Summary (Last 24 hours) at 9/10/2022 0900  Last data filed at 2022 1819  Gross per 24 hour   Intake 240 ml   Output --   Net 240 ml       CONSTITUTIONAL: awake, alert  no distress, appears as stated age. HEENT: Normocephalic atraumatic. Pupils are equal and reactive bilaterally. Extraocular movements are intact. Pallor+  NECK: Supple, no JVD , no lymphadenopathy, no bruits, no thyromegaly  LUNGS: Diminished over the bases bilaterally. CARDIOVASCULAR: Regular rate and rhythm, no murmur rub or gallop. ABDOMEN: Soft, nontender, bowel sounds are positive, no organomegaly appreciated. Obese. NEUROLOGIC: awake, alert  , no focal deficits noted. EXT: 1+ edema bilateral lower extremities.     CBC with Differential:    Lab Results   Component Value Date/Time    WBC 2.8 09/10/2022 05:58 AM    RBC 3.61 09/10/2022 05:58 AM    HGB 7.0 09/10/2022 05:58 AM    HCT 27.4 09/10/2022 05:58 AM     09/10/2022 05:58 AM    MCV 75.9 09/10/2022 05:58 AM    MCH 19.4 09/10/2022 05:58 AM    MCHC 25.5 09/10/2022 05:58 AM    RDW 21.7 09/10/2022 05:58 AM    LYMPHOPCT 27.4 09/10/2022 05:58 AM    MONOPCT 5.3 09/10/2022 05:58 AM    BASOPCT 0.4 09/10/2022 05:58 AM    MONOSABS 0.14 09/10/2022 05:58 AM    LYMPHSABS 0.76 09/10/2022 05:58 AM    EOSABS 0.00 09/10/2022 05:58 AM    BASOSABS 0.00 09/10/2022 05:58 AM     CMP:    Lab Results   Component Value Date/Time     09/10/2022 05:58 AM    K 3.8 09/10/2022 05:58 AM    K 3.8 09/01/2022 08:24 PM    CL 97 09/10/2022 05:58 AM    CO2 41 09/10/2022 05:58 AM    BUN 9 09/10/2022 05:58 AM    CREATININE 0.8 09/10/2022 05:58 AM    GFRAA >60 09/10/2022 05:58 AM    LABGLOM >60 09/10/2022 05:58 AM    GLUCOSE 89 09/10/2022 05:58 AM    PROT 5.8 09/10/2022 05:58 AM    LABALBU 2.6 09/10/2022 05:58 AM    CALCIUM 8.7 09/10/2022 05:58 AM    BILITOT 0.2 09/10/2022 05:58 AM    ALKPHOS 54 09/10/2022 05:58 AM    AST 12 09/10/2022 05:58 AM    ALT 10 09/10/2022 05:58 AM             ASSESSMENT AND PLAN  1. Acute on chronic diastolic heart failure. Her 2D echo reveals an ejection fraction of 86% with diastolic dysfunction and right-sided heart failure with pulmonary hypertension. Off diuretics at this point. 2.  Acute respiratory failure secondary to above. She is requiring oxygen at 4 to 6 L/min. CTA of the chest without any evidence of pulmonary embolism. Ultrasound of lower extremities negative for DVT. 3.  Right-sided heart failure. Questionable secondary to obstructive sleep apnea. Patient will need a sleep study as outpatient. 4.  Diabetes mellitus type 2. Metformin 1000 mg bid . Continue sliding scale insulin  5. Anemia most likely iron deficiency in which is chronic. Iron studies with low ferritin and normal TIBC and iron levels. She will need a GI evaluation to rule out GI etiology of her iron deficiency anemia. GI consulted and will observe for now considering overall condition and positive COVID test.  Hemoglobin down to 7.0. Recheck H&H every 8 hours. 6  Hypertension monitor blood pressure closely.   Lisinopril on hold because of her renal insufficiency. 7.  Edema of bilateral lower extremities. Ultrasound lower extremities negative for DVT. 8.  Acute kidney injury secondary to IV diuretics. Creatinine 0.8.  10.Metabolic encephalopathy secondary to CO2 narcosis and possible her pain meds. Neuro  status at baseline  11. COVID positive On Barcitinib , decadron and lovenox , ID following closely  Hold discharge for now until we recheck her H&H.      Samuel Jorge MD, MD.  9:00 AM  9/10/2022

## 2022-09-10 NOTE — PROGRESS NOTES
PROGRESS NOTE    [facility]  Dr. Robles Christine M.D., Dr. Clyde Rincon M.D., Dior Keller D.O.,  Jeronimo Collado M.D., Vikram Pham D.O., and Oc Watts M.D. Teri Catherine  73 y.o.  female    SUBJECTIVE: Pt not seen face to face given COVID positive. No outward bleeding reported. Consult by Dr. Yamilet Tomas and other notes reviewed. Pt reported to have not had EGD or colonoscopy in past.      OBJECTIVE:    /60   Pulse 64   Temp (!) 96 °F (35.6 °C) (Oral)   Resp 16   Ht 5' 2\" (1.575 m)   Wt 217 lb 3 oz (98.5 kg)   SpO2 100%   BMI 39.72 kg/m²     NO exam given COVID positive.     Stool (measured) : 0 mL  Lab Results   Component Value Date/Time    WBC 2.8 09/10/2022 05:58 AM    HGB 7.0 09/10/2022 05:58 AM    HCT 27.4 09/10/2022 05:58 AM    MCV 75.9 09/10/2022 05:58 AM    RDW 21.7 09/10/2022 05:58 AM     09/10/2022 05:58 AM     Lab Results   Component Value Date/Time     09/10/2022 05:58 AM    K 3.8 09/10/2022 05:58 AM    K 3.8 09/01/2022 08:24 PM    CL 97 09/10/2022 05:58 AM    CO2 41 09/10/2022 05:58 AM    BUN 9 09/10/2022 05:58 AM    CREATININE 0.8 09/10/2022 05:58 AM    CALCIUM 8.7 09/10/2022 05:58 AM    PROT 5.8 09/10/2022 05:58 AM    LABALBU 2.6 09/10/2022 05:58 AM    BILITOT 0.2 09/10/2022 05:58 AM    ALKPHOS 54 09/10/2022 05:58 AM    AST 12 09/10/2022 05:58 AM    ALT 10 09/10/2022 05:58 AM     No results found for: LIPASE  No results found for: AMYLASE    ASSESSMENT/PLAN:  Patient Active Problem List   Diagnosis    Chronic bilateral low back pain with bilateral sciatica    CHF with unknown LVEF ([facility] [address]    Primary hypertension    Type 2 diabetes mellitus without complication, without long-term current use of insulin (HCC)    Iron deficiency anemia due to chronic blood loss    Acute cystitis without hematuria    Acute on chronic congestive heart failure with right ventricular diastolic dysfunction (HCC)    Pulmonary HTN (HCC)    Tobacco abuse    Anemia    Acute on chronic respiratory failure with hypoxia and hypercapnia (HCC)    Fall    Morbid obesity (HCC)    Obstructive sleep apnea    Hypokalemia       1. Anemia -- acute on chronic. Hg was 11 range in 2018 on review. Pt will require EGD and Colonoscopy once cleared by cardiology and COVID infection resolved and breathing function improved. Pt would be at higher risk for cardiopulmonary collapse given CHF/Elevated trop/COVID infection and would consider scope only if emergent bleeding were to occur. Continue PPI. Can consider CT abd/pel to R/O any advanced lesions in the meantime as she has not had previous scopes. 2.  CHF/elevated troponin  -Per admitting/cardiology     3.   COVID-19 infection  -Per admitting/pulmonology    Phil Ornelas DO  9/10/2022  9:55 AM

## 2022-09-10 NOTE — PLAN OF CARE
Problem: Skin/Tissue Integrity  Goal: Absence of new skin breakdown  Description: 1. Monitor for areas of redness and/or skin breakdown  2. Assess vascular access sites hourly  3. Every 4-6 hours minimum:  Change oxygen saturation probe site  4. Every 4-6 hours:  If on nasal continuous positive airway pressure, respiratory therapy assess nares and determine need for appliance change or resting period.   9/10/2022 1146 by Mary Naqvi RN  Outcome: Progressing     Problem: Pain  Goal: Verbalizes/displays adequate comfort level or baseline comfort level  9/10/2022 1146 by Mary Naqvi RN  Outcome: Progressing     Problem: Discharge Planning  Goal: Discharge to home or other facility with appropriate resources  9/10/2022 1146 by Mary Naqvi RN  Outcome: Progressing     Problem: ABCDS Injury Assessment  Goal: Absence of physical injury  9/10/2022 1146 by Mary Naqvi RN  Outcome: Progressing

## 2022-09-10 NOTE — PLAN OF CARE
Problem: Skin/Tissue Integrity  Goal: Absence of new skin breakdown  Description: 1. Monitor for areas of redness and/or skin breakdown  2. Assess vascular access sites hourly  3. Every 4-6 hours minimum:  Change oxygen saturation probe site  4. Every 4-6 hours:  If on nasal continuous positive airway pressure, respiratory therapy assess nares and determine need for appliance change or resting period.   9/10/2022 1146 by Pia Knight RN  Outcome: Progressing     Problem: Safety - Adult  Goal: Free from fall injury  9/10/2022 1146 by Pia Knight RN  Outcome: Progressing

## 2022-09-11 ENCOUNTER — APPOINTMENT (OUTPATIENT)
Dept: CT IMAGING | Age: 72
DRG: 291 | End: 2022-09-11
Payer: MEDICARE

## 2022-09-11 LAB
BLOOD CULTURE, ROUTINE: NORMAL
CULTURE, BLOOD 2: NORMAL
HCT VFR BLD CALC: 28.1 % (ref 34–48)
HCT VFR BLD CALC: 28.3 % (ref 34–48)
HCT VFR BLD CALC: 29 % (ref 34–48)
HEMOGLOBIN: 7.3 G/DL (ref 11.5–15.5)
HEMOGLOBIN: 7.4 G/DL (ref 11.5–15.5)
HEMOGLOBIN: 7.6 G/DL (ref 11.5–15.5)
METER GLUCOSE: 105 MG/DL (ref 74–99)
METER GLUCOSE: 84 MG/DL (ref 74–99)

## 2022-09-11 PROCEDURE — A4216 STERILE WATER/SALINE, 10 ML: HCPCS | Performed by: HOSPITALIST

## 2022-09-11 PROCEDURE — 6360000004 HC RX CONTRAST MEDICATION: Performed by: RADIOLOGY

## 2022-09-11 PROCEDURE — 99232 SBSQ HOSP IP/OBS MODERATE 35: CPT | Performed by: INTERNAL MEDICINE

## 2022-09-11 PROCEDURE — 85018 HEMOGLOBIN: CPT

## 2022-09-11 PROCEDURE — 82962 GLUCOSE BLOOD TEST: CPT

## 2022-09-11 PROCEDURE — 36415 COLL VENOUS BLD VENIPUNCTURE: CPT

## 2022-09-11 PROCEDURE — 85014 HEMATOCRIT: CPT

## 2022-09-11 PROCEDURE — 6360000002 HC RX W HCPCS: Performed by: HOSPITALIST

## 2022-09-11 PROCEDURE — 6370000000 HC RX 637 (ALT 250 FOR IP): Performed by: INTERNAL MEDICINE

## 2022-09-11 PROCEDURE — 2700000000 HC OXYGEN THERAPY PER DAY

## 2022-09-11 PROCEDURE — 2580000003 HC RX 258: Performed by: INTERNAL MEDICINE

## 2022-09-11 PROCEDURE — 2580000003 HC RX 258: Performed by: HOSPITALIST

## 2022-09-11 PROCEDURE — 6360000002 HC RX W HCPCS: Performed by: INTERNAL MEDICINE

## 2022-09-11 PROCEDURE — 94660 CPAP INITIATION&MGMT: CPT

## 2022-09-11 PROCEDURE — 74177 CT ABD & PELVIS W/CONTRAST: CPT

## 2022-09-11 PROCEDURE — 1200000000 HC SEMI PRIVATE

## 2022-09-11 PROCEDURE — 6370000000 HC RX 637 (ALT 250 FOR IP): Performed by: SPECIALIST

## 2022-09-11 PROCEDURE — C9113 INJ PANTOPRAZOLE SODIUM, VIA: HCPCS | Performed by: HOSPITALIST

## 2022-09-11 RX ORDER — LACTOBACILLUS RHAMNOSUS GG 10B CELL
1 CAPSULE ORAL EVERY 12 HOURS
Status: DISCONTINUED | OUTPATIENT
Start: 2022-09-11 | End: 2022-09-14 | Stop reason: HOSPADM

## 2022-09-11 RX ORDER — FLUCONAZOLE 100 MG/1
200 TABLET ORAL DAILY
Status: DISCONTINUED | OUTPATIENT
Start: 2022-09-11 | End: 2022-09-14 | Stop reason: HOSPADM

## 2022-09-11 RX ORDER — ATENOLOL 25 MG/1
25 TABLET ORAL 2 TIMES DAILY
Status: DISCONTINUED | OUTPATIENT
Start: 2022-09-11 | End: 2022-09-12

## 2022-09-11 RX ADMIN — Medication 5 ML: at 21:07

## 2022-09-11 RX ADMIN — BARICITINIB 4 MG: 2 TABLET, FILM COATED ORAL at 09:02

## 2022-09-11 RX ADMIN — ASPIRIN 81 MG CHEWABLE TABLET 81 MG: 81 TABLET CHEWABLE at 09:02

## 2022-09-11 RX ADMIN — PANTOPRAZOLE SODIUM 40 MG: 40 INJECTION, POWDER, FOR SOLUTION INTRAVENOUS at 16:12

## 2022-09-11 RX ADMIN — FERROUS SULFATE TAB 325 MG (65 MG ELEMENTAL FE) 325 MG: 325 (65 FE) TAB at 16:12

## 2022-09-11 RX ADMIN — Medication 100 MG: at 09:03

## 2022-09-11 RX ADMIN — HEPARIN 100 UNITS: 100 SYRINGE at 20:57

## 2022-09-11 RX ADMIN — PANTOPRAZOLE SODIUM 40 MG: 40 INJECTION, POWDER, FOR SOLUTION INTRAVENOUS at 05:22

## 2022-09-11 RX ADMIN — Medication 1000 UNITS: at 09:04

## 2022-09-11 RX ADMIN — DEXAMETHASONE 6 MG: 6 TABLET ORAL at 09:02

## 2022-09-11 RX ADMIN — FLUCONAZOLE 200 MG: 100 TABLET ORAL at 14:51

## 2022-09-11 RX ADMIN — ENOXAPARIN SODIUM 30 MG: 100 INJECTION SUBCUTANEOUS at 20:57

## 2022-09-11 RX ADMIN — IOHEXOL 50 ML: 240 INJECTION, SOLUTION INTRATHECAL; INTRAVASCULAR; INTRAVENOUS; ORAL at 16:49

## 2022-09-11 RX ADMIN — Medication 1 CAPSULE: at 14:51

## 2022-09-11 RX ADMIN — ATENOLOL 50 MG: 25 TABLET ORAL at 09:02

## 2022-09-11 RX ADMIN — Medication 50 MG: at 09:04

## 2022-09-11 RX ADMIN — METFORMIN HYDROCHLORIDE 1000 MG: 1000 TABLET ORAL at 16:12

## 2022-09-11 RX ADMIN — OXYCODONE HYDROCHLORIDE AND ACETAMINOPHEN 500 MG: 500 TABLET ORAL at 09:02

## 2022-09-11 RX ADMIN — ENOXAPARIN SODIUM 30 MG: 100 INJECTION SUBCUTANEOUS at 09:03

## 2022-09-11 RX ADMIN — Medication 10 ML: at 09:03

## 2022-09-11 RX ADMIN — METFORMIN HYDROCHLORIDE 1000 MG: 1000 TABLET ORAL at 09:02

## 2022-09-11 RX ADMIN — IOPAMIDOL 75 ML: 755 INJECTION, SOLUTION INTRAVENOUS at 16:50

## 2022-09-11 RX ADMIN — FERROUS SULFATE TAB 325 MG (65 MG ELEMENTAL FE) 325 MG: 325 (65 FE) TAB at 09:02

## 2022-09-11 RX ADMIN — HEPARIN 100 UNITS: 100 SYRINGE at 09:03

## 2022-09-11 NOTE — PLAN OF CARE
Problem: Skin/Tissue Integrity  Goal: Absence of new skin breakdown  Description: 1. Monitor for areas of redness and/or skin breakdown  2. Assess vascular access sites hourly  3. Every 4-6 hours minimum:  Change oxygen saturation probe site  4. Every 4-6 hours:  If on nasal continuous positive airway pressure, respiratory therapy assess nares and determine need for appliance change or resting period.   9/11/2022 0246 by Antonio Singleton RN  Outcome: Progressing  9/11/2022 0245 by Antonio Singleton RN  Outcome: Progressing     Problem: Safety - Adult  Goal: Free from fall injury  9/11/2022 0246 by Antonio Singleton RN  Outcome: Progressing  9/11/2022 0245 by Antonio Singleton RN  Outcome: Progressing     Problem: Pain  Goal: Verbalizes/displays adequate comfort level or baseline comfort level  9/11/2022 0246 by Antonio Singleton RN  Outcome: Progressing  9/11/2022 0245 by Antonio Singleton RN  Outcome: Progressing     Problem: Discharge Planning  Goal: Discharge to home or other facility with appropriate resources  9/11/2022 0246 by Antonio Singleton RN  Outcome: Progressing  9/11/2022 0245 by Antonio Singleton RN  Outcome: Progressing     Problem: ABCDS Injury Assessment  Goal: Absence of physical injury  9/11/2022 0246 by Antonio Singleton RN  Outcome: Progressing  9/11/2022 0245 by Antonio Singleton RN  Outcome: Progressing     Problem: Chronic Conditions and Co-morbidities  Goal: Patient's chronic conditions and co-morbidity symptoms are monitored and maintained or improved  9/11/2022 0246 by Antonio Singleton RN  Outcome: Progressing  9/11/2022 0245 by Antonio Singleton RN  Outcome: Progressing     Problem: Nutrition Deficit:  Goal: Optimize nutritional status  9/11/2022 0246 by Antonio Singleton RN  Outcome: Progressing  9/11/2022 0245 by Antonio Singleton RN  Outcome: Progressing

## 2022-09-11 NOTE — PROGRESS NOTES
Department of Internal Medicine  General Internal Medicine  Attending Progress Note      SUBJECTIVE:    Patient seen and examined. Awake alert  Not short of breath  No chest pains  No nausea or vomiting.   Refusing to go to nursing home today, wants to wait till tomorrow    Medications    Current Facility-Administered Medications: lactobacillus (CULTURELLE) capsule 1 capsule, 1 capsule, Oral, Q12H  atenolol (TENORMIN) tablet 25 mg, 25 mg, Oral, BID  zinc gluconate tablet 50 mg, 50 mg, Oral, Daily  ascorbic acid (VITAMIN C) tablet 500 mg, 500 mg, Oral, Daily  thiamine mononitrate tablet 100 mg, 100 mg, Oral, Daily  Vitamin D (CHOLECALCIFEROL) tablet 1,000 Units, 1,000 Units, Oral, Daily  dexamethasone (DECADRON) tablet 6 mg, 6 mg, Oral, Daily  baricitinib (OLUMIANT) tablet 4 mg, 4 mg, Oral, Daily  acetaminophen (TYLENOL) tablet 650 mg, 650 mg, Oral, Q6H PRN  pantoprazole (PROTONIX) 40 mg in sodium chloride (PF) 10 mL injection, 40 mg, IntraVENous, Q12H  enoxaparin Sodium (LOVENOX) injection 30 mg, 30 mg, SubCUTAneous, BID  sodium chloride flush 0.9 % injection 5-40 mL, 5-40 mL, IntraVENous, 2 times per day  sodium chloride flush 0.9 % injection 5-40 mL, 5-40 mL, IntraVENous, PRN  0.9 % sodium chloride infusion, , IntraVENous, PRN  heparin flush 100 UNIT/ML injection 100 Units, 1 mL, IntraVENous, 2 times per day  heparin flush 100 UNIT/ML injection 100 Units, 1 mL, IntraCATHeter, PRN  albuterol (PROVENTIL) nebulizer solution 2.5 mg, 2.5 mg, Nebulization, Q4H PRN  naloxone (NARCAN) injection 0.4 mg, 0.4 mg, IntraVENous, Q5 Min PRN  [Held by provider] bumetanide (BUMEX) tablet 1 mg, 1 mg, Oral, Daily  aspirin chewable tablet 81 mg, 81 mg, Oral, Daily  insulin lispro (HUMALOG) injection vial 0-8 Units, 0-8 Units, SubCUTAneous, TID WC  glucose chewable tablet 16 g, 4 tablet, Oral, PRN  dextrose bolus 10% 125 mL, 125 mL, IntraVENous, PRN **OR** dextrose bolus 10% 250 mL, 250 mL, IntraVENous, PRN  glucagon (rDNA) injection 1 mg, 1 mg, SubCUTAneous, PRN  dextrose 10 % infusion, , IntraVENous, Continuous PRN  ondansetron (ZOFRAN) injection 4 mg, 4 mg, IntraVENous, Q6H PRN **OR** ondansetron (ZOFRAN-ODT) disintegrating tablet 4 mg, 4 mg, Oral, Q8H PRN  [Held by provider] lisinopril (PRINIVIL;ZESTRIL) tablet 20 mg, 20 mg, Oral, Daily **AND** [DISCONTINUED] hydroCHLOROthiazide (HYDRODIURIL) tablet 12.5 mg, 12.5 mg, Oral, BID  metFORMIN (GLUCOPHAGE) tablet 1,000 mg, 1,000 mg, Oral, BID WC  ferrous sulfate (IRON 325) tablet 325 mg, 325 mg, Oral, BID WC  perflutren lipid microspheres (DEFINITY) injection 1.65 mg, 1.5 mL, IntraVENous, ONCE PRN    Physical    VITALS:  /62   Pulse 62   Temp 98.2 °F (36.8 °C) (Oral)   Resp 18   Ht 5' 2\" (1.575 m)   Wt 217 lb 3 oz (98.5 kg)   SpO2 98%   BMI 39.72 kg/m²   TEMPERATURE:  Current - Temp: 98.2 °F (36.8 °C); Max - Temp  Av.5 °F (36.9 °C)  Min: 98.2 °F (36.8 °C)  Max: 98.7 °F (37.1 °C)  RESPIRATIONS RANGE: Resp  Av.5  Min: 17  Max: 18  PULSE RANGE: Pulse  Av.3  Min: 51  Max: 62  BLOOD PRESSURE RANGE:  Systolic (36UIR), BNB:479 , Min:96 , MOQ:682   ; Diastolic (82HAQ), EDC:55, Min:62, Max:81    PULSE OXIMETRY RANGE: SpO2  Av.5 %  Min: 98 %  Max: 99 %  24HR INTAKE/OUTPUT:    Intake/Output Summary (Last 24 hours) at 2022 1226  Last data filed at 9/10/2022 2233  Gross per 24 hour   Intake 50 ml   Output --   Net 50 ml       CONSTITUTIONAL: awake, alert  no distress, appears as stated age. HEENT: Normocephalic atraumatic. Pupils are equal and reactive bilaterally. Extraocular movements are intact. Pallor+  NECK: Supple, no JVD , no lymphadenopathy, no bruits, no thyromegaly  LUNGS: Diminished over the bases bilaterally. CARDIOVASCULAR: Regular rhythm, no murmur rub or gallop. Bradycardia. ABDOMEN: Soft, nontender, bowel sounds are positive, no organomegaly appreciated. Obese. NEUROLOGIC: awake, alert  , no focal deficits noted.   EXT: 1+ edema bilateral lower extremities. CBC with Differential:    Lab Results   Component Value Date/Time    WBC 2.8 09/10/2022 05:58 AM    RBC 3.61 09/10/2022 05:58 AM    HGB 7.4 09/11/2022 09:15 AM    HCT 28.1 09/11/2022 09:15 AM     09/10/2022 05:58 AM    MCV 75.9 09/10/2022 05:58 AM    MCH 19.4 09/10/2022 05:58 AM    MCHC 25.5 09/10/2022 05:58 AM    RDW 21.7 09/10/2022 05:58 AM    LYMPHOPCT 27.4 09/10/2022 05:58 AM    MONOPCT 5.3 09/10/2022 05:58 AM    BASOPCT 0.4 09/10/2022 05:58 AM    MONOSABS 0.14 09/10/2022 05:58 AM    LYMPHSABS 0.76 09/10/2022 05:58 AM    EOSABS 0.00 09/10/2022 05:58 AM    BASOSABS 0.00 09/10/2022 05:58 AM     CMP:    Lab Results   Component Value Date/Time     09/10/2022 05:58 AM    K 3.8 09/10/2022 05:58 AM    K 3.8 09/01/2022 08:24 PM    CL 97 09/10/2022 05:58 AM    CO2 41 09/10/2022 05:58 AM    BUN 9 09/10/2022 05:58 AM    CREATININE 0.8 09/10/2022 05:58 AM    GFRAA >60 09/10/2022 05:58 AM    LABGLOM >60 09/10/2022 05:58 AM    GLUCOSE 89 09/10/2022 05:58 AM    PROT 5.8 09/10/2022 05:58 AM    LABALBU 2.6 09/10/2022 05:58 AM    CALCIUM 8.7 09/10/2022 05:58 AM    BILITOT 0.2 09/10/2022 05:58 AM    ALKPHOS 54 09/10/2022 05:58 AM    AST 12 09/10/2022 05:58 AM    ALT 10 09/10/2022 05:58 AM             ASSESSMENT AND PLAN  1. Acute on chronic diastolic heart failure. Her 2D echo reveals an ejection fraction of 00% with diastolic dysfunction and right-sided heart failure with pulmonary hypertension. Off diuretics at this point. 2.  Acute respiratory failure secondary to above. She is requiring oxygen at 4 to 6 L/min. CTA of the chest without any evidence of pulmonary embolism. Ultrasound of lower extremities negative for DVT. 3.  Right-sided heart failure. Questionable secondary to obstructive sleep apnea. Patient will need a sleep study as outpatient. 4.  Diabetes mellitus type 2. Metformin 1000 mg bid . Continue sliding scale insulin  5.   Anemia most likely iron deficiency in which is chronic. Iron studies with low ferritin and normal TIBC and iron levels. She will need a GI evaluation to rule out GI etiology of her iron deficiency anemia. GI consulted and no plans for immediate intervention considering overall condition and positive COVID test.  Hemoglobin down to 7.4.  6  Hypertension monitor blood pressure closely. Lisinopril on hold because of her renal insufficiency. 7.  Edema of bilateral lower extremities. Ultrasound lower extremities negative for DVT. 8.  Acute kidney injury secondary to IV diuretics. Creatinine 0.8.  10.Metabolic encephalopathy secondary to CO2 narcosis and possible her pain meds. Neuro  status at baseline  11. COVID positive On Barcitinib , decadron and lovenox , ID following closely  Plan on possible discharge in am.    Samuel Jorge MD, MD.  12:26 PM  9/11/2022

## 2022-09-11 NOTE — PROGRESS NOTES
PROGRESS NOTE    [facility]  Dr. Vicky Juarez M.D., Dr. Bryn Gold M.D., Albert Sosa D.O.,  Ava Cowan M.D., Rowena Liu D.O., and Grabiel Ramos M.D. Viona Less  73 y.o.  female    SUBJECTIVE: Pt not seen face to face given COVID positive. No outward bleeding reported. Consult by Dr. Fabien Yost and other notes reviewed. Pt reported to have not had EGD or colonoscopy in past.      Hg stable. Pt afebrile and on 4-5 liters oxygen. OBJECTIVE:    /62   Pulse 62   Temp 98.2 °F (36.8 °C) (Oral)   Resp 18   Ht 5' 2\" (1.575 m)   Wt 217 lb 3 oz (98.5 kg)   SpO2 98%   BMI 39.72 kg/m²     NO exam given COVID positive.     Stool (measured) : 0 mL  Lab Results   Component Value Date/Time    WBC 2.8 09/10/2022 05:58 AM    HGB 7.4 09/11/2022 09:15 AM    HCT 28.1 09/11/2022 09:15 AM    MCV 75.9 09/10/2022 05:58 AM    RDW 21.7 09/10/2022 05:58 AM     09/10/2022 05:58 AM     Lab Results   Component Value Date/Time     09/10/2022 05:58 AM    K 3.8 09/10/2022 05:58 AM    K 3.8 09/01/2022 08:24 PM    CL 97 09/10/2022 05:58 AM    CO2 41 09/10/2022 05:58 AM    BUN 9 09/10/2022 05:58 AM    CREATININE 0.8 09/10/2022 05:58 AM    CALCIUM 8.7 09/10/2022 05:58 AM    PROT 5.8 09/10/2022 05:58 AM    LABALBU 2.6 09/10/2022 05:58 AM    BILITOT 0.2 09/10/2022 05:58 AM    ALKPHOS 54 09/10/2022 05:58 AM    AST 12 09/10/2022 05:58 AM    ALT 10 09/10/2022 05:58 AM     No results found for: LIPASE  No results found for: AMYLASE    ASSESSMENT/PLAN:  Patient Active Problem List   Diagnosis    Chronic bilateral low back pain with bilateral sciatica    CHF with unknown LVEF ([facility] [address]    Primary hypertension    Type 2 diabetes mellitus without complication, without long-term current use of insulin (HCC)    Iron deficiency anemia due to chronic blood loss    Acute cystitis without hematuria    Acute on chronic congestive heart failure with right ventricular diastolic dysfunction ([address] Pulmonary HTN (HCC)    Tobacco abuse    Anemia    Acute on chronic respiratory failure with hypoxia and hypercapnia (HCC)    Fall    Morbid obesity ([address]    Obstructive sleep apnea    Hypokalemia       1. Anemia -- acute on chronic. Hg was 11 range in 2018 on review. Pt will require EGD and Colonoscopy once cleared by cardiology and COVID infection resolved and breathing function improved. Pt would be at higher risk for cardiopulmonary collapse given CHF/Elevated trop/COVID infection and would consider scope only if emergent bleeding were to occur. Continue PPI. Will check CT abd/pel to R/O any advanced lesions in the meantime as she has not had previous scopes. 2.  CHF/elevated troponin  -Per admitting/cardiology     3.   COVID-19 infection  -Per admitting/pulmonology    ST. FRANCINE LUEVANO DO  9/11/2022  1:33 PM

## 2022-09-11 NOTE — PLAN OF CARE
Problem: Skin/Tissue Integrity  Goal: Absence of new skin breakdown  Description: 1. Monitor for areas of redness and/or skin breakdown  2. Assess vascular access sites hourly  3. Every 4-6 hours minimum:  Change oxygen saturation probe site  4. Every 4-6 hours:  If on nasal continuous positive airway pressure, respiratory therapy assess nares and determine need for appliance change or resting period.   Outcome: Progressing     Problem: Safety - Adult  Goal: Free from fall injury  Outcome: Progressing     Problem: Pain  Goal: Verbalizes/displays adequate comfort level or baseline comfort level  Outcome: Progressing     Problem: Discharge Planning  Goal: Discharge to home or other facility with appropriate resources  Outcome: Progressing     Problem: ABCDS Injury Assessment  Goal: Absence of physical injury  Outcome: Progressing

## 2022-09-11 NOTE — PROGRESS NOTES
NAME: Surjit Fu  MR:  [id number]  :   1950  DATE OF SERVICE:22    This is a face to face encounter with Surjit Fu 73 y.o. female on 22  Elements of this note, including Diagnosis,  Interval History, Past Medical/Surgical/Family/Social Histories, ROS, physical exam, and Assessment and Plan were copied and pasted from Previous. Updates have been made where noted and reflect current exam and medical decision making from the DOS of this encounter. CHIEF COMPLAINT     ID following for   Chief Complaint   Patient presents with    Latonya Ledezma at 930am was not able to get up. HISTORY OF PRESENT ILLNESS     Pt seen and examined  22  In bed has  no c/o f/c/nv/d  Has dysuria   Stools are better  Patient is tolerating medications. No reported adverse drug reactions. REVIEW OF SYSTEMS     As stated above in the chief complaint, otherwise negative.   CURRENT MEDICATIONS     Current Facility-Administered Medications:     zinc gluconate tablet 50 mg, 50 mg, Oral, Daily, Beverly Bernardo MD, 50 mg at 22 6143    ascorbic acid (VITAMIN C) tablet 500 mg, 500 mg, Oral, Daily, Beverly Bernardo MD, 500 mg at 22 8667    thiamine mononitrate tablet 100 mg, 100 mg, Oral, Daily, Beverly Bernardo MD, 100 mg at 22 5538    Vitamin D (CHOLECALCIFEROL) tablet 1,000 Units, 1,000 Units, Oral, Daily, Beverly Bernardo MD, 1,000 Units at 22 0904    dexamethasone (DECADRON) tablet 6 mg, 6 mg, Oral, Daily, Beverly Bernardo MD, 6 mg at 22 0902    baricitinib (OLUMIANT) tablet 4 mg, 4 mg, Oral, Daily, Beverly Bernardo MD, 4 mg at 22 09    acetaminophen (TYLENOL) tablet 650 mg, 650 mg, Oral, Q6H PRN, Silvana Braga MD    pantoprazole (PROTONIX) 40 mg in sodium chloride (PF) 10 mL injection, 40 mg, IntraVENous, Q12H, Taqueria Kemp MD, 40 mg at 22 0522    enoxaparin Sodium (LOVENOX) injection 30 mg, 30 mg, SubCUTAneous, BID, Valaria Day, MD, 30 mg at 09/11/22 0903    sodium chloride flush 0.9 % injection 5-40 mL, 5-40 mL, IntraVENous, 2 times per day, Damir Lopez MD, 10 mL at 09/11/22 0903    sodium chloride flush 0.9 % injection 5-40 mL, 5-40 mL, IntraVENous, PRN, Damir Lopez MD    0.9 % sodium chloride infusion, , IntraVENous, PRN, Damir Lopez MD    heparin flush 100 UNIT/ML injection 100 Units, 1 mL, IntraVENous, 2 times per day, Damir Lopez MD, 100 Units at 09/11/22 0903    heparin flush 100 UNIT/ML injection 100 Units, 1 mL, IntraCATHeter, PRN, Damir Lopez MD    albuterol (PROVENTIL) nebulizer solution 2.5 mg, 2.5 mg, Nebulization, Q4H PRN, Boogie Martines MD    naloxone [facility]) injection 0.4 mg, 0.4 mg, IntraVENous, Q5 Min PRN, Boogie Martines MD, 0.4 mg at 09/05/22 2047    [Held by provider] bumetanide (BUMEX) tablet 1 mg, 1 mg, Oral, Daily, Edin Castellano MD, 1 mg at 09/06/22 1047    aspirin chewable tablet 81 mg, 81 mg, Oral, Daily, Philip Braga MD, 81 mg at 09/11/22 0902    insulin lispro (HUMALOG) injection vial 0-8 Units, 0-8 Units, SubCUTAneous, TID WC, Silvana Braga MD    glucose chewable tablet 16 g, 4 tablet, Oral, PRN, Silvana Braga MD    dextrose bolus 10% 125 mL, 125 mL, IntraVENous, PRN **OR** dextrose bolus 10% 250 mL, 250 mL, IntraVENous, PRN, Silvana Braga MD    glucagon (rDNA) injection 1 mg, 1 mg, SubCUTAneous, PRN, Silvana Braga MD    dextrose 10 % infusion, , IntraVENous, Continuous PRN, Silvana Braga MD    ondansetron (ZOFRAN) injection 4 mg, 4 mg, IntraVENous, Q6H PRN **OR** ondansetron (ZOFRAN-ODT) disintegrating tablet 4 mg, 4 mg, Oral, Q8H PRN, Silvana Braga MD, 4 mg at 09/02/22 1805    atenolol (TENORMIN) tablet 50 mg, 50 mg, Oral, BID, Silvana Braga MD, 50 mg at 09/11/22 0902    [Held by provider] lisinopril (PRINIVIL;ZESTRIL) tablet 20 mg, 20 mg, Oral, Daily, 20 mg at 09/03/22 0902 **AND** [DISCONTINUED] hydroCHLOROthiazide (HYDRODIURIL) tablet 12.5 mg, 12.5 mg, Oral, BID, Silvana Braga MD    metFORMIN (GLUCOPHAGE) tablet 1,000 mg, 1,000 mg, Oral, BID Silvana ROJO MD, 1,000 mg at 22 0902    ferrous sulfate (IRON 325) tablet 325 mg, 325 mg, Oral, BID Silvana ROJO MD, 325 mg at 22 0902    perflutren lipid microspheres (DEFINITY) injection 1.65 mg, 1.5 mL, IntraVENous, ONCE PRN, [facility] MICHELL Pineda  PHYSICAL EXAM     /62   Pulse 62   Temp 98.2 °F (36.8 °C) (Oral)   Resp 18   Ht 5' 2\" (1.575 m)   Wt 217 lb 3 oz (98.5 kg)   SpO2 98%   BMI 39.72 kg/m²   Temp  Av.5 °F (36.9 °C)  Min: 98.2 °F (36.8 °C)  Max: 98.7 °F (37.1 °C)  Constitutional:  The patient is awake, alert, and oriented. Skin:    Warm and dry. HEENT:     AT/NC  Chest:   No use of accessory muscles to breathe. Symmetrical expansion. Dec bs on o2  Cardiovascular:  S1 and S2 are rhythmic and regular. Abdomen:   Positive bowel sounds to auscultation. Benign to palpation. Extremities:    edema. CNS    AAxO   Lines:          Peripherally Inserted Central Catheter:  Midline Single Lumen 81/68/08 Left Basilic (Active)   Criteria for Appropriate Use Long term IV/antibiotic administration 09/10/22 1146   Site Assessment Clean, dry & intact 09/10/22 1146   Phlebitis Assessment No symptoms 09/10/22 1146   Infiltration Assessment 0 09/10/22 1146   Extremity Circumference (cm) 32 cm 22 1141   Lumen Color/Status Normal saline locked 09/10/22 [address] changed; Connections checked and tightened 09/10/22 1146   Alcohol Cap Used Yes 09/10/22 1146   Date of Last Dressing Change 22 1141   Dressing Type Transparent 09/10/22 1146   Dressing Status Clean, dry & intact 09/10/22 1146   Dressing Intervention Other (Comment) 22 1615     Central Venous Catheter:     Venous Access Device:     Peripheral Intravenous Line:  Peripheral IV 22 Left Antecubital (Active)   Site Assessment Clean, dry & intact 09/10/22 1146   Line FINAL IMPRESSION    Pt had   Chief Complaint   Patient presents with    Brennan Leventhal at 930am was not able to get up. Admitted for CHF with unknown LVEF ([facility] [address] [I50.9]  On treatment for   Covid  Leukopenia  Asymptomatic bacteruria f/u urine cx neg   Pt has c/o dysuria repeat urine add antifungal     lactobacillus (CULTURELLE) capsule 1 capsule, Q12H  fluconazole (DIFLUCAN) tablet 200 mg, Daily  zinc gluconate tablet 50 mg, Daily  ascorbic acid (VITAMIN C) tablet 500 mg, Daily  thiamine mononitrate tablet 100 mg, Daily  Vitamin D (CHOLECALCIFEROL) tablet 1,000 Units, Daily  dexamethasone (DECADRON) tablet 6 mg, Daily  baricitinib (OLUMIANT) tablet 4 mg, Daily       To follow labs, cultures and imaging. Imaging and labs were reviewed per medical records. The patient was educated about the diagnosis, prognosis, indications, risks and benefits of treatment. An opportunity to ask questions was given to the patient/FAMILY. Thank you for involving me in the care of Claudette Lara I will continue to follow. Please do not hesitate to call for any questions or concerns.     Electronically signed by Shira Flores MD on 9/11/2022 at 12:00 PM

## 2022-09-12 LAB
ANISOCYTOSIS: ABNORMAL
BASOPHILS ABSOLUTE: 0 E9/L (ref 0–0.2)
BASOPHILS RELATIVE PERCENT: 0 % (ref 0–2)
CREAT SERPL-MCNC: 0.9 MG/DL (ref 0.5–1)
EOSINOPHILS ABSOLUTE: 0 E9/L (ref 0.05–0.5)
EOSINOPHILS RELATIVE PERCENT: 0 % (ref 0–6)
GFR AFRICAN AMERICAN: >60
GFR NON-AFRICAN AMERICAN: >60 ML/MIN/1.73
HCT VFR BLD CALC: 26.8 % (ref 34–48)
HCT VFR BLD CALC: 26.9 % (ref 34–48)
HCT VFR BLD CALC: 27 % (ref 34–48)
HCT VFR BLD CALC: 27.2 % (ref 34–48)
HEMOGLOBIN: 7 G/DL (ref 11.5–15.5)
HEMOGLOBIN: 7.1 G/DL (ref 11.5–15.5)
HEMOGLOBIN: 7.1 G/DL (ref 11.5–15.5)
HEMOGLOBIN: 7.2 G/DL (ref 11.5–15.5)
HYPOCHROMIA: ABNORMAL
IMMATURE GRANULOCYTES #: 0.02 E9/L
IMMATURE GRANULOCYTES %: 0.6 % (ref 0–5)
LYMPHOCYTES ABSOLUTE: 0.61 E9/L (ref 1.5–4)
LYMPHOCYTES RELATIVE PERCENT: 17 % (ref 20–42)
MCH RBC QN AUTO: 19.7 PG (ref 26–35)
MCHC RBC AUTO-ENTMCNC: 26 % (ref 32–34.5)
MCV RBC AUTO: 75.6 FL (ref 80–99.9)
METER GLUCOSE: 105 MG/DL (ref 74–99)
METER GLUCOSE: 109 MG/DL (ref 74–99)
METER GLUCOSE: 197 MG/DL (ref 74–99)
MONOCYTES ABSOLUTE: 0.25 E9/L (ref 0.1–0.95)
MONOCYTES RELATIVE PERCENT: 7 % (ref 2–12)
NEUTROPHILS ABSOLUTE: 2.7 E9/L (ref 1.8–7.3)
NEUTROPHILS RELATIVE PERCENT: 75.4 % (ref 43–80)
OVALOCYTES: ABNORMAL
PDW BLD-RTO: 22.2 FL (ref 11.5–15)
PLATELET # BLD: 217 E9/L (ref 130–450)
PMV BLD AUTO: 10.7 FL (ref 7–12)
POIKILOCYTES: ABNORMAL
POLYCHROMASIA: ABNORMAL
RBC # BLD: 3.56 E12/L (ref 3.5–5.5)
TARGET CELLS: ABNORMAL
WBC # BLD: 3.6 E9/L (ref 4.5–11.5)

## 2022-09-12 PROCEDURE — 6360000002 HC RX W HCPCS: Performed by: INTERNAL MEDICINE

## 2022-09-12 PROCEDURE — 6370000000 HC RX 637 (ALT 250 FOR IP): Performed by: INTERNAL MEDICINE

## 2022-09-12 PROCEDURE — 36415 COLL VENOUS BLD VENIPUNCTURE: CPT

## 2022-09-12 PROCEDURE — 82565 ASSAY OF CREATININE: CPT

## 2022-09-12 PROCEDURE — 2580000003 HC RX 258: Performed by: INTERNAL MEDICINE

## 2022-09-12 PROCEDURE — 82962 GLUCOSE BLOOD TEST: CPT

## 2022-09-12 PROCEDURE — 94761 N-INVAS EAR/PLS OXIMETRY MLT: CPT

## 2022-09-12 PROCEDURE — 99233 SBSQ HOSP IP/OBS HIGH 50: CPT | Performed by: INTERNAL MEDICINE

## 2022-09-12 PROCEDURE — 85014 HEMATOCRIT: CPT

## 2022-09-12 PROCEDURE — 94660 CPAP INITIATION&MGMT: CPT

## 2022-09-12 PROCEDURE — C9113 INJ PANTOPRAZOLE SODIUM, VIA: HCPCS | Performed by: HOSPITALIST

## 2022-09-12 PROCEDURE — 36592 COLLECT BLOOD FROM PICC: CPT

## 2022-09-12 PROCEDURE — A4216 STERILE WATER/SALINE, 10 ML: HCPCS | Performed by: HOSPITALIST

## 2022-09-12 PROCEDURE — 85018 HEMOGLOBIN: CPT

## 2022-09-12 PROCEDURE — 97110 THERAPEUTIC EXERCISES: CPT

## 2022-09-12 PROCEDURE — 6370000000 HC RX 637 (ALT 250 FOR IP): Performed by: SPECIALIST

## 2022-09-12 PROCEDURE — 85025 COMPLETE CBC W/AUTO DIFF WBC: CPT

## 2022-09-12 PROCEDURE — 1200000000 HC SEMI PRIVATE

## 2022-09-12 PROCEDURE — 6360000002 HC RX W HCPCS: Performed by: HOSPITALIST

## 2022-09-12 PROCEDURE — 2700000000 HC OXYGEN THERAPY PER DAY

## 2022-09-12 PROCEDURE — 2580000003 HC RX 258: Performed by: HOSPITALIST

## 2022-09-12 RX ORDER — AMLODIPINE BESYLATE 5 MG/1
5 TABLET ORAL DAILY
Status: DISCONTINUED | OUTPATIENT
Start: 2022-09-12 | End: 2022-09-14 | Stop reason: HOSPADM

## 2022-09-12 RX ADMIN — Medication 1 CAPSULE: at 17:26

## 2022-09-12 RX ADMIN — FERROUS SULFATE TAB 325 MG (65 MG ELEMENTAL FE) 325 MG: 325 (65 FE) TAB at 17:29

## 2022-09-12 RX ADMIN — Medication 50 MG: at 09:24

## 2022-09-12 RX ADMIN — ASPIRIN 81 MG CHEWABLE TABLET 81 MG: 81 TABLET CHEWABLE at 09:24

## 2022-09-12 RX ADMIN — ACETAMINOPHEN 650 MG: 325 TABLET ORAL at 22:29

## 2022-09-12 RX ADMIN — BARICITINIB 4 MG: 2 TABLET, FILM COATED ORAL at 09:24

## 2022-09-12 RX ADMIN — OXYCODONE HYDROCHLORIDE AND ACETAMINOPHEN 500 MG: 500 TABLET ORAL at 09:24

## 2022-09-12 RX ADMIN — Medication 100 MG: at 09:24

## 2022-09-12 RX ADMIN — ENOXAPARIN SODIUM 30 MG: 100 INJECTION SUBCUTANEOUS at 09:25

## 2022-09-12 RX ADMIN — Medication 1 CAPSULE: at 01:08

## 2022-09-12 RX ADMIN — HEPARIN 100 UNITS: 100 SYRINGE at 09:26

## 2022-09-12 RX ADMIN — ENOXAPARIN SODIUM 30 MG: 100 INJECTION SUBCUTANEOUS at 21:52

## 2022-09-12 RX ADMIN — AMLODIPINE BESYLATE 5 MG: 5 TABLET ORAL at 17:26

## 2022-09-12 RX ADMIN — DEXAMETHASONE 6 MG: 6 TABLET ORAL at 09:24

## 2022-09-12 RX ADMIN — Medication 1000 UNITS: at 09:24

## 2022-09-12 RX ADMIN — PANTOPRAZOLE SODIUM 40 MG: 40 INJECTION, POWDER, FOR SOLUTION INTRAVENOUS at 17:27

## 2022-09-12 RX ADMIN — HEPARIN 100 UNITS: 100 SYRINGE at 21:53

## 2022-09-12 RX ADMIN — Medication 10 ML: at 21:53

## 2022-09-12 RX ADMIN — PANTOPRAZOLE SODIUM 40 MG: 40 INJECTION, POWDER, FOR SOLUTION INTRAVENOUS at 05:48

## 2022-09-12 RX ADMIN — Medication 1 CAPSULE: at 22:30

## 2022-09-12 RX ADMIN — METFORMIN HYDROCHLORIDE 1000 MG: 1000 TABLET ORAL at 09:24

## 2022-09-12 RX ADMIN — METFORMIN HYDROCHLORIDE 1000 MG: 1000 TABLET ORAL at 17:29

## 2022-09-12 RX ADMIN — FLUCONAZOLE 200 MG: 100 TABLET ORAL at 09:24

## 2022-09-12 RX ADMIN — FERROUS SULFATE TAB 325 MG (65 MG ELEMENTAL FE) 325 MG: 325 (65 FE) TAB at 09:24

## 2022-09-12 RX ADMIN — Medication 10 ML: at 09:26

## 2022-09-12 ASSESSMENT — PAIN DESCRIPTION - PAIN TYPE: TYPE: ACUTE PAIN

## 2022-09-12 ASSESSMENT — PAIN SCALES - GENERAL: PAINLEVEL_OUTOF10: 7

## 2022-09-12 ASSESSMENT — PAIN - FUNCTIONAL ASSESSMENT: PAIN_FUNCTIONAL_ASSESSMENT: ACTIVITIES ARE NOT PREVENTED

## 2022-09-12 ASSESSMENT — PAIN DESCRIPTION - DESCRIPTORS: DESCRIPTORS: ACHING

## 2022-09-12 ASSESSMENT — PAIN DESCRIPTION - LOCATION: LOCATION: HEAD

## 2022-09-12 NOTE — CONSULTS
data filed at 9/12/2022 0926  Gross per 24 hour   Intake 20 ml   Output --   Net 20 ml       IMAGING STUDIES: Reviewed    2D Echo: 9/2/22     Summary   Normal left ventricular systolic function. Ejection fraction is visually estimated at > 55%. Dilated right ventricle (RV:LV diastolic diameter ratio > 1) and reduced   right ventricular function. There is doppler evidence of stage II diastolic dysfunction. Mild tricuspid regurgitation. PASP is estimated at 62 mmHg. LABS:      Cardiac enzymes:No results for input(s): CKTOTAL, CKMB, CKMBINDEX in the last 72 hours. Invalid input(s): TROPONIN  CBC:   Recent Labs     09/10/22  0558 09/10/22  1720 09/12/22  0615 09/12/22  0850   WBC 2.8*  --  3.6*  --    HGB 7.0*   < > 7.0* 7.2*   HCT 27.4*   < > 26.9* 27.2*     --  217  --     < > = values in this interval not displayed. BMP:   Recent Labs     09/10/22  0558 09/12/22  0615     --    K 3.8  --    CO2 41*  --    BUN 9  --    CREATININE 0.8 0.9   LABGLOM >60 >60   CALCIUM 8.7  --      Mag: No results for input(s): MG in the last 72 hours. Phos: No results for input(s): PHOS in the last 72 hours. TSH: No results for input(s): TSH in the last 72 hours. HgA1c:   Lab Results   Component Value Date    LABA1C 5.9 (H) 09/02/2022     No results found for: EAG  proBNP: No results for input(s): PROBNP in the last 72 hours. PT/INR: No results for input(s): PROTIME, INR in the last 72 hours. APTT:No results for input(s): APTT in the last 72 hours.   FASTING LIPID PANEL:  Lab Results   Component Value Date/Time    CHOL 101 09/02/2022 05:40 AM    HDL 38 09/02/2022 05:40 AM    LDLCALC 46 09/02/2022 05:40 AM    TRIG 85 09/02/2022 05:40 AM     LIVER PROFILE:  Recent Labs     09/10/22  0558   AST 12   ALT 10   LABALBU 2.6*       Current Inpatient Medications:   lactobacillus  1 capsule Oral Q12H    fluconazole  200 mg Oral Daily    zinc gluconate  50 mg Oral Daily    ascorbic acid  500 mg Oral Daily thiamine mononitrate  100 mg Oral Daily    Vitamin D  1,000 Units Oral Daily    dexamethasone  6 mg Oral Daily    baricitinib  4 mg Oral Daily    pantoprazole (PROTONIX) 40 mg injection  40 mg IntraVENous Q12H    enoxaparin  30 mg SubCUTAneous BID    sodium chloride flush  5-40 mL IntraVENous 2 times per day    heparin flush  1 mL IntraVENous 2 times per day    [Held by provider] bumetanide  1 mg Oral Daily    aspirin  81 mg Oral Daily    insulin lispro  0-8 Units SubCUTAneous TID WC    [Held by provider] lisinopril  20 mg Oral Daily    metFORMIN  1,000 mg Oral BID WC    ferrous sulfate  325 mg Oral BID WC       IV Infusions (if any):   sodium chloride      dextrose         PHYSICAL EXAM:     /66   Pulse 58-Currently on tele   Temp 97.5 °F (36.4 °C) (Oral)   Resp 15   Ht 5' 2\" (1.575 m)   Wt 217 lb 3 oz (98.5 kg)   SpO2 100%   BMI 39.72 kg/m²         Patient was not seen in person due to COVID-19 status, to limit personal exposure and limit PPE utilization. IMPRESSION / RECOMMENDATIONS:      Sinus bradycardia - Better now; Avoid AV blocking medications, Discontinue home Atenolol; TSH ok. Hypertension - Add Amlodipine 5mg for BP control, Monitor BP - Increase to 10mg if needed      Acute on chronic respiratory failure with hypoxia and hypercapnia ([address] - Per Dr Martin Mendoza -23 - Per ID consultants      Acute HFpEF - Resume Bumex 1mg, Check BNP, Monitor Wts, I/Os, BP and renal Fn      Type 2 diabetes mellitus - Per Dr Arthur Goff      Pulmonary HTN ([address] - Moderate      Tobacco abuse - Will be counseled to quit smoking       Anemia - Monitor H/H-GI consulted      Obesity - BMI 39.7       Probable Obstructive sleep apnea       Chronic bilateral low back pain with bilateral sciatica            Cardiology will follow as needed.     Electronically signed by Katie Adamson MD on 9/12/2022 at 12:35 PM  [facility]) Cardiology     Addendum    D/w Dr Anupama Chauhan MD

## 2022-09-12 NOTE — PROGRESS NOTES
Procalcitonin     Meter Glucose  84 81 90 91 89  83 125 101 86 108  84 105 109 105        CRP        5.2     Sed Rate        35      COAG OTHER    Fibrinogen        423            Fibrinogen     D-Dimer, Quant        587                  Assessment:    Principal Problem:    Acute on chronic respiratory failure with hypoxia and hypercapnia (HCC)  Active Problems:    CHF with unknown LVEF (HCC)    Primary hypertension    Type 2 diabetes mellitus without complication, without long-term current use of insulin (HCC)    Iron deficiency anemia due to chronic blood loss    Acute cystitis without hematuria    Acute on chronic congestive heart failure with right ventricular diastolic dysfunction (HCC)    Pulmonary HTN (HCC)    Tobacco abuse    Anemia    Fall    Morbid obesity (HCC)    Obstructive sleep apnea    Hypokalemia    Chronic bilateral low back pain with bilateral sciatica  COVID-19 infection        Plan:  Patient is feeling better she had bradycardia that was associated with being sleeping patient has obstructive sleep apnea and has been on beta-blockers I discussed the case with Dr. Arby Denver and decision to take her off the medication make sure he uses CPAP machine.     Patient had COVID-19 seen by ID and Dr. Doris Richardson started her on Decadron and Sebastian Lindau doing well she has mild cough we will continue current treatment    COPD patient has long term COPD and has been admitted to the hospital with acute respiratory failure on top of chronic respiratory failure with hypercapnia secondary to COPD exacerbation patient is currently on 4 L nasal cannula she has mild cough clear sputum stable when released from isolation we will discharge her to nursing home for rehab    DM type II patient has blood sugar been monitored and her sugar is ranging between 105 and 125 she had lowest blood sugar was 48 he is asymptomatic we will continue current treatment with metformin    Anemia we will monitor H&H transfuse if hemoglobin less

## 2022-09-12 NOTE — PLAN OF CARE
Problem: Skin/Tissue Integrity  Goal: Absence of new skin breakdown  Description: 1. Monitor for areas of redness and/or skin breakdown  2. Assess vascular access sites hourly  3. Every 4-6 hours minimum:  Change oxygen saturation probe site  4. Every 4-6 hours:  If on nasal continuous positive airway pressure, respiratory therapy assess nares and determine need for appliance change or resting period.   9/12/2022 0205 by Joycelyn No RN  Outcome: Progressing  9/11/2022 1847 by Socorro Sinha RN  Outcome: Progressing     Problem: Safety - Adult  Goal: Free from fall injury  9/12/2022 0205 by Joycelyn No RN  Outcome: Progressing  9/11/2022 1847 by Socorro Sinha RN  Outcome: Progressing     Problem: Pain  Goal: Verbalizes/displays adequate comfort level or baseline comfort level  9/12/2022 0205 by Joycelyn No RN  Outcome: Progressing  9/11/2022 1847 by Socorro Sinha RN  Outcome: Progressing     Problem: Discharge Planning  Goal: Discharge to home or other facility with appropriate resources  9/12/2022 0205 by Joycelyn No RN  Outcome: Progressing  9/11/2022 1847 by Socorro Sinha RN  Outcome: Progressing     Problem: ABCDS Injury Assessment  Goal: Absence of physical injury  9/12/2022 0205 by Joycelyn No RN  Outcome: Progressing  9/11/2022 1847 by Socorro Sinha RN  Outcome: Progressing     Problem: Chronic Conditions and Co-morbidities  Goal: Patient's chronic conditions and co-morbidity symptoms are monitored and maintained or improved  9/12/2022 0205 by Joycelyn No RN  Outcome: Progressing  9/11/2022 1847 by Socorro Sinha RN  Outcome: Progressing     Problem: Nutrition Deficit:  Goal: Optimize nutritional status  9/12/2022 0205 by Joycelyn No RN  Outcome: Progressing  9/11/2022 1847 by Socorro Sinha RN  Outcome: Progressing

## 2022-09-12 NOTE — PROGRESS NOTES
Physical Therapy  Physical Therapy Treatment Note/Plan of Care    Room #:  4579/2269-57  Patient Name: Brenda Pickens  YOB: 1950  MRN: [id number]    Date of Service: 9/12/2022     Tentative placement recommendation: Subacute rehab  Equipment recommendation: To be determined      Re Evaluating Physical Therapist: Alvin Berg, [address]  #[id number]      Specific Provider Orders/Date/Referring Provider :     09/02/22 0330    PT eval and treat  Start:  09/02/22 0330,   End:  09/02/22 0330,   ONE TIME,   Standing Count:  1 Occurrences,   R         Tex English MD Acknowledge New   09/07/22 0830    PT eval and treat  (PT/OT CONSULT PANEL)  ONE TIME     Complete  Discontinue     Question: Reason for PT? Answer: evaluate and treat for discharge planning    Yvette Bustillos MD  Admitting Diagnosis:   CHF with unknown LVEF ([address] [I50.9]   toxic encephalopathy due to opioid overdose  Rrt 9/5 decreased level of consciousness, bipap for hypoxemia icu for 2 days  Surgery: none  Visit Diagnoses         Codes    Fall, initial encounter    -  Primary W19. XXXA    Congestive heart failure, unspecified HF chronicity, unspecified heart failure type ([address]     I50.9            Patient Active Problem List   Diagnosis    Chronic bilateral low back pain with bilateral sciatica    CHF with unknown LVEF ([address]    Primary hypertension    Type 2 diabetes mellitus without complication, without long-term current use of insulin (HCC)    Iron deficiency anemia due to chronic blood loss    Acute cystitis without hematuria    Acute on chronic congestive heart failure with right ventricular diastolic dysfunction (HCC)    Pulmonary HTN (HCC)    Tobacco abuse    Anemia    Acute on chronic respiratory failure with hypoxia and hypercapnia (HCC)    Fall    Morbid obesity ([address]    Obstructive sleep apnea    Hypokalemia        ASSESSMENT of Current Deficits Patient exhibits decreased strength, balance, and endurance impairing functional mobility, transfers, gait , gait distance, and tolerance to activity. Patient only agreed on  bed activity this session. States she is too tired and that her cousin had just passed away and doesn't want to get up. Patient able to perform all exercise AAROM with LE. Patient at 90% spo2 with making loud breathing sounds that she is short of breath. Patient continues to have difficulty with endurance, pt would benefit from short rehab stay to learn energy conservation techniques and improve tolerance to activity by building strength and stamina. PHYSICAL THERAPY  PLAN OF CARE       Physical therapy plan of care is established based on physician order,  patient diagnosis and clinical assessment    Current Treatment Recommendations:    -Bed Mobility: Lower extremity exercises  and Trunk control activities   -Sitting Balance: Incorporate reaching activities to activate trunk muscles , Hands on support to maintain midline , Facilitate active trunk muscle engagement , Facilitate postural control in all planes , and Engage in core activities to allow for movement within base of support   -Standing Balance: Perform strengthening exercises in standing to promote motor control with or without upper extremity support , Instruct patient on adequate base of support to maintain balance, and Challenge balance utilizing reaching  activities beyond center of gravity    -Transfers: Provide instruction on proper hand and foot position for adequate transfer of weight onto lower extremities and use of gait device if needed, Cues for hand placement, technique and safety.  Provide stabilization to prevent fall , Facilitate weight shift forward on to lower extremities and provide necessary stabilization of bilateral lower extremities , Support transfer of weight on to lower extremities, and Assist with extension of knees trunk and hip to accept weight transfer   -Gait: Gait training, Standing activities to improve: base of support, weight shift, weight bearing , Exercises to improve trunk control, Exercises to improve hip and knee control, Performance of protected weight bearing activities, and Activities to increase weight bearing   -Endurance: Utilize Supervised activities to increase level of endurance to allow for safe functional mobility including transfers and gait  and Use graduated activities to promote good breathing techniques and provide support and education to maximize respiratory function  -Stairs: Stair training with instruction on proper technique and hand placement on rail    PT long term treatment goals are located in below grid    Patient and or family understand(s) diagnosis, prognosis, and plan of care. Frequency of treatments: Patient will be seen  daily. Prior Level of Function: Patient ambulated independently   Rehab Potential: fair + for baseline    Past medical history:   Past Medical History:   Diagnosis Date    Chronic back pain 2018    10/10 on the pain scale    Hypertension     Scoliosis     Type 2 diabetes mellitus without complication [facility])      Past Surgical History:   Procedure Laterality Date    FINGER TRIGGER RELEASE Right 08/14/2018    right thumb    HYSTERECTOMY (CERVIX STATUS UNKNOWN)      MA INCISE FINGER TENDON SHEATH Right 8/14/2018    RIGHT THUMB TRIGGER THUMB RELEASE performed by Chata Franco DO at [facility] [address] Right 2002    RCR    WISDOM TOOTH EXTRACTION         SUBJECTIVE:    Precautions:  Up with assistance, falls, alarm, and L knee karel  new o2 user    Social history: Patient lives alone in a two story home bedroom and bathroom 2nd floor full flight stairs with Rail  with 1 step  to enter without [address] owned: [facility],      [address] [place] [address]   How much difficulty turning over in bed?: A Lot  How much difficulty sitting down on / standing up from a chair with arms?: A Lot  How much difficulty moving from not assessed  not assessed    12 stairs with 1 HR with Supervision   ROM Within functional limits     Increase range of motion 10% of affected joints    Strength BUE:  refer to OT eval  RLE:  3+/5  LLE:  3+/5   Increase strength in affected mm groups by 1/3 grade   Balance Sitting EOB:  fair -  Dynamic Standing:  not assessed   Sitting EOB: fair +   Dynamic Standing: not assessed     Sitting EOB:  fair +  Dynamic Standing: fair      Patient is Alert & Oriented x person and place and follows directions but pleasantly confused  Sensation:  Patient  denies numbness/tingling   Edema:  no   Endurance: poor +    Vitals:  5L NC  Blood Pressure at rest  Blood Pressure during session    Heart Rate at rest  Heart Rate during session    SPO2 at rest 91%  SPO2 during session 90%     Patient education  Patient educated on role of Physical Therapy, risks of immobility, safety and plan of care, energy conservation,  importance of mobility while in hospital , importance and purpose of adaptive device and adjusted to proper height for the patient. , and safety      Patient response to education:   Pt verbalized understanding Pt demonstrated skill Pt requires further education in this area   Yes Partial Yes      Treatment:  Patient practiced and was instructed/facilitated in the following treatment: multiple rolls for hygiene due to patient being soiled, new chux. performed supine exercise. Therapeutic Exercises:  ankle pumps, quad sets, glut sets, heel slide, hip abduction/adduction, and straight leg raise x 10-12 reps    At end of session, patient in bed with alarm with call light and phone within reach,  all lines and tubes intact, nursing notified. Patient would benefit from continued skilled Physical Therapy to improve functional independence and quality of life.          Patient's/ family goals   rehab    Time in  318  Time out  335    Total Treatment Time  17 minutes    CPT codes:  Therapeutic activities (01686) 5 minutes  0 unit(s)  Therapeutic exercises (18769)   12 minutes  1 unit(s)     Stephon Sanches, [facility]    #[id number]

## 2022-09-12 NOTE — PROGRESS NOTES
PROGRESS NOTE  By Cynthia Jarvis M.D. [facility]  Dr. aFrhana Luciano M.D.,  Dr. Saranya Grullon M.D.,   Dr. Ottoniel Corcoran D.O.,  Dr. Veronica Machado M.D.,  Dr. Jessica Leger D.O.,  Rita Zhang  73 y.o.  female    Patient not seen and examined in face-to-face encounter secondary to Covid positive status in an effort to decrease transmission/exposure and to preserve existing PPE stockpile. Pertinent notes/labs reviewed. Discussed with healthcare team/nursing.   Physical examination per nursing exam       OBJECTIVE:    BP (!) 153/66   Pulse (!) 47   Temp 97.5 °F (36.4 °C) (Oral)   Resp 15   Ht 5' 2\" (1.575 m)   Wt 217 lb 3 oz (98.5 kg)   SpO2 100%   BMI 39.72 kg/m²       DATA:    Monitor data reviewed       Stool (measured) : 0 mL  Lab Results   Component Value Date/Time    WBC 3.6 09/12/2022 06:15 AM    RBC 3.56 09/12/2022 06:15 AM    HGB 7.2 09/12/2022 08:50 AM    HCT 27.2 09/12/2022 08:50 AM    MCV 75.6 09/12/2022 06:15 AM    MCH 19.7 09/12/2022 06:15 AM    MCHC 26.0 09/12/2022 06:15 AM    RDW 22.2 09/12/2022 06:15 AM     09/12/2022 06:15 AM    MPV 10.7 09/12/2022 06:15 AM     Lab Results   Component Value Date/Time     09/10/2022 05:58 AM    K 3.8 09/10/2022 05:58 AM    K 3.8 09/01/2022 08:24 PM    CL 97 09/10/2022 05:58 AM    CO2 41 09/10/2022 05:58 AM    BUN 9 09/10/2022 05:58 AM    CREATININE 0.9 09/12/2022 06:15 AM    CALCIUM 8.7 09/10/2022 05:58 AM    PROT 5.8 09/10/2022 05:58 AM    LABALBU 2.6 09/10/2022 05:58 AM    BILITOT 0.2 09/10/2022 05:58 AM    ALKPHOS 54 09/10/2022 05:58 AM    AST 12 09/10/2022 05:58 AM    ALT 10 09/10/2022 05:58 AM     No results found for: LIPASE  No results found for: AMYLASE      ASSESSMENT/PLAN:  Patient Active Problem List   Diagnosis    Chronic bilateral low back pain with bilateral sciatica    CHF with unknown LVEF ([facility] [address]    Primary hypertension    Type 2 diabetes mellitus without complication, without long-term current use of insulin (HCC)    Iron deficiency anemia due to chronic blood loss    Acute cystitis without hematuria    Acute on chronic congestive heart failure with right ventricular diastolic dysfunction (HCC)    Pulmonary HTN (HCC)    Tobacco abuse    Anemia    Acute on chronic respiratory failure with hypoxia and hypercapnia (HCC)    Fall    Morbid obesity (HCC)    Obstructive sleep apnea    Hypokalemia        1. Anemia -  - acute on chronic.    -Persistent downtrend  - will require EGD and Colonoscopy once cleared by cardiology and COVID infection resolved and breathing function improved. -Increased risk risk for cardiopulmonary collapse given CHF/Elevated trop/COVID infection and would consider endoscopy if emergent bleeding were to occur. -CT fairly unremarkable  -Continue PPI. 2.  CHF/elevated troponin  -Per admitting/cardiology     3. COVID-19 infection  -Per admitting/pulmonology      Oc Watts MD  9/12/2022  11:41 AM    NOTE:  This report was transcribed using voice recognition software. Every effort was made to ensure accuracy; however, inadvertent computerized transcription errors may be present.

## 2022-09-12 NOTE — CARE COORDINATION
9/12/2022 1327 CM note: POSITIVE COVID 9/8/22. ACP done. Per christine Marte, [facility] accepted patient for skilled rehab. For SNF, NO PRECERT, will need HENS and signed LES. Pt's son Geovanni Jarrell ([phone])[facility] need notified when pt is discharged. Awaiat cardiology input.  Eli PERERA

## 2022-09-12 NOTE — PROGRESS NOTES
NAME: Basilio Parra  MR:  [id number]  :   1950  DATE OF SERVICE:22    This is a face to face encounter with Basilio Parra 73 y.o. female on 22  Elements of this note, including Diagnosis,  Interval History, Past Medical/Surgical/Family/Social Histories, ROS, physical exam, and Assessment and Plan were copied and pasted from Previous. Updates have been made where noted and reflect current exam and medical decision making from the DOS of this encounter. CHIEF COMPLAINT     ID following for   Chief Complaint   Patient presents with    Sarika Sarkar at 930am was not able to get up. HISTORY OF PRESENT ILLNESS     Pt seen and examined  22  In bed has  no c/o f/c/nv/d  Had bradycardia last night   He has c/o sob on 5L o2 sat about 94%  Has dysuria   Stools are better  No diarrhea  Patient is tolerating medications. No reported adverse drug reactions. REVIEW OF SYSTEMS     As stated above in the chief complaint, otherwise negative.   CURRENT MEDICATIONS     Current Facility-Administered Medications:     amLODIPine (NORVASC) tablet 5 mg, 5 mg, Oral, Daily, Vipin Granados MD, 5 mg at 22 1726    lactobacillus (CULTURELLE) capsule 1 capsule, 1 capsule, Oral, Q12H, Darinel Singh MD, 1 capsule at 22 1726    fluconazole (DIFLUCAN) tablet 200 mg, 200 mg, Oral, Daily, Darinel Singh MD, 200 mg at 22 7495    zinc gluconate tablet 50 mg, 50 mg, Oral, Daily, Darinel Singh MD, 50 mg at 22 3120    ascorbic acid (VITAMIN C) tablet 500 mg, 500 mg, Oral, Daily, Darinel Singh MD, 500 mg at 22 0229    thiamine mononitrate tablet 100 mg, 100 mg, Oral, Daily, Darinel Singh MD, 100 mg at 22 2593    Vitamin D (CHOLECALCIFEROL) tablet 1,000 Units, 1,000 Units, Oral, Daily, Darinel Singh MD, 1,000 Units at 22 0924    dexamethasone (DECADRON) tablet 6 mg, 6 mg, Oral, Daily, Darinel Singh MD, 6 mg at 22 0924    baricitinib (OLUMIANT) tablet 4 mg, 4 mg, Oral, Daily, Silke Dean MD, 4 mg at 09/12/22 2049    acetaminophen (TYLENOL) tablet 650 mg, 650 mg, Oral, Q6H PRN, Pierce Braga MD    pantoprazole (PROTONIX) 40 mg in sodium chloride (PF) 10 mL injection, 40 mg, IntraVENous, Q12H, Erica Sims MD, 40 mg at 09/12/22 1727    enoxaparin Sodium (LOVENOX) injection 30 mg, 30 mg, SubCUTAneous, BID, Sean Metz MD, 30 mg at 09/12/22 0925    sodium chloride flush 0.9 % injection 5-40 mL, 5-40 mL, IntraVENous, 2 times per day, Jessica Clarke MD, 10 mL at 09/12/22 0926    sodium chloride flush 0.9 % injection 5-40 mL, 5-40 mL, IntraVENous, PRN, Jessica Clarke MD    0.9 % sodium chloride infusion, , IntraVENous, PRN, Jessica Clarke MD    heparin flush 100 UNIT/ML injection 100 Units, 1 mL, IntraVENous, 2 times per day, Jessica Clarke MD, 100 Units at 09/12/22 0926    heparin flush 100 UNIT/ML injection 100 Units, 1 mL, IntraCATHeter, PRN, Jessica Clarke MD    albuterol (PROVENTIL) nebulizer solution 2.5 mg, 2.5 mg, Nebulization, Q4H PRN, Sean Metz MD    naloxone Lanny Najera) injection 0.4 mg, 0.4 mg, IntraVENous, Q5 Min PRN, Sean Metz MD, 0.4 mg at 09/05/22 2047    bumetanide (BUMEX) tablet 1 mg, 1 mg, Oral, Daily, Velvet Schaefer MD, 1 mg at 09/06/22 1047    aspirin chewable tablet 81 mg, 81 mg, Oral, Daily, Pierce Braga MD, 81 mg at 09/12/22 0924    insulin lispro (HUMALOG) injection vial 0-8 Units, 0-8 Units, SubCUTAneous, TID WC, Silvana Braga MD    glucose chewable tablet 16 g, 4 tablet, Oral, PRN, Silvana Braga MD    dextrose bolus 10% 125 mL, 125 mL, IntraVENous, PRN **OR** dextrose bolus 10% 250 mL, 250 mL, IntraVENous, PRN, Silvana Braga MD    glucagon (rDNA) injection 1 mg, 1 mg, SubCUTAneous, PRN, Silvana Braga MD    dextrose 10 % infusion, , IntraVENous, Continuous PRN, Silvana Braga MD    ondansetron (ZOFRAN) injection 4 mg, 4 mg, IntraVENous, Q6H PRN **OR** ondansetron (ZOFRAN-ODT) disintegrating tablet 4 mg, 4 mg, Oral, Q8H PRN, Junie Braga MD, 4 mg at 22 1805    [Held by provider] lisinopril (PRINIVIL;ZESTRIL) tablet 20 mg, 20 mg, Oral, Daily, 20 mg at 22 0902 **AND** [DISCONTINUED] hydroCHLOROthiazide (HYDRODIURIL) tablet 12.5 mg, 12.5 mg, Oral, BID, Silvana Braga MD    metFORMIN (GLUCOPHAGE) tablet 1,000 mg, 1,000 mg, Oral, BID , Silvana Braga MD, 1,000 mg at 22 1729    ferrous sulfate (IRON 325) tablet 325 mg, 325 mg, Oral, BID , Silvana Braga MD, 325 mg at 22 1729    perflutren lipid microspheres (DEFINITY) injection 1.65 mg, 1.5 mL, IntraVENous, ONCE PRN, [facility] MICHELL Pineda  PHYSICAL EXAM     BP (!) 185/79   Pulse 74   Temp 97.6 °F (36.4 °C) (Oral)   Resp 18   Ht 5' 2\" (1.575 m)   Wt 217 lb 3 oz (98.5 kg)   SpO2 94%   BMI 39.72 kg/m²   Temp  Av.3 °F (36.3 °C)  Min: 96.9 °F (36.1 °C)  Max: 97.6 °F (36.4 °C)  Constitutional:  The patient is awake, alert    Skin:    Warm and dry. No rash   HEENT:     AT/NC  Chest:   No use of accessory muscles to breathe. Symmetrical expansion. Dec bs on    Cardiovascular:  S1 and S2 are rhythmic and regular. Had bradycardia   Abdomen:   Positive bowel sounds to auscultation. Benign to palpation. bs  Extremities:    edema. CNS    AAxO   Lines:          Peripherally Inserted Central Catheter:  Midline Single Lumen 94/37/90 Left Basilic (Active)   Criteria for Appropriate Use Long term IV/antibiotic administration 09/10/22 1146   Site Assessment Clean, dry & intact 09/10/22 1146   Phlebitis Assessment No symptoms 09/10/22 114   Infiltration Assessment 0 09/10/22 1146   Extremity Circumference (cm) 32 cm 22 1141   Lumen Color/Status Normal saline locked 09/10/22 [address] changed; Connections checked and tightened 09/10/22 1146   Alcohol Cap Used Yes 09/10/22 1146   Date of Last Dressing Change 22 1141   Dressing Type Transparent 09/10/22 1146   Dressing Status Clean, dry & intact 09/10/22 1146   Dressing Intervention Other (Comment) 09/09/22 1615     Central Venous Catheter:     Venous Access Device:     Peripheral Intravenous Line:  Peripheral IV 09/05/22 Left Antecubital (Active)   Site Assessment Clean, dry & intact 09/10/22 1146   Line Status Capped 09/10/22 [address] [facility] Connections checked and tightened 09/10/22 1146   Phlebitis Assessment No symptoms 09/10/22 1146   Infiltration Assessment 0 09/10/22 1146   Alcohol Cap Used Yes 09/10/22 1146   Dressing Status Clean, dry & intact 09/10/22 1146   Dressing Type Transparent 09/10/22 1146   Dressing Intervention Other (Comment) 09/10/22 0339        DIAGNOSTIC RESULTS   Radiology:    Recent Labs     09/10/22  0558 09/10/22  1720 09/12/22  0113 09/12/22  0615 09/12/22  0850   WBC 2.8*  --   --  3.6*  --    RBC 3.61  --   --  3.56  --    HGB 7.0*   < > 7.1* 7.0* 7.2*   HCT 27.4*   < > 27.0* 26.9* 27.2*   MCV 75.9*  --   --  75.6*  --    MCH 19.4*  --   --  19.7*  --    MCHC 25.5*  --   --  26.0*  --    RDW 21.7*  --   --  22.2*  --      --   --  217  --    MPV 10.5  --   --  10.7  --     < > = values in this interval not displayed.        Recent Labs     09/10/22  0558 09/12/22  0615     --    K 3.8  --    CL 97*  --    CO2 41*  --    BUN 9  --    CREATININE 0.8 0.9   GLUCOSE 89  --    PROT 5.8*  --    LABALBU 2.6*  --    CALCIUM 8.7  --    BILITOT 0.2  --    ALKPHOS 54  --    AST 12  --    ALT 10  --        Lab Results   Component Value Date    CRP 5.2 (H) 09/09/2022     Lab Results   Component Value Date    SEDRATE 35 (H) 09/09/2022     Recent Labs     09/10/22  0558   AST 12   ALT 10       Lab Results   Component Value Date/Time    CHOL 101 09/02/2022 05:40 AM    TRIG 85 09/02/2022 05:40 AM    HDL 38 09/02/2022 05:40 AM    LDLCALC 46 09/02/2022 05:40 AM    LABVLDL 17 09/02/2022 05:40 AM     No results found for: [address]    Microbiology:   No results for input(s): COVID19 in the last 72 hours. Lab Results   Component Value Date/Time    BC 5 Days no growth 09/06/2022 12:25 AM    BLOODCULT2 5 Days no growth 09/06/2022 12:25 AM          MRSA Culture Only   Date Value Ref Range Status   09/06/2022 Methicillin resistant Staph aureus not isolated  Final      No results for input(s): Giovany Figueroa in the last 72 hours. FINAL IMPRESSION    Pt had   Chief Complaint   Patient presents with    Marian Colunga at 930am was not able to get up. Admitted for CHF with unknown LVEF ([facility] [address] [I50.9]  On treatment for   Covid  Leukopenia  Asymptomatic bacteruria f/u urine cx neg   Pt has c/o dysuria repeat urine add antifungal     lactobacillus (CULTURELLE) capsule 1 capsule, Q12H  fluconazole (DIFLUCAN) tablet 200 mg, Daily  zinc gluconate tablet 50 mg, Daily  ascorbic acid (VITAMIN C) tablet 500 mg, Daily  thiamine mononitrate tablet 100 mg, Daily  Vitamin D (CHOLECALCIFEROL) tablet 1,000 Units, Daily  dexamethasone (DECADRON) tablet 6 mg, Daily  baricitinib (OLUMIANT) tablet 4 mg, Daily       To follow labs, cultures and imaging. Labs in am     Imaging and labs were reviewed per medical records. The patient was educated about the diagnosis, prognosis, indications, risks and benefits of treatment. An opportunity to ask questions was given to the patient/FAMILY. Thank you for involving me in the care of Genesis Delgado I will continue to follow. Please do not hesitate to call for any questions or concerns.     Electronically signed by Branden Mills MD on 9/12/2022 at 5:59 PM

## 2022-09-12 NOTE — PLAN OF CARE
Problem: Skin/Tissue Integrity  Goal: Absence of new skin breakdown  Description: 1. Monitor for areas of redness and/or skin breakdown  2. Assess vascular access sites hourly  3. Every 4-6 hours minimum:  Change oxygen saturation probe site  4. Every 4-6 hours:  If on nasal continuous positive airway pressure, respiratory therapy assess nares and determine need for appliance change or resting period.   9/12/2022 1524 by Xander Samuels RN  Outcome: Progressing     Problem: Safety - Adult  Goal: Free from fall injury  9/12/2022 1524 by Xander Samuels RN  Outcome: Progressing     Problem: Pain  Goal: Verbalizes/displays adequate comfort level or baseline comfort level  9/12/2022 1524 by Xander Samuels RN  Outcome: Progressing     Problem: Discharge Planning  Goal: Discharge to home or other facility with appropriate resources  9/12/2022 1524 by Xander Samuels RN  Outcome: Progressing     Problem: Chronic Conditions and Co-morbidities  Goal: Patient's chronic conditions and co-morbidity symptoms are monitored and maintained or improved  9/12/2022 1524 by Xander Samuels RN  Outcome: Progressing     Problem: Nutrition Deficit:  Goal: Optimize nutritional status  9/12/2022 1524 by Xander Samuels RN  Outcome: Progressing

## 2022-09-13 PROBLEM — R00.1 BRADYCARDIA: Status: ACTIVE | Noted: 2022-09-13

## 2022-09-13 LAB
ABO/RH: NORMAL
ALBUMIN SERPL-MCNC: 2.7 G/DL (ref 3.5–5.2)
ALP BLD-CCNC: 55 U/L (ref 35–104)
ALT SERPL-CCNC: 11 U/L (ref 0–32)
ANION GAP SERPL CALCULATED.3IONS-SCNC: 5 MMOL/L (ref 7–16)
ANISOCYTOSIS: ABNORMAL
ANTIBODY SCREEN: NORMAL
AST SERPL-CCNC: 15 U/L (ref 0–31)
BACTERIA: ABNORMAL /HPF
BASOPHILS ABSOLUTE: 0 E9/L (ref 0–0.2)
BASOPHILS RELATIVE PERCENT: 0 % (ref 0–2)
BILIRUB SERPL-MCNC: 0.3 MG/DL (ref 0–1.2)
BILIRUBIN URINE: NEGATIVE
BLOOD BANK DISPENSE STATUS: NORMAL
BLOOD BANK PRODUCT CODE: NORMAL
BLOOD, URINE: ABNORMAL
BPU ID: NORMAL
BUN BLDV-MCNC: 13 MG/DL (ref 6–23)
CALCIUM SERPL-MCNC: 8.5 MG/DL (ref 8.6–10.2)
CHLORIDE BLD-SCNC: 99 MMOL/L (ref 98–107)
CLARITY: CLEAR
CO2: 40 MMOL/L (ref 22–29)
COLOR: YELLOW
CREAT SERPL-MCNC: 0.8 MG/DL (ref 0.5–1)
DESCRIPTION BLOOD BANK: NORMAL
EOSINOPHILS ABSOLUTE: 0 E9/L (ref 0.05–0.5)
EOSINOPHILS RELATIVE PERCENT: 0 % (ref 0–6)
EPITHELIAL CELLS, UA: ABNORMAL /HPF
GFR AFRICAN AMERICAN: >60
GFR NON-AFRICAN AMERICAN: >60 ML/MIN/1.73
GLUCOSE BLD-MCNC: 94 MG/DL (ref 74–99)
GLUCOSE URINE: NEGATIVE MG/DL
HCT VFR BLD CALC: 26.4 % (ref 34–48)
HCT VFR BLD CALC: 27 % (ref 34–48)
HEMOGLOBIN: 7 G/DL (ref 11.5–15.5)
HEMOGLOBIN: 7.2 G/DL (ref 11.5–15.5)
HYPOCHROMIA: ABNORMAL
IRON SATURATION: 18 % (ref 15–50)
IRON: 50 MCG/DL (ref 37–145)
KETONES, URINE: NEGATIVE MG/DL
LEUKOCYTE ESTERASE, URINE: NEGATIVE
LYMPHOCYTES ABSOLUTE: 0.42 E9/L (ref 1.5–4)
LYMPHOCYTES RELATIVE PERCENT: 12.2 % (ref 20–42)
MCH RBC QN AUTO: 19.8 PG (ref 26–35)
MCHC RBC AUTO-ENTMCNC: 26.5 % (ref 32–34.5)
MCV RBC AUTO: 74.8 FL (ref 80–99.9)
METER GLUCOSE: 123 MG/DL (ref 74–99)
METER GLUCOSE: 187 MG/DL (ref 74–99)
MONOCYTES ABSOLUTE: 0.1 E9/L (ref 0.1–0.95)
MONOCYTES RELATIVE PERCENT: 2.6 % (ref 2–12)
NEUTROPHILS ABSOLUTE: 2.98 E9/L (ref 1.8–7.3)
NEUTROPHILS RELATIVE PERCENT: 85.2 % (ref 43–80)
NITRITE, URINE: NEGATIVE
NUCLEATED RED BLOOD CELLS: 0.9 /100 WBC
PDW BLD-RTO: 22.3 FL (ref 11.5–15)
PH UA: 7.5 (ref 5–9)
PLATELET # BLD: 237 E9/L (ref 130–450)
PMV BLD AUTO: 10.7 FL (ref 7–12)
POTASSIUM SERPL-SCNC: 4.4 MMOL/L (ref 3.5–5)
PRO-BNP: 9357 PG/ML (ref 0–125)
PROTEIN UA: ABNORMAL MG/DL
RBC # BLD: 3.53 E12/L (ref 3.5–5.5)
RBC UA: ABNORMAL /HPF (ref 0–2)
SODIUM BLD-SCNC: 144 MMOL/L (ref 132–146)
SPECIFIC GRAVITY UA: 1.01 (ref 1–1.03)
TOTAL IRON BINDING CAPACITY: 279 MCG/DL (ref 250–450)
TOTAL PROTEIN: 5.8 G/DL (ref 6.4–8.3)
UROBILINOGEN, URINE: 0.2 E.U./DL
WBC # BLD: 3.5 E9/L (ref 4.5–11.5)
WBC UA: ABNORMAL /HPF (ref 0–5)

## 2022-09-13 PROCEDURE — 1200000000 HC SEMI PRIVATE

## 2022-09-13 PROCEDURE — 97535 SELF CARE MNGMENT TRAINING: CPT

## 2022-09-13 PROCEDURE — 36592 COLLECT BLOOD FROM PICC: CPT

## 2022-09-13 PROCEDURE — C9113 INJ PANTOPRAZOLE SODIUM, VIA: HCPCS | Performed by: HOSPITALIST

## 2022-09-13 PROCEDURE — 82962 GLUCOSE BLOOD TEST: CPT

## 2022-09-13 PROCEDURE — 2580000003 HC RX 258: Performed by: INTERNAL MEDICINE

## 2022-09-13 PROCEDURE — 83540 ASSAY OF IRON: CPT

## 2022-09-13 PROCEDURE — 87088 URINE BACTERIA CULTURE: CPT

## 2022-09-13 PROCEDURE — 83550 IRON BINDING TEST: CPT

## 2022-09-13 PROCEDURE — 6370000000 HC RX 637 (ALT 250 FOR IP): Performed by: INTERNAL MEDICINE

## 2022-09-13 PROCEDURE — 85018 HEMOGLOBIN: CPT

## 2022-09-13 PROCEDURE — 36415 COLL VENOUS BLD VENIPUNCTURE: CPT

## 2022-09-13 PROCEDURE — 97530 THERAPEUTIC ACTIVITIES: CPT

## 2022-09-13 PROCEDURE — 86901 BLOOD TYPING SEROLOGIC RH(D): CPT

## 2022-09-13 PROCEDURE — 85014 HEMATOCRIT: CPT

## 2022-09-13 PROCEDURE — 80053 COMPREHEN METABOLIC PANEL: CPT

## 2022-09-13 PROCEDURE — 6360000002 HC RX W HCPCS: Performed by: INTERNAL MEDICINE

## 2022-09-13 PROCEDURE — 36430 TRANSFUSION BLD/BLD COMPNT: CPT

## 2022-09-13 PROCEDURE — 6370000000 HC RX 637 (ALT 250 FOR IP): Performed by: SPECIALIST

## 2022-09-13 PROCEDURE — 6360000002 HC RX W HCPCS: Performed by: HOSPITALIST

## 2022-09-13 PROCEDURE — 86923 COMPATIBILITY TEST ELECTRIC: CPT

## 2022-09-13 PROCEDURE — P9016 RBC LEUKOCYTES REDUCED: HCPCS

## 2022-09-13 PROCEDURE — 85025 COMPLETE CBC W/AUTO DIFF WBC: CPT

## 2022-09-13 PROCEDURE — 86900 BLOOD TYPING SEROLOGIC ABO: CPT

## 2022-09-13 PROCEDURE — 83880 ASSAY OF NATRIURETIC PEPTIDE: CPT

## 2022-09-13 PROCEDURE — 2580000003 HC RX 258: Performed by: HOSPITALIST

## 2022-09-13 PROCEDURE — A4216 STERILE WATER/SALINE, 10 ML: HCPCS | Performed by: HOSPITALIST

## 2022-09-13 PROCEDURE — 81001 URINALYSIS AUTO W/SCOPE: CPT

## 2022-09-13 PROCEDURE — 86850 RBC ANTIBODY SCREEN: CPT

## 2022-09-13 PROCEDURE — 94660 CPAP INITIATION&MGMT: CPT

## 2022-09-13 PROCEDURE — 2700000000 HC OXYGEN THERAPY PER DAY

## 2022-09-13 PROCEDURE — 99233 SBSQ HOSP IP/OBS HIGH 50: CPT | Performed by: INTERNAL MEDICINE

## 2022-09-13 RX ORDER — LISINOPRIL 20 MG/1
40 TABLET ORAL DAILY
Status: DISCONTINUED | OUTPATIENT
Start: 2022-09-13 | End: 2022-09-14 | Stop reason: HOSPADM

## 2022-09-13 RX ORDER — SODIUM CHLORIDE 9 MG/ML
INJECTION, SOLUTION INTRAVENOUS PRN
Status: DISCONTINUED | OUTPATIENT
Start: 2022-09-13 | End: 2022-09-14 | Stop reason: HOSPADM

## 2022-09-13 RX ADMIN — PANTOPRAZOLE SODIUM 40 MG: 40 INJECTION, POWDER, FOR SOLUTION INTRAVENOUS at 17:41

## 2022-09-13 RX ADMIN — ENOXAPARIN SODIUM 30 MG: 100 INJECTION SUBCUTANEOUS at 23:17

## 2022-09-13 RX ADMIN — ASPIRIN 81 MG CHEWABLE TABLET 81 MG: 81 TABLET CHEWABLE at 10:17

## 2022-09-13 RX ADMIN — METFORMIN HYDROCHLORIDE 1000 MG: 1000 TABLET ORAL at 17:40

## 2022-09-13 RX ADMIN — METFORMIN HYDROCHLORIDE 1000 MG: 1000 TABLET ORAL at 10:17

## 2022-09-13 RX ADMIN — FERROUS SULFATE TAB 325 MG (65 MG ELEMENTAL FE) 325 MG: 325 (65 FE) TAB at 10:17

## 2022-09-13 RX ADMIN — Medication 50 MG: at 10:16

## 2022-09-13 RX ADMIN — FLUCONAZOLE 200 MG: 100 TABLET ORAL at 10:16

## 2022-09-13 RX ADMIN — HEPARIN 100 UNITS: 100 SYRINGE at 10:16

## 2022-09-13 RX ADMIN — Medication 10 ML: at 23:18

## 2022-09-13 RX ADMIN — Medication 1 CAPSULE: at 10:32

## 2022-09-13 RX ADMIN — ENOXAPARIN SODIUM 30 MG: 100 INJECTION SUBCUTANEOUS at 10:16

## 2022-09-13 RX ADMIN — LISINOPRIL 40 MG: 20 TABLET ORAL at 10:21

## 2022-09-13 RX ADMIN — HEPARIN 100 UNITS: 100 SYRINGE at 23:17

## 2022-09-13 RX ADMIN — Medication 100 MG: at 10:17

## 2022-09-13 RX ADMIN — FERROUS SULFATE TAB 325 MG (65 MG ELEMENTAL FE) 325 MG: 325 (65 FE) TAB at 17:40

## 2022-09-13 RX ADMIN — BUMETANIDE 1 MG: 1 TABLET ORAL at 10:17

## 2022-09-13 RX ADMIN — Medication 1000 UNITS: at 10:17

## 2022-09-13 RX ADMIN — PANTOPRAZOLE SODIUM 40 MG: 40 INJECTION, POWDER, FOR SOLUTION INTRAVENOUS at 05:14

## 2022-09-13 RX ADMIN — BARICITINIB 4 MG: 2 TABLET, FILM COATED ORAL at 10:16

## 2022-09-13 RX ADMIN — OXYCODONE HYDROCHLORIDE AND ACETAMINOPHEN 500 MG: 500 TABLET ORAL at 10:17

## 2022-09-13 RX ADMIN — Medication 10 ML: at 10:16

## 2022-09-13 RX ADMIN — DEXAMETHASONE 6 MG: 6 TABLET ORAL at 10:16

## 2022-09-13 RX ADMIN — AMLODIPINE BESYLATE 5 MG: 5 TABLET ORAL at 10:17

## 2022-09-13 RX ADMIN — Medication 1 CAPSULE: at 23:18

## 2022-09-13 ASSESSMENT — PAIN SCALES - GENERAL
PAINLEVEL_OUTOF10: 0
PAINLEVEL_OUTOF10: 0

## 2022-09-13 NOTE — PROGRESS NOTES
PROGRESS NOTE  By Tamela Hilario M.D. [facility]  Dr. Clare Knight M.D.,  Dr. Ashly Ravi M.D.,   Dr. Dmitry Nicholas D.O.,  Dr. Camilo Sparrow M.D.,  Dr. Karmen Araiza D.O.,  Xiao Bragg, Eitan Martinezashi  73 y.o.  female    Patient not seen and examined in face-to-face encounter secondary to Covid positive status in an effort to decrease transmission/exposure and to preserve existing PPE stockpile. Pertinent notes/labs reviewed. Discussed with healthcare team/nursing. Physical examination per nursing exam       OBJECTIVE:    BP (!) 166/76   Pulse 56   Temp 97.7 °F (36.5 °C) (Oral)   Resp 16   Ht 5' 2\" (1.575 m)   Wt 222 lb 4.8 oz (100.8 kg)   SpO2 94%   BMI 40.66 kg/m²       DATA:    Monitor data reviewed -sinus rhythm noted.     Stool (measured) : 0 mL  Lab Results   Component Value Date/Time    WBC 3.5 09/13/2022 06:00 AM    RBC 3.53 09/13/2022 06:00 AM    HGB 7.0 09/13/2022 06:00 AM    HCT 26.4 09/13/2022 06:00 AM    MCV 74.8 09/13/2022 06:00 AM    MCH 19.8 09/13/2022 06:00 AM    MCHC 26.5 09/13/2022 06:00 AM    RDW 22.3 09/13/2022 06:00 AM     09/13/2022 06:00 AM    MPV 10.7 09/13/2022 06:00 AM     Lab Results   Component Value Date/Time     09/13/2022 06:00 AM    K 4.4 09/13/2022 06:00 AM    K 3.8 09/01/2022 08:24 PM    CL 99 09/13/2022 06:00 AM    CO2 40 09/13/2022 06:00 AM    BUN 13 09/13/2022 06:00 AM    CREATININE 0.8 09/13/2022 06:00 AM    CALCIUM 8.5 09/13/2022 06:00 AM    PROT 5.8 09/13/2022 06:00 AM    LABALBU 2.7 09/13/2022 06:00 AM    BILITOT 0.3 09/13/2022 06:00 AM    ALKPHOS 55 09/13/2022 06:00 AM    AST 15 09/13/2022 06:00 AM    ALT 11 09/13/2022 06:00 AM     No results found for: LIPASE  No results found for: AMYLASE      ASSESSMENT/PLAN:  Patient Active Problem List   Diagnosis    Chronic bilateral low back pain with bilateral sciatica    CHF with unknown LVEF ([facility] [address]    Primary hypertension    Type 2 diabetes mellitus without complication, without long-term current use of insulin (HCC)    Iron deficiency anemia due to chronic blood loss    Acute cystitis without hematuria    Acute on chronic congestive heart failure with right ventricular diastolic dysfunction (HCC)    Pulmonary HTN (HCC)    Tobacco abuse    Anemia    Acute on chronic respiratory failure with hypoxia and hypercapnia (HCC)    Fall    Morbid obesity (HCC)    Obstructive sleep apnea    Hypokalemia    Bradycardia     1. Anemia -  - acute on chronic.    -Persistent downtrend  - will require EGD and Colonoscopy once cleared by cardiology and COVID infection resolved and breathing function improved. -Increased risk risk for cardiopulmonary collapse given CHF/Elevated trop/COVID infection and would consider endoscopy if emergent bleeding were to occur. -CT fairly unremarkable  -Continue PPI. 2.  CHF/elevated troponin  -Per admitting/cardiology     3. COVID-19 infection  -Per admitting/pulmonology    Ester Fields MD  9/13/2022  12:45 PM    NOTE:  This report was transcribed using voice recognition software. Every effort was made to ensure accuracy; however, inadvertent computerized transcription errors may be present.

## 2022-09-13 NOTE — CARE COORDINATION
9/13/2022 1356 CM note: POSITIVE COVID 9/8/22. ACP done. Per christine Marte, [facility] accepted patient for skilled rehab and bed is available. For SNF, NO PRECERT, will need HENS-initiated and signed LES. [facility] cannot provide olumiant and request hospital send it with patient. Per [address] [place] [address] outpt pharmacy,they can not supply olumiant but can supply paxlovid-WILL NEED Rx sent to [address] [place] [address] outpt pharmacy. Pt's son Flo Quintero ([phone])[facility] need notified when pt is discharged.  Eli PERERA

## 2022-09-13 NOTE — PROGRESS NOTES
Physical Therapy        [id number]    Patient unavailable for physical therapy treatment due to patient refusal. Will attempt session at later time/date.      Nupur Sarkar PTA  #[phone]

## 2022-09-13 NOTE — PROGRESS NOTES
Comprehensive Nutrition Assessment    Type and Reason for Visit:  Reassess    Nutrition Recommendations/Plan:   Start Ensure HP BID with varied PO. Will continue to monitor      Malnutrition Assessment:  Malnutrition Status: At risk for malnutrition (Comment) (09/13/22 1414)    Context:  Chronic Illness     Findings of the 6 clinical characteristics of malnutrition:  Energy Intake:  Mild decrease in energy intake (Comment)  Weight Loss:  Unable to assess     Body Fat Loss:  Unable to assess (COVID)     Muscle Mass Loss:  Unable to assess (COVID)    Fluid Accumulation:  No significant fluid accumulation     Strength:  Not Performed    Nutrition Assessment:    Pt remains at nutritional risk w/ varied PO likely 2/2 adm d/t SOB s/p fall w/ resp failure, CHF/elevated troponin, COVID+. Note anemia pending EGD/Cscope when improved. Hx DM. Restart ONS BID & monitor. Nutrition Related Findings:    A&Ox4, soft abd, +BS, loss appetite, +2 pitting edema, -7.7L I/Os   Wound Type: None       Current Nutrition Intake & Therapies:    Average Meal Intake: 26-50%  Average Supplements Intake: None Ordered  ADULT DIET; Regular    Anthropometric Measures:  Height: 5' 2\" (157.5 cm)  Ideal Body Weight (IBW): 110 lbs (50 kg)    Admission Body Weight: 243 lb 3.2 oz (110.3 kg) (9/2 bed scale)  Current Body Weight: 222 lb 4.8 oz (100.8 kg) (9/13 bed scale), 220.9 % IBW.     Current BMI (kg/m2): 40.6  Usual Body Weight:  (lack measured wt hx per EMR)  Weight Adjustment For: No Adjustment  BMI Categories: Obese Class 3 (BMI 40.0 or greater)    Estimated Daily Nutrient Needs:  Energy Requirements Based On: Formula  Weight Used for Energy Requirements: Current  Energy (kcal/day): 5715-5620  Weight Used for Protein Requirements: Ideal  Protein (g/day): 75-85  Method Used for Fluid Requirements: 1 ml/kcal  Fluid (ml/day): 6513-5694    Nutrition Diagnosis:   Inadequate oral intake related to impaired respiratory function as evidenced by intake 0-25%    Nutrition Interventions:   Food and/or Nutrient Delivery: Continue Current Diet, Start Oral Nutrition Supplement (Ensure HP BID)  Nutrition Education/Counseling: No recommendation at this time  Coordination of Nutrition Care: Continue to monitor while inpatient    Goals:  Previous Goal Met: Progressing toward Goal(s)  Goals: PO intake 75% or greater    Nutrition Monitoring and Evaluation:   Behavioral-Environmental Outcomes: None Identified  Food/Nutrient Intake Outcomes: Supplement Intake, Food and Nutrient Intake  Physical Signs/Symptoms Outcomes: Biochemical Data, Nutrition Focused Physical Findings, Skin, Weight, Chewing or Swallowing, GI Status, Fluid Status or Edema    Discharge Planning:     Too soon to determine     Yuni Loya MS, RD, LD  Contact: [phone]

## 2022-09-13 NOTE — PROGRESS NOTES
Subjective: The patient is awake and alert. No problems overnight. Denies chest pain, angina, and dyspnea. Denies abdominal pain. Tolerating diet. No nausea or vomiting.     Objective:    BP (!) 166/76   Pulse 56   Temp 97.7 °F (36.5 °C) (Oral)   Resp 16   Ht 5' 2\" (1.575 m)   Wt 222 lb 4.8 oz (100.8 kg)   SpO2 94%   BMI 40.66 kg/m²   Head and Neck: normal atraumatic, no neck masses, normal thyroid, no jvd  Heart:  regular rate and rhythm, S1, S2 normal, no murmur, click, rub or gallop  Lungs:  chest clear, no wheezing, rales, normal symmetric air entry, no chest wall deformities or tenderness  Abd: soft, non-tender, without masses or organomegaly  Extrem:  No clubbing, cyanosis, or edema  Neuro:Normal, no focal neurological deficit     CBC with Differential:    Lab Results   Component Value Date/Time    WBC 3.5 09/13/2022 06:00 AM    RBC 3.53 09/13/2022 06:00 AM    HGB 7.0 09/13/2022 06:00 AM    HCT 26.4 09/13/2022 06:00 AM     09/13/2022 06:00 AM    MCV 74.8 09/13/2022 06:00 AM    MCH 19.8 09/13/2022 06:00 AM    MCHC 26.5 09/13/2022 06:00 AM    RDW 22.3 09/13/2022 06:00 AM    NRBC 0.9 09/13/2022 06:00 AM    LYMPHOPCT 12.2 09/13/2022 06:00 AM    MONOPCT 2.6 09/13/2022 06:00 AM    BASOPCT 0.0 09/13/2022 06:00 AM    MONOSABS 0.10 09/13/2022 06:00 AM    LYMPHSABS 0.42 09/13/2022 06:00 AM    EOSABS 0.00 09/13/2022 06:00 AM    BASOSABS 0.00 09/13/2022 06:00 AM     CMP:    Lab Results   Component Value Date/Time     09/13/2022 06:00 AM    K 4.4 09/13/2022 06:00 AM    K 3.8 09/01/2022 08:24 PM    CL 99 09/13/2022 06:00 AM    CO2 40 09/13/2022 06:00 AM    BUN 13 09/13/2022 06:00 AM    CREATININE 0.8 09/13/2022 06:00 AM    GFRAA >60 09/13/2022 06:00 AM    LABGLOM >60 09/13/2022 06:00 AM    GLUCOSE 94 09/13/2022 06:00 AM    PROT 5.8 09/13/2022 06:00 AM    LABALBU 2.7 09/13/2022 06:00 AM    CALCIUM 8.5 09/13/2022 06:00 AM    BILITOT 0.3 09/13/2022 06:00 AM    ALKPHOS 55 09/13/2022 06:00 AM    AST 15 09/13/2022 06:00 AM    ALT 11 09/13/2022 06:00 AM        Assessment:    Principal Problem:    Acute on chronic respiratory failure with hypoxia and hypercapnia (HCC)  Active Problems:    CHF with unknown LVEF (HCC)    Primary hypertension    Type 2 diabetes mellitus without complication, without long-term current use of insulin (HCC)    Iron deficiency anemia due to chronic blood loss    Acute cystitis without hematuria    Acute on chronic congestive heart failure with right ventricular diastolic dysfunction ([address]    Pulmonary HTN ([address]    Tobacco abuse    Anemia    Fall    Morbid obesity ([address]    Obstructive sleep apnea    Hypokalemia    Bradycardia    Chronic bilateral low back pain with bilateral sciatica  Resolved Problems:    * No resolved hospital problems. *        Plan: We will transfuse 1 unit of packed RBCs today. Patient refused transfusion until discussed this with her son.   I tried to contact the son who is visiting his mother to explain the situation for him and her and the son reported to me that the mother is more comfortable with it now and she will proceed with the transfusion  Blood pressure is still elevated patient started amlodipine 5 mg p.o. daily of beta-blockers  Will increase lisinopril to 40 mg p.o. daily  Bradycardia borderline off beta-blockers we will continue monitoring  I spent more than 35 minutes explaining the situation to the patient and her son and reviewing the chart  Leia Aquino MD  10:00 AM  9/13/2022

## 2022-09-13 NOTE — PROGRESS NOTES
Dr. Shelley Madden notified that the patient is refusing blood until her son comes in. Per the nurse, its unclear when he will be here.  Per Omi, keep patient here until son comes

## 2022-09-13 NOTE — PROGRESS NOTES
NAME: Bryon Ascencio  MR:  [id number]  :   1950  DATE OF SERVICE:22    This is a face to face encounter with Bryon Ascencio 73 y.o. female on 22  Elements of this note, including Diagnosis,  Interval History, Past Medical/Surgical/Family/Social Histories, ROS, physical exam, and Assessment and Plan were copied and pasted from Previous. Updates have been made where noted and reflect current exam and medical decision making from the DOS of this encounter. CHIEF COMPLAINT     ID following for   Chief Complaint   Patient presents with    [facility] Memory at 930am was not able to get up. HISTORY OF PRESENT ILLNESS     Pt seen and examined  22  In bed has  c/o being cold no dysuria    5L o2 sat about 94%   Wbc3.5  cr0.8  No diarrhea  Patient is tolerating medications. No reported adverse drug reactions. REVIEW OF SYSTEMS     As stated above in the chief complaint, otherwise negative.   CURRENT MEDICATIONS     Current Facility-Administered Medications:     lisinopril (PRINIVIL;ZESTRIL) tablet 40 mg, 40 mg, Oral, Daily, Swetha Braga MD    amLODIPine (NORVASC) tablet 5 mg, 5 mg, Oral, Daily, Edelmira Alcazar MD, 5 mg at 22 1017    lactobacillus (CULTURELLE) capsule 1 capsule, 1 capsule, Oral, Q12H, Doris Corrigan MD, 1 capsule at 22 2230    fluconazole (DIFLUCAN) tablet 200 mg, 200 mg, Oral, Daily, Doris Corrigan MD, 200 mg at 22 1016    zinc gluconate tablet 50 mg, 50 mg, Oral, Daily, Doris Corrigan MD, 50 mg at 22 1016    ascorbic acid (VITAMIN C) tablet 500 mg, 500 mg, Oral, Daily, Doris Corrigan MD, 500 mg at 22 1017    thiamine mononitrate tablet 100 mg, 100 mg, Oral, Daily, Doris Corrigan MD, 100 mg at 22 1017    Vitamin D (CHOLECALCIFEROL) tablet 1,000 Units, 1,000 Units, Oral, Daily, Doris Corrigan MD, 1,000 Units at 22 1017    dexamethasone (DECADRON) tablet 6 mg, 6 mg, Oral, Daily, Doris Corrigan MD, 6 mg at 22 1016    baricitinib (OLUMIANT) tablet 4 mg, 4 mg, Oral, Daily, Helena Reynoso MD, 4 mg at 09/13/22 1016    acetaminophen (TYLENOL) tablet 650 mg, 650 mg, Oral, Q6H PRN, Linus Mcclain MD, 650 mg at 09/12/22 2229    pantoprazole (PROTONIX) 40 mg in sodium chloride (PF) 10 mL injection, 40 mg, IntraVENous, Q12H, Nakita Deal MD, 40 mg at 09/13/22 0514    enoxaparin Sodium (LOVENOX) injection 30 mg, 30 mg, SubCUTAneous, BID, Nini Rodriguez MD, 30 mg at 09/13/22 1016    sodium chloride flush 0.9 % injection 5-40 mL, 5-40 mL, IntraVENous, 2 times per day, Casandra Burroughs MD, 10 mL at 09/13/22 1016    sodium chloride flush 0.9 % injection 5-40 mL, 5-40 mL, IntraVENous, PRN, Casandra Burroughs MD    0.9 % sodium chloride infusion, , IntraVENous, PRN, Casandra Burroughs MD    heparin flush 100 UNIT/ML injection 100 Units, 1 mL, IntraVENous, 2 times per day, Casandra Burroughs MD, 100 Units at 09/13/22 1016    heparin flush 100 UNIT/ML injection 100 Units, 1 mL, IntraCATHeter, PRN, Casandra Burroughs MD    albuterol (PROVENTIL) nebulizer solution 2.5 mg, 2.5 mg, Nebulization, Q4H PRN, Nini Rodriguez MD    naloxone [facility]) injection 0.4 mg, 0.4 mg, IntraVENous, Q5 Min PRN, Nini Rodriguez MD, 0.4 mg at 09/05/22 2047    bumetanide (BUMEX) tablet 1 mg, 1 mg, Oral, Daily, Shashi Peña MD, 1 mg at 09/13/22 1017    aspirin chewable tablet 81 mg, 81 mg, Oral, Daily, Silvana Braga MD, 81 mg at 09/13/22 1017    insulin lispro (HUMALOG) injection vial 0-8 Units, 0-8 Units, SubCUTAneous, TID WC, Silvana Braga MD    glucose chewable tablet 16 g, 4 tablet, Oral, PRN, Silvana Braga MD    dextrose bolus 10% 125 mL, 125 mL, IntraVENous, PRN **OR** dextrose bolus 10% 250 mL, 250 mL, IntraVENous, PRN, Silvana Braga MD    glucagon (rDNA) injection 1 mg, 1 mg, SubCUTAneous, PRN, Silvana Braga MD    dextrose 10 % infusion, , IntraVENous, Continuous PRN, Linus Mcclain MD ondansetron (ZOFRAN) injection 4 mg, 4 mg, IntraVENous, Q6H PRN **OR** ondansetron (ZOFRAN-ODT) disintegrating tablet 4 mg, 4 mg, Oral, Q8H PRN, Ottoniel Braga MD, 4 mg at 22 1805    metFORMIN (GLUCOPHAGE) tablet 1,000 mg, 1,000 mg, Oral, BID , Silvana Braga MD, 1,000 mg at 22 1017    ferrous sulfate (IRON 325) tablet 325 mg, 325 mg, Oral, BID , Silvana Braga MD, 325 mg at 22 1017    perflutren lipid microspheres (DEFINITY) injection 1.65 mg, 1.5 mL, IntraVENous, ONCE PRN, Yvonne Pineda PA-C  PHYSICAL EXAM     BP (!) 166/76   Pulse 56   Temp 97.7 °F (36.5 °C) (Oral)   Resp 16   Ht 5' 2\" (1.575 m)   Wt 222 lb 4.8 oz (100.8 kg)   SpO2 94%   BMI 40.66 kg/m²   Temp  Av.8 °F (36.6 °C)  Min: 97.6 °F (36.4 °C)  Max: 98.1 °F (36.7 °C)  Constitutional:  The patient is awake, alert  in bed supine inc bmi   Skin:    Warm and dry. HEENT:     AT/NC  Chest:    Symmetrical expansion. Dec bs on  5Lo2  Cardiovascular:  S1 and S2 are rhythmic and regular. Abdomen:   Positive bowel sounds to auscultation. Benign to palpation. Bs nt   Extremities:    edema. Ble   CNS    AAxO   Lines:          Peripherally Inserted Central Catheter:  Midline Single Lumen 36/16 Left Basilic (Active)   Criteria for Appropriate Use Long term IV/antibiotic administration 09/10/22 1146   Site Assessment Clean, dry & intact 09/10/22 1146   Phlebitis Assessment No symptoms 09/10/22 1146   Infiltration Assessment 0 09/10/22 1146   Extremity Circumference (cm) 32 cm 22 1141   Lumen Color/Status Normal saline locked 09/10/22 [address] changed; Connections checked and tightened 09/10/22 1146   Alcohol Cap Used Yes 09/10/22 1146   Date of Last Dressing Change 22 1141   Dressing Type Transparent 09/10/22 1146   Dressing Status Clean, dry & intact 09/10/22 1146   Dressing Intervention Other (Comment) 22 1615     Central Venous Catheter:     Venous Access Device: Peripheral Intravenous Line:  Peripheral IV 09/05/22 Left Antecubital (Active)   Site Assessment Clean, dry & intact 09/10/22 1146   Line Status Capped 09/10/22 [address] [facility] Connections checked and tightened 09/10/22 1146   Phlebitis Assessment No symptoms 09/10/22 1146   Infiltration Assessment 0 09/10/22 1146   Alcohol Cap Used Yes 09/10/22 1146   Dressing Status Clean, dry & intact 09/10/22 1146   Dressing Type Transparent 09/10/22 1146   Dressing Intervention Other (Comment) 09/10/22 0339        DIAGNOSTIC RESULTS   Radiology:    Recent Labs     09/12/22  0615 09/12/22  0850 09/12/22  1915 09/13/22  0330 09/13/22  0600   WBC 3.6*  --   --   --  3.5*   RBC 3.56  --   --   --  3.53   HGB 7.0*   < > 7.1* 7.2* 7.0*   HCT 26.9*   < > 26.8* 27.0* 26.4*   MCV 75.6*  --   --   --  74.8*   MCH 19.7*  --   --   --  19.8*   MCHC 26.0*  --   --   --  26.5*   RDW 22.2*  --   --   --  22.3*     --   --   --  237   MPV 10.7  --   --   --  10.7    < > = values in this interval not displayed. Recent Labs     09/12/22  0615 09/13/22  0600   NA  --  144   K  --  4.4   CL  --  99   CO2  --  40*   BUN  --  13   CREATININE 0.9 0.8   GLUCOSE  --  94   PROT  --  5.8*   LABALBU  --  2.7*   CALCIUM  --  8.5*   BILITOT  --  0.3   ALKPHOS  --  55   AST  --  15   ALT  --  11       Lab Results   Component Value Date    CRP 5.2 (H) 09/09/2022     Lab Results   Component Value Date    SEDRATE 35 (H) 09/09/2022     Recent Labs     09/13/22  0600   AST 15   ALT 11       Lab Results   Component Value Date/Time    CHOL 101 09/02/2022 05:40 AM    TRIG 85 09/02/2022 05:40 AM    HDL 38 09/02/2022 05:40 AM    LDLCALC 46 09/02/2022 05:40 AM    LABVLDL 17 09/02/2022 05:40 AM     No results found for: VITD25    Microbiology:   No results for input(s): COVID19 in the last 72 hours.     Lab Results   Component Value Date/Time    BC 5 Days no growth 09/06/2022 12:25 AM    BLOODCULT2 5 Days no growth 09/06/2022 12:25 AM          MRSA Culture Only   Date Value Ref Range Status   09/06/2022 Methicillin resistant Staph aureus not isolated  Final         FINAL IMPRESSION    Pt had   Chief Complaint   Patient presents with    [place] at 930am was not able to get up. Admitted for CHF with unknown LVEF ([facility] [I50.9]  On treatment for   Covid  Leukopenia follow   Asymptomatic bacteruria f/u urine cx neg   Pt has c/o dysuria repeat urine add antifungal   Anemia gi following      lactobacillus (CULTURELLE) capsule 1 capsule, Q12H  fluconazole (DIFLUCAN) tablet 200 mg, Daily  zinc gluconate tablet 50 mg, Daily  ascorbic acid (VITAMIN C) tablet 500 mg, Daily  thiamine mononitrate tablet 100 mg, Daily  Vitamin D (CHOLECALCIFEROL) tablet 1,000 Units, Daily  dexamethasone (DECADRON) tablet 6 mg, Daily  baricitinib (OLUMIANT) tablet 4 mg, Daily       Imaging and labs were reviewed per medical records. An opportunity to ask questions was given to the patient/FAMILY. Thank you for involving me in the care of Vianey Wu I will continue to follow. Please do not hesitate to call for any questions or concerns.     Electronically signed by Dawson Medel MD on 9/13/2022 at 10:21 AM

## 2022-09-13 NOTE — PROGRESS NOTES
OT BEDSIDE TREATMENT NOTE      Date:2022  Patient Name: Purnima Retana  MRN: [id number]  : 1950  Room: [address] [place] [address]     Evaluating OT: Mathew Arriaga OTR/L; [id number]  Referring Provider/Orders/Date:    OT eval and treat  Start:  22,   End:  22,   ONE TIME,   Standing Count:  1 Occurrences,   Rena Valencia MD      Diagnosis:   1. Fall, initial encounter    2.  Congestive heart failure, unspecified HF chronicity, unspecified heart failure type [facility])          Surgery: none      Pertinent Medical History:        Past Medical History        Past Medical History:   Diagnosis Date    Chronic back pain 2018     10/10 on the pain scale    Hypertension      Scoliosis      Type 2 diabetes mellitus without complication [facility])               Past Surgical History         Past Surgical History:   Procedure Laterality Date    FINGER TRIGGER RELEASE Right 2018     right thumb    HYSTERECTOMY (CERVIX STATUS UNKNOWN)        PA INCISE FINGER TENDON SHEATH Right 2018     RIGHT THUMB TRIGGER THUMB RELEASE performed by Moody Blum DO at [facility] Right 2002     RCR    WISDOM TOOTH EXTRACTION               Precautions:  Fall Risk, covid+ with droplet, 2L O2    Recommended placement: subacute    Assessment of current deficits     [x] Functional mobility           [x]ADLs           [x] Strength                  []Cognition     [x] Functional transfers         [x] IADLs         [x] Safety Awareness   [x]Endurance     [] Fine Coordination                        [x] Balance      [] Vision/perception   []Sensation       [x]Gross Motor Coordination            [] ROM           [] Delirium                   [] Motor Control      OT PLAN OF CARE   OT POC based on physician orders, patient diagnosis and results of clinical assessment     Frequency/Duration 1-3 days/wk for 2 weeks PRN   Specific OT Treatment Interventions to include:   * Instruction/training on adapted ADL techniques and AE recommendations to increase functional independence within precautions       * Training on energy conservation strategies, correct breathing pattern and techniques to improve independence/tolerance for self-care routine  * Functional transfer/mobility training/DME recommendations for increased independence, safety, and fall prevention  * Patient/Family education to increase follow through with safety techniques and functional independence  * Recommendation of environmental modifications for increased safety with functional transfers/mobility and ADLs  * Therapeutic exercise to improve motor endurance, ROM, and functional strength for ADLs/functional transfers  * Therapeutic activities to facilitate/challenge dynamic balance, stand tolerance for increased safety and independence with ADLs  * Therapeutic activities to facilitate gross/fine motor skills for increased independence with ADLs  * Neuro-muscular re-education: facilitation of righting/equilibrium reactions, midline orientation, scapular stability/mobility, normalization of muscle tone, and facilitation of volitional active controled movement  * Positioning to improve skin integrity, interaction with environment and functional independence    Recommended Adaptive Equipment: TBD at rehab  ; pt requesting rollator       Home Living: alone; single family home, 2 story, bedroom and bathroom on 2nd floor, 1 step to enter with no rail, tub shower. Son does live in the area and he can assist at DC.         Equipment owned: wheeled walker, cane     Prior Level of Function: Pt states she was independent with ADLs and IADLs; ambulated with no device     Driving: yes   Enjoys: watching TV, retired, mother recently passed away and they did a lot together, 2 grandchildren; pt's cousin passed this week     Pain Level: none           Cognition: A&O: 4/4; Follows 2 step directions              Memory: fair Sequencing: fair               Problem solving: fair-               Judgement/safety: fair-     AMPAC   AM-PAC Daily Activity Inpatient   How much help for putting on and taking off regular lower body clothing?: Total  How much help for Bathing?: A Lot  How much help for Toileting?: Total  How much help for putting on and taking off regular upper body clothing?: A Lot  How much help for taking care of personal grooming?: A Little  How much help for eating meals?: A Little  AM-PAC Inpatient Daily Activity Raw Score: 12  AM-PAC Inpatient ADL T-Scale Score : 30.6  ADL Inpatient CMS 0-100% Score: 66.57  ADL Inpatient CMS G-Code Modifier : CL                   Functional Assessment:     Initial Eval Status  Date: 9/4/22 Re-eval status Date: 9/9/22 Treatment Status  Date: 9/13/22 STGs = LTGs  Time frame: 10-14 days   Feeding Minimal Assist   Set up and SBA with simulation, but NPO on eval.   Pt required assist for container management; pt able to bring cup to mouth to drink with HOB elevated Independent    Grooming Minimal Assist  SBA sitting EOB for oral care, face wash and hand wash Pt able to wash face with SBA; pt's hair was braided thus hair care deferred  Independent    UB Dressing Maximal Assist  to doff and don hospital gown  Mod A with gown management Mod A for gown management to change gowns with assist to thread monitor lines through gown and tie/untie back of gown  Independent    LB Dressing Dependent  Dep with socks from sitting EOB  Dep to adjust socks when long seated in bed  Minimal Assist    Bathing Dependent to sponge bathe while supine, side lying and seated EOB. Dependent to apply deodorant and baby powder Max A with sponge bathing from sitting/standing with assist for LB and buttocks/faina care.   Mod A; pt able to wash UB with assist to wash back; pt able to complete pericare; pt required assist for rear hygiene; distal LE's deferred 2* to pt completed earlier and bathing completed d/t incontinence of urine  Minimal Assist    Toileting Dependent  Dep with all parts on 3:1  Max A; pt is incontinent of urine on arrival; pt able to complete pericare when seated EOB and on commode; assist for rear hygiene upon standing from [facility]  Minimal Assist    Bed Mobility  Supine to sit: Moderate Assist   Sit to supine: Moderate Assist x 2  Supine to sit: Min Assist   Sit to supine: Moderate Assist for LB management Min A to support R LE to EOB and UB to sitting; scooting at SBA; mod A to support LE's to supine; HOB elevated ; alarm on  Supine to sit: Supervision   Sit to supine: Supervision    Functional Transfers Moderate Assist x 2 from EOB to wheeled walker with bed height elevated x 2 reps. Transfer training with verbal cues for hand placement throughout session to improve safety. Min A with walker from bed and 3:1 with impulsivity overall. Min A for sit to stand transfers to/from EOB and to/from [facility] Minimal Assist    Functional Mobility Moderate Assist x 2 with wheeled walker to improve balance to side step towards head of bed, verbal cues for walker sequence and safety. Mod A with walker for safety, line management and walker management for short distance functional mobility throughout the room to simulate house hold distances.      Min A with FWW for less than household distances EOB<>BSC; assist for line management Minimal Assist    Balance Sitting:     Static: fair at EOB    Dynamic: fair minus  Standing: fair minus with wheeled walker Sitting:     Static: fair at EOB    Dynamic: fair minus  Standing: fair minus with wheeled walker Sitting:     Static: fair at EOB    Dynamic: fair/fair minus  Standing: fair minus with wheeled walker      Activity Tolerance Fair minus Fair- with SOB and 4L O2 ranging from 84-91% with ADLs and activity Fair minus; pt limited d/t fatigue and SOB with exertion; no pulse ox in room; nsg aware  good    Visual/  Perceptual Glasses: yes                            Hand Dominance: right Hearing: WFL   Sensation:  No c/o numbness or tingling  Tone: WFL   Edema: yes, B LE's     Comments: Patient cleared by nursing staff. Upon arrival pt supine in bed. Pt agreeable to OT tx session with initial encouragement provided. Pt educated with regards to bed mobility, hand placement, safety awareness, static sitting balance,  standing balance, transfer training, functional mobility, ADL retraining,  grooming tasks, UE/LE bathing, LE/UE dressing, toileting/hygiene, ECT's. At end of session pt seated in bed with HOB elevated  with all lines and tubes intact, call light within reach. Overall, pt demonstrated decreased independence and safety during completion of ADL/functional transfers/mobility tasks. Pt would benefit from continued skilled OT to increase safety and independence with completion of ADL/IADL tasks for functional independence and quality of life. Pt required cues and education as noted above for safe facilitation and completion of tasks. Therapist provided skilled monitoring of patient's response during treatment session. Prior to and at the end of session, environmental modifications /monitor line management completed for patients safety and efficiency of treatment session. Overall, patient demonstrates moderate difficulties with completion of BADLs and IADLs. Factors contributing to these difficulties include decreased trunk flexion, SOB with exertion, incontinence of urine, decreased endurance, and generalized weakness. As noted above, patient likely to benefit from further OT intervention to increase independence, safety, and overall quality of life. Treatment:     Bed mobility: Facilitated bed mobility with cues for proper body mechanics and sequencing to prepare for ADL completion. Functional transfers: Facilitated transfers to/from EOB and to/from [facility] with cues for body alignment, safety and hand placement.   ADL completion: Self-care retraining for the above-mentioned

## 2022-09-14 VITALS
RESPIRATION RATE: 20 BRPM | TEMPERATURE: 97.9 F | SYSTOLIC BLOOD PRESSURE: 152 MMHG | HEIGHT: 62 IN | BODY MASS INDEX: 41.94 KG/M2 | OXYGEN SATURATION: 96 % | DIASTOLIC BLOOD PRESSURE: 96 MMHG | WEIGHT: 227.9 LBS | HEART RATE: 57 BPM

## 2022-09-14 LAB
ANION GAP SERPL CALCULATED.3IONS-SCNC: 4 MMOL/L (ref 7–16)
BUN BLDV-MCNC: 13 MG/DL (ref 6–23)
CALCIUM SERPL-MCNC: 8.8 MG/DL (ref 8.6–10.2)
CHLORIDE BLD-SCNC: 97 MMOL/L (ref 98–107)
CO2: 42 MMOL/L (ref 22–29)
CREAT SERPL-MCNC: 0.9 MG/DL (ref 0.5–1)
GFR AFRICAN AMERICAN: >60
GFR NON-AFRICAN AMERICAN: >60 ML/MIN/1.73
GLUCOSE BLD-MCNC: 96 MG/DL (ref 74–99)
HCT VFR BLD CALC: 30.2 % (ref 34–48)
HEMOGLOBIN: 8.1 G/DL (ref 11.5–15.5)
METER GLUCOSE: 160 MG/DL (ref 74–99)
METER GLUCOSE: 96 MG/DL (ref 74–99)
POTASSIUM SERPL-SCNC: 4.9 MMOL/L (ref 3.5–5)
SODIUM BLD-SCNC: 143 MMOL/L (ref 132–146)
URINE CULTURE, ROUTINE: NORMAL

## 2022-09-14 PROCEDURE — C9113 INJ PANTOPRAZOLE SODIUM, VIA: HCPCS | Performed by: HOSPITALIST

## 2022-09-14 PROCEDURE — 6370000000 HC RX 637 (ALT 250 FOR IP): Performed by: CLINICAL NURSE SPECIALIST

## 2022-09-14 PROCEDURE — 6370000000 HC RX 637 (ALT 250 FOR IP): Performed by: INTERNAL MEDICINE

## 2022-09-14 PROCEDURE — 99239 HOSP IP/OBS DSCHRG MGMT >30: CPT | Performed by: INTERNAL MEDICINE

## 2022-09-14 PROCEDURE — 6360000002 HC RX W HCPCS: Performed by: HOSPITALIST

## 2022-09-14 PROCEDURE — A4216 STERILE WATER/SALINE, 10 ML: HCPCS | Performed by: HOSPITALIST

## 2022-09-14 PROCEDURE — 85014 HEMATOCRIT: CPT

## 2022-09-14 PROCEDURE — 82962 GLUCOSE BLOOD TEST: CPT

## 2022-09-14 PROCEDURE — 2700000000 HC OXYGEN THERAPY PER DAY

## 2022-09-14 PROCEDURE — 2580000003 HC RX 258: Performed by: INTERNAL MEDICINE

## 2022-09-14 PROCEDURE — 6360000002 HC RX W HCPCS: Performed by: INTERNAL MEDICINE

## 2022-09-14 PROCEDURE — 80048 BASIC METABOLIC PNL TOTAL CA: CPT

## 2022-09-14 PROCEDURE — 94660 CPAP INITIATION&MGMT: CPT

## 2022-09-14 PROCEDURE — 36415 COLL VENOUS BLD VENIPUNCTURE: CPT

## 2022-09-14 PROCEDURE — 2580000003 HC RX 258: Performed by: HOSPITALIST

## 2022-09-14 PROCEDURE — 6370000000 HC RX 637 (ALT 250 FOR IP): Performed by: SPECIALIST

## 2022-09-14 PROCEDURE — 85018 HEMOGLOBIN: CPT

## 2022-09-14 RX ORDER — LISINOPRIL 40 MG/1
40 TABLET ORAL DAILY
Qty: 30 TABLET | Refills: 3 | Status: SHIPPED | OUTPATIENT
Start: 2022-09-15 | End: 2022-10-17 | Stop reason: SDUPTHER

## 2022-09-14 RX ORDER — FLUCONAZOLE 200 MG/1
200 TABLET ORAL DAILY
Qty: 2 TABLET | Refills: 0 | Status: SHIPPED | OUTPATIENT
Start: 2022-09-15 | End: 2022-09-17

## 2022-09-14 RX ORDER — CEFDINIR 300 MG/1
300 CAPSULE ORAL EVERY 12 HOURS SCHEDULED
Status: DISCONTINUED | OUTPATIENT
Start: 2022-09-14 | End: 2022-09-14 | Stop reason: HOSPADM

## 2022-09-14 RX ORDER — DEXAMETHASONE 6 MG/1
6 TABLET ORAL DAILY
Qty: 4 TABLET | Refills: 0 | Status: SHIPPED | OUTPATIENT
Start: 2022-09-15 | End: 2022-09-19

## 2022-09-14 RX ORDER — ASCORBIC ACID 500 MG
500 TABLET ORAL DAILY
Qty: 30 TABLET | Refills: 3 | Status: SHIPPED | OUTPATIENT
Start: 2022-09-15 | End: 2022-11-02

## 2022-09-14 RX ORDER — LACTOBACILLUS RHAMNOSUS GG 10B CELL
1 CAPSULE ORAL EVERY 12 HOURS
Qty: 42 CAPSULE | Refills: 0 | Status: SHIPPED | OUTPATIENT
Start: 2022-09-14 | End: 2022-10-05

## 2022-09-14 RX ORDER — NIRMATRELVIR AND RITONAVIR 300-100 MG
KIT ORAL
Qty: 30 TABLET | Refills: 0 | Status: SHIPPED | OUTPATIENT
Start: 2022-09-14 | End: 2022-09-19

## 2022-09-14 RX ORDER — AMLODIPINE BESYLATE 5 MG/1
5 TABLET ORAL DAILY
Qty: 30 TABLET | Refills: 3 | Status: SHIPPED | OUTPATIENT
Start: 2022-09-15 | End: 2022-10-17 | Stop reason: SDUPTHER

## 2022-09-14 RX ORDER — THIAMINE MONONITRATE (VIT B1) 100 MG
100 TABLET ORAL DAILY
Qty: 10 TABLET | Refills: 0 | Status: SHIPPED | OUTPATIENT
Start: 2022-09-15 | End: 2022-09-25

## 2022-09-14 RX ORDER — CEFDINIR 300 MG/1
300 CAPSULE ORAL EVERY 12 HOURS SCHEDULED
Qty: 10 CAPSULE | Refills: 0 | Status: SHIPPED | OUTPATIENT
Start: 2022-09-14 | End: 2022-09-19

## 2022-09-14 RX ADMIN — LISINOPRIL 40 MG: 20 TABLET ORAL at 08:54

## 2022-09-14 RX ADMIN — Medication 100 MG: at 08:54

## 2022-09-14 RX ADMIN — HEPARIN 100 UNITS: 100 SYRINGE at 08:55

## 2022-09-14 RX ADMIN — ENOXAPARIN SODIUM 30 MG: 100 INJECTION SUBCUTANEOUS at 08:54

## 2022-09-14 RX ADMIN — Medication 50 MG: at 08:53

## 2022-09-14 RX ADMIN — Medication 1000 UNITS: at 08:54

## 2022-09-14 RX ADMIN — FLUCONAZOLE 200 MG: 100 TABLET ORAL at 08:54

## 2022-09-14 RX ADMIN — Medication 10 ML: at 08:55

## 2022-09-14 RX ADMIN — BARICITINIB 4 MG: 2 TABLET, FILM COATED ORAL at 08:53

## 2022-09-14 RX ADMIN — FERROUS SULFATE TAB 325 MG (65 MG ELEMENTAL FE) 325 MG: 325 (65 FE) TAB at 08:54

## 2022-09-14 RX ADMIN — Medication 1 CAPSULE: at 12:35

## 2022-09-14 RX ADMIN — OXYCODONE HYDROCHLORIDE AND ACETAMINOPHEN 500 MG: 500 TABLET ORAL at 08:54

## 2022-09-14 RX ADMIN — PANTOPRAZOLE SODIUM 40 MG: 40 INJECTION, POWDER, FOR SOLUTION INTRAVENOUS at 05:15

## 2022-09-14 RX ADMIN — ASPIRIN 81 MG CHEWABLE TABLET 81 MG: 81 TABLET CHEWABLE at 08:54

## 2022-09-14 RX ADMIN — BUMETANIDE 1 MG: 1 TABLET ORAL at 08:54

## 2022-09-14 RX ADMIN — DEXAMETHASONE 6 MG: 6 TABLET ORAL at 08:54

## 2022-09-14 RX ADMIN — CEFDINIR 300 MG: 300 CAPSULE ORAL at 12:35

## 2022-09-14 RX ADMIN — METFORMIN HYDROCHLORIDE 1000 MG: 1000 TABLET ORAL at 08:53

## 2022-09-14 RX ADMIN — AMLODIPINE BESYLATE 5 MG: 5 TABLET ORAL at 08:54

## 2022-09-14 ASSESSMENT — PAIN SCALES - GENERAL: PAINLEVEL_OUTOF10: 0

## 2022-09-14 NOTE — CARE COORDINATION
9/14/2022 1138 CM note: POSITIVE COVID 9/8/22. ACP done. Per christine Marte, [facility] accepted patient for skilled rehab and bed is available. Arrangements made for patient to be transported to [facility] at [facility] via [employer]. NO PRECERT, SW completed HENS. Pt, pt's son Meño Felipe and SNF liaison Kylee informed of arrangements. Adelaida Parks given facility phone # also. [address] outpt pharmacy to fill paxlovid Rx and it is to be sent with patient-nursing informed. N-N [phone].  Eli PERERA

## 2022-09-14 NOTE — PLAN OF CARE
Problem: Skin/Tissue Integrity  Goal: Absence of new skin breakdown  Description: 1. Monitor for areas of redness and/or skin breakdown  2. Assess vascular access sites hourly  3. Every 4-6 hours minimum:  Change oxygen saturation probe site  4. Every 4-6 hours:  If on nasal continuous positive airway pressure, respiratory therapy assess nares and determine need for appliance change or resting period.   Outcome: Progressing     Problem: Safety - Adult  Goal: Free from fall injury  Outcome: Progressing     Problem: Pain  Goal: Verbalizes/displays adequate comfort level or baseline comfort level  Outcome: Progressing     Problem: Discharge Planning  Goal: Discharge to home or other facility with appropriate resources  Outcome: Progressing     Problem: ABCDS Injury Assessment  Goal: Absence of physical injury  Outcome: Progressing     Problem: Chronic Conditions and Co-morbidities  Goal: Patient's chronic conditions and co-morbidity symptoms are monitored and maintained or improved  Outcome: Progressing     Problem: Nutrition Deficit:  Goal: Optimize nutritional status  Outcome: Progressing

## 2022-09-14 NOTE — DISCHARGE SUMMARY
Physician Discharge Summary     Patient ID:  Gabriela Ley  [id number]  73 y.o.  1950    Admit date: 9/1/2022    Discharge date and time: 9/14/2022    Admission Diagnoses: Principal Problem:    Acute on chronic respiratory failure with hypoxia and hypercapnia (HCC)  Active Problems:    CHF with unknown LVEF ([address]    Primary hypertension    Type 2 diabetes mellitus without complication, without long-term current use of insulin (HCC)    Iron deficiency anemia due to chronic blood loss    Acute cystitis without hematuria    Acute on chronic congestive heart failure with right ventricular diastolic dysfunction ([address]    Pulmonary HTN ([address]    Tobacco abuse    Anemia    Fall    Morbid obesity ([address]    Obstructive sleep apnea    Hypokalemia    Bradycardia    Chronic bilateral low back pain with bilateral sciatica  Resolved Problems:    * No resolved hospital problems. *      Discharge Diagnoses: COVID-19 infection  Rest as an admit diagnosis    Consults: cardiology and ID and GI        Hospital Course:   Patient was admitted to the hospital with shortness of breath and fatigue. Was found to have acute CHF patient was treated appropriately. During her hospital stay the patient has to be transferred to ICU because of worsening respiratory failure. She got better and transferred back to telemetry. During her hospital stay in ICU she had to use noninvasive ventilation. Patient is diagnosed with hypercapnic respiratory failure requiring noninvasive ventilation and the patient has also severe sleep apnea diagnosed by significant bradycardia and desaturation of O2 at night. Patient was put on BiPAP. Patient would need to continue on the BiPAP in the nursing home. She might need sleep study later on.   During preparing her to be discharged to the nursing home for rehabilitation the patient was diagnosed with COVID 19 infection by rapid test on Friday, September 2, 2022 patient was seen by ID and was started on appropriate medications. Patient symptoms has been improving. Patient also was found to have guaiac positive stools and anemia that progressed ended up warranting transfusion with a hemoglobin of 7 patient received 1 unit of blood today she is feeling better ID decided to switch her to back COVID to finish her treatment for COVID as outpatient. Patient will have follow-up with GI to have upper endoscopy and colonoscopy. BP (!) 152/96   Pulse 57   Temp 97.9 °F (36.6 °C) (Oral)   Resp 20   Ht 5' 2\" (1.575 m)   Wt 227 lb 14.4 oz (103.4 kg)   SpO2 96%   BMI 41.68 kg/m²   Head and Neck: normal atraumatic, no neck masses, normal thyroid, no jvd  Heart:  regular rate and rhythm, S1, S2 normal, no murmur, click, rub or gallop  Lungs:  chest clear, no wheezing, rales, normal symmetric air entry, , no chest wall deformities or tenderness  Abd: soft, non-tender, without masses or organomegaly  Extrem:  No clubbing, cyanosis, or edema  Neuro:Normal,     Condition on discharge: Stable    Disposition: SNF    Patient Instructions:   Current Discharge Medication List        START taking these medications    Details   nirmatrelvir/ritonavir (PAXLOVID, 300/100,) 20 x 150 MG & 10 x 100MG TBPK Take 3 tablets (two 150 mg nirmatrelvir and one 100 mg ritonavir tablets) by mouth every 12 hours for 5 days. Qty: 30 tablet, Refills: 0      lisinopril (PRINIVIL;ZESTRIL) 40 MG tablet Take 1 tablet by mouth daily  Qty: 30 tablet, Refills: 3      amLODIPine (NORVASC) 5 MG tablet Take 1 tablet by mouth daily  Qty: 30 tablet, Refills: 3      lactobacillus (CULTURELLE) CAPS capsule Take 1 capsule by mouth in the morning and 1 capsule in the evening. Do all this for 21 days.   Qty: 42 capsule, Refills: 0      fluconazole (DIFLUCAN) 200 MG tablet Take 1 tablet by mouth daily for 2 doses  Qty: 2 tablet, Refills: 0      dexamethasone (DECADRON) 6 MG tablet Take 1 tablet by mouth daily for 4 doses  Qty: 4 tablet, Refills: 0      ascorbic acid (VITAMIN C) 500 MG tablet Take 1 tablet by mouth daily  Qty: 30 tablet, Refills: 3      thiamine mononitrate (THIAMINE) 100 MG tablet Take 1 tablet by mouth daily for 10 days  Qty: 10 tablet, Refills: 0      cefdinir (OMNICEF) 300 MG capsule Take 1 capsule by mouth every 12 hours for 5 days  Qty: 10 capsule, Refills: 0           CONTINUE these medications which have NOT CHANGED    Details   glipiZIDE (GLUCOTROL) 5 MG tablet       omeprazole (PRILOSEC) 20 MG delayed release capsule TAKE ONE CAPSULE BY MOUTH EVERY DAY, NEED TO MAKE APPOINTMENT  Refills: 1      aspirin 325 MG tablet Take 325 mg by mouth daily      HYDROcodone-acetaminophen (NORCO) 5-325 MG per tablet Take 1 tablet by mouth every 6 hours as needed for Pain.      metFORMIN (GLUCOPHAGE) 500 MG tablet Take 500 mg by mouth 2 times daily (with meals)      quinapril-hydrochlorothiazide (ACCURETIC) 20-12.5 MG per tablet Take 1 tablet by mouth 2 times daily      topiramate (TOPAMAX) 100 MG tablet Take 200 mg by mouth nightly      Insulin Detemir (LEVEMIR FLEXTOUCH SC) Inject 10 mg into the skin daily           STOP taking these medications       atenolol (TENORMIN) 50 MG tablet Comments:   Reason for Stopping:             Activity: activity as tolerated  Diet: regular diet    Follow-up with PCP in 1 week.     Note that over 30 minutes was spent in preparing discharge papers, discussing discharge with patient, medication review, etc.      Electronically signed by Chirag Palacios MD on 9/14/2022 at 11:34 AM

## 2022-09-14 NOTE — PROGRESS NOTES
NAME: Gudelia Jacobsen  MR:  [id number]  :   1950  DATE OF SERVICE:22    This is a face to face encounter with Gudelia Jacobsen 73 y.o. female on 22  Elements of this note, including Diagnosis,  Interval History, Past Medical/Surgical/Family/Social Histories, ROS, physical exam, and Assessment and Plan were copied and pasted from Previous. Updates have been made where noted and reflect current exam and medical decision making from the DOS of this encounter. CHIEF COMPLAINT     ID following for   Chief Complaint   Patient presents with    Krystal Diss at 930am was not able to get up. HISTORY OF PRESENT ILLNESS     Pt seen and examined  22  In bed- watching TV  Still with some dysuria , + cough  On 4 liters nasal canula. Feeling a little better today       2022  In bed has  c/o being cold no dysuria    5L o2 sat about 94%   Wbc3.5  cr0.8  No diarrhea  Patient is tolerating medications. No reported adverse drug reactions. REVIEW OF SYSTEMS     As stated above in the chief complaint, otherwise negative.   CURRENT MEDICATIONS     Current Facility-Administered Medications:     lisinopril (PRINIVIL;ZESTRIL) tablet 40 mg, 40 mg, Oral, Daily, Swetha Braga MD, 40 mg at 22 0854    0.9 % sodium chloride infusion, , IntraVENous, PRN, Swetha Braga MD    amLODIPine (NORVASC) tablet 5 mg, 5 mg, Oral, Daily, Melva Mcmahon MD, 5 mg at 22 0854    lactobacillus (CULTURELLE) capsule 1 capsule, 1 capsule, Oral, Q12H, Aliza Mueller MD, 1 capsule at 22 2318    fluconazole (DIFLUCAN) tablet 200 mg, 200 mg, Oral, Daily, Aliza Mueller MD, 200 mg at 22 0854    zinc gluconate tablet 50 mg, 50 mg, Oral, Daily, Aliza Mueller MD, 50 mg at 22 9997    ascorbic acid (VITAMIN C) tablet 500 mg, 500 mg, Oral, Daily, Aliza Mueller MD, 500 mg at 22 0854    thiamine mononitrate tablet 100 mg, 100 mg, Oral, Daily, Aliza Mueller MD, 100 mg at 22 6794    Vitamin D (CHOLECALCIFEROL) tablet 1,000 Units, 1,000 Units, Oral, Daily, Kishore Liu MD, 1,000 Units at 09/14/22 0854    dexamethasone (DECADRON) tablet 6 mg, 6 mg, Oral, Daily, Kishore Liu MD, 6 mg at 09/14/22 0854    baricitinib (OLUMIANT) tablet 4 mg, 4 mg, Oral, Daily, Kishore Liu MD, 4 mg at 09/14/22 0853    acetaminophen (TYLENOL) tablet 650 mg, 650 mg, Oral, Q6H PRN, Ko Braga MD, 650 mg at 09/12/22 2229    pantoprazole (PROTONIX) 40 mg in sodium chloride (PF) 10 mL injection, 40 mg, IntraVENous, Q12H, Graeme Lopez MD, 40 mg at 09/14/22 0515    enoxaparin Sodium (LOVENOX) injection 30 mg, 30 mg, SubCUTAneous, BID, Angela Glynn MD, 30 mg at 09/14/22 0854    sodium chloride flush 0.9 % injection 5-40 mL, 5-40 mL, IntraVENous, 2 times per day, Becca Briscoe MD, 10 mL at 09/14/22 0855    sodium chloride flush 0.9 % injection 5-40 mL, 5-40 mL, IntraVENous, PRN, Becca Briscoe MD    0.9 % sodium chloride infusion, , IntraVENous, PRN, Becca Briscoe MD    heparin flush 100 UNIT/ML injection 100 Units, 1 mL, IntraVENous, 2 times per day, Becca Briscoe MD, 100 Units at 09/14/22 0855    heparin flush 100 UNIT/ML injection 100 Units, 1 mL, IntraCATHeter, PRN, Becca Briscoe MD    albuterol (PROVENTIL) nebulizer solution 2.5 mg, 2.5 mg, Nebulization, Q4H PRN, Angela Glynn MD    naloxone [facility]) injection 0.4 mg, 0.4 mg, IntraVENous, Q5 Min PRN, Angela Glynn MD, 0.4 mg at 09/05/22 2047    bumetanide (BUMEX) tablet 1 mg, 1 mg, Oral, Daily, Jaycee Bragg MD, 1 mg at 09/14/22 0854    aspirin chewable tablet 81 mg, 81 mg, Oral, Daily, Silvana Braga MD, 81 mg at 09/14/22 0854    insulin lispro (HUMALOG) injection vial 0-8 Units, 0-8 Units, SubCUTAneous, TID WC, Silvana Braga MD    glucose chewable tablet 16 g, 4 tablet, Oral, PRN, Silvana Braga MD    dextrose bolus 10% 125 mL, 125 mL, IntraVENous, PRN **OR** dextrose bolus 10% 250 mL, 250 mL, IntraVENous, PRN, Silvana Braga MD    glucagon (rDNA) injection 1 mg, 1 mg, SubCUTAneous, PRN, Silvana Braga MD    dextrose 10 % infusion, , IntraVENous, Continuous PRN, Silvana Braga MD    ondansetron (ZOFRAN) injection 4 mg, 4 mg, IntraVENous, Q6H PRN **OR** ondansetron (ZOFRAN-ODT) disintegrating tablet 4 mg, 4 mg, Oral, Q8H PRN, Samara Brgaa MD, 4 mg at 22 1805    metFORMIN (GLUCOPHAGE) tablet 1,000 mg, 1,000 mg, Oral, BID , Silvana Braga MD, 1,000 mg at 22 0853    ferrous sulfate (IRON 325) tablet 325 mg, 325 mg, Oral, BID , Silvana Braga MD, 325 mg at 22 0854    perflutren lipid microspheres (DEFINITY) injection 1.65 mg, 1.5 mL, IntraVENous, ONCE PRN, [facility] MICHELL Pineda  PHYSICAL EXAM     BP (!) 152/96   Pulse 57   Temp 97.9 °F (36.6 °C) (Oral)   Resp 20   Ht 5' 2\" (1.575 m)   Wt 227 lb 14.4 oz (103.4 kg)   SpO2 96%   BMI 41.68 kg/m²   Temp  Av.4 °F (36.9 °C)  Min: 97.9 °F (36.6 °C)  Max: 99 °F (37.2 °C)  Constitutional:  The patient is awake, alert  in bed supine inc bmi   Skin:    Warm and dry. HEENT:     AT/NC  Chest:    Symmetrical expansion. Dec bs on  4L nasal canula. Cardiovascular:  S1 and S2 are rhythmic and regular. Abdomen:   Positive bowel sounds to auscultation. Benign to palpation. Bs nt   Extremities:    edema. Ble   CNS    AAxO   Lines:    Peripherally Inserted Central Catheter:  Midline Single Lumen 58/38/85 Left Basilic (Active)   Criteria for Appropriate Use Long term IV/antibiotic administration 09/10/22 1146   Site Assessment Clean, dry & intact 09/10/22 1146   Phlebitis Assessment No symptoms 09/10/22 1146   Infiltration Assessment 0 09/10/22 1146   Extremity Circumference (cm) 32 cm 22 1141   Lumen Color/Status Normal saline locked 09/10/22 [address] changed; Connections checked and tightened 09/10/22 1146   Alcohol Cap Used Yes 09/10/22 1146   Date of Last Dressing Change 22 09/08/22 1141   Dressing Type Transparent 09/10/22 1146   Dressing Status Clean, dry & intact 09/10/22 1146   Dressing Intervention Other (Comment) 09/09/22 1615        Peripheral Intravenous Line:  Peripheral IV 09/05/22 Left Antecubital (Active)   Site Assessment Clean, dry & intact 09/10/22 1146   Line Status Capped 09/10/22 [address] [facility] Connections checked and tightened 09/10/22 1146   Phlebitis Assessment No symptoms 09/10/22 1146   Infiltration Assessment 0 09/10/22 1146   Alcohol Cap Used Yes 09/10/22 1146   Dressing Status Clean, dry & intact 09/10/22 1146   Dressing Type Transparent 09/10/22 1146   Dressing Intervention Other (Comment) 09/10/22 0339        DIAGNOSTIC RESULTS   Radiology:    Recent Labs     09/12/22  0615 09/12/22  0850 09/13/22  0330 09/13/22  0600 09/14/22  0700   WBC 3.6*  --   --  3.5*  --    RBC 3.56  --   --  3.53  --    HGB 7.0*   < > 7.2* 7.0* 8.1*   HCT 26.9*   < > 27.0* 26.4* 30.2*   MCV 75.6*  --   --  74.8*  --    MCH 19.7*  --   --  19.8*  --    MCHC 26.0*  --   --  26.5*  --    RDW 22.2*  --   --  22.3*  --      --   --  237  --    MPV 10.7  --   --  10.7  --     < > = values in this interval not displayed.        Recent Labs     09/12/22  0615 09/13/22  0600 09/14/22  0700   NA  --  144 143   K  --  4.4 4.9   CL  --  99 97*   CO2  --  40* 42*   BUN  --  13 13   CREATININE 0.9 0.8 0.9   GLUCOSE  --  94 96   PROT  --  5.8*  --    LABALBU  --  2.7*  --    CALCIUM  --  8.5* 8.8   BILITOT  --  0.3  --    ALKPHOS  --  55  --    AST  --  15  --    ALT  --  11  --        Lab Results   Component Value Date    CRP 5.2 (H) 09/09/2022     Lab Results   Component Value Date    SEDRATE 35 (H) 09/09/2022     Recent Labs     09/13/22  0600   AST 15   ALT 11       Lab Results   Component Value Date/Time    CHOL 101 09/02/2022 05:40 AM    TRIG 85 09/02/2022 05:40 AM    HDL 38 09/02/2022 05:40 AM    LDLCALC 46 09/02/2022 05:40 AM    LABVLDL 17 09/02/2022 05:40 AM     Microbiology: Lab Results   Component Value Date/Time    BC 5 Days no growth 09/06/2022 12:25 AM    BLOODCULT2 5 Days no growth 09/06/2022 12:25 AM     MRSA Culture Only   Date Value Ref Range Status   09/06/2022 Methicillin resistant Staph aureus not isolated  Final        FINAL IMPRESSION    Pt had   Chief Complaint   Patient presents with    Latonya Ledezma at 930am was not able to get up. Admitted for CHF with unknown LVEF ([facility] [I50.9]  On treatment for   Covid  Leukopenia follow   Asymptomatic bacteruria f/u urine cx neg   Pt has c/o dysuria- persists. Anemia gi following        Plan:   Continue diflucan- few more days  Start Omnicef for 5 days  Continue vitamins. Stop Baricitinib on discharge   Continue decadron for total of 10 days  Wean oxygen as tolerated. Monitor labs  For discharge today   Med rec- for Paxlovid for 5 days    Imaging and labs were reviewed per medical records. An opportunity to ask questions was given to the patient/FAMILY. Thank you for involving me in the care of Surjit Fu I will continue to follow. Please do not hesitate to call for any questions or concerns. Electronically signed by SERGIO Mtz on 9/14/2022 at 11:00 AM     As above    The patient has COVID-19 infection. To prevent self exposure and cross contamination care will be coordinated with patient's care team. All relevant records and diagnostic tests were reviewed in EMR, including laboratory results and imaging. I have directed the medical decision making, and  POC with the NURSE PRACTITIONER, SERIGO Mtz. Imaging and labs were reviewed. Pt was d/c before rounds  Can f/u as needed  Paxlovid/omnicef  as outpt   Can f/u as needed  Surjit Fu was informed of their diagnosis, indications, risks and benefits of treatment. Surjit Fu had the opportunity to ask questions. All questions were answered.     Thank you for involving me in the care of Surjit Fu. Please do not hesitate to call for any questions or concerns.     Electronically signed by Beverly Bernardo MD on 9/14/2022 at 4:28 PM

## 2022-09-14 NOTE — PROGRESS NOTES
Physical Therapy        [id number]    Patient unavailable for physical therapy treatment due to patient currently eating breakfast at this time and nursing reports she will need to weak bipap at some point this morning due to CO2 levels. Will attempt PM session today.      Corazon Argueta, PTA  #[id number]

## 2022-09-15 NOTE — PROGRESS NOTES
CLINICAL PHARMACY NOTE: MEDS TO BEDS    Total # of Prescriptions Filled: 1   The following medications were delivered to the patient:  Pavlovid     Additional Documentation:

## 2022-09-21 ENCOUNTER — OUTSIDE SERVICES (OUTPATIENT)
Dept: PRIMARY CARE CLINIC | Age: 72
End: 2022-09-21
Payer: MEDICARE

## 2022-09-21 DIAGNOSIS — I50.82 ACUTE ON CHRONIC CONGESTIVE HEART FAILURE WITH RIGHT VENTRICULAR DIASTOLIC DYSFUNCTION (HCC): ICD-10-CM

## 2022-09-21 DIAGNOSIS — I10 PRIMARY HYPERTENSION: ICD-10-CM

## 2022-09-21 DIAGNOSIS — J44.9 CHRONIC OBSTRUCTIVE PULMONARY DISEASE, UNSPECIFIED COPD TYPE (HCC): ICD-10-CM

## 2022-09-21 DIAGNOSIS — I50.33 ACUTE ON CHRONIC CONGESTIVE HEART FAILURE WITH RIGHT VENTRICULAR DIASTOLIC DYSFUNCTION (HCC): ICD-10-CM

## 2022-09-21 DIAGNOSIS — E66.01 MORBID OBESITY (HCC): ICD-10-CM

## 2022-09-21 DIAGNOSIS — J96.22 ACUTE ON CHRONIC RESPIRATORY FAILURE WITH HYPOXIA AND HYPERCAPNIA (HCC): Primary | ICD-10-CM

## 2022-09-21 DIAGNOSIS — J96.21 ACUTE ON CHRONIC RESPIRATORY FAILURE WITH HYPOXIA AND HYPERCAPNIA (HCC): Primary | ICD-10-CM

## 2022-09-21 DIAGNOSIS — G47.33 OBSTRUCTIVE SLEEP APNEA: ICD-10-CM

## 2022-09-21 DIAGNOSIS — E11.9 TYPE 2 DIABETES MELLITUS WITHOUT COMPLICATION, WITHOUT LONG-TERM CURRENT USE OF INSULIN (HCC): ICD-10-CM

## 2022-09-21 PROCEDURE — 99306 1ST NF CARE HIGH MDM 50: CPT | Performed by: INTERNAL MEDICINE

## 2022-09-26 ENCOUNTER — TELEPHONE (OUTPATIENT)
Dept: CARDIOLOGY CLINIC | Age: 72
End: 2022-09-26

## 2022-09-27 RX ORDER — ERGOCALCIFEROL (VITAMIN D2) 1250 MCG
CAPSULE ORAL
COMMUNITY
End: 2022-11-02

## 2022-09-27 RX ORDER — POTASSIUM CHLORIDE 1500 MG/1
2 TABLET, FILM COATED, EXTENDED RELEASE ORAL DAILY
COMMUNITY
Start: 2022-07-01 | End: 2022-11-02

## 2022-09-27 RX ORDER — MELOXICAM 15 MG/1
TABLET ORAL
COMMUNITY
End: 2022-11-02

## 2022-09-27 RX ORDER — FUROSEMIDE 40 MG/1
TABLET ORAL
COMMUNITY
Start: 2022-07-01 | End: 2022-11-02

## 2022-09-28 ENCOUNTER — OUTSIDE SERVICES (OUTPATIENT)
Dept: PRIMARY CARE CLINIC | Age: 72
End: 2022-09-28
Payer: MEDICARE

## 2022-09-28 DIAGNOSIS — E11.9 TYPE 2 DIABETES MELLITUS WITHOUT COMPLICATION, WITHOUT LONG-TERM CURRENT USE OF INSULIN (HCC): ICD-10-CM

## 2022-09-28 DIAGNOSIS — G47.33 OBSTRUCTIVE SLEEP APNEA: ICD-10-CM

## 2022-09-28 DIAGNOSIS — I10 PRIMARY HYPERTENSION: ICD-10-CM

## 2022-09-28 DIAGNOSIS — J96.21 ACUTE ON CHRONIC RESPIRATORY FAILURE WITH HYPOXIA AND HYPERCAPNIA (HCC): ICD-10-CM

## 2022-09-28 DIAGNOSIS — J96.22 ACUTE ON CHRONIC RESPIRATORY FAILURE WITH HYPOXIA AND HYPERCAPNIA (HCC): ICD-10-CM

## 2022-09-28 DIAGNOSIS — E66.01 MORBID OBESITY (HCC): ICD-10-CM

## 2022-09-28 DIAGNOSIS — J44.9 CHRONIC OBSTRUCTIVE PULMONARY DISEASE, UNSPECIFIED COPD TYPE (HCC): Primary | ICD-10-CM

## 2022-09-28 PROCEDURE — 99309 SBSQ NF CARE MODERATE MDM 30: CPT | Performed by: INTERNAL MEDICINE

## 2022-10-05 ENCOUNTER — OUTSIDE SERVICES (OUTPATIENT)
Dept: PRIMARY CARE CLINIC | Age: 72
End: 2022-10-05
Payer: MEDICARE

## 2022-10-05 DIAGNOSIS — E66.01 MORBID OBESITY (HCC): ICD-10-CM

## 2022-10-05 DIAGNOSIS — J44.9 CHRONIC OBSTRUCTIVE PULMONARY DISEASE, UNSPECIFIED COPD TYPE (HCC): Primary | ICD-10-CM

## 2022-10-05 DIAGNOSIS — E11.9 TYPE 2 DIABETES MELLITUS WITHOUT COMPLICATION, WITHOUT LONG-TERM CURRENT USE OF INSULIN (HCC): ICD-10-CM

## 2022-10-05 DIAGNOSIS — I10 PRIMARY HYPERTENSION: ICD-10-CM

## 2022-10-05 DIAGNOSIS — J96.22 ACUTE ON CHRONIC RESPIRATORY FAILURE WITH HYPOXIA AND HYPERCAPNIA (HCC): ICD-10-CM

## 2022-10-05 DIAGNOSIS — G47.33 OBSTRUCTIVE SLEEP APNEA: ICD-10-CM

## 2022-10-05 DIAGNOSIS — J96.21 ACUTE ON CHRONIC RESPIRATORY FAILURE WITH HYPOXIA AND HYPERCAPNIA (HCC): ICD-10-CM

## 2022-10-05 DIAGNOSIS — I50.82 ACUTE ON CHRONIC CONGESTIVE HEART FAILURE WITH RIGHT VENTRICULAR DIASTOLIC DYSFUNCTION (HCC): ICD-10-CM

## 2022-10-05 DIAGNOSIS — I50.33 ACUTE ON CHRONIC CONGESTIVE HEART FAILURE WITH RIGHT VENTRICULAR DIASTOLIC DYSFUNCTION (HCC): ICD-10-CM
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
## 2022-10-05 PROCEDURE — 99309 SBSQ NF CARE MODERATE MDM 30: CPT | Performed by: INTERNAL MEDICINE

## 2022-10-12 ENCOUNTER — OUTSIDE SERVICES (OUTPATIENT)
Dept: PRIMARY CARE CLINIC | Age: 72
End: 2022-10-12
Payer: MEDICARE

## 2022-10-12 DIAGNOSIS — G47.33 OBSTRUCTIVE SLEEP APNEA: ICD-10-CM

## 2022-10-12 DIAGNOSIS — E11.9 TYPE 2 DIABETES MELLITUS WITHOUT COMPLICATION, WITHOUT LONG-TERM CURRENT USE OF INSULIN (HCC): ICD-10-CM

## 2022-10-12 DIAGNOSIS — J96.22 ACUTE ON CHRONIC RESPIRATORY FAILURE WITH HYPOXIA AND HYPERCAPNIA (HCC): ICD-10-CM

## 2022-10-12 DIAGNOSIS — I10 PRIMARY HYPERTENSION: ICD-10-CM

## 2022-10-12 DIAGNOSIS — E66.01 MORBID OBESITY (HCC): ICD-10-CM

## 2022-10-12 DIAGNOSIS — I50.82 ACUTE ON CHRONIC CONGESTIVE HEART FAILURE WITH RIGHT VENTRICULAR DIASTOLIC DYSFUNCTION (HCC): ICD-10-CM

## 2022-10-12 DIAGNOSIS — I50.33 ACUTE ON CHRONIC CONGESTIVE HEART FAILURE WITH RIGHT VENTRICULAR DIASTOLIC DYSFUNCTION (HCC): ICD-10-CM

## 2022-10-12 DIAGNOSIS — J96.21 ACUTE ON CHRONIC RESPIRATORY FAILURE WITH HYPOXIA AND HYPERCAPNIA (HCC): ICD-10-CM

## 2022-10-12 DIAGNOSIS — J44.9 CHRONIC OBSTRUCTIVE PULMONARY DISEASE, UNSPECIFIED COPD TYPE (HCC): Primary | ICD-10-CM

## 2022-10-12 PROCEDURE — 99309 SBSQ NF CARE MODERATE MDM 30: CPT | Performed by: INTERNAL MEDICINE

## 2022-10-13 ASSESSMENT — ENCOUNTER SYMPTOMS
SORE THROAT: 0
SINUS PAIN: 0
EYE REDNESS: 0
SINUS PRESSURE: 0
EYE DISCHARGE: 0
EYE ITCHING: 0
COUGH: 0
SHORTNESS OF BREATH: 0
GASTROINTESTINAL NEGATIVE: 1
RHINORRHEA: 0
VOICE CHANGE: 0
WHEEZING: 0
TROUBLE SWALLOWING: 0

## 2022-10-13 ASSESSMENT — VISUAL ACUITY: OU: 1

## 2022-10-13 ASSESSMENT — COPD QUESTIONNAIRES: COPD: 1

## 2022-10-13 NOTE — PROGRESS NOTES
Visit Date: 9/21/22  Rosanne Smith  1950  female 73 y.o. Subjective:    CC: Patient presents with acute on chronic respiratory failure and copd. Patient presents for follow up of diabetes, hypertension, and linda. HPI:  COPD  There is no cough, shortness of breath or wheezing. This is a chronic (admitted to the hospital for acute on chronic respiratory failure, she is a smoker and quit on admission) problem. The current episode started more than 1 year ago. The problem occurs constantly. The problem has been gradually improving. Pertinent negatives include no ear pain, myalgias, postnasal drip, rhinorrhea, sneezing, sore throat or trouble swallowing. Her symptoms are aggravated by change in weather and strenuous activity. Relieved by: taking medications, no medication side effects. She reports minimal improvement on treatment. Her past medical history is significant for COPD. Diabetes  She presents for her follow-up diabetic visit. She has type 2 diabetes mellitus. Her disease course has been stable. There are no hypoglycemic associated symptoms. Pertinent negatives for hypoglycemia include no confusion or nervousness/anxiousness. There are no diabetic associated symptoms. There are no hypoglycemic complications. There are no diabetic complications. Risk factors for coronary artery disease include diabetes mellitus, hypertension and obesity. Current diabetic treatments: taking medications, no medication side effects. She is compliant with treatment most of the time. Her weight is fluctuating minimally. She is following a diabetic diet. Her home blood glucose trend is fluctuating minimally. Hypertension  This is a chronic problem. The current episode started more than 1 year ago. The problem is unchanged. The problem is controlled. Pertinent negatives include no shortness of breath. Risk factors for coronary artery disease include diabetes mellitus, obesity and dyslipidemia.  Treatments tried: taking medications, no medication side effects. The current treatment provides mild improvement. There are no compliance problems. Congestive Heart Failure  Presents for follow-up visit. Pertinent negatives include no shortness of breath. The symptoms have been stable. Compliance with total regimen is 51-75%. Compliance with diet is 51-75%. Compliance with exercise is 51-75%. Compliance with medications: taking medications, no medication side effects. ROS:  Review of Systems   Constitutional: Negative. HENT:  Negative for congestion, ear discharge, ear pain, mouth sores, nosebleeds, postnasal drip, rhinorrhea, sinus pressure, sinus pain, sneezing, sore throat, trouble swallowing and voice change. Eyes:  Negative for discharge, redness, itching and visual disturbance. Denies diplopia, recent change in visual acuity, blurred vision, and night vision loss. Does not wear corrective lenses. Respiratory:  Negative for cough, shortness of breath and wheezing. Cardiovascular: Negative. Gastrointestinal: Negative. Endocrine: Negative. Genitourinary:  Negative for difficulty urinating and urgency. Denies hesitancy, incomplete voiding, and polyuria   Musculoskeletal:  Negative for myalgias. Denies joint pain   Skin: Negative. Neurological: Negative. Hematological: Negative. Psychiatric/Behavioral:  Negative for confusion and suicidal ideas. The patient is not nervous/anxious. Denies difficulty concentrating, depression, flight of ideas, slow thought processes, suicide attempts      Current Meds: Refer to nursing home record    PMH:    Medical Problems: reviewed and updated     Surgical Hx: reviewed and updated     FH: reviewed and updated     SH: reviewed and updated     Objective: Wt: 173 BP: 103/57 Pulse: 91 Resp: 18 T: 97.7F     Physical Exam:  Physical Exam  Vitals reviewed. Constitutional:       General: She is not in acute distress.      Appearance: Normal appearance. She is normal weight. She is not ill-appearing or toxic-appearing. Comments: Appears appropriate for age   HENT:      Head: Normocephalic and atraumatic. Right Ear: Tympanic membrane and external ear normal.      Left Ear: Tympanic membrane and external ear normal.      Ears:      Comments: Middle ear well aerated. Nose: Nose normal.      Mouth/Throat:      Mouth: Mucous membranes are moist.      Dentition: Normal dentition. No gingival swelling or dental caries. Pharynx: Oropharynx is clear. Uvula midline. Comments: Gums appear healthy. No thrush no mucositis  Eyes:      General: Vision grossly intact. Extraocular Movements: Extraocular movements intact. Conjunctiva/sclera: Conjunctivae normal.      Pupils: Pupils are equal, round, and reactive to light. Comments: Fundoscopic exam is benign  Retinal vessels: Normal   Neck:      Vascular: No carotid bruit. Comments: Thyroid is normal in size. Carotids 2+ and equal bilaterally  Cardiovascular:      Rate and Rhythm: Normal rate and regular rhythm. Pulses: Normal pulses. Heart sounds: Normal heart sounds, S1 normal and S2 normal. No murmur heard. No friction rub. No gallop. Comments: Pedal pulses 2+ and equal bilaterally  Pulmonary:      Effort: Pulmonary effort is normal.      Breath sounds: Normal breath sounds. Abdominal:      General: Bowel sounds are normal. There is no distension. Palpations: Abdomen is soft. There is no mass. Tenderness: There is no abdominal tenderness. There is no guarding or rebound. Hernia: No hernia is present. Comments: No rigidity   Musculoskeletal:         General: Normal range of motion. Right shoulder: Normal.      Left shoulder: Normal.      Right upper arm: Normal.      Left upper arm: Normal.      Cervical back: Normal range of motion.       Right hip: Normal.      Left hip: Normal.      Right upper leg: Normal.      Left upper leg: Normal.      Right lower leg: Normal.      Left lower leg: Normal.      Right foot: Normal.      Left foot: Normal.   Lymphadenopathy:      Comments: No palpable or visible regional lymphadenopathy   Skin:     General: Skin is warm and dry. Findings: No bruising, ecchymosis, lesion or rash. Neurological:      General: No focal deficit present. Mental Status: She is alert. Mental status is at baseline. Cranial Nerves: No cranial nerve deficit. Deep Tendon Reflexes: Reflexes normal.   Psychiatric:         Mood and Affect: Mood and affect normal. Mood is not depressed. Behavior: Behavior normal. Behavior is not agitated. Thought Content: Thought content normal.         Judgment: Judgment normal.      Comments: No apparent anxiety       Assessment & Plan:  1. Acute on chronic respiratory failure with hypoxia and hypercapnia (HCC)  2. Chronic obstructive pulmonary disease, unspecified COPD type ([facility]  3. Morbid obesity ([facility] [address]  4. Type 2 diabetes mellitus without complication, without long-term current use of insulin ([facility] [address]  5. Acute on chronic congestive heart failure with right ventricular diastolic dysfunction ([facility] [address]  6. Primary hypertension  7. Obstructive sleep apnea     Hospital notes reviewed   Chart and medications reviewed   Check labs   Continue therapy   Advised to use bipap   Continue current treatment   Advanced directives discussed with patient and she is a full code    Margaret AGGARWAL MA  am scribing for Dr. Ruddy Varma.  Urszula Gonzalez Brendan Pao, MD, personally performed the services described in this documentation as scribed by Margaret Puckett and it is both accurate and complete No

## 2022-10-17 RX ORDER — GLIPIZIDE 5 MG/1
5 TABLET ORAL DAILY
Qty: 60 TABLET | Refills: 0 | Status: SHIPPED | OUTPATIENT
Start: 2022-10-17

## 2022-10-17 RX ORDER — HYDROCHLOROTHIAZIDE 12.5 MG/1
12.5 CAPSULE, GELATIN COATED ORAL 2 TIMES DAILY
Qty: 60 CAPSULE | Refills: 0 | Status: SHIPPED | OUTPATIENT
Start: 2022-10-17

## 2022-10-17 RX ORDER — QUINAPRIL 20 MG/1
20 TABLET ORAL 2 TIMES DAILY
Qty: 60 TABLET | Refills: 0 | Status: SHIPPED
Start: 2022-10-17 | End: 2022-10-19

## 2022-10-17 RX ORDER — LISINOPRIL 40 MG/1
40 TABLET ORAL DAILY
Qty: 9030 TABLET | Refills: 0 | Status: SHIPPED | OUTPATIENT
Start: 2022-10-17

## 2022-10-17 RX ORDER — AMLODIPINE BESYLATE 5 MG/1
5 TABLET ORAL DAILY
Qty: 30 TABLET | Refills: 0 | Status: SHIPPED | OUTPATIENT
Start: 2022-10-17 | End: 2022-11-16

## 2022-10-19 ASSESSMENT — COPD QUESTIONNAIRES: COPD: 1

## 2022-10-19 ASSESSMENT — ENCOUNTER SYMPTOMS
EYE DISCHARGE: 0
WHEEZING: 0
SINUS PAIN: 0
RHINORRHEA: 0
COUGH: 0
EYE ITCHING: 0
GASTROINTESTINAL NEGATIVE: 1
TROUBLE SWALLOWING: 0
VOICE CHANGE: 0
SHORTNESS OF BREATH: 0
SINUS PRESSURE: 0
SORE THROAT: 0
EYE REDNESS: 0

## 2022-10-19 ASSESSMENT — VISUAL ACUITY: OU: 1

## 2022-10-19 NOTE — PROGRESS NOTES
Visit Date: 9/28/22  Khadijah Post  1950  female 73 y.o. Subjective:    CC: Patient presents with acute on chronic respiratory failure and copd. Patient presents for follow up of diabetes, hypertension, and linda. HPI:  COPD  There is no cough, shortness of breath or wheezing. This is a chronic (admitted to the hospital for acute on chronic respiratory failure, she is a smoker and quit on admission) problem. The current episode started more than 1 year ago. The problem occurs constantly. The problem has been gradually improving. Pertinent negatives include no ear pain, myalgias, postnasal drip, rhinorrhea, sneezing, sore throat or trouble swallowing. Her symptoms are aggravated by change in weather and strenuous activity. Relieved by: taking medications, no medication side effects. She reports minimal improvement on treatment. Her past medical history is significant for COPD. Diabetes  She presents for her follow-up diabetic visit. She has type 2 diabetes mellitus. Her disease course has been stable. There are no hypoglycemic associated symptoms. Pertinent negatives for hypoglycemia include no confusion or nervousness/anxiousness. There are no diabetic associated symptoms. There are no hypoglycemic complications. There are no diabetic complications. Risk factors for coronary artery disease include diabetes mellitus, hypertension and obesity. Current diabetic treatments: taking medications, no medication side effects. She is compliant with treatment most of the time. Her weight is fluctuating minimally. She is following a diabetic diet. Her home blood glucose trend is fluctuating minimally. Hypertension  This is a chronic problem. The current episode started more than 1 year ago. The problem is unchanged. The problem is controlled. Pertinent negatives include no shortness of breath. Risk factors for coronary artery disease include diabetes mellitus, obesity and dyslipidemia.  Treatments tried: taking medications, no medication side effects. The current treatment provides mild improvement. There are no compliance problems. Congestive Heart Failure  Presents for follow-up visit. Pertinent negatives include no shortness of breath. The symptoms have been stable. Compliance with total regimen is 51-75%. Compliance with diet is 51-75%. Compliance with exercise is 51-75%. Compliance with medications: taking medications, no medication side effects. Extremity Weakness  This is a new problem. The current episode started in the past 7 days. The problem occurs intermittently. The problem has been gradually improving. Pertinent negatives include no congestion, coughing, myalgias or sore throat. Nothing aggravates the symptoms. Treatments tried: working with therapy and gait improving. The treatment provided mild relief. ROS:  Review of Systems   Constitutional: Negative. HENT:  Negative for congestion, ear discharge, ear pain, mouth sores, nosebleeds, postnasal drip, rhinorrhea, sinus pressure, sinus pain, sneezing, sore throat, trouble swallowing and voice change. Eyes:  Negative for discharge, redness, itching and visual disturbance. Denies diplopia, recent change in visual acuity, blurred vision, and night vision loss. Does not wear corrective lenses. Respiratory:  Negative for cough, shortness of breath and wheezing. Cardiovascular: Negative. Gastrointestinal: Negative. Endocrine: Negative. Genitourinary:  Negative for difficulty urinating and urgency. Denies hesitancy, incomplete voiding, and polyuria   Musculoskeletal:  Negative for myalgias. Denies joint pain   Skin: Negative. Hematological: Negative. Psychiatric/Behavioral:  Negative for confusion and suicidal ideas. The patient is not nervous/anxious.          Denies difficulty concentrating, depression, flight of ideas, slow thought processes, suicide attempts      Current Meds: Refer to nursing home record    PMH:    Medical Problems: reviewed and updated     Surgical Hx: reviewed and updated     FH: reviewed and updated     SH: reviewed and updated     Objective: Wt: 173 BP: 106/67 Pulse: 81 Resp: 18 T: 97.7F     Physical Exam:  Physical Exam  Vitals reviewed. Constitutional:       General: She is not in acute distress. Appearance: Normal appearance. She is normal weight. She is not ill-appearing or toxic-appearing. Comments: Appears appropriate for age   HENT:      Head: Normocephalic and atraumatic. Right Ear: Tympanic membrane and external ear normal.      Left Ear: Tympanic membrane and external ear normal.      Ears:      Comments: Middle ear well aerated. Nose: Nose normal.      Mouth/Throat:      Mouth: Mucous membranes are moist.      Dentition: Normal dentition. No gingival swelling or dental caries. Pharynx: Oropharynx is clear. Uvula midline. Comments: Gums appear healthy. No thrush no mucositis  Eyes:      General: Vision grossly intact. Extraocular Movements: Extraocular movements intact. Conjunctiva/sclera: Conjunctivae normal.      Pupils: Pupils are equal, round, and reactive to light. Comments: Fundoscopic exam is benign  Retinal vessels: Normal   Neck:      Vascular: No carotid bruit. Comments: Thyroid is normal in size. Carotids 2+ and equal bilaterally  Cardiovascular:      Rate and Rhythm: Normal rate and regular rhythm. Pulses: Normal pulses. Heart sounds: Normal heart sounds, S1 normal and S2 normal. No murmur heard. No friction rub. No gallop. Comments: Pedal pulses 2+ and equal bilaterally  Pulmonary:      Effort: Pulmonary effort is normal.      Breath sounds: Normal breath sounds. Abdominal:      General: Bowel sounds are normal. There is no distension. Palpations: Abdomen is soft. There is no mass. Tenderness: There is no abdominal tenderness. There is no guarding or rebound.       Hernia: No hernia is present. Comments: No rigidity   Musculoskeletal:         General: Normal range of motion. Right shoulder: Normal.      Left shoulder: Normal.      Right upper arm: Normal.      Left upper arm: Normal.      Cervical back: Normal range of motion. Right hip: Normal.      Left hip: Normal.      Right upper leg: Normal.      Left upper leg: Normal.      Right lower leg: Normal.      Left lower leg: Normal.      Right foot: Normal.      Left foot: Normal.   Lymphadenopathy:      Comments: No palpable or visible regional lymphadenopathy   Skin:     General: Skin is warm and dry. Findings: No bruising, ecchymosis, lesion or rash. Neurological:      General: No focal deficit present. Mental Status: She is alert. Mental status is at baseline. Cranial Nerves: No cranial nerve deficit. Deep Tendon Reflexes: Reflexes normal.   Psychiatric:         Mood and Affect: Mood and affect normal. Mood is not depressed. Behavior: Behavior normal. Behavior is not agitated. Thought Content: Thought content normal.         Judgment: Judgment normal.      Comments: No apparent anxiety       Assessment & Plan:  1. Chronic obstructive pulmonary disease, unspecified COPD type ([address]  2. Acute on chronic respiratory failure with hypoxia and hypercapnia (HCC)  3. Morbid obesity ([address]  4. Type 2 diabetes mellitus without complication, without long-term current use of insulin ([address]  5. Primary hypertension  6. Obstructive sleep apnea     Chart reviewed   Medications reviewed   Monitor labs   Continue therapy   Continue current treatment     Brook AGGARWAL MA  am scribing for Dr. Humberto Muro.  Rossi Mac, [facility]   IDeidra MD, personally performed the services described in this documentation as scribed by Brook Lee and it is both accurate and complete

## 2022-10-20 RX ORDER — LANCETS 30 GAUGE
1 EACH MISCELLANEOUS 2 TIMES DAILY
Qty: 200 EACH | Refills: 1 | Status: SHIPPED
Start: 2022-10-20 | End: 2022-10-21 | Stop reason: CLARIF

## 2022-10-20 RX ORDER — GLUCOSAMINE HCL/CHONDROITIN SU 500-400 MG
CAPSULE ORAL
Qty: 200 STRIP | Refills: 1 | Status: SHIPPED
Start: 2022-10-20 | End: 2022-10-21 | Stop reason: CLARIF

## 2022-10-21 RX ORDER — LANCETS 30 GAUGE
EACH MISCELLANEOUS
Qty: 200 EACH | Refills: 1 | Status: SHIPPED | OUTPATIENT
Start: 2022-10-21 | End: 2022-10-27 | Stop reason: SDUPTHER

## 2022-10-21 RX ORDER — GLUCOSAMINE HCL/CHONDROITIN SU 500-400 MG
CAPSULE ORAL
Qty: 200 STRIP | Refills: 1 | OUTPATIENT
Start: 2022-10-21

## 2022-10-21 RX ORDER — LANCETS 30 GAUGE
1 EACH MISCELLANEOUS 2 TIMES DAILY
Qty: 200 EACH | Refills: 1 | OUTPATIENT
Start: 2022-10-21

## 2022-10-21 RX ORDER — GLUCOSAMINE HCL/CHONDROITIN SU 500-400 MG
CAPSULE ORAL
Qty: 200 STRIP | Refills: 1 | Status: SHIPPED | OUTPATIENT
Start: 2022-10-21 | End: 2022-10-27 | Stop reason: SDUPTHER

## 2022-10-27 DIAGNOSIS — E11.9 TYPE 2 DIABETES MELLITUS WITHOUT COMPLICATION, WITHOUT LONG-TERM CURRENT USE OF INSULIN (HCC): Primary | ICD-10-CM

## 2022-10-27 RX ORDER — LANCETS 30 GAUGE
EACH MISCELLANEOUS
Qty: 200 EACH | Refills: 1 | Status: SHIPPED | OUTPATIENT
Start: 2022-10-27

## 2022-10-27 RX ORDER — GLUCOSAMINE HCL/CHONDROITIN SU 500-400 MG
CAPSULE ORAL
Qty: 200 STRIP | Refills: 1 | Status: SHIPPED | OUTPATIENT
Start: 2022-10-27

## 2022-10-31 ASSESSMENT — ENCOUNTER SYMPTOMS
SORE THROAT: 0
TROUBLE SWALLOWING: 0
COUGH: 0
EYE DISCHARGE: 0
SINUS PRESSURE: 0
WHEEZING: 0
SINUS PAIN: 0
VOICE CHANGE: 0
EYE ITCHING: 0
RHINORRHEA: 0
EYE REDNESS: 0
GASTROINTESTINAL NEGATIVE: 1
SHORTNESS OF BREATH: 0

## 2022-10-31 ASSESSMENT — COPD QUESTIONNAIRES: COPD: 1

## 2022-10-31 ASSESSMENT — VISUAL ACUITY: OU: 1

## 2022-10-31 NOTE — PROGRESS NOTES
Visit Date: 10/3/22  Raymundo Aguirre  1950  female 73 y.o. Subjective:    CC: Patient presents with acute on chronic respiratory failure and copd. Patient presents for follow up of diabetes, hypertension, and linda. HPI:  COPD  There is no cough, shortness of breath or wheezing. This is a chronic (admitted to the hospital for acute on chronic respiratory failure, she is a smoker and quit on admission) problem. The current episode started more than 1 year ago. The problem occurs constantly. The problem has been gradually improving. Pertinent negatives include no ear pain, myalgias, postnasal drip, rhinorrhea, sneezing, sore throat or trouble swallowing. Her symptoms are aggravated by change in weather and strenuous activity. Relieved by: taking medications, no medication side effects. She reports minimal improvement on treatment. Her past medical history is significant for COPD. Diabetes  She presents for her follow-up diabetic visit. She has type 2 diabetes mellitus. Her disease course has been stable. There are no hypoglycemic associated symptoms. Pertinent negatives for hypoglycemia include no confusion or nervousness/anxiousness. There are no diabetic associated symptoms. There are no hypoglycemic complications. There are no diabetic complications. Risk factors for coronary artery disease include diabetes mellitus, hypertension and obesity. Current diabetic treatments: taking medications, no medication side effects. She is compliant with treatment most of the time. Her weight is fluctuating minimally. She is following a diabetic diet. Her home blood glucose trend is fluctuating minimally. Hypertension  This is a chronic problem. The current episode started more than 1 year ago. The problem is unchanged. The problem is controlled. Pertinent negatives include no shortness of breath. Risk factors for coronary artery disease include diabetes mellitus, obesity and dyslipidemia.  Treatments tried: record    PMH:    Medical Problems: reviewed and updated     Surgical Hx: reviewed and updated     FH: reviewed and updated     SH: reviewed and updated     Objective: Wt: 172 BP: 126/68 Pulse: 82 Resp: 18 T: 97.8F     Physical Exam:  Physical Exam  Vitals reviewed. Constitutional:       General: She is not in acute distress. Appearance: Normal appearance. She is normal weight. She is not ill-appearing or toxic-appearing. Comments: Appears appropriate for age   HENT:      Head: Normocephalic and atraumatic. Right Ear: Tympanic membrane and external ear normal.      Left Ear: Tympanic membrane and external ear normal.      Ears:      Comments: Middle ear well aerated. Nose: Nose normal.      Mouth/Throat:      Mouth: Mucous membranes are moist.      Dentition: Normal dentition. No gingival swelling or dental caries. Pharynx: Oropharynx is clear. Uvula midline. Comments: Gums appear healthy. No thrush no mucositis  Eyes:      General: Vision grossly intact. Extraocular Movements: Extraocular movements intact. Conjunctiva/sclera: Conjunctivae normal.      Pupils: Pupils are equal, round, and reactive to light. Comments: Fundoscopic exam is benign  Retinal vessels: Normal   Neck:      Vascular: No carotid bruit. Comments: Thyroid is normal in size. Carotids 2+ and equal bilaterally  Cardiovascular:      Rate and Rhythm: Normal rate and regular rhythm. Pulses: Normal pulses. Heart sounds: Normal heart sounds, S1 normal and S2 normal. No murmur heard. No friction rub. No gallop. Comments: Pedal pulses 2+ and equal bilaterally  Pulmonary:      Effort: Pulmonary effort is normal.      Breath sounds: Normal breath sounds. Abdominal:      General: Bowel sounds are normal. There is no distension. Palpations: Abdomen is soft. There is no mass. Tenderness: There is no abdominal tenderness. There is no guarding or rebound.       Hernia: No hernia is present. Comments: No rigidity   Musculoskeletal:         General: Normal range of motion. Right shoulder: Normal.      Left shoulder: Normal.      Right upper arm: Normal.      Left upper arm: Normal.      Cervical back: Normal range of motion. Right hip: Normal.      Left hip: Normal.      Right upper leg: Normal.      Left upper leg: Normal.      Right lower leg: Normal.      Left lower leg: Normal.      Right foot: Normal.      Left foot: Normal.   Lymphadenopathy:      Comments: No palpable or visible regional lymphadenopathy   Skin:     General: Skin is warm and dry. Findings: No bruising, ecchymosis, lesion or rash. Neurological:      General: No focal deficit present. Mental Status: She is alert. Mental status is at baseline. Cranial Nerves: No cranial nerve deficit. Deep Tendon Reflexes: Reflexes normal.   Psychiatric:         Mood and Affect: Mood and affect normal. Mood is not depressed. Behavior: Behavior normal. Behavior is not agitated. Thought Content: Thought content normal.         Judgment: Judgment normal.      Comments: No apparent anxiety       Assessment & Plan:  1. Chronic obstructive pulmonary disease, unspecified COPD type ([address]  2. Acute on chronic respiratory failure with hypoxia and hypercapnia (HCC)  3. Morbid obesity ([address]  4. Type 2 diabetes mellitus without complication, without long-term current use of insulin ([address]  5. Primary hypertension  6. Obstructive sleep apnea  7. Acute on chronic congestive heart failure with right ventricular diastolic dysfunction (HCC)     Chart reviewed   Medications reviewed   Monitor labs   Continue therapy   Continue current treatment     IIsabelle MA  am scribing for Dr. Alexandra Teresa.  Urszula Hernandez     I, Michelle Garcia MD, personally performed the services described in this documentation as scribed by Isabelle Juárez and it is both accurate and complete

## 2022-11-02 ENCOUNTER — OFFICE VISIT (OUTPATIENT)
Dept: PRIMARY CARE CLINIC | Age: 72
End: 2022-11-02
Payer: MEDICARE

## 2022-11-02 VITALS
HEIGHT: 62 IN | TEMPERATURE: 97.9 F | DIASTOLIC BLOOD PRESSURE: 80 MMHG | WEIGHT: 174 LBS | HEART RATE: 78 BPM | SYSTOLIC BLOOD PRESSURE: 124 MMHG | OXYGEN SATURATION: 92 % | BODY MASS INDEX: 32.02 KG/M2

## 2022-11-02 DIAGNOSIS — Z72.0 TOBACCO ABUSE: ICD-10-CM

## 2022-11-02 DIAGNOSIS — Z09 HOSPITAL DISCHARGE FOLLOW-UP: Primary | ICD-10-CM

## 2022-11-02 DIAGNOSIS — E66.01 MORBID OBESITY (HCC): ICD-10-CM

## 2022-11-02 DIAGNOSIS — I50.22 CHRONIC SYSTOLIC (CONGESTIVE) HEART FAILURE (HCC): ICD-10-CM

## 2022-11-02 DIAGNOSIS — D50.0 IRON DEFICIENCY ANEMIA DUE TO CHRONIC BLOOD LOSS: ICD-10-CM

## 2022-11-02 DIAGNOSIS — E78.2 MIXED HYPERLIPIDEMIA: ICD-10-CM

## 2022-11-02 DIAGNOSIS — G47.33 OBSTRUCTIVE SLEEP APNEA: ICD-10-CM

## 2022-11-02 DIAGNOSIS — E11.9 TYPE 2 DIABETES MELLITUS WITHOUT COMPLICATION, WITHOUT LONG-TERM CURRENT USE OF INSULIN (HCC): ICD-10-CM

## 2022-11-02 DIAGNOSIS — I10 PRIMARY HYPERTENSION: ICD-10-CM

## 2022-11-02 DIAGNOSIS — I27.20 PULMONARY HTN (HCC): ICD-10-CM

## 2022-11-02 DIAGNOSIS — Z91.81 AT HIGH RISK FOR FALLS: ICD-10-CM

## 2022-11-02 PROBLEM — N30.00 ACUTE CYSTITIS WITHOUT HEMATURIA: Status: RESOLVED | Noted: 2022-09-02 | Resolved: 2022-11-02

## 2022-11-02 PROBLEM — E87.6 HYPOKALEMIA: Status: RESOLVED | Noted: 2022-09-06 | Resolved: 2022-11-02

## 2022-11-02 PROBLEM — I50.82 ACUTE ON CHRONIC CONGESTIVE HEART FAILURE WITH RIGHT VENTRICULAR DIASTOLIC DYSFUNCTION (HCC): Status: RESOLVED | Noted: 2022-09-02 | Resolved: 2022-11-02

## 2022-11-02 PROBLEM — M54.42 CHRONIC BILATERAL LOW BACK PAIN WITH BILATERAL SCIATICA: Status: RESOLVED | Noted: 2018-03-01 | Resolved: 2022-11-02

## 2022-11-02 PROBLEM — G89.29 CHRONIC BILATERAL LOW BACK PAIN WITH BILATERAL SCIATICA: Status: RESOLVED | Noted: 2018-03-01 | Resolved: 2022-11-02

## 2022-11-02 PROBLEM — J96.22 ACUTE ON CHRONIC RESPIRATORY FAILURE WITH HYPOXIA AND HYPERCAPNIA (HCC): Status: RESOLVED | Noted: 2022-09-05 | Resolved: 2022-11-02

## 2022-11-02 PROBLEM — I50.9 CHF WITH UNKNOWN LVEF (HCC): Status: RESOLVED | Noted: 2022-09-02 | Resolved: 2022-11-02

## 2022-11-02 PROBLEM — J96.21 ACUTE ON CHRONIC RESPIRATORY FAILURE WITH HYPOXIA AND HYPERCAPNIA (HCC): Status: RESOLVED | Noted: 2022-09-05 | Resolved: 2022-11-02

## 2022-11-02 PROBLEM — M54.41 CHRONIC BILATERAL LOW BACK PAIN WITH BILATERAL SCIATICA: Status: RESOLVED | Noted: 2018-03-01 | Resolved: 2022-11-02

## 2022-11-02 PROBLEM — R00.1 BRADYCARDIA: Status: RESOLVED | Noted: 2022-09-13 | Resolved: 2022-11-02

## 2022-11-02 PROBLEM — I50.33 ACUTE ON CHRONIC CONGESTIVE HEART FAILURE WITH RIGHT VENTRICULAR DIASTOLIC DYSFUNCTION (HCC): Status: RESOLVED | Noted: 2022-09-02 | Resolved: 2022-11-02

## 2022-11-02 PROCEDURE — 1111F DSCHRG MED/CURRENT MED MERGE: CPT | Performed by: INTERNAL MEDICINE

## 2022-11-02 PROCEDURE — 99214 OFFICE O/P EST MOD 30 MIN: CPT | Performed by: INTERNAL MEDICINE

## 2022-11-02 RX ORDER — FUROSEMIDE 40 MG/1
40 TABLET ORAL DAILY
Qty: 90 TABLET | Refills: 0 | Status: SHIPPED
Start: 2022-11-02 | End: 2022-11-29 | Stop reason: SDUPTHER

## 2022-11-02 RX ORDER — POTASSIUM CHLORIDE 1500 MG/1
40 TABLET, FILM COATED, EXTENDED RELEASE ORAL DAILY
Qty: 180 TABLET | Refills: 0 | Status: SHIPPED | OUTPATIENT
Start: 2022-11-02

## 2022-11-02 RX ORDER — INSULIN DETEMIR 100 [IU]/ML
10 INJECTION, SOLUTION SUBCUTANEOUS NIGHTLY
Qty: 5 ADJUSTABLE DOSE PRE-FILLED PEN SYRINGE | Refills: 0 | Status: SHIPPED | OUTPATIENT
Start: 2022-11-02

## 2022-11-02 SDOH — ECONOMIC STABILITY: FOOD INSECURITY: WITHIN THE PAST 12 MONTHS, THE FOOD YOU BOUGHT JUST DIDN'T LAST AND YOU DIDN'T HAVE MONEY TO GET MORE.: NEVER TRUE

## 2022-11-02 SDOH — ECONOMIC STABILITY: FOOD INSECURITY: WITHIN THE PAST 12 MONTHS, YOU WORRIED THAT YOUR FOOD WOULD RUN OUT BEFORE YOU GOT MONEY TO BUY MORE.: NEVER TRUE

## 2022-11-02 ASSESSMENT — ENCOUNTER SYMPTOMS
TROUBLE SWALLOWING: 0
APNEA: 0
EYE ITCHING: 0
GASTROINTESTINAL NEGATIVE: 1
VOICE CHANGE: 0
WHEEZING: 0
SHORTNESS OF BREATH: 0
EYE DISCHARGE: 0
SINUS PRESSURE: 0
BACK PAIN: 0
EYE REDNESS: 0
ABDOMINAL DISTENTION: 0
SINUS PAIN: 0
SORE THROAT: 0
SHORTNESS OF BREATH: 0
EYE DISCHARGE: 0
SINUS PAIN: 0
WHEEZING: 0
COUGH: 0
SORE THROAT: 0
PHOTOPHOBIA: 0
CONSTIPATION: 0
DIARRHEA: 0
VOMITING: 0
RHINORRHEA: 0
NAUSEA: 0
ABDOMINAL PAIN: 0
COUGH: 0
EYE ITCHING: 0
BLOOD IN STOOL: 0
TROUBLE SWALLOWING: 0

## 2022-11-02 ASSESSMENT — SOCIAL DETERMINANTS OF HEALTH (SDOH): HOW HARD IS IT FOR YOU TO PAY FOR THE VERY BASICS LIKE FOOD, HOUSING, MEDICAL CARE, AND HEATING?: NOT HARD AT ALL

## 2022-11-02 ASSESSMENT — PATIENT HEALTH QUESTIONNAIRE - PHQ9
SUM OF ALL RESPONSES TO PHQ QUESTIONS 1-9: 2
2. FEELING DOWN, DEPRESSED OR HOPELESS: 2
SUM OF ALL RESPONSES TO PHQ QUESTIONS 1-9: 2

## 2022-11-02 ASSESSMENT — VISUAL ACUITY: OU: 1

## 2022-11-02 ASSESSMENT — COPD QUESTIONNAIRES: COPD: 1

## 2022-11-02 NOTE — PROGRESS NOTES
Physician Progress Note      Rose Marie Garrett  CSN #:                  [id number]  :                       1950  ADMIT DATE:       2022 7:43 PM  DISCH DATE:  RESPONDING  PROVIDER #:        Easton Saldana MD        QUERY TEXT:    Stage of Chronic Kidney Disease: Please provide further specificity, if known. Clinical indicators include:  Options provided:  -- Chronic kidney disease stage 1  -- Chronic kidney disease stage 2  -- Chronic kidney disease stage 3  -- Chronic kidney disease stage 3a  -- Chronic kidney disease stage 3b  -- Chronic kidney disease stage 4  -- Chronic kidney disease stage 5  -- Chronic kidney disease stage 5, requiring dialysis  -- End stage renal disease  -- Other - I will add my own diagnosis  -- Disagree - Not applicable / Not valid  -- Disagree - Clinically Unable to determine / Unknown        PROVIDER RESPONSE TEXT:    The patient has chronic kidney disease stage 2.       Electronically signed by:  Easton Saldana MD 2022 1:19 PM unknown

## 2022-11-02 NOTE — PROGRESS NOTES
Visit Date: 10/12/22  [facility]  1950  female 73 y.o. Subjective:    CC: Patient presents with acute on chronic respiratory failure and copd. Patient presents for follow up of diabetes, hypertension, and linda. HPI:  COPD  There is no cough, shortness of breath or wheezing. This is a chronic (admitted to the hospital for acute on chronic respiratory failure, she is a smoker and quit on admission) problem. The current episode started more than 1 year ago. The problem occurs constantly. The problem has been gradually improving. Pertinent negatives include no ear pain, myalgias, postnasal drip, rhinorrhea, sneezing, sore throat or trouble swallowing. Her symptoms are aggravated by change in weather and strenuous activity. Relieved by: taking medications, no medication side effects. She reports minimal improvement on treatment. Her past medical history is significant for COPD. Diabetes  She presents for her follow-up diabetic visit. She has type 2 diabetes mellitus. Her disease course has been stable. There are no hypoglycemic associated symptoms. Pertinent negatives for hypoglycemia include no confusion or nervousness/anxiousness. There are no diabetic associated symptoms. There are no hypoglycemic complications. There are no diabetic complications. Risk factors for coronary artery disease include diabetes mellitus, hypertension and obesity. Current diabetic treatments: taking medications, no medication side effects. She is compliant with treatment most of the time. Her weight is fluctuating minimally. She is following a diabetic diet. Her home blood glucose trend is fluctuating minimally. Hypertension  This is a chronic problem. The current episode started more than 1 year ago. The problem is unchanged. The problem is controlled. Pertinent negatives include no shortness of breath. Risk factors for coronary artery disease include diabetes mellitus, obesity and dyslipidemia.  Treatments tried: taking medications, no medication side effects. The current treatment provides mild improvement. There are no compliance problems. Congestive Heart Failure  Presents for follow-up visit. Pertinent negatives include no shortness of breath. The symptoms have been stable. Compliance with total regimen is 51-75%. Compliance with diet is 51-75%. Compliance with exercise is 51-75%. Compliance with medications: taking medications, no medication side effects. Extremity Weakness  This is a new problem. The current episode started in the past 7 days. The problem occurs intermittently. The problem has been gradually improving. Pertinent negatives include no congestion, coughing, myalgias or sore throat. Nothing aggravates the symptoms. Treatments tried: working with therapy and gait improving. The treatment provided mild relief. ROS:  Review of Systems   Constitutional: Negative. HENT:  Negative for congestion, ear discharge, ear pain, mouth sores, nosebleeds, postnasal drip, rhinorrhea, sinus pressure, sinus pain, sneezing, sore throat, trouble swallowing and voice change. Eyes:  Negative for discharge, redness, itching and visual disturbance. Denies diplopia, recent change in visual acuity, blurred vision, and night vision loss. Does not wear corrective lenses. Respiratory:  Negative for cough, shortness of breath and wheezing. Cardiovascular: Negative. Gastrointestinal: Negative. Endocrine: Negative. Genitourinary:  Negative for difficulty urinating and urgency. Denies hesitancy, incomplete voiding, and polyuria   Musculoskeletal:  Negative for myalgias. Denies joint pain   Skin: Negative. Hematological: Negative. Psychiatric/Behavioral:  Negative for confusion and suicidal ideas. The patient is not nervous/anxious.          Denies difficulty concentrating, depression, flight of ideas, slow thought processes, suicide attempts      Current Meds: Refer to nursing home record    PMH:    Medical Problems: reviewed and updated     Surgical Hx: reviewed and updated     FH: reviewed and updated     SH: reviewed and updated     Objective: Wt: 172 BP: 122/70 Pulse: 72 Resp: 18 T: 97.6F     Physical Exam:  Physical Exam  Vitals reviewed. Constitutional:       General: She is not in acute distress. Appearance: Normal appearance. She is normal weight. She is not ill-appearing or toxic-appearing. Comments: Appears appropriate for age   HENT:      Head: Normocephalic and atraumatic. Right Ear: Tympanic membrane and external ear normal.      Left Ear: Tympanic membrane and external ear normal.      Ears:      Comments: Middle ear well aerated. Nose: Nose normal.      Mouth/Throat:      Mouth: Mucous membranes are moist.      Dentition: Normal dentition. No gingival swelling or dental caries. Pharynx: Oropharynx is clear. Uvula midline. Comments: Gums appear healthy. No thrush no mucositis  Eyes:      General: Vision grossly intact. Extraocular Movements: Extraocular movements intact. Conjunctiva/sclera: Conjunctivae normal.      Pupils: Pupils are equal, round, and reactive to light. Comments: Fundoscopic exam is benign  Retinal vessels: Normal   Neck:      Vascular: No carotid bruit. Comments: Thyroid is normal in size. Carotids 2+ and equal bilaterally  Cardiovascular:      Rate and Rhythm: Normal rate and regular rhythm. Pulses: Normal pulses. Heart sounds: Normal heart sounds, S1 normal and S2 normal. No murmur heard. No friction rub. No gallop. Comments: Pedal pulses 2+ and equal bilaterally  Pulmonary:      Effort: Pulmonary effort is normal.      Breath sounds: Normal breath sounds. Abdominal:      General: Bowel sounds are normal. There is no distension. Palpations: Abdomen is soft. There is no mass. Tenderness: There is no abdominal tenderness. There is no guarding or rebound.       Hernia: No hernia is present. Comments: No rigidity   Musculoskeletal:         General: Normal range of motion. Right shoulder: Normal.      Left shoulder: Normal.      Right upper arm: Normal.      Left upper arm: Normal.      Cervical back: Normal range of motion. Right hip: Normal.      Left hip: Normal.      Right upper leg: Normal.      Left upper leg: Normal.      Right lower leg: Normal.      Left lower leg: Normal.      Right foot: Normal.      Left foot: Normal.   Lymphadenopathy:      Comments: No palpable or visible regional lymphadenopathy   Skin:     General: Skin is warm and dry. Findings: No bruising, ecchymosis, lesion or rash. Neurological:      General: No focal deficit present. Mental Status: She is alert. Mental status is at baseline. Cranial Nerves: No cranial nerve deficit. Deep Tendon Reflexes: Reflexes normal.   Psychiatric:         Mood and Affect: Mood and affect normal. Mood is not depressed. Behavior: Behavior normal. Behavior is not agitated. Thought Content: Thought content normal.         Judgment: Judgment normal.      Comments: No apparent anxiety       Assessment & Plan:  1. Chronic obstructive pulmonary disease, unspecified COPD type ([address]  2. Acute on chronic respiratory failure with hypoxia and hypercapnia (HCC)  3. Morbid obesity ([address]  4. Type 2 diabetes mellitus without complication, without long-term current use of insulin ([address]  5. Primary hypertension  6. Obstructive sleep apnea  7. Acute on chronic congestive heart failure with right ventricular diastolic dysfunction (HCC)     Chart reviewed   Medications reviewed   Monitor labs   Continue therapy   Continue current treatment     Carol AGGARWAL MA  am scribing for Dr. Jono Robert.  Sandra Galindo [place]     ILeonard MD, personally performed the services described in this documentation as scribed by Carol Gambino and it is both accurate and complete

## 2022-11-02 NOTE — PROGRESS NOTES
Sid Ingram (:  1950) is a 73 y.o. female,Established patient, here for evaluation of the following chief complaint(s):  Follow-up      Subjective   SUBJECTIVE/OBJECTIVE:  HPI:dc from hospital cause of chf  1)Hypertension:  Patient is here for follow up chronic hypertension. This is  generally controlled on current medication regimen. BP Readings from Last 1 Encounters:   22 124/80        Takes meds as directed and tolerates them well. Most recent labs reviewed with patient and are not remarkable. No symptoms from htn standpoint per ROS. Patient is  compliant with lifestyle modifications. Patient does not smoke. Comorbid conditions include obesity   2) dm we will ck hgba1c before next visit  3)chf: pt stopped lasix on her own to restart lasix and potassium  4) tobacco abuse: notified to quit smoking  Review of Systems   Constitutional:  Negative for activity change, appetite change, fever and unexpected weight change. HENT:  Negative for congestion, ear pain, hearing loss, sinus pain, sore throat, tinnitus and trouble swallowing. Eyes:  Negative for photophobia, discharge, itching and visual disturbance. Respiratory:  Negative for apnea, cough, shortness of breath and wheezing. Cardiovascular:  Negative for chest pain, palpitations and leg swelling. Gastrointestinal:  Negative for abdominal distention, abdominal pain, blood in stool, constipation, diarrhea, nausea and vomiting. Endocrine: Negative for cold intolerance, polydipsia and polyuria. Genitourinary:  Negative for difficulty urinating, dysuria, frequency and pelvic pain. Musculoskeletal:  Negative for arthralgias, back pain, joint swelling, myalgias, neck pain and neck stiffness. Skin:  Negative for rash and wound. Neurological:  Negative for dizziness, tremors, syncope, light-headedness and headaches.    CBC with Differential:    Lab Results   Component Value Date/Time    WBC 3.5 2022 06:00 AM RBC 3.53 09/13/2022 06:00 AM    HGB 8.1 09/14/2022 07:00 AM    HCT 30.2 09/14/2022 07:00 AM     09/13/2022 06:00 AM    MCV 74.8 09/13/2022 06:00 AM    MCH 19.8 09/13/2022 06:00 AM    MCHC 26.5 09/13/2022 06:00 AM    RDW 22.3 09/13/2022 06:00 AM    NRBC 0.9 09/13/2022 06:00 AM    LYMPHOPCT 12.2 09/13/2022 06:00 AM    MONOPCT 2.6 09/13/2022 06:00 AM    BASOPCT 0.0 09/13/2022 06:00 AM    MONOSABS 0.10 09/13/2022 06:00 AM    LYMPHSABS 0.42 09/13/2022 06:00 AM    EOSABS 0.00 09/13/2022 06:00 AM    BASOSABS 0.00 09/13/2022 06:00 AM     CMP:    Lab Results   Component Value Date/Time     09/14/2022 07:00 AM    K 4.9 09/14/2022 07:00 AM    K 3.8 09/01/2022 08:24 PM    CL 97 09/14/2022 07:00 AM    CO2 42 09/14/2022 07:00 AM    BUN 13 09/14/2022 07:00 AM    CREATININE 0.9 09/14/2022 07:00 AM    GFRAA >60 09/14/2022 07:00 AM    LABGLOM >60 09/14/2022 07:00 AM    GLUCOSE 96 09/14/2022 07:00 AM    PROT 5.8 09/13/2022 06:00 AM    LABALBU 2.7 09/13/2022 06:00 AM    CALCIUM 8.8 09/14/2022 07:00 AM    BILITOT 0.3 09/13/2022 06:00 AM    ALKPHOS 55 09/13/2022 06:00 AM    AST 15 09/13/2022 06:00 AM    ALT 11 09/13/2022 06:00 AM     HgBA1c:    Lab Results   Component Value Date/Time    LABA1C 5.9 09/02/2022 05:40 AM            Objective   /80   Pulse 78   Temp 97.9 °F (36.6 °C) (Temporal)   Ht 5' 2\" (1.575 m)   Wt 174 lb (78.9 kg)   SpO2 92%   BMI 31.83 kg/m²   Current Outpatient Medications   Medication Sig Dispense Refill    Insulin Pen Needle 32G X 5 MM MISC 1 each by Does not apply route daily 100 each 1    insulin detemir (LEVEMIR FLEXTOUCH) 100 UNIT/ML injection pen Inject 10 Units into the skin nightly 5 Adjustable Dose Pre-filled Pen Syringe 0    furosemide (LASIX) 40 MG tablet Take 1 tablet by mouth daily 90 tablet 0    potassium chloride (KLOR-CON M) 20 MEQ TBCR extended release tablet Take 2 tablets by mouth daily 180 tablet 0    blood glucose monitor strips Test 2 times a day & as needed for symptoms of irregular blood glucose. Dispense sufficient amount for indicated testing frequency plus additional to accommodate PRN testing needs. 200 strip 1    glipiZIDE (GLUCOTROL) 5 MG tablet Take 1 tablet by mouth daily 60 tablet 0    lisinopril (PRINIVIL;ZESTRIL) 40 MG tablet Take 1 tablet by mouth daily 9030 tablet 0    amLODIPine (NORVASC) 5 MG tablet Take 1 tablet by mouth daily 30 tablet 0    metFORMIN (GLUCOPHAGE) 500 MG tablet Take 1 tablet by mouth 2 times daily (with meals) 60 tablet 0    aspirin 325 MG tablet Take 325 mg by mouth daily      Lancets MISC Use to check blood sugar 2 times daily. 200 each 1    hydroCHLOROthiazide (MICROZIDE) 12.5 MG capsule Take 1 capsule by mouth 2 times daily 60 capsule 0    lactobacillus (CULTURELLE) CAPS capsule Take 1 capsule by mouth in the morning and 1 capsule in the evening. Do all this for 21 days. 42 capsule 0    thiamine mononitrate (THIAMINE) 100 MG tablet Take 1 tablet by mouth daily for 10 days 10 tablet 0     No current facility-administered medications for this visit.       No Known Allergies     Past Medical History:   Diagnosis Date    Chronic back pain 2018    10/10 on the pain scale    COPD (chronic obstructive pulmonary disease) (HCC)     GERD (gastroesophageal reflux disease)     Headache     Hyperlipidemia     Hypertension     Obesity     Scoliosis     Type 2 diabetes mellitus without complication [facility])      Past Surgical History:   Procedure Laterality Date    FINGER TRIGGER RELEASE Right 08/14/2018    right thumb    HYSTERECTOMY (CERVIX STATUS UNKNOWN)      [place] INCISE FINGER TENDON SHEATH Right 8/14/2018    RIGHT THUMB TRIGGER THUMB RELEASE performed by [facility], DO at [facility] [address] Right 2002    RCR    WISDOM TOOTH EXTRACTION       Family History   Problem Relation Age of Onset    Cancer Father         Pancreatic      Social History     Socioeconomic History    Marital status: Single     Spouse name: Not on file    Number of children: Not on file    Years of education: Not on file    Highest education level: Not on file   Occupational History    Not on file   Tobacco Use    Smoking status: Every Day     Packs/day: 0.50     Years: 47.00     Pack years: 23.50     Types: Cigarettes     Start date: 67    Smokeless tobacco: Never   Substance and Sexual Activity    Alcohol use: No    Drug use: No    Sexual activity: Not on file   Other Topics Concern    Not on file   Social History Narrative    Not on file     Social Determinants of Health     Financial Resource Strain: Low Risk     Difficulty of Paying Living Expenses: Not hard at all   Food Insecurity: No Food Insecurity    Worried About Running Out of Food in the Last Year: Never true    Ran Out of Food in the Last Year: Never true   Transportation Needs: Not on file   Physical Activity: Not on file   Stress: Not on file   Social Connections: Not on file   Intimate Partner Violence: Not on file   Housing Stability: Not on file      Health Maintenance Due   Topic Date Due    Pneumococcal 65+ years Vaccine (1 - PCV) Never done    Diabetic foot exam  Never done    Depression Screen  Never done    Diabetic microalbuminuria test  Never done    Diabetic retinal exam  Never done    Hepatitis C screen  Never done    DTaP/Tdap/Td vaccine (1 - Tdap) Never done    Shingles vaccine (1 of 2) Never done    Low dose CT lung screening  Never done    DEXA (modify frequency per FRAX score)  Never done    COVID-19 Vaccine (2 - Booster for Esme series) 05/13/2021    Flu vaccine (1) Never done        Physical Exam  Constitutional:       Appearance: Normal appearance. She is normal weight. HENT:      Head: Normocephalic and atraumatic. Right Ear: Tympanic membrane and ear canal normal.      Left Ear: Tympanic membrane and ear canal normal.      Nose: Nose normal.      Mouth/Throat:      Mouth: Mucous membranes are moist.      Pharynx: Oropharynx is clear.    Eyes:      Extraocular Movements: Extraocular movements intact. Conjunctiva/sclera: Conjunctivae normal.      Pupils: Pupils are equal, round, and reactive to light. Cardiovascular:      Rate and Rhythm: Normal rate and regular rhythm. Pulses: Normal pulses. Heart sounds: Normal heart sounds. Pulmonary:      Effort: Pulmonary effort is normal.      Breath sounds: Normal breath sounds. Abdominal:      General: Abdomen is flat. Bowel sounds are normal.      Palpations: Abdomen is soft. Musculoskeletal:         General: Normal range of motion. Right shoulder: Normal.      Left shoulder: Normal.      Right upper arm: Normal.      Left upper arm: Normal.      Right elbow: Normal.      Left elbow: Normal.      Right forearm: Normal.      Left forearm: Normal.      Right wrist: Normal.      Left wrist: Normal.      Right hand: Normal.      Left hand: Normal.      Cervical back: Normal, normal range of motion and neck supple. Lumbar back: Normal.   Skin:     General: Skin is warm and dry. Neurological:      General: No focal deficit present. Mental Status: She is alert and oriented to person, place, and time. Mental status is at baseline. Psychiatric:         Mood and Affect: Mood normal.         Behavior: Behavior normal.         Thought Content: Thought content normal.         Judgment: Judgment normal.             ASSESSMENT/PLAN:  1. Hospital discharge follow-up  -     [place] DISCHARGE MEDS RECONCILED W/ CURRENT OUTPATIENT MED LIST  2. Primary hypertension  3. At high risk for falls  4. Chronic systolic (congestive) heart failure  5. Pulmonary HTN ([address]  6. Obstructive sleep apnea  7. Type 2 diabetes mellitus without complication, without long-term current use of insulin (HCC)  -     Hemoglobin A1C; Future  -     Microalbumin / Creatinine Urine Ratio; Future  8. Tobacco abuse  9. Iron deficiency anemia due to chronic blood loss  -     CBC with Auto Differential; Future  10. Morbid obesity ([address]  11.  Mixed hyperlipidemia  -     T4, Free; Future  -     TSH; Future  -     Comprehensive Metabolic Panel; Future  -     Lipid, Fasting; Future      No follow-ups on file. An electronic signature was used to authenticate this note. --Hussain Armenta MD   On the basis of positive falls risk screening, assessment and plan is as follows: home safety tips provided.

## 2022-11-08 RX ORDER — AMLODIPINE BESYLATE 5 MG/1
TABLET ORAL
Qty: 90 TABLET | Refills: 0 | Status: SHIPPED | OUTPATIENT
Start: 2022-11-08

## 2022-11-10 ENCOUNTER — TELEPHONE (OUTPATIENT)
Dept: PRIMARY CARE CLINIC | Age: 72
End: 2022-11-10

## 2022-11-10 RX ORDER — HYDROCHLOROTHIAZIDE 12.5 MG/1
CAPSULE, GELATIN COATED ORAL
Qty: 180 CAPSULE | Refills: 0 | Status: SHIPPED | OUTPATIENT
Start: 2022-11-10

## 2022-11-21 ENCOUNTER — TELEPHONE (OUTPATIENT)
Dept: PRIMARY CARE CLINIC | Age: 72
End: 2022-11-21

## 2022-11-21 RX ORDER — PROMETHAZINE HYDROCHLORIDE 25 MG/1
25 TABLET ORAL EVERY 6 HOURS PRN
Qty: 20 TABLET | Refills: 0 | Status: SHIPPED | OUTPATIENT
Start: 2022-11-21 | End: 2022-11-28

## 2022-11-21 NOTE — TELEPHONE ENCOUNTER
Pt called stating she has been coughing for the last couple nights, asking for cough syrup sent to [facility] [address],   Offered appt and pt declined.

## 2022-11-29 ENCOUNTER — TELEPHONE (OUTPATIENT)
Dept: PRIMARY CARE CLINIC | Age: 72
End: 2022-11-29

## 2022-11-29 RX ORDER — FUROSEMIDE 40 MG/1
40 TABLET ORAL DAILY
Qty: 90 TABLET | Refills: 0 | Status: SHIPPED | OUTPATIENT
Start: 2022-11-29

## 2022-12-02 RX ORDER — QUINAPRIL 20 MG/1
TABLET ORAL
Qty: 180 TABLET | Refills: 0 | Status: SHIPPED | OUTPATIENT
Start: 2022-12-02

## 2022-12-05 DIAGNOSIS — R11.0 NAUSEA: Primary | ICD-10-CM

## 2022-12-05 RX ORDER — PROMETHAZINE HYDROCHLORIDE 25 MG/1
25 TABLET ORAL 4 TIMES DAILY PRN
Qty: 40 TABLET | Refills: 0 | Status: SHIPPED | OUTPATIENT
Start: 2022-12-05 | End: 2022-12-15

## 2022-12-12 RX ORDER — GLIPIZIDE 5 MG/1
TABLET ORAL
Qty: 90 TABLET | Refills: 0 | Status: SHIPPED | OUTPATIENT
Start: 2022-12-12

## 2022-12-12 RX ORDER — GLIPIZIDE 5 MG/1
5 TABLET ORAL DAILY
Qty: 90 TABLET | Refills: 0 | Status: SHIPPED
Start: 2022-12-12 | End: 2022-12-12

## 2022-12-22 RX ORDER — PROMETHAZINE HYDROCHLORIDE AND CODEINE PHOSPHATE 6.25; 1 MG/5ML; MG/5ML
5 SYRUP ORAL 4 TIMES DAILY PRN
COMMUNITY
End: 2022-12-22

## 2022-12-22 RX ORDER — PROMETHAZINE HYDROCHLORIDE AND CODEINE PHOSPHATE 6.25; 1 MG/5ML; MG/5ML
5 SYRUP ORAL 4 TIMES DAILY PRN
Qty: 240 ML | Refills: 0 | Status: CANCELLED | OUTPATIENT
Start: 2022-12-22 | End: 2023-01-21

## 2022-12-22 RX ORDER — ONDANSETRON 4 MG/1
4 TABLET, FILM COATED ORAL DAILY PRN
Qty: 30 TABLET | Refills: 0 | Status: SHIPPED
Start: 2022-12-22 | End: 2023-01-13 | Stop reason: SDUPTHER

## 2022-12-22 NOTE — TELEPHONE ENCOUNTER
Pt called in has been taking her lasix but her legs are swelling she does not use compression stockings also needed a refill on the nausea medication.  It is pended for you

## 2023-01-13 RX ORDER — ONDANSETRON 4 MG/1
4 TABLET, FILM COATED ORAL DAILY PRN
Qty: 30 TABLET | Refills: 0 | Status: SHIPPED | OUTPATIENT
Start: 2023-01-13

## 2023-02-06 RX ORDER — AMLODIPINE BESYLATE 5 MG/1
TABLET ORAL
Qty: 10 TABLET | Refills: 0 | Status: ON HOLD
Start: 2023-02-06 | End: 2023-02-22 | Stop reason: HOSPADM

## 2023-02-14 ASSESSMENT — ENCOUNTER SYMPTOMS
EYE ITCHING: 0
APNEA: 0
NAUSEA: 0
VOMITING: 0
BLOOD IN STOOL: 0
DIARRHEA: 0
SINUS PAIN: 0
WHEEZING: 0
SHORTNESS OF BREATH: 0
CONSTIPATION: 0
SORE THROAT: 0
TROUBLE SWALLOWING: 0
EYE DISCHARGE: 0
ABDOMINAL PAIN: 0
BACK PAIN: 0
ABDOMINAL DISTENTION: 0
COUGH: 0
PHOTOPHOBIA: 0

## 2023-02-14 NOTE — PROGRESS NOTES
Larry Uribe (:  1950) is a 81 y.o. female,Established patient, here for evaluation of the following chief complaint(s):  Edema (B/L Lower extremity edema ) and Shortness of Breath      Subjective   SUBJECTIVE/OBJECTIVE:  HPI:  1) came to the office with chief complaint of marked increased shortness of breath, saturation was 60% also by reviewing the weight of the patient she gained 9 pounds since last visit  2) congestive heart failure, sure patient has been taking her Lasix or not the rest of her medication patient since discharge from the nursing home she has not been seen in the office, discussed in details with emergency room physician and patient was sent to emergency room  3)Hypertension:  Patient is here for follow up chronic hypertension. This is  generally controlled on current medication regimen. BP Readings from Last 1 Encounters:   02/15/23 132/74        Takes meds as directed and tolerates them well. Most recent labs reviewed with patient and are not remarkable. No symptoms from htn standpoint per ROS. Patient is  compliant with lifestyle modifications. Patient does not smoke. Comorbid conditions include obesity  4)DM2:   Patient is here to fu regarding DM2. Patient is  controlled. Not Taking all medications and . Currently on glipizide  5)Obesity:  Patient is here today to discuss weight management. T body mass index is 43.16 kg/m². Darius Isaías Patient has chf and diabetes co-morbidities contributed by obesity. Review of Systems   Constitutional:  Negative for activity change, appetite change, fever and unexpected weight change. HENT:  Negative for congestion, ear pain, hearing loss, sinus pain, sore throat, tinnitus and trouble swallowing. Eyes:  Negative for photophobia, discharge, itching and visual disturbance. Respiratory:  Negative for apnea, cough, shortness of breath and wheezing. Cardiovascular:  Negative for chest pain, palpitations and leg swelling. Gastrointestinal:  Negative for abdominal distention, abdominal pain, blood in stool, constipation, diarrhea, nausea and vomiting. Endocrine: Negative for cold intolerance, polydipsia and polyuria. Genitourinary:  Negative for difficulty urinating, dysuria, frequency and pelvic pain. Musculoskeletal:  Negative for arthralgias, back pain, joint swelling, myalgias, neck pain and neck stiffness. Skin:  Negative for rash and wound. Neurological:  Negative for dizziness, tremors, syncope, light-headedness and headaches.    CBC with Differential:    Lab Results   Component Value Date/Time    WBC 5.5 02/15/2023 01:13 PM    RBC 4.40 02/15/2023 01:13 PM    HGB 9.0 02/15/2023 01:13 PM    HCT 32.9 02/15/2023 01:13 PM     02/15/2023 01:13 PM    MCV 74.8 02/15/2023 01:13 PM    MCH 20.5 02/15/2023 01:13 PM    MCHC 27.4 02/15/2023 01:13 PM    RDW 19.9 02/15/2023 01:13 PM    NRBC 0.9 02/15/2023 01:13 PM    LYMPHOPCT 8.7 02/15/2023 01:13 PM    MONOPCT 5.2 02/15/2023 01:13 PM    BASOPCT 1.7 02/15/2023 01:13 PM    MONOSABS 0.28 02/15/2023 01:13 PM    LYMPHSABS 0.50 02/15/2023 01:13 PM    EOSABS 0.09 02/15/2023 01:13 PM    BASOSABS 0.09 02/15/2023 01:13 PM     HgBA1c:    Lab Results   Component Value Date/Time    LABA1C 5.9 09/02/2022 05:40 AM     Lipid:   Lab Results   Component Value Date    CHOL 101 09/02/2022     Lab Results   Component Value Date    TRIG 85 09/02/2022     Lab Results   Component Value Date    HDL 38 09/02/2022     Lab Results   Component Value Date    LDLCALC 46 09/02/2022     Lab Results   Component Value Date    LABVLDL 17 09/02/2022     No results found for: CHOLHDLRATIO  Microalbumen/Creatinine ratio:  No components found for: RUCREAT  TSH:    Lab Results   Component Value Date/Time    TSH 2.460 09/06/2022 11:01 AM     Free T4: No results found for: T4FREE         Objective   Pulse 96   Ht 5' 2\" (1.575 m)   Wt 236 lb (107 kg)   SpO2 (!) 63%   BMI 43.16 kg/m²   No current facility-administered medications for this visit. Current Outpatient Medications   Medication Sig Dispense Refill    glipiZIDE (GLUCOTROL) 5 MG tablet TAKE 1 TABLET BY MOUTH EVERY DAY 90 tablet 0    amLODIPine (NORVASC) 5 MG tablet TAKE 1 TABLET BY MOUTH EVERY DAY 10 tablet 0    ondansetron (ZOFRAN) 4 MG tablet Take 1 tablet by mouth daily as needed for Nausea or Vomiting 30 tablet 0    metFORMIN (GLUCOPHAGE) 500 MG tablet Take 1 tablet by mouth 2 times daily (with meals) 180 tablet 0    quinapril (ACCUPRIL) 20 MG tablet TAKE 1 TABLET BY MOUTH IN THE MORNING AND IN THE EVENING 180 tablet 0    furosemide (LASIX) 40 MG tablet Take 1 tablet by mouth daily 90 tablet 0    hydroCHLOROthiazide (MICROZIDE) 12.5 MG capsule TAKE 1 CAPSULE BY MOUTH TWICE A  capsule 0    Insulin Pen Needle 32G X 5 MM MISC 1 each by Does not apply route daily 100 each 1    insulin detemir (LEVEMIR FLEXTOUCH) 100 UNIT/ML injection pen Inject 10 Units into the skin nightly 5 Adjustable Dose Pre-filled Pen Syringe 0    potassium chloride (KLOR-CON M) 20 MEQ TBCR extended release tablet Take 2 tablets by mouth daily 180 tablet 0    blood glucose monitor strips Test 2 times a day & as needed for symptoms of irregular blood glucose. Dispense sufficient amount for indicated testing frequency plus additional to accommodate PRN testing needs. 200 strip 1    Lancets MISC Use to check blood sugar 2 times daily. 200 each 1    lisinopril (PRINIVIL;ZESTRIL) 40 MG tablet Take 1 tablet by mouth daily 9030 tablet 0    lactobacillus (CULTURELLE) CAPS capsule Take 1 capsule by mouth in the morning and 1 capsule in the evening. Do all this for 21 days.  42 capsule 0    thiamine mononitrate (THIAMINE) 100 MG tablet Take 1 tablet by mouth daily for 10 days 10 tablet 0    aspirin 325 MG tablet Take 325 mg by mouth daily       Facility-Administered Medications Ordered in Other Visits   Medication Dose Route Frequency Provider Last Rate Last Admin methylPREDNISolone sodium (SOLU-MEDROL) injection 125 mg  125 mg IntraVENous Daily Prashant Davis DO   125 mg at 02/15/23 1320    iopamidol (ISOVUE-370) 76 % injection 75 mL  75 mL IntraVENous ONCE PRN Annie Gates MD          No Known Allergies     Past Medical History:   Diagnosis Date    Chronic back pain 2018    10/10 on the pain scale    COPD (chronic obstructive pulmonary disease) (HCC)     GERD (gastroesophageal reflux disease)     Headache     Hyperlipidemia     Hypertension     Obesity     Scoliosis     Type 2 diabetes mellitus without complication [facility])      Past Surgical History:   Procedure Laterality Date    FINGER TRIGGER RELEASE Right 08/14/2018    right thumb    HYSTERECTOMY (CERVIX STATUS UNKNOWN)      [place] TENDON SHEATH INCISION Right 8/14/2018    RIGHT THUMB TRIGGER THUMB RELEASE performed by Tanya Almonte DO at [facility] [address] Right 2002    RCR    WISDOM TOOTH EXTRACTION       Family History   Problem Relation Age of Onset    Cancer Father         Pancreatic      Social History     Socioeconomic History    Marital status: Single     Spouse name: Not on file    Number of children: Not on file    Years of education: Not on file    Highest education level: Not on file   Occupational History    Not on file   Tobacco Use    Smoking status: Former     Packs/day: 0.50     Years: 47.00     Pack years: 23.50     Types: Cigarettes     Start date: 1975    Smokeless tobacco: Never   Substance and Sexual Activity    Alcohol use: No    Drug use: No    Sexual activity: Not on file   Other Topics Concern    Not on file   Social History Narrative    Not on file     Social Determinants of Health     Financial Resource Strain: Low Risk     Difficulty of Paying Living Expenses: Not hard at all   Food Insecurity: No Food Insecurity    Worried About Running Out of Food in the Last Year: Never true    Ran Out of Food in the Last Year: Never true   Transportation Needs: Not on file   Physical Activity: Not on file   Stress: Not on file   Social Connections: Not on file   Intimate Partner Violence: Not on file   Housing Stability: Not on file      Health Maintenance Due   Topic Date Due    Pneumococcal 65+ years Vaccine (1 - PCV) Never done    Diabetic foot exam  Never done    Diabetic retinal exam  Never done    Hepatitis C screen  Never done    DTaP/Tdap/Td vaccine (1 - Tdap) Never done    Shingles vaccine (1 of 2) Never done    DEXA (modify frequency per FRAX score)  Never done    COVID-19 Vaccine (2 - Booster for Esme series) 05/13/2021    Flu vaccine (1) Never done    Annual Wellness Visit (AWV)  12/23/2022        Physical Exam  Constitutional:       General: She is in acute distress. Appearance: Normal appearance. She is obese. She is ill-appearing. HENT:      Head: Normocephalic and atraumatic. Right Ear: Tympanic membrane and ear canal normal.      Left Ear: Tympanic membrane and ear canal normal.      Nose: Nose normal.      Mouth/Throat:      Mouth: Mucous membranes are moist.      Pharynx: Oropharynx is clear. Eyes:      Extraocular Movements: Extraocular movements intact. Conjunctiva/sclera: Conjunctivae normal.      Pupils: Pupils are equal, round, and reactive to light. Cardiovascular:      Rate and Rhythm: Normal rate and regular rhythm. Pulses: Normal pulses. Heart sounds: Normal heart sounds. Pulmonary:      Effort: Pulmonary effort is normal.      Breath sounds: Wheezing and rales present. Abdominal:      General: Abdomen is flat. Bowel sounds are normal.      Palpations: Abdomen is soft. Musculoskeletal:         General: Normal range of motion.       Right shoulder: Normal.      Left shoulder: Normal.      Right upper arm: Normal.      Left upper arm: Normal.      Right elbow: Normal.      Left elbow: Normal.      Right forearm: Normal.      Left forearm: Normal.      Right wrist: Normal.      Left wrist: Normal.      Right hand: Normal.      Left hand: Normal.      Cervical back: Normal, normal range of motion and neck supple. Lumbar back: Normal.      Right lower leg: Swelling present. Left lower leg: Swelling present. Skin:     General: Skin is warm and dry. Neurological:      General: No focal deficit present. Mental Status: She is alert and oriented to person, place, and time. Mental status is at baseline. Psychiatric:         Mood and Affect: Mood normal.         Behavior: Behavior normal.         Thought Content: Thought content normal.         Judgment: Judgment normal.             ASSESSMENT/PLAN:  1. Acute respiratory failure with hypoxia ([facility] [address]  2. Chronic obstructive pulmonary disease, unspecified COPD type ([address]  3. Obesity, Class III, BMI 40-49.9 (morbid obesity) ([address]  4. Pulmonary HTN ([facility] [address]  5. Type 2 diabetes mellitus without complication, without long-term current use of insulin (HCC)    No follow-ups on file. An electronic signature was used to authenticate this note.     --Eloina Nix MD

## 2023-02-15 ENCOUNTER — APPOINTMENT (OUTPATIENT)
Dept: GENERAL RADIOLOGY | Age: 73
DRG: 291 | End: 2023-02-15
Payer: MEDICARE

## 2023-02-15 ENCOUNTER — OFFICE VISIT (OUTPATIENT)
Dept: PRIMARY CARE CLINIC | Age: 73
End: 2023-02-15
Payer: MEDICARE

## 2023-02-15 ENCOUNTER — HOSPITAL ENCOUNTER (INPATIENT)
Age: 73
LOS: 7 days | Discharge: NURSING, REHAB OR LONG-TERM CARE | DRG: 291 | End: 2023-02-22
Attending: EMERGENCY MEDICINE | Admitting: INTERNAL MEDICINE
Payer: MEDICARE

## 2023-02-15 ENCOUNTER — APPOINTMENT (OUTPATIENT)
Dept: CT IMAGING | Age: 73
DRG: 291 | End: 2023-02-15
Payer: MEDICARE

## 2023-02-15 VITALS — HEIGHT: 62 IN | WEIGHT: 236 LBS | HEART RATE: 96 BPM | BODY MASS INDEX: 43.43 KG/M2 | OXYGEN SATURATION: 63 %

## 2023-02-15 DIAGNOSIS — J96.01 ACUTE RESPIRATORY FAILURE WITH HYPOXIA (HCC): Primary | ICD-10-CM

## 2023-02-15 DIAGNOSIS — E66.01 OBESITY, CLASS III, BMI 40-49.9 (MORBID OBESITY) (HCC): ICD-10-CM

## 2023-02-15 DIAGNOSIS — J44.9 CHRONIC OBSTRUCTIVE PULMONARY DISEASE, UNSPECIFIED COPD TYPE (HCC): ICD-10-CM

## 2023-02-15 DIAGNOSIS — G89.29 CHRONIC MIDLINE LOW BACK PAIN WITHOUT SCIATICA: ICD-10-CM

## 2023-02-15 DIAGNOSIS — J44.1 COPD EXACERBATION (HCC): ICD-10-CM

## 2023-02-15 DIAGNOSIS — D50.0 IRON DEFICIENCY ANEMIA DUE TO CHRONIC BLOOD LOSS: ICD-10-CM

## 2023-02-15 DIAGNOSIS — E11.9 TYPE 2 DIABETES MELLITUS WITHOUT COMPLICATION, WITHOUT LONG-TERM CURRENT USE OF INSULIN (HCC): ICD-10-CM

## 2023-02-15 DIAGNOSIS — I27.20 PULMONARY HTN (HCC): ICD-10-CM

## 2023-02-15 DIAGNOSIS — M54.50 CHRONIC MIDLINE LOW BACK PAIN WITHOUT SCIATICA: ICD-10-CM

## 2023-02-15 DIAGNOSIS — E78.2 MIXED HYPERLIPIDEMIA: ICD-10-CM

## 2023-02-15 LAB
ALBUMIN SERPL-MCNC: 3.2 G/DL (ref 3.5–5.2)
ALP BLD-CCNC: 101 U/L (ref 35–104)
ALT SERPL-CCNC: 12 U/L (ref 0–32)
ANION GAP SERPL CALCULATED.3IONS-SCNC: 8 MMOL/L (ref 7–16)
ANISOCYTOSIS: ABNORMAL
AST SERPL-CCNC: 19 U/L (ref 0–31)
B.E.: 3.6 MMOL/L (ref -3–3)
BASOPHILS ABSOLUTE: 0.09 E9/L (ref 0–0.2)
BASOPHILS RELATIVE PERCENT: 1.7 % (ref 0–2)
BILIRUB SERPL-MCNC: 0.4 MG/DL (ref 0–1.2)
BUN BLDV-MCNC: 16 MG/DL (ref 6–23)
CALCIUM SERPL-MCNC: 8.4 MG/DL (ref 8.6–10.2)
CHLORIDE BLD-SCNC: 98 MMOL/L (ref 98–107)
CO2: 33 MMOL/L (ref 22–29)
CREAT SERPL-MCNC: 1.1 MG/DL (ref 0.5–1)
CREATININE URINE: 92 MG/DL (ref 29–226)
DEVICE: ABNORMAL
EKG ATRIAL RATE: 67 BPM
EKG P AXIS: 90 DEGREES
EKG P-R INTERVAL: 156 MS
EKG Q-T INTERVAL: 418 MS
EKG QRS DURATION: 86 MS
EKG QTC CALCULATION (BAZETT): 441 MS
EKG R AXIS: 140 DEGREES
EKG T AXIS: 64 DEGREES
EKG VENTRICULAR RATE: 67 BPM
EOSINOPHILS ABSOLUTE: 0.09 E9/L (ref 0.05–0.5)
EOSINOPHILS RELATIVE PERCENT: 1.7 % (ref 0–6)
GFR SERPL CREATININE-BSD FRML MDRD: 54 ML/MIN/1.73
GLUCOSE BLD-MCNC: 84 MG/DL (ref 74–99)
HCO3: 29.2 MMOL/L (ref 22–26)
HCT VFR BLD CALC: 32.9 % (ref 34–48)
HEMOGLOBIN: 9 G/DL (ref 11.5–15.5)
HYPOCHROMIA: ABNORMAL
LYMPHOCYTES ABSOLUTE: 0.5 E9/L (ref 1.5–4)
LYMPHOCYTES RELATIVE PERCENT: 8.7 % (ref 20–42)
MAGNESIUM: 2 MG/DL (ref 1.6–2.6)
MCH RBC QN AUTO: 20.5 PG (ref 26–35)
MCHC RBC AUTO-ENTMCNC: 27.4 % (ref 32–34.5)
MCV RBC AUTO: 74.8 FL (ref 80–99.9)
METER GLUCOSE: 308 MG/DL (ref 74–99)
MICROALBUMIN UR-MCNC: 74.4 MG/L
MICROALBUMIN/CREAT UR-RTO: 80.9 (ref 0–30)
MONOCYTES ABSOLUTE: 0.28 E9/L (ref 0.1–0.95)
MONOCYTES RELATIVE PERCENT: 5.2 % (ref 2–12)
NEUTROPHILS ABSOLUTE: 4.57 E9/L (ref 1.8–7.3)
NEUTROPHILS RELATIVE PERCENT: 82.6 % (ref 43–80)
NUCLEATED RED BLOOD CELLS: 0.9 /100 WBC
O2 SATURATION: 99.6 % (ref 92–98.5)
OPERATOR ID: 8219
OVALOCYTES: ABNORMAL
PCO2 37: 47.6 MMHG (ref 35–45)
PDW BLD-RTO: 19.9 FL (ref 11.5–15)
PH 37: 7.4 (ref 7.35–7.45)
PLATELET # BLD: 425 E9/L (ref 130–450)
PMV BLD AUTO: 9.9 FL (ref 7–12)
PO2 37: 188.9 MMHG (ref 60–80)
POC SOURCE: ABNORMAL
POIKILOCYTES: ABNORMAL
POLYCHROMASIA: ABNORMAL
POTASSIUM REFLEX MAGNESIUM: 3.2 MMOL/L (ref 3.5–5)
PRO-BNP: 5127 PG/ML (ref 0–125)
RBC # BLD: 4.4 E12/L (ref 3.5–5.5)
SARS-COV-2, NAAT: NOT DETECTED
SODIUM BLD-SCNC: 139 MMOL/L (ref 132–146)
TOTAL PROTEIN: 6.4 G/DL (ref 6.4–8.3)
TROPONIN, HIGH SENSITIVITY: 43 NG/L (ref 0–9)
TROPONIN, HIGH SENSITIVITY: 54 NG/L (ref 0–9)
WBC # BLD: 5.5 E9/L (ref 4.5–11.5)

## 2023-02-15 PROCEDURE — 6360000002 HC RX W HCPCS: Performed by: STUDENT IN AN ORGANIZED HEALTH CARE EDUCATION/TRAINING PROGRAM

## 2023-02-15 PROCEDURE — 6370000000 HC RX 637 (ALT 250 FOR IP): Performed by: INTERNAL MEDICINE

## 2023-02-15 PROCEDURE — 83880 ASSAY OF NATRIURETIC PEPTIDE: CPT

## 2023-02-15 PROCEDURE — 94640 AIRWAY INHALATION TREATMENT: CPT

## 2023-02-15 PROCEDURE — 83735 ASSAY OF MAGNESIUM: CPT

## 2023-02-15 PROCEDURE — 96374 THER/PROPH/DIAG INJ IV PUSH: CPT

## 2023-02-15 PROCEDURE — 6370000000 HC RX 637 (ALT 250 FOR IP): Performed by: STUDENT IN AN ORGANIZED HEALTH CARE EDUCATION/TRAINING PROGRAM

## 2023-02-15 PROCEDURE — 6360000004 HC RX CONTRAST MEDICATION: Performed by: RADIOLOGY

## 2023-02-15 PROCEDURE — 71275 CT ANGIOGRAPHY CHEST: CPT

## 2023-02-15 PROCEDURE — 94660 CPAP INITIATION&MGMT: CPT

## 2023-02-15 PROCEDURE — 93005 ELECTROCARDIOGRAM TRACING: CPT | Performed by: STUDENT IN AN ORGANIZED HEALTH CARE EDUCATION/TRAINING PROGRAM

## 2023-02-15 PROCEDURE — 93010 ELECTROCARDIOGRAM REPORT: CPT | Performed by: INTERNAL MEDICINE

## 2023-02-15 PROCEDURE — 1123F ACP DISCUSS/DSCN MKR DOCD: CPT | Performed by: INTERNAL MEDICINE

## 2023-02-15 PROCEDURE — 82962 GLUCOSE BLOOD TEST: CPT

## 2023-02-15 PROCEDURE — 71045 X-RAY EXAM CHEST 1 VIEW: CPT

## 2023-02-15 PROCEDURE — 99285 EMERGENCY DEPT VISIT HI MDM: CPT

## 2023-02-15 PROCEDURE — 6360000002 HC RX W HCPCS: Performed by: INTERNAL MEDICINE

## 2023-02-15 PROCEDURE — 87635 SARS-COV-2 COVID-19 AMP PRB: CPT

## 2023-02-15 PROCEDURE — 99215 OFFICE O/P EST HI 40 MIN: CPT | Performed by: INTERNAL MEDICINE

## 2023-02-15 PROCEDURE — 84484 ASSAY OF TROPONIN QUANT: CPT

## 2023-02-15 PROCEDURE — 80053 COMPREHEN METABOLIC PANEL: CPT

## 2023-02-15 PROCEDURE — 2060000000 HC ICU INTERMEDIATE R&B

## 2023-02-15 PROCEDURE — 85025 COMPLETE CBC W/AUTO DIFF WBC: CPT

## 2023-02-15 PROCEDURE — 2700000000 HC OXYGEN THERAPY PER DAY

## 2023-02-15 PROCEDURE — 82803 BLOOD GASES ANY COMBINATION: CPT

## 2023-02-15 PROCEDURE — 36415 COLL VENOUS BLD VENIPUNCTURE: CPT

## 2023-02-15 RX ORDER — LISINOPRIL 20 MG/1
40 TABLET ORAL DAILY
Status: DISCONTINUED | OUTPATIENT
Start: 2023-02-16 | End: 2023-02-22 | Stop reason: HOSPADM

## 2023-02-15 RX ORDER — AMLODIPINE BESYLATE 5 MG/1
5 TABLET ORAL DAILY
Status: DISCONTINUED | OUTPATIENT
Start: 2023-02-16 | End: 2023-02-19

## 2023-02-15 RX ORDER — IPRATROPIUM BROMIDE AND ALBUTEROL SULFATE 2.5; .5 MG/3ML; MG/3ML
3 SOLUTION RESPIRATORY (INHALATION)
Status: DISCONTINUED | OUTPATIENT
Start: 2023-02-15 | End: 2023-02-15

## 2023-02-15 RX ORDER — INSULIN GLARGINE 100 [IU]/ML
10 INJECTION, SOLUTION SUBCUTANEOUS NIGHTLY
Status: DISCONTINUED | OUTPATIENT
Start: 2023-02-15 | End: 2023-02-16

## 2023-02-15 RX ORDER — METHYLPREDNISOLONE SODIUM SUCCINATE 125 MG/2ML
125 INJECTION, POWDER, LYOPHILIZED, FOR SOLUTION INTRAMUSCULAR; INTRAVENOUS DAILY
Status: DISCONTINUED | OUTPATIENT
Start: 2023-02-15 | End: 2023-02-15

## 2023-02-15 RX ORDER — HYDROCHLOROTHIAZIDE 25 MG/1
25 TABLET ORAL DAILY
Status: DISCONTINUED | OUTPATIENT
Start: 2023-02-16 | End: 2023-02-18

## 2023-02-15 RX ORDER — ASPIRIN 325 MG
325 TABLET ORAL DAILY
Status: DISCONTINUED | OUTPATIENT
Start: 2023-02-16 | End: 2023-02-17

## 2023-02-15 RX ORDER — POTASSIUM CHLORIDE 20 MEQ/1
40 TABLET, EXTENDED RELEASE ORAL DAILY
Status: DISCONTINUED | OUTPATIENT
Start: 2023-02-16 | End: 2023-02-22 | Stop reason: HOSPADM

## 2023-02-15 RX ORDER — ENOXAPARIN SODIUM 100 MG/ML
30 INJECTION SUBCUTANEOUS 2 TIMES DAILY
Status: DISCONTINUED | OUTPATIENT
Start: 2023-02-15 | End: 2023-02-22 | Stop reason: HOSPADM

## 2023-02-15 RX ORDER — ONDANSETRON 4 MG/1
4 TABLET, FILM COATED ORAL DAILY PRN
Status: DISCONTINUED | OUTPATIENT
Start: 2023-02-15 | End: 2023-02-22 | Stop reason: HOSPADM

## 2023-02-15 RX ORDER — INSULIN LISPRO 100 [IU]/ML
0-8 INJECTION, SOLUTION INTRAVENOUS; SUBCUTANEOUS
Status: DISCONTINUED | OUTPATIENT
Start: 2023-02-16 | End: 2023-02-22 | Stop reason: HOSPADM

## 2023-02-15 RX ORDER — ACETAMINOPHEN 500 MG
1000 TABLET ORAL ONCE
Status: COMPLETED | OUTPATIENT
Start: 2023-02-15 | End: 2023-02-15

## 2023-02-15 RX ORDER — LACTOBACILLUS RHAMNOSUS GG 10B CELL
1 CAPSULE ORAL EVERY 12 HOURS
Status: DISCONTINUED | OUTPATIENT
Start: 2023-02-16 | End: 2023-02-22 | Stop reason: HOSPADM

## 2023-02-15 RX ORDER — GAUZE BANDAGE 2" X 2"
100 BANDAGE TOPICAL DAILY
Status: DISCONTINUED | OUTPATIENT
Start: 2023-02-16 | End: 2023-02-22 | Stop reason: HOSPADM

## 2023-02-15 RX ORDER — GLIPIZIDE 5 MG/1
5 TABLET ORAL DAILY
Status: DISCONTINUED | OUTPATIENT
Start: 2023-02-16 | End: 2023-02-22

## 2023-02-15 RX ORDER — POTASSIUM CHLORIDE 7.45 MG/ML
10 INJECTION INTRAVENOUS
Status: DISPENSED | OUTPATIENT
Start: 2023-02-15 | End: 2023-02-16

## 2023-02-15 RX ORDER — POTASSIUM CHLORIDE 20 MEQ/1
40 TABLET, EXTENDED RELEASE ORAL ONCE
Status: COMPLETED | OUTPATIENT
Start: 2023-02-15 | End: 2023-02-15

## 2023-02-15 RX ORDER — ENOXAPARIN SODIUM 100 MG/ML
40 INJECTION SUBCUTANEOUS DAILY
Status: DISCONTINUED | OUTPATIENT
Start: 2023-02-16 | End: 2023-02-15 | Stop reason: DRUGHIGH

## 2023-02-15 RX ORDER — FUROSEMIDE 10 MG/ML
40 INJECTION INTRAMUSCULAR; INTRAVENOUS DAILY
Status: DISCONTINUED | OUTPATIENT
Start: 2023-02-16 | End: 2023-02-16

## 2023-02-15 RX ORDER — DEXTROSE MONOHYDRATE 100 MG/ML
INJECTION, SOLUTION INTRAVENOUS CONTINUOUS PRN
Status: DISCONTINUED | OUTPATIENT
Start: 2023-02-15 | End: 2023-02-22 | Stop reason: HOSPADM

## 2023-02-15 RX ADMIN — IPRATROPIUM BROMIDE AND ALBUTEROL SULFATE 3 AMPULE: .5; 3 SOLUTION RESPIRATORY (INHALATION) at 13:46

## 2023-02-15 RX ADMIN — IOPAMIDOL 75 ML: 755 INJECTION, SOLUTION INTRAVENOUS at 16:03

## 2023-02-15 RX ADMIN — ACETAMINOPHEN 1000 MG: 500 TABLET ORAL at 16:20

## 2023-02-15 RX ADMIN — POTASSIUM CHLORIDE 10 MEQ: 7.46 INJECTION, SOLUTION INTRAVENOUS at 22:26

## 2023-02-15 RX ADMIN — METHYLPREDNISOLONE SODIUM SUCCINATE 125 MG: 125 INJECTION, POWDER, FOR SOLUTION INTRAMUSCULAR; INTRAVENOUS at 13:20

## 2023-02-15 RX ADMIN — INSULIN GLARGINE 10 UNITS: 100 INJECTION, SOLUTION SUBCUTANEOUS at 22:22

## 2023-02-15 RX ADMIN — ENOXAPARIN SODIUM 30 MG: 100 INJECTION SUBCUTANEOUS at 22:21

## 2023-02-15 RX ADMIN — POTASSIUM CHLORIDE 40 MEQ: 20 TABLET, EXTENDED RELEASE ORAL at 14:51

## 2023-02-15 NOTE — ED NOTES
Pt found to be sleeping in bed, 87% on 6L NC. Placed on high flow NC at 10L, now 91%. Dr Tisha Rosenberg aware.       Jordan, TREVER  02/15/23 0578

## 2023-02-15 NOTE — ED NOTES
EKG complete, copy given to Dr Noelle Ramírez. Pt on cont tele and pulse ox. Pt medicated per orders.       TREVER Ortega  02/15/23 0630

## 2023-02-15 NOTE — LETTER
[address] Medicaid  CERTIFICATION OF NECESSITY  FOR TRANSPORTATION   BY WHEELCHAIR VAN     Individual Information   1. Name: Scot Roblero 2. [facility] Medicaid Billing Number:   3. Address: [address] [place] [id number]      Transportation Provider Information   4. Provider Name: [facility]   5. [facility] Medicaid Provider Number: [id number] National Provider Identifier (NPI): [id number]     Certification  7. Criteria:  By signing this document, the practitioner certifies that two statements are true:  A. This individual must be accompanied by a mobility-related assistive device from the point of pick-up to the point of drop-off. B. Transport of this individual by standard passenger vehicle or common carrier is precluded or contraindicated. 8. Period Beginning Date: 2/22/2023   9. Length  [x] Not more than 1 day(s)  [] One Year     Additional Information Relevant to Certification   10. Comments or Explanations, If Necessary or Appropriate:   Acute resp failure with hypoxia, O2 dependence, gait dysfunction, CHF, VHD         Certifying Practitioner Information   11. Name of Practitioner: Dr Mark Armenta   12. [facility] Medicaid Provider Number, If Applicable:  [address] Provider Identifier (NPI): [id number]     Signature Information   14. Date of Signature: 2/22/2023 15. Name of Person Signing: Ike Grier RN   16. Signature and Professional Designation: Electronically signed by Ike Grier RN on 2/22/2023 at 9:35 AM     [facility] [id number]  Rev. 7/2015  [facility] Encounter Date/Time: 2/15/2023 [address] Account: [de-identified]    MRN: [id number]    Patient: Scot Roblero    Contact Serial #: [id number]      ENCOUNTER          Patient Class: I Private Enc? No Unit RM BD: Andre Figueredo [facility] [address] [facility]/[id number]   Hospital Service:  INM   Encounter DX: COPD exacerbation ([facility] [address] *   ADM Provider: Claudette Mixer, MD   Procedure:     ATT Provider: Jayla Oviedo MD   REF Provider:        Admission DX: COPD exacerbation [facility]), Acute respiratory failure with hypoxia ([facility] [address] and DX codes: J44.1, J96.01      PATIENT                 Name: Gus Rae : 1950 (72 yrs)   Address:  [address] Sex: Female   City: [address] [place] [address]         Marital Status: Single   Employer: RETIRED         Ep[employer]alian: [employer]ism   Primary Care Provider: Colonel Fei MD         Primary Phone: [phone]   EMERGENCY CONTACT   Contact Name Legal Guardian? Relationship to Patient Home Phone Work Phone   1. Whitney Duncan  2. Rodney Duncan      Other  Child [phone]              GUARANTOR            Guarantor: Gus Rae     : 1950   Address: [address] [place] [address] Sex: Female     [address] [address]     Relation to Patient: Self       Home Phone: [phone]   Guarantor ID: [id number]       Work Phone:     Guarantor Employer: RETIRED         Status: RETIRED      COVERAGE        PRIMARY INSURANCE   Payor: [facility] MEDICARE Plan: [facility] OPTUM PLAN [facility] *   Payor Address: ,          Group Number: [id number] Insurance Type: [id number] Name: Lorie Sasha : 1950   Subscriber ID: [id number] Pat. Rel. to Sub: Self   SECONDARY INSURANCE   Payor:   Plan:     Payor Address:  ,           Group Number:   Insurance Type:     Subscriber Name:   Subscriber :     Subscriber ID:   Pat.  Rel. to Sub:        CSN: [id number]

## 2023-02-15 NOTE — ED NOTES
Pt to room 8, O2 74%. Dr David Lozano paged into room. Pt placed on 15L NRB and now 97%.      TREVER Ortega  02/15/23 0062

## 2023-02-15 NOTE — ED PROVIDER NOTES
[address]      Pt Name: Therese Parry  MRN: [id number]  [place] 1950  Date of evaluation: 2/15/2023  Provider: Patricia Kothari DO  PCP: Chandu Billings MD  Note Started: 1:25 PM EST 2/15/23    CHIEF COMPLAINT       Chief Complaint   Patient presents with    Shortness of Breath     Pt sent by PCP for O2 in the 60s at the office, pt 73% on RA upon arrival, denies feeling SOB at time of triage       HISTORY OF PRESENT ILLNESS: 1 or more Elements   History From: Patient  Limitations to history : None    Therese Parry is a 81 y.o. female who presents to the ED due to shortness of breath and hypoxia. Patient was seen at her primary care physician's office this morning and her pulse oximetry was registering in the 60%. On arrival she was 79% in triage. She reports that she has been having worsening shortness of breath over the past week. She denies any fevers or chills. States that she has not had any increased cough. She notes that she has had increased lower extremity edema over the past several weeks as well. She states her shortness of breath is worsened with exertion and laying flat. Nursing Notes were all reviewed and agreed with or any disagreements were addressed in the HPI. REVIEW OF SYSTEMS :    Positives and Pertinent negatives as per HPI.      SURGICAL HISTORY     Past Surgical History:   Procedure Laterality Date    FINGER TRIGGER RELEASE Right 08/14/2018    right thumb    HYSTERECTOMY (CERVIX STATUS UNKNOWN)      MN TENDON SHEATH INCISION Right 8/14/2018    RIGHT THUMB TRIGGER THUMB RELEASE performed by Ese Almanza DO at [place] [id number] Right 2002    RCR    WISDOM TOOTH EXTRACTION         CURRENTMEDICATIONS       Previous Medications    AMLODIPINE (NORVASC) 5 MG TABLET    TAKE 1 TABLET BY MOUTH EVERY DAY    ASPIRIN 325 MG TABLET    Take 325 mg by mouth daily    BLOOD GLUCOSE MONITOR STRIPS    Test 2 times a day & as needed for symptoms of irregular blood glucose. Dispense sufficient amount for indicated testing frequency plus additional to accommodate PRN testing needs. FUROSEMIDE (LASIX) 40 MG TABLET    Take 1 tablet by mouth daily    GLIPIZIDE (GLUCOTROL) 5 MG TABLET    TAKE 1 TABLET BY MOUTH EVERY DAY    HYDROCHLOROTHIAZIDE (MICROZIDE) 12.5 MG CAPSULE    TAKE 1 CAPSULE BY MOUTH TWICE A DAY    INSULIN DETEMIR (LEVEMIR FLEXTOUCH) 100 UNIT/ML INJECTION PEN    Inject 10 Units into the skin nightly    INSULIN PEN NEEDLE 32G X 5 MM MISC    1 each by Does not apply route daily    LACTOBACILLUS (CULTURELLE) CAPS CAPSULE    Take 1 capsule by mouth in the morning and 1 capsule in the evening. Do all this for 21 days. LANCETS MISC    Use to check blood sugar 2 times daily. LISINOPRIL (PRINIVIL;ZESTRIL) 40 MG TABLET    Take 1 tablet by mouth daily    METFORMIN (GLUCOPHAGE) 500 MG TABLET    Take 1 tablet by mouth 2 times daily (with meals)    ONDANSETRON (ZOFRAN) 4 MG TABLET    Take 1 tablet by mouth daily as needed for Nausea or Vomiting    POTASSIUM CHLORIDE (KLOR-CON M) 20 MEQ TBCR EXTENDED RELEASE TABLET    Take 2 tablets by mouth daily    QUINAPRIL (ACCUPRIL) 20 MG TABLET    TAKE 1 TABLET BY MOUTH IN THE MORNING AND IN THE EVENING    THIAMINE MONONITRATE (THIAMINE) 100 MG TABLET    Take 1 tablet by mouth daily for 10 days       ALLERGIES     Patient has no known allergies.     FAMILYHISTORY       Family History   Problem Relation Age of Onset    Cancer Father         Pancreatic        SOCIAL HISTORY       Social History     Tobacco Use    Smoking status: Former     Packs/day: 0.50     Years: 47.00     Pack years: 23.50     Types: Cigarettes     Start date: 1975    Smokeless tobacco: Never   Substance Use Topics    Alcohol use: No    Drug use: No       SCREENINGS        [place] Coma Scale  Eye Opening: Spontaneous  Best Verbal Response: Oriented  Best Motor Response: Obeys commands  Royce Coma Scale Score: 15                CIWA Assessment  BP: 124/77  Heart Rate: 74           PHYSICAL EXAM  1 or more Elements     ED Triage Vitals [02/15/23 1259]   BP Temp Temp Source Heart Rate Resp SpO2 Height Weight   121/84 97.9 °F (36.6 °C) Axillary 76 20 100 % -- --       Constitutional/General: Alert and oriented x3  Head: Normocephalic and atraumatic  Respiratory: Decreased air movement throughout without noticeable rhonchi or rales. Not in respiratory distress  Cardiovascular:  Regular rate. Regular rhythm. No murmurs, no gallops, no rubs. 2+ distal pulses. Equal extremity pulses.   Chest: No chest wall tenderness  GI:  Abdomen Soft, Non tender, Non distended.  +BS.   No rebound, guarding, or rigidity. No pulsatile masses.  Musculoskeletal: Moves all extremities x 4. Warm and well perfused, no clubbing, no cyanosis, no edema. Capillary refill <3 seconds  Integument: skin warm and dry. No rashes.   Neurologic: GCS 15, no focal deficits  Psychiatric: Normal Affect    DIAGNOSTIC RESULTS   LABS:    Labs Reviewed   BRAIN NATRIURETIC PEPTIDE - Abnormal; Notable for the following components:       Result Value    Pro-BNP 5,127 (*)     All other components within normal limits   CBC WITH AUTO DIFFERENTIAL - Abnormal; Notable for the following components:    Hemoglobin 9.0 (*)     Hematocrit 32.9 (*)     MCV 74.8 (*)     MCH 20.5 (*)     MCHC 27.4 (*)     RDW 19.9 (*)     Neutrophils % 82.6 (*)     Lymphocytes % 8.7 (*)     Lymphocytes Absolute 0.50 (*)     All other components within normal limits   COMPREHENSIVE METABOLIC PANEL W/ REFLEX TO MG FOR LOW K - Abnormal; Notable for the following components:    Potassium reflex Magnesium 3.2 (*)     CO2 33 (*)     Creatinine 1.1 (*)     Calcium 8.4 (*)     Albumin 3.2 (*)     All other components within normal limits   TROPONIN - Abnormal; Notable for the following components:    Troponin, High Sensitivity 54 (*)     All other components within normal  limits   ARTERIAL BLOOD GAS, RESPIRATORY ONLY - Abnormal; Notable for the following components:    PCO2 37 47.6 (*)     PO2 37 188.9 (*)     HCO3 29.2 (*)     B.E. 3.6 (*)     O2 Sat 99.6 (*)     All other components within normal limits   TROPONIN - Abnormal; Notable for the following components:    Troponin, High Sensitivity 43 (*)     All other components within normal limits   COVID-19, RAPID   MAGNESIUM   ARTERIAL BLOOD GAS, RESPIRATORY ONLY       As interpreted by me, the above displayed labs are abnormal. All other labs obtained during this visit were within normal range or not returned as of this dictation. EKG Interpretation:  Interpreted by emergency department physician, Manohar Lares, DO  Rate: 79  Rhythm: Sinus  Interpretation: right ventricular hypertrophy and sinus arrhythmia  Comparison: stable as compared to patient's most recent EKG    RADIOLOGY:   Non-plain film images such as CT, Ultrasound and MRI are read by the radiologist. Plain radiographic images are visualized and preliminarily interpreted by the ED Provider with the below findings:    Did not appreciate any consolidation or pneumothorax on chest x-ray. Interpretation per the Radiologist below, if available at the time of this note:    CTA PULMONARY W CONTRAST   Final Result   Limited CTA chest without definite pulmonary embolism, particularly centrally   or within the secondary branches. Pulmonary hypertension. Cardiomegaly and atherosclerosis. Lingular and bilateral lower lobe atelectasis/pneumonitis. XR CHEST PORTABLE   Final Result   No active process. Postsurgical changes. No results found. No results found.     PROCEDURES   Unless otherwise noted below, none     CRITICAL CARE TIME (.cct)   N/a    PAST MEDICAL HISTORY/Chronic Conditions Affecting Care    has a past medical history of Chronic back pain (2018), COPD (chronic obstructive pulmonary disease) ([facility] [address] GERD (gastroesophageal reflux disease), Headache, Hyperlipidemia, Hypertension, Obesity, Scoliosis, and Type 2 diabetes mellitus without complication ([facility] EMERGENCY DEPARTMENT COURSE    Vitals:    Vitals:    02/15/23 1630 02/15/23 1700 02/15/23 1730 02/15/23 1745   BP:    124/77   Pulse: 76 73 60 74   Resp: 13 14 10 15   Temp:       TempSrc:       SpO2: 97% 97% 97% 97%       Patient was given the following medications:  Medications   methylPREDNISolone sodium (SOLU-MEDROL) injection 125 mg (125 mg IntraVENous Given 2/15/23 1320)   potassium chloride (KLOR-CON M) extended release tablet 40 mEq (40 mEq Oral Given 2/15/23 1451)   iopamidol (ISOVUE-370) 76 % injection 75 mL (75 mLs IntraVENous Given 2/15/23 1603)   acetaminophen (TYLENOL) tablet 1,000 mg (1,000 mg Oral Given 2/15/23 1620)         Is this patient to be included in the SEP-1 Core Measure due to severe sepsis or septic shock? No Exclusion criteria - the patient is NOT to be included for SEP-1 Core Measure due to: Infection is not suspected      Medical Decision Making/Differential Diagnosis:    CC/HPI Summary, Social Determinants of health, Records Reviewed, DDx, testing done/not done, ED Course, Reassessment, disposition considerations/shared decision making with patient, consults, disposition:            Chronic Conditions:   Past Medical History:   Diagnosis Date    Chronic back pain 2018    10/10 on the pain scale    COPD (chronic obstructive pulmonary disease) ([facility]     GERD (gastroesophageal reflux disease)     Headache     Hyperlipidemia     Hypertension     Obesity     Scoliosis     Type 2 diabetes mellitus without complication ([facility]        CONSULTS: (Who and What was discussed)  None    Discussion with Other Profesionals : None    Social Determinants : None    Records Reviewed :  Outpatient Notes primary care visit from earlier today    CC/HPI Summary, DDx, ED Course, and Reassessment: On initial evaluation patient saturating in the mid 76s on room air and placed on a nonrebreather. Patient was initially given Solu-Medrol and DuoNeb treatments. EKG is ordered to have documentation of patient's current rhythm, and to rule out any obvious acute cardiac illnesses such as ACS. Additionally, QT interval may be of use in decision making regarding any medications administered here in the ED. Patient is placed on cardiac monitor and continuous pulse ox for monitoring. CBC is ordered to evaluate for any signs of infection or inflammation by obtaining a WBC count, or any signs of acute anemia by interpreting hemoglobin. CMP was ordered to evaluate for any electrolyte imbalances, kidney function, or any elevations in anion gap. Troponin ordered to evaluate for possible cardiac etiology of symptoms including but not limited to STEMI or NSTEMI. Viral swabs are ordered to evaluate possible viral etiology of symptoms. BNP ordered to evaluate for possible cardiac failure or worsening cardiac function. Chest x-ray is ordered to evaluate for any possible signs of pneumonia, pleural effusions, cardiomegaly, pneumothorax, atelectasis, rib or sternal abnormalities including fractures. CTA chest with contrast ordered to evaluate for, but without limitation to, pulmonary embolism, effusions, pneumonia, dissection or acute bony fractures. Patient initial ABG revealed appropriate oxygen saturation on nonrebreather 9.6 with slight hypercapnia PCO2 47.6 with a pH of 3.96. CBC revealed mild anemia with hemoglobin 9.0 slightly above baseline values. CMP revealed hypokalemia potassium 3.2 and she was ordered oral potassium repletion. Creatinine was 1.1 slightly elevated from baseline around 0.9. Initial troponin was 54 with repeat of 43. BNP was elevated to 5127. She tested negative for COVID. Chest x-ray revealed no acute process. CTA did not reveal any evidence of pulmonary embolism or pulmonary hypertension.   Patient will be admitted to the hospital by Dr. Juanjo Feng for further work-up and treatment at this time.      Disposition Considerations (Tests not ordered but considered, Shared Decision Making, Pt Expectation of Test or Tx.):     FINAL IMPRESSION      1. Acute respiratory failure with hypoxia (HCC)    2. COPD exacerbation (HCC)          DISPOSITION/PLAN     DISPOSITION Admitted 02/15/2023 05:34:59 PM    PATIENT REFERRED TO:  No follow-up provider specified.    DISCHARGE MEDICATIONS:  New Prescriptions    No medications on file       DISCONTINUED MEDICATIONS:  Discontinued Medications    No medications on file            (Please note that portions of this note were completed with a voice recognition program.  Efforts were made to edit the dictations but occasionally words are mis-transcribed.)    Saurabh Arnett DO (electronically signed)       Saurabh Arnett DO  Resident  02/15/23 5425

## 2023-02-16 PROBLEM — R79.89 ELEVATED TROPONIN: Status: ACTIVE | Noted: 2023-02-16

## 2023-02-16 PROBLEM — R77.8 ELEVATED TROPONIN: Status: ACTIVE | Noted: 2023-02-16

## 2023-02-16 PROBLEM — I50.810 RVF (RIGHT VENTRICULAR FAILURE) (HCC): Status: ACTIVE | Noted: 2023-02-16

## 2023-02-16 PROBLEM — I50.33 ACUTE ON CHRONIC HEART FAILURE WITH PRESERVED EJECTION FRACTION (HFPEF) (HCC): Status: ACTIVE | Noted: 2023-02-16

## 2023-02-16 PROBLEM — I38 VHD (VALVULAR HEART DISEASE): Status: ACTIVE | Noted: 2023-02-16

## 2023-02-16 LAB
ANION GAP SERPL CALCULATED.3IONS-SCNC: 7 MMOL/L (ref 7–16)
ANISOCYTOSIS: ABNORMAL
BASOPHILS ABSOLUTE: 0 E9/L (ref 0–0.2)
BASOPHILS RELATIVE PERCENT: 0.3 % (ref 0–2)
BUN BLDV-MCNC: 15 MG/DL (ref 6–23)
CALCIUM SERPL-MCNC: 8.1 MG/DL (ref 8.6–10.2)
CHLORIDE BLD-SCNC: 99 MMOL/L (ref 98–107)
CO2: 32 MMOL/L (ref 22–29)
CREAT SERPL-MCNC: 1 MG/DL (ref 0.5–1)
EOSINOPHILS ABSOLUTE: 0 E9/L (ref 0.05–0.5)
EOSINOPHILS RELATIVE PERCENT: 0 % (ref 0–6)
GFR SERPL CREATININE-BSD FRML MDRD: >60 ML/MIN/1.73
GLUCOSE BLD-MCNC: 312 MG/DL (ref 74–99)
HCT VFR BLD CALC: 31.4 % (ref 34–48)
HEMOGLOBIN: 8.6 G/DL (ref 11.5–15.5)
HYPOCHROMIA: ABNORMAL
IRON SATURATION: 3 % (ref 15–50)
IRON: 11 MCG/DL (ref 37–145)
LYMPHOCYTES ABSOLUTE: 0.56 E9/L (ref 1.5–4)
LYMPHOCYTES RELATIVE PERCENT: 14.9 % (ref 20–42)
MCH RBC QN AUTO: 20.1 PG (ref 26–35)
MCHC RBC AUTO-ENTMCNC: 27.4 % (ref 32–34.5)
MCV RBC AUTO: 73.4 FL (ref 80–99.9)
METER GLUCOSE: 151 MG/DL (ref 74–99)
METER GLUCOSE: 357 MG/DL (ref 74–99)
METER GLUCOSE: 78 MG/DL (ref 74–99)
METER GLUCOSE: 97 MG/DL (ref 74–99)
MONOCYTES ABSOLUTE: 0.15 E9/L (ref 0.1–0.95)
MONOCYTES RELATIVE PERCENT: 4.4 % (ref 2–12)
NEUTROPHILS ABSOLUTE: 3 E9/L (ref 1.8–7.3)
NEUTROPHILS RELATIVE PERCENT: 80.7 % (ref 43–80)
NUCLEATED RED BLOOD CELLS: 1.8 /100 WBC
OVALOCYTES: ABNORMAL
PDW BLD-RTO: 19.7 FL (ref 11.5–15)
PLATELET # BLD: 406 E9/L (ref 130–450)
PMV BLD AUTO: 10 FL (ref 7–12)
POIKILOCYTES: ABNORMAL
POLYCHROMASIA: ABNORMAL
POTASSIUM SERPL-SCNC: 3.7 MMOL/L (ref 3.5–5)
RBC # BLD: 4.28 E12/L (ref 3.5–5.5)
SODIUM BLD-SCNC: 138 MMOL/L (ref 132–146)
TARGET CELLS: ABNORMAL
TOTAL IRON BINDING CAPACITY: 353 MCG/DL (ref 250–450)
WBC # BLD: 3.7 E9/L (ref 4.5–11.5)

## 2023-02-16 PROCEDURE — 6370000000 HC RX 637 (ALT 250 FOR IP): Performed by: INTERNAL MEDICINE

## 2023-02-16 PROCEDURE — 83550 IRON BINDING TEST: CPT

## 2023-02-16 PROCEDURE — 99223 1ST HOSP IP/OBS HIGH 75: CPT | Performed by: INTERNAL MEDICINE

## 2023-02-16 PROCEDURE — 2700000000 HC OXYGEN THERAPY PER DAY

## 2023-02-16 PROCEDURE — 97165 OT EVAL LOW COMPLEX 30 MIN: CPT

## 2023-02-16 PROCEDURE — 82962 GLUCOSE BLOOD TEST: CPT

## 2023-02-16 PROCEDURE — 97530 THERAPEUTIC ACTIVITIES: CPT

## 2023-02-16 PROCEDURE — 80048 BASIC METABOLIC PNL TOTAL CA: CPT

## 2023-02-16 PROCEDURE — 36415 COLL VENOUS BLD VENIPUNCTURE: CPT

## 2023-02-16 PROCEDURE — 97535 SELF CARE MNGMENT TRAINING: CPT

## 2023-02-16 PROCEDURE — 94660 CPAP INITIATION&MGMT: CPT

## 2023-02-16 PROCEDURE — 6360000002 HC RX W HCPCS: Performed by: INTERNAL MEDICINE

## 2023-02-16 PROCEDURE — 85025 COMPLETE CBC W/AUTO DIFF WBC: CPT

## 2023-02-16 PROCEDURE — 2060000000 HC ICU INTERMEDIATE R&B

## 2023-02-16 PROCEDURE — APPSS60 APP SPLIT SHARED TIME 46-60 MINUTES: Performed by: PHYSICIAN ASSISTANT

## 2023-02-16 PROCEDURE — 83540 ASSAY OF IRON: CPT

## 2023-02-16 PROCEDURE — 6360000002 HC RX W HCPCS: Performed by: PHYSICIAN ASSISTANT

## 2023-02-16 PROCEDURE — 97161 PT EVAL LOW COMPLEX 20 MIN: CPT

## 2023-02-16 RX ORDER — TRAMADOL HYDROCHLORIDE 50 MG/1
50 TABLET ORAL EVERY 6 HOURS PRN
Status: DISCONTINUED | OUTPATIENT
Start: 2023-02-16 | End: 2023-02-22 | Stop reason: HOSPADM

## 2023-02-16 RX ORDER — FUROSEMIDE 10 MG/ML
40 INJECTION INTRAMUSCULAR; INTRAVENOUS 2 TIMES DAILY
Status: DISCONTINUED | OUTPATIENT
Start: 2023-02-16 | End: 2023-02-19

## 2023-02-16 RX ORDER — INSULIN GLARGINE 100 [IU]/ML
10 INJECTION, SOLUTION SUBCUTANEOUS 2 TIMES DAILY
Status: DISCONTINUED | OUTPATIENT
Start: 2023-02-16 | End: 2023-02-22 | Stop reason: HOSPADM

## 2023-02-16 RX ORDER — HYDROCHLOROTHIAZIDE 12.5 MG/1
CAPSULE, GELATIN COATED ORAL
Qty: 180 CAPSULE | Refills: 0 | OUTPATIENT
Start: 2023-02-16

## 2023-02-16 RX ADMIN — FUROSEMIDE 40 MG: 10 INJECTION, SOLUTION INTRAMUSCULAR; INTRAVENOUS at 17:46

## 2023-02-16 RX ADMIN — Medication 1 CAPSULE: at 08:05

## 2023-02-16 RX ADMIN — HYDROCHLOROTHIAZIDE 25 MG: 25 TABLET ORAL at 08:04

## 2023-02-16 RX ADMIN — INSULIN GLARGINE 10 UNITS: 100 INJECTION, SOLUTION SUBCUTANEOUS at 21:00

## 2023-02-16 RX ADMIN — ENOXAPARIN SODIUM 30 MG: 100 INJECTION SUBCUTANEOUS at 21:53

## 2023-02-16 RX ADMIN — TRAMADOL HYDROCHLORIDE 50 MG: 50 TABLET, COATED ORAL at 21:52

## 2023-02-16 RX ADMIN — Medication 1 CAPSULE: at 21:52

## 2023-02-16 RX ADMIN — AMLODIPINE BESYLATE 5 MG: 5 TABLET ORAL at 08:05

## 2023-02-16 RX ADMIN — TRAMADOL HYDROCHLORIDE 50 MG: 50 TABLET, COATED ORAL at 14:31

## 2023-02-16 RX ADMIN — ENOXAPARIN SODIUM 30 MG: 100 INJECTION SUBCUTANEOUS at 08:05

## 2023-02-16 RX ADMIN — GLIPIZIDE 5 MG: 5 TABLET ORAL at 08:04

## 2023-02-16 RX ADMIN — ASPIRIN 325 MG: 325 TABLET ORAL at 08:04

## 2023-02-16 RX ADMIN — INSULIN GLARGINE 10 UNITS: 100 INJECTION, SOLUTION SUBCUTANEOUS at 08:29

## 2023-02-16 RX ADMIN — TRAMADOL HYDROCHLORIDE 50 MG: 50 TABLET, COATED ORAL at 08:29

## 2023-02-16 RX ADMIN — FUROSEMIDE 40 MG: 10 INJECTION, SOLUTION INTRAMUSCULAR; INTRAVENOUS at 08:04

## 2023-02-16 RX ADMIN — LISINOPRIL 40 MG: 10 TABLET ORAL at 08:04

## 2023-02-16 RX ADMIN — INSULIN LISPRO 8 UNITS: 100 INJECTION, SOLUTION INTRAVENOUS; SUBCUTANEOUS at 08:13

## 2023-02-16 RX ADMIN — THIAMINE HCL TAB 100 MG 100 MG: 100 TAB at 08:04

## 2023-02-16 RX ADMIN — POTASSIUM CHLORIDE 40 MEQ: 1500 TABLET, EXTENDED RELEASE ORAL at 08:04

## 2023-02-16 ASSESSMENT — PAIN SCALES - GENERAL
PAINLEVEL_OUTOF10: 8
PAINLEVEL_OUTOF10: 8
PAINLEVEL_OUTOF10: 9
PAINLEVEL_OUTOF10: 4
PAINLEVEL_OUTOF10: 5
PAINLEVEL_OUTOF10: 5
PAINLEVEL_OUTOF10: 8

## 2023-02-16 ASSESSMENT — PAIN DESCRIPTION - LOCATION
LOCATION: BACK

## 2023-02-16 ASSESSMENT — PAIN DESCRIPTION - ORIENTATION: ORIENTATION: MID;UPPER

## 2023-02-16 ASSESSMENT — PAIN SCALES - WONG BAKER: WONGBAKER_NUMERICALRESPONSE: 0

## 2023-02-16 ASSESSMENT — PAIN DESCRIPTION - DESCRIPTORS: DESCRIPTORS: ACHING;CRAMPING;DISCOMFORT

## 2023-02-16 NOTE — ACP (ADVANCE CARE PLANNING)
Advance Care Planning   Healthcare Decision Maker:    Primary Decision Maker: Ya Robinson - Child - [phone]    Today we documented Decision Maker(s) consistent with Legal Next of Kin hierarchy.

## 2023-02-16 NOTE — CARE COORDINATION
Case Management Assessment  Initial Evaluation    Date/Time of Evaluation: 2/16/2023 1:49 PM  Assessment Completed by: Marv Puente RN    If patient is discharged prior to next notation, then this note serves as note for discharge by case management. Patient Name: Oneyda Anna                   YOB: 1950  Diagnosis: COPD exacerbation ([facility] [address] [J44.1]  Acute respiratory failure with hypoxia ([facility] [address] [J96.01]                   Date / Time: 2/15/2023 12:33 PM    Patient Admission Status: Inpatient   Readmission Risk (Low < 19, Mod (19-27), High > 27): Readmission Risk Score: 15.5    Current PCP: Mansi Kelly MD  PCP verified by CM? Yes    Chart Reviewed: Yes      History Provided by: Patient  Patient Orientation: Alert and Oriented    Patient Cognition: Alert    Hospitalization in the last 30 days (Readmission):  No    If yes, Readmission Assessment in CM Navigator will be completed. Advance Directives:      Code Status: Full Code   Patient's Primary Decision Maker is: Legal Next of Kin    Primary Decision Maker: Tramaine [address] - Child - [phone]    Discharge Planning:    Patient lives with:   Type of Home: house    Primary Care Giver: Self  Patient Support Systems include: Children, Family Members   Current Financial resources:  medicare   Current community resources:  none   Current services prior to admission:  none             Current DME:  none            Type of Home Care services:   none     ADLS  Prior functional level: Independent in ADLs/IADLs  Current functional level: Independent in ADLs/IADLs    PT AM-PAC: 17 /24  OT AM-PAC: 16 /24    Family can provide assistance at DC: Yes  Would you like Case Management to discuss the discharge plan with any other family members/significant others, and if so, who? No  Plans to Return to Present Housing: Yes  Other Identified Issues/Barriers to RETURNING to current housing: none   Potential Assistance needed at discharge:  ?  Home care, pt refused             Potential DME: none   Patient expects to discharge to:  home   Plan for transportation at discharge:  family     Financial    Payor: Daniel Rezar / Plan: Gary Labor / Product Type: *No Product type* /     [facility] [address], [address]  [address] [place] [address]  Phone: [phone] Fax: [phone]      Notes:    Factors facilitating achievement of predicted outcomes: Family support, Motivated, Cooperative, and Pleasant    Barriers to discharge: none     Additional Case Management Notes: 2-16-Cm note: met with pt for transition of care, pt lives alone , she has no DME, asked for tub transfer bench, it is not covered by her insurance, her son will help her get one at [facility]. Pt denies any needs for dc, her family will transport home . Electronically signed by Eneida Tapia RN on 2/16/2023 at 1:53 PM      The Plan for Transition of Care is related to the following treatment goals of COPD exacerbation ([address] [J44.1]  Acute respiratory failure with hypoxia ([address] [J96.01]      Eneida Tapia RN  Case Management Department  Ph: [phone] Fax: [phone]

## 2023-02-16 NOTE — PROGRESS NOTES
Physical Therapy Initial Evaluation/Plan of Care    Room #:  0615/0615-01  Patient Name: Rosanne Smith  YOB: 1950  MRN: [id number]    Date of Service: 2/16/2023     Tentative placement recommendation: Home with Home Health Physical Therapy  Equipment recommendation: Bedside commode  and possibly a cane, trial  next session as patient does not want to use wheeled walker      Evaluating Physical Therapist: Edith Zambrano #[id number]      Specific Provider Orders/Date/Referring Provider :   02/15/23 2115    PT eval and treat  Start:  02/15/23 2115,   End:  02/15/23 2115,   ONE TIME,   Standing Count:  1 Occurrences,   Florence Saenz MD     Admitting Diagnosis:   COPD exacerbation ([address] [J44.1]  Acute respiratory failure with hypoxia ([address] [J96.01]      Surgery: none  Visit Diagnoses         Codes    COPD exacerbation ([address]     J44.1            Patient Active Problem List   Diagnosis    Primary hypertension    Type 2 diabetes mellitus without complication, without long-term current use of insulin ([address]    Pulmonary HTN ([address]    Tobacco abuse    Anemia    Morbid obesity ([address]    Obstructive sleep apnea    Chronic systolic (congestive) heart failure    Chronic obstructive pulmonary disease, unspecified COPD type ([address]    Acute respiratory failure with hypoxia ([address]        ASSESSMENT of Current Deficits Patient exhibits decreased strength, balance, and endurance impairing functional mobility, transfers, gait distance, and tolerance to activity. Pt requires cues for upright posture and safety awareness. Shortness of breath noted, spo2 91% after gait on 6L O2. The patient will benefit from continued skilled therapy to increase strength and improve balance for safe functional mobility, to decrease risk of falls, and to meet goals at discharge.          PHYSICAL THERAPY  PLAN OF CARE       Physical therapy plan of care is established based on physician order,  patient diagnosis and clinical assessment    Current Treatment Recommendations:    -Bed Mobility: Lower extremity exercises , Upper extremity exercises , and Trunk control activities   -Sitting Balance: Incorporate reaching activities to activate trunk muscles , Hands on support to maintain midline , and Facilitate active trunk muscle engagement   -Standing Balance: Perform strengthening exercises in standing to promote motor control with or without upper extremity support , Instruct patient on adequate base of support to maintain balance, and Challenge balance utilizing reaching  activities beyond center of gravity    -Transfers: Provide instruction on proper hand and foot position for adequate transfer of weight onto lower extremities and use of gait device if needed and Cues for hand placement, technique and safety. Provide stabilization to prevent fall   -Gait: Gait training and Standing activities to improve: base of support, weight shift, weight bearing    -Endurance: Utilize Supervised activities to increase level of endurance to allow for safe functional mobility including transfers and gait   -Stairs: Stair training with instruction on proper technique and hand placement on rail    PT long term treatment goals are located in below grid    Patient and or family understand(s) diagnosis, prognosis, and plan of care.    Frequency of treatments: Patient will be seen  daily.         Prior Level of Function: Patient ambulated independently   Rehab Potential: good  for baseline    Past medical history:   Past Medical History:   Diagnosis Date    Chronic back pain 2018    10/10 on the pain scale    COPD (chronic obstructive pulmonary disease) (HCC)     GERD (gastroesophageal reflux disease)     Headache     Hyperlipidemia     Hypertension     Obesity     Scoliosis     Type 2 diabetes mellitus without complication (HCC)      Past Surgical History:   Procedure Laterality Date    FINGER TRIGGER RELEASE Right 08/14/2018    right thumb    HYSTERECTOMY  (CERVIX STATUS UNKNOWN)      AR TENDON SHEATH INCISION Right 8/14/2018    RIGHT THUMB TRIGGER THUMB RELEASE performed by Monty Rousseau DO at [facility] Right 2002    RCR    WISDOM TOOTH EXTRACTION         SUBJECTIVE:    Precautions: Up with assistance, falls and alarm , L knee karel, CHF, O2 (6L)    Social history: Patient lives alone in a two story home bedroom and bathroom 2nd floor full flight stairs with Rail  with 1 step  to enter without Rail. Tub shower       Equipment owned: None,      AM-PAC Basic Mobility       AM-PAC Basic Mobility - Inpatient   How much help is needed turning from your back to your side while in a flat bed without using bedrails?: A Little  How much help is needed moving from lying on your back to sitting on the side of a flat bed without using bedrails?: A Little  How much help is needed moving to and from a bed to a chair?: A Little  How much help is needed standing up from a chair using your arms?: A Little  How much help is needed walking in hospital room?: A Little  How much help is needed climbing 3-5 steps with a railing?: A Lot  AM-PAC Inpatient Mobility Raw Score : 17  AM-PAC Inpatient T-Scale Score : 42.13  Mobility Inpatient CMS 0-100% Score: 50.57  Mobility Inpatient CMS G-Code Modifier : CK    Nursing cleared patient for PT evaluation. The admitting diagnosis and active problem list as listed above have been reviewed prior to the initiation of this evaluation. OBJECTIVE;   Initial Evaluation  Date: 2/16/2023 Treatment Date:     Short Term/ Long Term   Goals   Was pt agreeable to Eval/treatment? Yes  To be met in 4 days   Pain level   8/10  Back      Bed Mobility    Rolling: Supervision     Supine to sit: Supervision     Sit to supine: Supervision     Scooting: Supervision     Rolling: Independent    Supine to sit:  Independent    Sit to supine: Independent    Scooting: Independent     Transfers Sit to stand: Supervision  from EOB and toilet Sit to stand: Independent    Ambulation     2x15 feet using  wheeled walker with Minimal assist of 1   for walker approximation, balance, upright, safety, and O2 line management     100 feet using  least restrictive device versus no device with Modified Independent    Stair negotiation: ascended and descended   Not assessed     10 steps, 1 rail and supervision    ROM Within functional limits    Increase range of motion 10% of affected joints    Strength BUE:  refer to OT eval  RLE:  4/5  LLE:  4-/5  Increase strength in affected mm groups by 1/3 grade   Balance Sitting EOB:  good -  Dynamic Standing:  fair + wheeled walker    Sitting EOB:  good   Dynamic Standing: good      Patient is Alert & Oriented x person, place, time, and situation and follows directions    Sensation:  Patient  denies numbness/tingling   Edema:  yes bilateral lower extremities   Endurance: fair      Vitals:  6 liters nasal cannula   Blood Pressure at rest  Blood Pressure during session    Heart Rate at rest 77 Heart Rate during session    SPO2 at rest 98%  SPO2 during session 91-95%     Patient education  Patient educated on role of Physical Therapy, risks of immobility, safety and plan of care,  importance of mobility while in hospital , and safety      Patient response to education:   Pt verbalized understanding Pt demonstrated skill Pt requires further education in this area   Yes Partial Yes      Treatment:  Patient practiced and was instructed/facilitated in the following treatment: Patient assisted to EOB. Sat edge of bed 10 minutes with Supervision  to increase dynamic sitting balance and activity tolerance. Pt stood and ambulated as above. Pt stood again and ambulated to restroom and back. And assisted up in chair. Therapeutic Exercises:  ankle pumps  x 15 reps. At end of session, patient in chair with  call light and phone within reach,  all lines and tubes intact, nursing notified.       Patient would benefit from continued skilled Physical Therapy to improve functional independence and quality of life. Patient's/ family goals   home    Time in  0824  Time out  0854    Total Treatment Time  10 minutes    Evaluation time includes thorough review of current medical information, gathering information on past medical history/social history and prior level of function, completion of standardized testing/informal observation of tasks, assessment of data, and development of Plan of care and goals.      CPT codes:  Low Complexity PT evaluation (25808)  Therapeutic activities (02942)   10 minutes  1 unit(s)    Naresh Osei PT

## 2023-02-16 NOTE — H&P
H&P Note        CHIEF COMPLAINT: Increased shortness of breath      HISTORY OF PRESENT ILLNESS:      The patient is a 81 y.o. female who presents with the above to her PCP office Dr. Jyotsna Segovia patient was found to have increasing shortness of breath increased leg swelling and she gained more than 10 pounds. Patient is known to have long history of chronic diastolic CHF. Obstructive sleep apnea. Morbid obesity. Also probably COPD. Patient was sent to emergency room for evaluation she will and was admitted because of extubation of CHF and possible non-STEMI. Consultation with cardiology was obtained. Patient was started on IV diuretics. And was to be put on BiPAP at the patient has refused to be put on BiPAP because of being claustrophobic. When I saw her today she is reporting that she is feeling better. She has lost 540 cc of water overnight with IV diuretics    Past Medical History:    Past Medical History:   Diagnosis Date    Chronic back pain 2018    10/10 on the pain scale    COPD (chronic obstructive pulmonary disease) ([address]     GERD (gastroesophageal reflux disease)     Headache     Hyperlipidemia     Hypertension     Obesity     Scoliosis     Type 2 diabetes mellitus without complication [facility])        Past Surgical History:    Past Surgical History:   Procedure Laterality Date    FINGER TRIGGER RELEASE Right 08/14/2018    right thumb    HYSTERECTOMY (CERVIX STATUS UNKNOWN)      MA TENDON SHEATH INCISION Right 8/14/2018    RIGHT THUMB TRIGGER THUMB RELEASE performed by Monty Rousseau DO at [facility] Right 2002    RCR    WISDOM TOOTH EXTRACTION         Medications Prior to Admission:    Prior to Admission medications    Medication Sig Start Date End Date Taking?  Authorizing Provider   glipiZIDE (GLUCOTROL) 5 MG tablet TAKE 1 TABLET BY MOUTH EVERY DAY 12/12/22   Nicol Braga MD   amLODIPine (NORVASC) 5 MG tablet TAKE 1 TABLET BY MOUTH EVERY DAY 2/6/23   Silvana Braga MD   ondansetron (ZOFRAN) 4 MG tablet Take 1 tablet by mouth daily as needed for Nausea or Vomiting 1/13/23   Luis Lincoln MD   metFORMIN (GLUCOPHAGE) 500 MG tablet Take 1 tablet by mouth 2 times daily (with meals) 12/12/22   Silvana Braga MD   quinapril (ACCUPRIL) 20 MG tablet TAKE 1 TABLET BY MOUTH IN THE MORNING AND IN THE EVENING 12/2/22   Silvana Braga MD   furosemide (LASIX) 40 MG tablet Take 1 tablet by mouth daily 11/29/22   Silvana Braga MD   hydroCHLOROthiazide (MICROZIDE) 12.5 MG capsule TAKE 1 CAPSULE BY MOUTH TWICE A DAY 11/10/22   Luis Lincoln MD   Insulin Pen Needle 32G X 5 MM MISC 1 each by Does not apply route daily 11/2/22   Silvana Braga MD   insulin detemir (LEVEMIR FLEXTOUCH) 100 UNIT/ML injection pen Inject 10 Units into the skin nightly 11/2/22   Silvana Braga MD   potassium chloride (KLOR-CON M) 20 MEQ TBCR extended release tablet Take 2 tablets by mouth daily 11/2/22   Luis Lincoln MD   blood glucose monitor strips Test 2 times a day & as needed for symptoms of irregular blood glucose. Dispense sufficient amount for indicated testing frequency plus additional to accommodate PRN testing needs. 10/27/22   Luis Lincoln MD   Lancets MISC Use to check blood sugar 2 times daily. 10/27/22   Luis Lincoln MD   lisinopril (PRINIVIL;ZESTRIL) 40 MG tablet Take 1 tablet by mouth daily 10/17/22   Silvana Braga MD   lactobacillus (CULTURELLE) CAPS capsule Take 1 capsule by mouth in the morning and 1 capsule in the evening. Do all this for 21 days.  9/14/22 10/5/22  SERGIO Monk - CNS   thiamine mononitrate (THIAMINE) 100 MG tablet Take 1 tablet by mouth daily for 10 days 9/15/22 9/25/22  SERGIO Dukes - CNS   aspirin 325 MG tablet Take 325 mg by mouth daily    Historical Provider, MD   atenolol (TENORMIN) 50 MG tablet Take 50 mg by mouth 2 times daily  9/14/22  Historical Provider, MD       Allergies:    Patient has no known allergies. Social History:   Social History     Socioeconomic History    Marital status: Single     Spouse name: Not on file    Number of children: Not on file    Years of education: Not on file    Highest education level: Not on file   Occupational History    Not on file   Tobacco Use    Smoking status: Former     Packs/day: 0.50     Years: 47.00     Pack years: 23.50     Types: Cigarettes     Start date: 1975    Smokeless tobacco: Never   Substance and Sexual Activity    Alcohol use: No    Drug use: No    Sexual activity: Not on file   Other Topics Concern    Not on file   Social History Narrative    Not on file     Social Determinants of Health     Financial Resource Strain: Low Risk     Difficulty of Paying Living Expenses: Not hard at all   Food Insecurity: No Food Insecurity    Worried About Running Out of Food in the Last Year: Never true    Ran Out of Food in the Last Year: Never true   Transportation Needs: Not on file   Physical Activity: Not on file   Stress: Not on file   Social Connections: Not on file   Intimate Partner Violence: Not on file   Housing Stability: Not on file       Family History:   Family History   Problem Relation Age of Onset    Cancer Father         Pancreatic       REVIEW OF SYSTEMS:    General:  No fever or chills. No lightheadedness or dizziness  Cardiovascular: Denies any chest pain, irregular heartbeats, or palpitations. Respiratory: Denies shortness of breath, coughing, sputum production, hemoptysis, or wheezing. Gastrointestinal: Denies nausea, vomiting, diarrhea, or constipation. Denies any   abdominal pain. Extremities: Bilateral lower leg edema  Neurology:  Denies any headache or focal neurological deficits. No weakness or   paresthesia. Derm:  Denies any rashes, ulcers, or excoriations. Denies bruising. Genitourinary:  Denies any urgency, frequency, hematuria. Voiding without difficulty. Musculoskeletal:  Denies any myalgia or arthralgia.   No back pain.      PHYSICAL EXAM:    Vitals:  /68   Pulse 80   Temp 97.9 °F (36.6 °C) (Oral)   Resp 20   Ht 5' 4\" (1.626 m)   Wt 242 lb (109.8 kg)   SpO2 98%   BMI 41.54 kg/m²   Morbidly obese woman was seen sitting in a chair she reported that she sleeps in chair lately because she could not get an bed because of the swelling in her leg  General:  This is a 81 y.o. yo female who is alert and oriented in NAD  HEENT:  Head is normocephalic and atraumatic, PERRLA, EOMI, mucus membranes moist with no pharyngeal erythema or exudate. Neck:  Supple with no carotid bruits, JVD or thyromegaly.   No cervical adenopathy  CV:  Regular rate and rhythm, no murmurs  Lungs:  Clear to auscultation bilaterally with no wheezes, rales or rhonchi  Abdomen:  Soft, nontender, nondistended, bowel sounds present  Extremities: 2+ edema, peripheral pulses intact bilaterally  Neuro:  Cranial nerves II-XII grossly intact; motor and sensory function intact with no focal deficits  Skin:  No rashes, lesions or wounds    CBC with Differential:    Lab Results   Component Value Date/Time    WBC 3.7 02/16/2023 04:57 AM    RBC 4.28 02/16/2023 04:57 AM    HGB 8.6 02/16/2023 04:57 AM    HCT 31.4 02/16/2023 04:57 AM     02/16/2023 04:57 AM    MCV 73.4 02/16/2023 04:57 AM    MCH 20.1 02/16/2023 04:57 AM    MCHC 27.4 02/16/2023 04:57 AM    RDW 19.7 02/16/2023 04:57 AM    NRBC 1.8 02/16/2023 04:57 AM    LYMPHOPCT 14.9 02/16/2023 04:57 AM    MONOPCT 4.4 02/16/2023 04:57 AM    BASOPCT 0.3 02/16/2023 04:57 AM    MONOSABS 0.15 02/16/2023 04:57 AM    LYMPHSABS 0.56 02/16/2023 04:57 AM    EOSABS 0.00 02/16/2023 04:57 AM    BASOSABS 0.00 02/16/2023 04:57 AM     CMP:    Lab Results   Component Value Date/Time     02/16/2023 04:57 AM    K 3.7 02/16/2023 04:57 AM    K 3.2 02/15/2023 01:13 PM    CL 99 02/16/2023 04:57 AM    CO2 32 02/16/2023 04:57 AM    BUN 15 02/16/2023 04:57 AM    CREATININE 1.0 02/16/2023 04:57 AM    GFRAA >60 09/14/2022 07:00 AM LABGLOM >60 02/16/2023 04:57 AM    GLUCOSE 312 02/16/2023 04:57 AM    PROT 6.4 02/15/2023 01:13 PM    LABALBU 3.2 02/15/2023 01:13 PM    CALCIUM 8.1 02/16/2023 04:57 AM    BILITOT 0.4 02/15/2023 01:13 PM    ALKPHOS 101 02/15/2023 01:13 PM    AST 19 02/15/2023 01:13 PM    ALT 12 02/15/2023 01:13 PM       CTA PULMONARY W CONTRAST [[id number]] Collected: 02/15/23 1628     Order Status: Completed Updated: 02/15/23 1641     Narrative:       EXAMINATION:   CTA OF THE CHEST 2/15/2023 3:49 pm     TECHNIQUE:   CTA of the chest was performed after the administration of intravenous   contrast.  Multiplanar reformatted images are provided for review. MIP   images are provided for review. Automated exposure control, iterative   reconstruction, and/or weight based adjustment of the mA/kV was utilized to   reduce the radiation dose to as low as reasonably achievable. COMPARISON:   09/02/2022 CTA chest.     HISTORY:   ORDERING SYSTEM PROVIDED HISTORY: rule out PE   TECHNOLOGIST PROVIDED HISTORY:   Reason for exam:->rule out PE   Decision Support Exception - unselect if not a suspected or confirmed   emergency medical condition->Emergency Medical Condition (MA)     FINDINGS:   Pulmonary Arteries: The current examination is limited due to motion and beam   hardening artifact particularly in evaluating the lower lobes. No obvious   central pulmonary artery filling defect or defect involving the secondary   branches is identified. The main pulmonary artery measures 2.9 cm diameter. Mediastinum: Cardiac size is enlarged. There is enlargement of all 4   chambers with greatest enlargement of the right atrium followed by right   ventricle suggesting chronic increased pulmonary artery pressure. There are   scattered atherosclerotic calcifications of the thoracic aorta. No thoracic   aortic aneurysm or dissection. No mediastinal or hilar lymphadenopathy.    Density within the esophagus is presumably oral contrast although there is no   contrast seen within the stomach. There is a small hiatal hernia. Lungs/pleura: There are linear densities within the lingula and at both lung   bases. Small areas of hyperdense material at the lung bases may be due to   aspirated barium or parenchymal calcifications. There is a small area of   consolidation right posterior costophrenic sulcus. No pleural effusions. Upper Abdomen: There is diffuse enlargement of the adrenal glands most likely   reflecting benign adrenal hyperplasia. There is a partially visualized cyst   left kidney superior pole. Soft Tissues/Bones: No acute bone or soft tissue abnormality. Impression:       Limited CTA chest without definite pulmonary embolism, particularly centrally   or within the secondary branches. Pulmonary hypertension. Cardiomegaly and atherosclerosis. Lingular and bilateral lower lobe atelectasis/pneumonitis. XR CHEST PORTABLE [[id number]] Collected: 02/15/23 1344     Order Status: Completed Updated: 02/15/23 1348     Narrative:       EXAMINATION:   ONE XRAY VIEW OF THE CHEST     2/15/2023 12:14 pm     COMPARISON:   5 September 2022     HISTORY:   ORDERING SYSTEM PROVIDED HISTORY: shortness of breath   TECHNOLOGIST PROVIDED HISTORY:   Reason for exam:->shortness of breath     FINDINGS:   Postoperative changes redemonstrated near the right apex. Normal heart and   pulmonary vascularity. Lungs are clear. Neither costophrenic angle is   blunted. Impression:       No active process. Postsurgical changes.           ASSESSMENT:    Principal Problem:    Acute respiratory failure with hypoxia (HCC)  Active Problems:    Acute on chronic heart failure with preserved ejection fraction (HFpEF) ([facility]  Primary hypertension  Type 2 diabetes mellitus without complications without long-term current use of insulin  Pulmonary hypertension  History of tobacco abuse  Anemia unspecified  Morbid obesity  Obstructive sleep apnea  COPD    PLAN:  [facility], IV Lasix, continue other medications. Consultation with cardiology. Further work-up as per cardiology  Obstructive sleep apnea advised the patient to use her BiPAP at this has been given to her but she still refusing to use the she is aware that it is detrimental to her health not to use something to treat obstructive sleep apnea specially with her history of COPD and right-sided heart failure  Diabetes mellitus type 2 insulin requiring continue insulin and give as needed insulin as needed according to sliding scale  Anemia check iron TIBC  Primary hypertension blood pressure well controlled continue same treatment  History of tobacco abuse I have discussed with the patient that she needs to completely stay away from smoking she has not been smoking lately according to her she has been 23.5-pack-year smoker CTA of the chest was reviewed and showed that she has possible atelectasis and infiltrate but no evidence of pulmonary embolism or mass      Greater than 75 minutes has been spent organizing care and treating patient. More than 50% of my  time was spent at the bedside counseling/coordinating care with the patient and/or family with face to face contact. This time was spent reviewing notes and laboratory data as well as instructing and counseling the patient. Nicolas Gosselin  requires this high level of physician care and nursing on the IMC/Telemetry unit due the complexity of decision management and chance of rapid decline or death. Continued cardiac monitoring and higher level of nursing are required. I am readily available for any further decision-making and intervention.     Electronically signed by Don Moon MD on 2/16/2023 at 2:09 PM

## 2023-02-16 NOTE — CONSULTS
INPATIENT CARDIOLOGY CONSULT     Reason for Consult: CHF, elevated troponin    Cardiologist: Dr. Vianca Ayon    Requesting Physician: Dr. Bunny Beth    Date of Consultation: 2/16/2023    HISTORY OF PRESENT ILLNESS:   Patient is a 81year old AAF known to Dr. Rufus Alva from inpatient consultation 9/2022. She is being seen in consultation this hospital admission by Dr. Vianca Ayon for evaluation and recommendations regarding CHF and elevated troponin. PMH: HTN, insulin requiring T2DM, chronic anemia, CKD, morbid obesity, chronically elevated troponin, history of COVID-19 infection 9/2022, history of sinus bradycardia on beta-blocker therapy, coronary artery calcifications on chest CTA 9/2022, chronic HFpEF/RHF, VHD, pulmonary hypertension, probable SHAVONNE, COPD with continued tobacco abuse, GERD, chronic back pain and history of mechanical fall 9/2022    Patient presented to [facility] on February 15, 2023 with complaints of shortness of breath and peripheral edema. Per patient, she notes of progressively worsening dyspnea on exertion and peripheral edema bilaterally over the past week or so. She admits to orthopnea. Denies chest pain, N/B, diaphoresis, palpitations, dizziness, near-syncope or syncope. Admits to medication compliance, but reported non-compliance at [facility] [place] facility through chart review. She does admit to dietary indiscretions, and eating \"three big jars of olives per day\". She was seen at her PCP office yesterday for the above-noted complaints, found to have weight gain, respiratory distress with oxygen saturation 60% at rest.  She was thus advised to present to the ED for further evaluation. She states she is feeling better today, but not back to her typical baseline. She does not require supplemental oxygen therapy at home. Please note: past medical records were reviewed per electronic medical record (EMR) - see detailed reports under Past Medical/ Surgical History.    PAST MEDICAL HISTORY: COPD with continued tobacco abuse  Chronically elevated troponin  Morbid obesity, BMI 41  CKD. Baseline creatinine 0.8-1.1  Chronic anemia. Evaluated by GI inpatient 9/2022 with plans for outpatient EGD and colonoscopy  HTN  Insulin requiring T2DM  History of COVID-19 infection 9/2022  History of sinus bradycardia on Atenolol/beta-blocker therapy --> improved after discontinuation  Coronary artery calcifications on Chest CTA 9/2022  Chronic HFpEF/RHF  VHD. Pulmonary hypertension  TTE, Dr. Niesha Cabello 9/2022: Normal LV size and function, EF > 55%. Stage II DD. Dilated RV and reduced RV function. Dilated RA. Mild TR. Pulmonary hypertension with PASP 62 mmHg. Probable SHAVONNE  GERD  Chronic back pain  Admission for mechanical fall 9/2022    PAST SURGICAL HISTORY:    Past Surgical History:   Procedure Laterality Date    FINGER TRIGGER RELEASE Right 08/14/2018    right thumb    HYSTERECTOMY (CERVIX STATUS UNKNOWN)      MD TENDON SHEATH INCISION Right 8/14/2018    RIGHT THUMB TRIGGER THUMB RELEASE performed by Belma Leyden, DO at [place] [address] Right 2002    RCR    WISDOM TOOTH EXTRACTION         HOME MEDICATIONS:  Prior to Admission medications    Medication Sig Start Date End Date Taking?  Authorizing Provider   glipiZIDE (GLUCOTROL) 5 MG tablet TAKE 1 TABLET BY MOUTH EVERY DAY 12/12/22   Silvana Braga MD   amLODIPine (NORVASC) 5 MG tablet TAKE 1 TABLET BY MOUTH EVERY DAY 2/6/23   Luis Lincoln MD   ondansetron (ZOFRAN) 4 MG tablet Take 1 tablet by mouth daily as needed for Nausea or Vomiting 1/13/23   Luis Lincoln MD   metFORMIN (GLUCOPHAGE) 500 MG tablet Take 1 tablet by mouth 2 times daily (with meals) 12/12/22   Silvana Braga MD   quinapril (ACCUPRIL) 20 MG tablet TAKE 1 TABLET BY MOUTH IN THE MORNING AND IN THE EVENING 12/2/22   Silvana Braga MD   furosemide (LASIX) 40 MG tablet Take 1 tablet by mouth daily 11/29/22   Luis Lincoln MD   hydroCHLOROthiazide (Gypsy Pankaj) 12.5 MG capsule TAKE 1 CAPSULE BY MOUTH TWICE A DAY 11/10/22   Manan Monaco MD   Insulin Pen Needle 32G X 5 MM MISC 1 each by Does not apply route daily 11/2/22   Silvana Braga MD   insulin detemir (LEVEMIR FLEXTOUCH) 100 UNIT/ML injection pen Inject 10 Units into the skin nightly 11/2/22   Silvana Braga MD   potassium chloride (KLOR-CON M) 20 MEQ TBCR extended release tablet Take 2 tablets by mouth daily 11/2/22   Manan Monaco MD   blood glucose monitor strips Test 2 times a day & as needed for symptoms of irregular blood glucose. Dispense sufficient amount for indicated testing frequency plus additional to accommodate PRN testing needs. 10/27/22   Manan Monaco MD   Lancets MISC Use to check blood sugar 2 times daily. 10/27/22   Manan Monaco MD   lisinopril (PRINIVIL;ZESTRIL) 40 MG tablet Take 1 tablet by mouth daily 10/17/22   Silvana Braga MD   lactobacillus (CULTURELLE) CAPS capsule Take 1 capsule by mouth in the morning and 1 capsule in the evening. Do all this for 21 days.  9/14/22 10/5/22  [facility], APRN - CNS   thiamine mononitrate (THIAMINE) 100 MG tablet Take 1 tablet by mouth daily for 10 days 9/15/22 9/25/22  [facility]   aspirin 325 MG tablet Take 325 mg by mouth daily    Historical Provider, MD   atenolol (TENORMIN) 50 MG tablet Take 50 mg by mouth 2 times daily  9/14/22  Historical Provider, MD       CURRENT MEDICATIONS:      Current Facility-Administered Medications:     traMADol (ULTRAM) tablet 50 mg, 50 mg, Oral, Q6H PRN, Swetha Braga MD    insulin glargine (LANTUS) injection vial 10 Units, 10 Units, SubCUTAneous, BID, Swetha Braga MD    amLODIPine (NORVASC) tablet 5 mg, 5 mg, Oral, Daily, Swetha Braga MD, 5 mg at 02/16/23 0805    aspirin tablet 325 mg, 325 mg, Oral, Daily, Swetha Braga MD, 325 mg at 02/16/23 0804    glipiZIDE (GLUCOTROL) tablet 5 mg, 5 mg, Oral, Daily, Swetha Braga MD, 5 mg at 02/16/23 2095 hydroCHLOROthiazide (HYDRODIURIL) tablet 25 mg, 25 mg, Oral, Daily, Swetha Braga MD, 25 mg at 02/16/23 0804    lactobacillus (CULTURELLE) capsule 1 capsule, 1 capsule, Oral, Q12H, Swetha Braga MD, 1 capsule at 02/16/23 0805    lisinopril (PRINIVIL;ZESTRIL) tablet 40 mg, 40 mg, Oral, Daily, Swetha Braga MD, 40 mg at 02/16/23 0804    potassium chloride (KLOR-CON M) extended release tablet 40 mEq, 40 mEq, Oral, Daily, Swetha Braga MD, 40 mEq at 02/16/23 0804    thiamine mononitrate tablet 100 mg, 100 mg, Oral, Daily, Swetha Braga MD, 100 mg at 02/16/23 0804    ondansetron (ZOFRAN) tablet 4 mg, 4 mg, Oral, Daily PRN, Swetha Braga MD    furosemide (LASIX) injection 40 mg, 40 mg, IntraVENous, Daily, Swetha Braga MD, 40 mg at 02/16/23 0804    insulin lispro (HUMALOG) injection vial 0-8 Units, 0-8 Units, SubCUTAneous, TID WC, Swetha Braga MD, 8 Units at 02/16/23 0813    glucose chewable tablet 16 g, 4 tablet, Oral, PRN, Swetha Braga MD    dextrose bolus 10% 125 mL, 125 mL, IntraVENous, PRN **OR** dextrose bolus 10% 250 mL, 250 mL, IntraVENous, PRN, Swetha Braga MD    glucagon (rDNA) injection 1 mg, 1 mg, SubCUTAneous, PRN, Swetha Braga MD    dextrose 10 % infusion, , IntraVENous, Continuous PRN, Swetha Braga MD    enoxaparin (LOVENOX) injection 30 mg, 30 mg, SubCUTAneous, BID, Swetha Braga MD, 30 mg at 02/16/23 0805    ALLERGIES:  Patient has no known allergies. SOCIAL HISTORY:    Currently resides in NH facility. Former tobacco smoker, quit September 2022. Smoked half a pack per day for 30+ years. Denies/current alcohol or illicit drug use. FAMILY HISTORY:   Non-contributory at this time due to patient's advanced age.       REVIEW OF SYSTEMS:     Negative except as noted above in HPI      PHYSICAL EXAM:   /68   Pulse 80   Temp 97.9 °F (36.6 °C) (Oral)   Resp 20   Ht 5' 4\" (1.626 m)   Wt 242 lb (109.8 kg)   SpO2 100%   BMI 41.54 kg/m²   Wt Readings from Last 3 Encounters:   09/03/22 243 lb 6.4 oz (110.4 kg)   08/29/18 230 lb (104.3 kg)   08/14/18 237 lb (107.5 kg)   CONST:  Well developed, morbidly obese AAF who appears stated age. Awake, alert, cooperative, no apparent distress. HEENT:   Head- Normocephalic, atraumatic. Eyes- Conjunctivae pink, anicteric. Neck-  No stridor, trachea midline, +JVD. CHEST: Chest symmetrical and non-tender to palpation. No accessory muscle use or intercostal retractions. RESPIRATORY: Lung sounds - rales bilaterally. CARDIOVASCULAR:     No noted carotid bruit. Heart Ausculation- Regular rate and rhythm, no apparent murmur. PV: 1-2+ bilateral lower extremity edema. Pedal pulses palpable, no clubbing or cyanosis. ABDOMEN: Soft, non-tender to light palpation. Bowel sounds present. MS: Good muscle strength and tone. No atrophy or abnormal movements. SKIN: Warm and dry. NEURO / PSYCH: Oriented to person, place and time. Speech clear and appropriate. Follows all commands. Pleasant affect. DATA:    Telemetry: SR with HR in the 70-80s    Diagnostic:  All diagnostic testing and lab work thus far this admission reviewed in detail. CXR 2/15/2023  Impression:  No active process. Postsurgical changes. Chest CTA 2/15/2023  Impression:  Limited CTA chest without definite pulmonary embolism, particularly centrally  or within the secondary branches. Pulmonary hypertension. Main pulmonary artery measures 2.9 cm. Cardiomegaly and atherosclerosis. Lingular and bilateral lower lobe atelectasis/pneumonitis.     Labs:   CBC:   Recent Labs     02/15/23  1313 02/16/23  0457   WBC 5.5 3.7*   HGB 9.0* 8.6*   HCT 32.9* 31.4*    406     BMP:   Recent Labs     02/15/23  1313 02/16/23  0457    138   K 3.2* 3.7   CO2 33* 32*   BUN 16 15   CREATININE 1.1* 1.0   LABGLOM 54 >60   CALCIUM 8.4* 8.1*     Mag:   Recent Labs     02/15/23  1313   MG 2.0     HgA1c:   Lab Results   Component Value Date    LABA1C 5.9 (H) 09/02/2022     FASTING LIPID PANEL:  Lab Results   Component Value Date/Time    CHOL 101 09/02/2022 05:40 AM    HDL 38 09/02/2022 05:40 AM    LDLCALC 46 09/02/2022 05:40 AM    TRIG 85 09/02/2022 05:40 AM     LIVER PROFILE:  Recent Labs     02/15/23  1313   AST 19   ALT 12   LABALBU 3.2*     Component Ref Range & Units 2/15/23 1500 2/15/23 1313 9/9/22 1020 9/2/22 0540 9/1/22 2024   Troponin, High Sensitivity 0 - 9 ng/L 43 High   54 High  CM  67 High  CM  28 High  CM  27 High      Component Ref Range & Units 2/15/23 1313 9/13/22 0600 9/1/22 2024   Pro-BNP 0 - 125 pg/mL 5,127 High   9,357 High   10,646 High       Component Ref Range & Units 2/15/23 1313    SARS-CoV-2, NAAT Not Detected Not Detected      Component Ref Range & Units 2/15/23 1252 9/6/22 1016 9/5/22 2310 9/5/22 2056 9/5/22 1748 9/5/22 0932 9/2/22 0309   POC Source  Arterial          PH 37 7.350 - 7.450 7. 396          PCO2 37 35.0 - 45.0 mmHg 47.6 High           PO2 37 60.0 - 80.0 mmHg 188. 9 High           HCO3 22.0 - 26.0 mmol/L 29.2 High   45.0 High   46.7 High   49.6 High   47.3 High   40.4 High   27.1 High     B.E. -3.0 - 3.0 mmol/L 3.6 High   18.8 High   19.5 High   20.6 High   20.3 High   13.9 High   0.3    O2 Sat 92.0 - 98.5 % 99.6 High   92.7  88.9 Low   99.4 High   80.1 Low   90.8 Low   90.5 Low         ASSESSMENT:  Acute hypoxic/hypercapnic respiratory failure, currently on 8 L HF NC  Acute on chronic HFpEF/RHF, appears hypervolemic with proBNP 5100  Mild TR, pulmonary hypertension with PASP 62 mmHg on TTE 9/2022  Possible COPD exacerbation  Chronically elevated troponin, at baseline  Coronary artery calcifications on chest CTA 9/2022  History of sinus bradycardia on beta-blocker therapy  HTN, decent control  Insulin requiring T2DM  CKD  Hypokalemia, supplemented  Chronic anemia  Morbid obesity, BMI 41  Tobacco abuse  Probable SHAVONNE  Chronic back pain  History of mechanical fall and COVID-19 infection 9/2022      RECOMMENDATIONS:  Unclear etiology for aspirin 325 mg daily  Agree with IV Lasix --> will increase to 40 mg BID  Monitor strict intake and output, daily weights  Monitor renal function and electrolytes  Inpatient CHF nurse consultation  Aggressive risk factor modification  Rest as per primary service and other consultants  Above as per Dr. Ruben Pack -- A+P discussed and made in collaboration with him. Further recommendations to follow          NOTE: This report was transcribed using voice recognition software. Every effort was made to ensure accuracy; however, inadvertent computerized transcription errors may be present.       Mima Valverde, [address] [place] [address] Cardiology    Electronically signed by Abel Starr PA-C on 2/16/2023 at 8:28 AM

## 2023-02-16 NOTE — PROGRESS NOTES
This patient is on medication that requires renal, weight, and/or indication dose adjustment. Date Body Weight IBW  Adjusted BW SCr  CrCl Dialysis status   2/15/2023   Ideal body weight: 50.1 kg (110 lb 7.2 oz)  Adjusted ideal body weight: 72.9 kg (160 lb 10.7 oz) Serum creatinine: 1.1 mg/dL (H) 02/15/23 1313  Estimated creatinine clearance: 55 mL/min (A) N/a       Pharmacy has dose-adjusted the following medication(s):    Date Previous Order Adjusted Order   2/15/2023 Enoxaparin 40 mg daily Enoxaparin 30 mg twice daily       These changes were made per protocol according to the [address] for Pharmacists. *Please note this dose may need readjusted if patient's condition changes. Please contact pharmacy with any questions regarding these changes.     Steff Mejias, [facility]  2/15/2023  9:21 PM

## 2023-02-16 NOTE — PROGRESS NOTES
Date: 2/15/2023    Time: 11:57 PM    Patient Placed On BIPAP/CPAP/ Non-Invasive Ventilation?   No    If no must comment    Comments: patient is refusing to wear BIPAP at this time        Auston Boast, RCP

## 2023-02-16 NOTE — PROGRESS NOTES
[facility] [place] [facility]  [place]. [place]        Date:2023                                                  Patient Name: Racquel Alarocn    MRN: [id number]    : 1950    Room: [address] [place] [address]      Evaluating OT: Paty Modi OTR/L #[id number]     Referring Provider and Specific Provider Orders/Date:      02/15/23 2115  OT eval and treat  Start:  02/15/23 2115,   End:  02/15/23 2115,   ONE TIME,   Standing Count:  1 Occurrences,   Beth Brambila MD      Placement Recommendation: Home with Home Health OT        Diagnosis:   1. Acute respiratory failure with hypoxia ([facility] [address]    2.  COPD exacerbation [facility])         Surgery: None       Pertinent Medical History:       Past Medical History:   Diagnosis Date    Chronic back pain 2018    10/10 on the pain scale    COPD (chronic obstructive pulmonary disease) (HCC)     GERD (gastroesophageal reflux disease)     Headache     Hyperlipidemia     Hypertension     Obesity     Scoliosis     Type 2 diabetes mellitus without complication [facility])          Past Surgical History:   Procedure Laterality Date    FINGER TRIGGER RELEASE Right 2018    right thumb    HYSTERECTOMY (CERVIX STATUS UNKNOWN)      [facility] TENDON SHEATH INCISION Right 2018    RIGHT THUMB TRIGGER THUMB RELEASE performed by Monico Luz DO at [facility] Right 2002    RCR    WISDOM TOOTH EXTRACTION          Precautions:  Fall Risk, activity as tolerated, 6L O2 via nasal canula      Assessment of current deficits    [x] Functional mobility  [x]ADLs  [x] Strength               []Cognition    [x] Functional transfers   [x] IADLs         [x] Safety Awareness   [x]Endurance    [] Fine Coordination              [x] Balance      [] Vision/perception   []Sensation     []Gross Motor Coordination  [] ROM  [] Delirium                   [] Motor Control     OT PLAN OF CARE   OT POC based on physician orders, patient diagnosis and results of clinical assessment    Frequency/Duration 1-3 days/wk for 2 weeks PRN     Specific OT Treatment Interventions to include:   * Instruction/training on adapted ADL techniques and AE recommendations to increase functional independence within precautions       * Training on energy conservation strategies, correct breathing pattern and techniques to improve independence/tolerance for self-care routine  * Functional transfer/mobility training/DME recommendations for increased independence, safety, and fall prevention  * Patient/Family education to increase follow through with safety techniques and functional independence  * Recommendation of environmental modifications for increased safety with functional transfers/mobility and ADLs  * Therapeutic exercise to improve motor endurance, ROM, and functional strength for ADLs/functional transfers  * Therapeutic activities to facilitate/challenge dynamic balance, stand tolerance for increased safety and independence with ADLs    Recommended Adaptive Equipment: Tub transfer bench , pt declined 3:1 commode. Home Living: Patient lives alone in a two story home bedroom and bathroom 2nd floor full flight stairs with Rail  with 1 step  to enter without Rail. Tub shower        Equipment owned: reacher, sock aide. Prior Level of Function: Independent with ADLs , Independent with IADLs; ambulated independently. Driving: Yes     Pain Level: 8/10 pain in back and head; Nursing notified.      Cognition: A&O: 4/4; Follows 3 step directions   Memory: intact   Sequencing: intact   Problem solving: fair    Judgement/safety: fair     [facility]   AM-PAC Daily Activity - Inpatient   How much help is needed for putting on and taking off regular lower body clothing?: A Lot  How much help is needed for bathing (which includes washing, rinsing, drying)?: A Lot  How much help is needed for toileting (which includes using toilet, bedpan, or urinal)?: A Little  How much help is needed for putting on and taking off regular upper body clothing?: A Little  How much help is needed for taking care of personal grooming?: A Little  How much help for eating meals?: A Little  AM-PAC Inpatient Daily Activity Raw Score: 16  AM-PAC Inpatient ADL T-Scale Score : 35.96  ADL Inpatient CMS 0-100% Score: 53.32  ADL Inpatient CMS G-Code Modifier : CK     Functional Assessment:    Initial Eval Status  Date: 2/16/23   Treatment Status  Date: STGs = LTGs  Time frame: 10-14 days   Feeding Supervision     Independent    Grooming Stand by Assist     Moderate Perkins    UB Dressing Minimal Assist    Moderate Perkins    LB Dressing Moderate Assist   Discussed benefits for AE to improve indep and safety with LB dressing. Pt states she owns a reacher and sock aide but doesn't use it. Pt declined AE trial at time of eval.     Moderate Perkins    Bathing Moderate Assist     Moderate Perkins    Toileting Stand by Assist   Completed perineal hygiene standing at commode with SBA for safety. Moderate Perkins    Bed Mobility  Supine to sit:  Not Assessed; in chair   Sit to supine:  Not Assessed; in chair     Supine to sit: Independent   Sit to supine: Independent    Functional Transfers Sit to stand: Supervision  Stand to sit: Supervision     Transfer training with verbal cues for hand placement throughout session to improve safety. Independent    Functional Mobility Minimal Assist with wheeled walker to improve balance to/from bathroom, verbal cues for walker sequence and safety. Assist for mgmt of O2 line.      Moderate Perkins with use of appropriate AD , prn    Balance Sitting:     Static: fair plus     Dynamic: fair plus   Standing: fair with walker     Sitting:     Static: good    Dynamic: good  Standing: good with AD    Activity Tolerance fair    Increase standing tolerance >3-5 minutes for improved engagement with functional transfers and indep in ADLs     Visual/  Perceptual Glasses: Yes     Reports changes in vision since admission: No      NA      Hand Dominance:      AROM (PROM) Strength Additional Info:  Goal:   RUE  WFL 4-/5 good  and wfl FMC/dexterity noted during ADL tasks   Improve overall RUE strength  for participation in functional tasks   LUE WFL 4-/5 good  and wfl FMC/dexterity noted during ADL tasks   Improve overall LUE strength  for participation in functional tasks     Hearing:  [facility]   Sensation:   No c/o numbness or tingling  Tone:  WFL   Edema:      Vitals: 6L via nasal canula    HR at rest: 78 bpm HR with activity: 114 bpm HR at end of session: 96 bpm   SpO2 at rest: 98% SpO2 with activity: 96% SpO2 at end of session: 97%   BP at rest:  BP with activity:  BP at end of session:      Comments: RN cleared patient for OT. Upon arrival patient in chair. Therapist facilitated and instructed pt on adapted  techniques & compensatory strategies to improve safety and independence with basic ADLs, bed mobility, functional transfers and mobility to allow pt to achieve highest level of independence and safely. Pt demonstrated fair understanding of education & follow through. At end of session, patient was in chair with call light and phone within reach, all lines and tubes intact. Overall, patient demonstrated  decreased independence and safety during completion of ADL tasks. Pt would benefit from continued skilled OT to increase safety and independence with completion of ADL tasks and functional mobility for improved quality of life. Treatment: OT treatment provided this date includes:   Instructions/training on safety, sequencing, and adapted techniques for completion of ADLs. Facilitated bed mobility with cues for proper body mechanics and sequencing to prepare for ADL completion.   Instruction/training on safe functional mobility/transfer techniques including hand and feet placement   Instruction/training on energy conservation/work simplification for completion of ADLs  Skilled monitoring of O2 sats - see section above     Rehab Potential: Good for established goals. Patient / Family Goal: return home       Patient and/or family were instructed on functional diagnosis, prognosis/goals and OT plan of care. Demonstrated fair understanding. Eval Complexity: Low    Time In: 9:43 AM   Time Out: 10:06 AM    Total Treatment Time: 8      Min Units   OT Eval Low 97165  X  1    OT Eval Medium 78991      OT Eval High 88430      OT Re-Eval 74349            ADL/Self Care 71044 8 1   Therapeutic Activities 46342       Therapeutic Ex 53206       Orthotic Management 27984       Manual [id number]     Neuro Re-Ed 77339       Non-Billable Time        Evaluation Time additionally includes thorough review of current medical information, gathering information on past medical history/social history and prior level of function, interpretation of standardized testing/informal observation of tasks, assessment of data and development of plan of care and goals.         Evaluating OT: Corena Goodell OTR/L #[id number]

## 2023-02-17 LAB
ANION GAP SERPL CALCULATED.3IONS-SCNC: 6 MMOL/L (ref 7–16)
ANISOCYTOSIS: ABNORMAL
BASOPHILS ABSOLUTE: 0.06 E9/L (ref 0–0.2)
BASOPHILS RELATIVE PERCENT: 0.9 % (ref 0–2)
BUN BLDV-MCNC: 16 MG/DL (ref 6–23)
CALCIUM SERPL-MCNC: 8.4 MG/DL (ref 8.6–10.2)
CHLORIDE BLD-SCNC: 98 MMOL/L (ref 98–107)
CO2: 35 MMOL/L (ref 22–29)
CREAT SERPL-MCNC: 1.3 MG/DL (ref 0.5–1)
EOSINOPHILS ABSOLUTE: 0.36 E9/L (ref 0.05–0.5)
EOSINOPHILS RELATIVE PERCENT: 5.2 % (ref 0–6)
GFR SERPL CREATININE-BSD FRML MDRD: 44 ML/MIN/1.73
GLUCOSE BLD-MCNC: 66 MG/DL (ref 74–99)
HCT VFR BLD CALC: 36.5 % (ref 34–48)
HEMOGLOBIN: 9.7 G/DL (ref 11.5–15.5)
HYPOCHROMIA: ABNORMAL
LYMPHOCYTES ABSOLUTE: 1.96 E9/L (ref 1.5–4)
LYMPHOCYTES RELATIVE PERCENT: 27.8 % (ref 20–42)
MCH RBC QN AUTO: 20.1 PG (ref 26–35)
MCHC RBC AUTO-ENTMCNC: 26.6 % (ref 32–34.5)
MCV RBC AUTO: 75.7 FL (ref 80–99.9)
METER GLUCOSE: 111 MG/DL (ref 74–99)
METER GLUCOSE: 124 MG/DL (ref 74–99)
METER GLUCOSE: 56 MG/DL (ref 74–99)
METER GLUCOSE: 57 MG/DL (ref 74–99)
METER GLUCOSE: 74 MG/DL (ref 74–99)
METER GLUCOSE: 80 MG/DL (ref 74–99)
METER GLUCOSE: 85 MG/DL (ref 74–99)
MONOCYTES ABSOLUTE: 0.56 E9/L (ref 0.1–0.95)
MONOCYTES RELATIVE PERCENT: 7.8 % (ref 2–12)
NEUTROPHILS ABSOLUTE: 4.06 E9/L (ref 1.8–7.3)
NEUTROPHILS RELATIVE PERCENT: 58.3 % (ref 43–80)
NUCLEATED RED BLOOD CELLS: 0.9 /100 WBC
PDW BLD-RTO: 19.5 FL (ref 11.5–15)
PLATELET # BLD: 477 E9/L (ref 130–450)
PMV BLD AUTO: 10.3 FL (ref 7–12)
POIKILOCYTES: ABNORMAL
POLYCHROMASIA: ABNORMAL
POTASSIUM SERPL-SCNC: 4 MMOL/L (ref 3.5–5)
RBC # BLD: 4.82 E12/L (ref 3.5–5.5)
SCHISTOCYTES: ABNORMAL
SODIUM BLD-SCNC: 139 MMOL/L (ref 132–146)
WBC # BLD: 7 E9/L (ref 4.5–11.5)

## 2023-02-17 PROCEDURE — 99233 SBSQ HOSP IP/OBS HIGH 50: CPT | Performed by: INTERNAL MEDICINE

## 2023-02-17 PROCEDURE — 2700000000 HC OXYGEN THERAPY PER DAY

## 2023-02-17 PROCEDURE — 2060000000 HC ICU INTERMEDIATE R&B

## 2023-02-17 PROCEDURE — 6360000002 HC RX W HCPCS: Performed by: INTERNAL MEDICINE

## 2023-02-17 PROCEDURE — 6370000000 HC RX 637 (ALT 250 FOR IP): Performed by: INTERNAL MEDICINE

## 2023-02-17 PROCEDURE — 82962 GLUCOSE BLOOD TEST: CPT

## 2023-02-17 PROCEDURE — 85025 COMPLETE CBC W/AUTO DIFF WBC: CPT

## 2023-02-17 PROCEDURE — 80048 BASIC METABOLIC PNL TOTAL CA: CPT

## 2023-02-17 PROCEDURE — 97530 THERAPEUTIC ACTIVITIES: CPT

## 2023-02-17 PROCEDURE — 94660 CPAP INITIATION&MGMT: CPT

## 2023-02-17 PROCEDURE — 99232 SBSQ HOSP IP/OBS MODERATE 35: CPT | Performed by: INTERNAL MEDICINE

## 2023-02-17 PROCEDURE — 36415 COLL VENOUS BLD VENIPUNCTURE: CPT

## 2023-02-17 RX ORDER — METOLAZONE 2.5 MG/1
2.5 TABLET ORAL ONCE
Status: DISCONTINUED | OUTPATIENT
Start: 2023-02-17 | End: 2023-02-22 | Stop reason: HOSPADM

## 2023-02-17 RX ORDER — ASPIRIN 81 MG/1
81 TABLET, CHEWABLE ORAL DAILY
Status: DISCONTINUED | OUTPATIENT
Start: 2023-02-18 | End: 2023-02-22 | Stop reason: HOSPADM

## 2023-02-17 RX ADMIN — ENOXAPARIN SODIUM 30 MG: 100 INJECTION SUBCUTANEOUS at 08:41

## 2023-02-17 RX ADMIN — GLIPIZIDE 5 MG: 5 TABLET ORAL at 08:41

## 2023-02-17 RX ADMIN — TRAMADOL HYDROCHLORIDE 50 MG: 50 TABLET, COATED ORAL at 08:41

## 2023-02-17 RX ADMIN — TRAMADOL HYDROCHLORIDE 50 MG: 50 TABLET, COATED ORAL at 18:23

## 2023-02-17 RX ADMIN — Medication 1 CAPSULE: at 08:40

## 2023-02-17 RX ADMIN — INSULIN GLARGINE 10 UNITS: 100 INJECTION, SOLUTION SUBCUTANEOUS at 08:45

## 2023-02-17 RX ADMIN — THIAMINE HCL TAB 100 MG 100 MG: 100 TAB at 08:41

## 2023-02-17 RX ADMIN — ENOXAPARIN SODIUM 30 MG: 100 INJECTION SUBCUTANEOUS at 21:05

## 2023-02-17 RX ADMIN — Medication 1 CAPSULE: at 21:05

## 2023-02-17 RX ADMIN — ASPIRIN 325 MG: 325 TABLET ORAL at 08:40

## 2023-02-17 RX ADMIN — POTASSIUM CHLORIDE 40 MEQ: 1500 TABLET, EXTENDED RELEASE ORAL at 08:40

## 2023-02-17 ASSESSMENT — PAIN SCALES - GENERAL
PAINLEVEL_OUTOF10: 9
PAINLEVEL_OUTOF10: 10

## 2023-02-17 ASSESSMENT — PAIN DESCRIPTION - LOCATION
LOCATION: BACK;HEAD
LOCATION: BACK

## 2023-02-17 ASSESSMENT — PAIN DESCRIPTION - DESCRIPTORS
DESCRIPTORS: ACHING
DESCRIPTORS: DISCOMFORT

## 2023-02-17 NOTE — PROGRESS NOTES
Patient 15 minute recheck after hypoglycemia treatment twice each 15 minutes apart patient blood sugar now 74 patient remained asymptomatic throughout hypoglycemic episode

## 2023-02-17 NOTE — PROGRESS NOTES
Date: 2/16/2023    Time: 11:22 PM    Patient Placed On BIPAP/CPAP/ Non-Invasive Ventilation? No    If no must comment.     Comments: patient refusing to wear bipap        Michelle Clayton RCP

## 2023-02-17 NOTE — CONSULTS
CHF NURSE NAVIGATOR CONSULT NOTE:      Patient currently admitted with diagnosis of Diastolic heart failure. Patient was awake and alert sitting in the chair during the consultation. She was engaged and asked appropriate questions throughout the education session. She is agreeable to symptom monitoring, daily weights, and following a low sodium diet. Scheduling with [facility] Cardiology APRN and the CHF clinic Yes. Pt to call PCP to schedule an appointment at discharge. Future Appointments   Date Time Provider Kierra Angela   3/3/2023  8:00 AM SERGIO Parker - CNP [facility]   3/7/2023  5:15 PM Dread Torre MD [facility]   3/13/2023  9:00 AM [facility] ROOM 1 [facility]       Barriers to Care:  Contributing risk factors for Heart Failure are identified as impairment:  physical: mobility, overwhelmed by complexity of regimen, lack of education, and multiple comorbidities . Pt was sent to ED from PCP office with SOB and swelling, weight gain. Pt lives alone and does not cook anymore she has been going out to eat or eating bologna and cheese sandwiches. Pt states that she was eating 2- 3 small jars of olives per day. Pt does not have a scale but states she is able to buy one. She uses a pill minder for her pills. She is able to provide her own transportation to appointments. Pt given list of meal delivery services. Pt agreeable to establishing in the CHF clinic. The patient is ordered:  Diet: ADULT DIET; Regular; 4 carb choices (60 gm/meal);  Low Fat/Low Chol/High Fiber/CONNOR; Low Sodium (2 gm); 1500 ml   Sodium controlled diet Yes  Fluid restriction daily ordered (fluid restriction recommended if patient is hyponatremic and/or diuretic is initiated or increased) Yes  FR:   Daily Weights: Patient Vitals for the past 96 hrs (Last 3 readings):   Weight   02/17/23 0600 244 lb 9.6 oz (110.9 kg)   02/15/23 2000 242 lb (109.8 kg)     I/O:   Intake/Output Summary (Last 24 hours) at 2/17/2023 1342  Last data filed at 2/17/2023 1047  Gross per 24 hour   Intake 600 ml   Output --   Net 600 ml              We reviewed the introduction to Heart Failure, the HF zones, signs and symptoms to report on day 1 of onset, medications, medication compliance, the importance of obtaining daily weights, following a low sodium diet, reading food labels for the sodium content, keeping physician appointments, and smoking cessation. We discussed writing / tracking daily weights on a calendar / log because a 5 pound gain in 1 week can sneak up if you are not tracking it. I advised patient they can reduce the risk for Heart Failure exacerbations by modifying / controlling the risk factors. We discussed self-managed care which includes the following:  to take medications as prescribed, report any intolerable side effects of medications to the cardiologist / doctor, do not just stop taking the medication; follow a cardiac heart healthy / low sodium diet; weigh yourself daily, exercise regularly- per doctor recommendation and not to smoke or use an excess amount of alcohol. We discussed calling the cardiologist / doctor with a weight gain of 3 pounds in one day or a total of 5 pounds or more in one week. Also, if you should have a significant weight loss of 3# or more in one day to call the doctor, they may need to decrease or hold the diuretic dose. On days you feels nauseated and not eating / drinking, having emesis or diarrhea,  informed to call the cardiologist  / doctor, they may need to decrease or hold diuretic to avoid dehydration. I stressed the importance of informing their cardiologist the first day of onset of any of the signs and symptoms in the \"Yellow Zone\" of the HF Zones. Patient verbalizes understanding. Greater than 30 minutes was spent educating patient.       The Heart Failure Booklet given to the patient with additional patient education addressing:  What is Heart Failure? Things You Can Do to Live Well with HF  How to Take Your Medications  How to Eat Less Salt  [place] its Salt? Exercising Well with Heart Failure  Signs and symptoms of HF to report  Weight Yourself Each Day  Home Self Management- activity, weight tracking, taking medications as prescribed, meals /diet planning (sodium and fluid restriction), how to read food labels, keeping physician follow ups, smoking cessation, follow the Heart Failure Zones  The Heart Failure zones  Every Dose Every Day      Instructed  to call 911 if you have any of the following symptoms:       Struggling to breathe unrelieved with rest,     Having chest pain     Have confusion or cant think clearly      Nini Justice, RN MSN, RN  Heart Failure Navigator        CONGESTIVE HEART FAILURE (CHF) AHA GUIDELINES  (Must be completed for Primary Diagnosis CHF or History of CHF)    Discharge Plan:  I placed the Heart Failure Home Instructions in patient's discharge instructions. Per Heart Failure GWTG, the patient should have a follow-up appointment made within 7 days of discharge.     New Diagnosis No    ECHO Results most recent: 9/2/23 55%  Lab Results   Component Value Date    LVEF 55 09/02/2022                                        Social History     Tobacco Use   Smoking Status Former    Packs/day: 0.50    Years: 47.00    Pack years: 23.50    Types: Cigarettes    Start date: 1975   Smokeless Tobacco Never        Immunization History   Administered Date(s) Administered    COVID-19, J&J, (age 18y+), IM, 0.5 mL 03/18/2021          Angiotensin-Converting-Enzyme (ACE) inhibitor ordered:  [] Yes  [x] No (specify contraindication): held by provider     [] Contraindicated  [] Hypotensive patient who was at immediate risk of cardiogenic shock  [x] Hospitalized patient who experienced marked azotemia  [] Other Contraindications  [] Not Eligible  [] Not Tolerant  [] Patient Reason  [] System Reason  []Other Reason    Angiotensin II receptor blockers (ARB) ordered:  [] Yes  [x] No (specify contraindication):    [] Contraindicated  [] Hypotensive patient who was at immediate risk of cardiogenic shock  [] Hospitalized patient who experienced marked azotemia  [] Other Contraindications    ARNI - Angiotensin Receptor Neprilysin Inhibitor ordered:  [] Yes  [x] No (specify contraindication):    [] ACE inhibitor use within the prior 36 hours  [] Allergy  [] Hyperkalemia  [] Hypotension  [] Renal dysfunction defined as creatinine > 2.5 mg/dL in men or > 2.0 mg/dL in women  [] Other Contraindications  [x] Not Eligible  [] Not Tolerant  [] Patient Reason  []System Reason  []Other Reason      Beta Blocker ordered:    [] Yes  [x] No (specify contraindication): Bradycardia     [] Contraindicated  [] Asthma  [] Fluid Overload  [] Low Blood Pressure  [] Patient recently treated with an intravenous positive inotropic agent  [] Other Contraindications  [] Not Eligible  [] Not Tolerant  [] Patient Reason  [] System Reason    SGLT2 Inhibitor ordered:  [] Yes  [x] No (specify contraindication):    [] Contraindicated  [] Patient currently on dialysis  [] Ketoacidosis  [] Known hypersensitivity to the medication  [] Type I diabetes (not approved for use in patients with Type I diabetes due to increased risk of ketoacidosis)  [] Other Contraindications  [] Not Eligible  [] Not Tolerant  [] Patient Reason  [] System Reason  [x] Other Reason    Aldosterone Antagonist ordered:  [] Yes  [x] No (specify contraindication):    [] Contraindicated  [] Allergy due to aldosterone receptor antagonist  [] Hyperkalemia  [] Renal dysfunction defined as creatinine >2.5 mg/dL in men or >2.0 mg/dL in women.   [] Other contraindications  [] Not Eligible  [] Not Tolerant  [] Patient Reason  [] System Reason  [x] Other Reason

## 2023-02-17 NOTE — CARE COORDINATION
SOCIAL WORK / DISCHARGE PLANNING:   Dr Elizabeth Hilton came to this Sw stating pt is agreeable for [facility] placement, does not want [facility] but is agreeable [facility] Referral called to [facility] rep, no female beds today but might become available. PRECERT is needed prior to dc.              Electronically signed by MOHAMUD Zamudio on 2/17/2023 at 4:08 PM

## 2023-02-17 NOTE — PROGRESS NOTES
Physician Progress Note      PATIENTJolane Cancer  CSN #:                  [id number]  :                       1950  ADMIT DATE:       2/15/2023 12:33 PM  DISCH DATE:  RESPONDING  PROVIDER #:        Ana Laura Botello        QUERY TEXT:    Stage of Chronic Kidney Disease: Please provide further specificity, if known. Clinical indicators include: bun, creatinine, probnp, kd  Options provided:  -- Chronic kidney disease stage 1  -- Chronic kidney disease stage 2  -- Chronic kidney disease stage 3  -- Chronic kidney disease stage 3a  -- Chronic kidney disease stage 3b  -- Chronic kidney disease stage 4  -- Chronic kidney disease stage 5  -- Chronic kidney disease stage 5, requiring dialysis  -- End stage renal disease  -- Other - I will add my own diagnosis  -- Disagree - Not applicable / Not valid  -- Disagree - Clinically Unable to determine / Unknown        PROVIDER RESPONSE TEXT:    The patient has chronic kidney disease stage 3a.       Electronically signed by:  Ana Laura Botello 2023 4:20 PM

## 2023-02-17 NOTE — DISCHARGE INSTRUCTIONS
HEART FAILURE  / CONGESTIVE HEART FAILURE  DISCHARGE INSTRUCTIONS:  GUIDELINES TO FOLLOW AT HOME    Self- Managed Care:     MEDICATIONS:  Take your medication as directed. If you are experiencing any side effects, inform your doctor, Do not stop taking any of your medications without letting your doctor know. Check with your doctor before taking any over-the-counter medications / herbal / or dietary supplements. They may interfere with your other medications. Do not take ibuprofen (Advil or Motrin) and naproxen (Aleve) without talking to your doctor first. They could make your heart failure worse. WEIGHT MONITORING:   Weigh yourself everyday (with the same scale) around the same time of the day and write it down. (you can chart them on a calendar or keep track of them on paper. Notify your doctor of a weight gain of 3 pounds or more in 1 day   OR a total of 5 pounds or more in 1 week    Take your weight record to your doctor visits  Also, the same goes if you loose more than 3# in one day, let your heart doctor know. DIET:   Cardiac heart healthy diet- Low saturated / low trans fat, no added salt, caffeine restricted, Low sodium diet-   No more than 2,000mg (2 grams) of salt / sodium per day (which equals to a little less than  a teaspoon of salt)  If your doctor wants you on a fluid restriction. ..it is usually recommended a fluid limit of 2,000cc -  Fluid restriction- 2,000 ml (milliliters) = 64 ounces = you can have 8 glasses of fluid per day (each glass 8 ounces)    Follow a low salt diet - avoid using salt at the table, avoid / limit use of canned soups, processed / packaged foods, salted snacks, olives and pickles. Do not use a salt substitute without checking with your doctor, they may contain a high amount of potassioum. (Mrs. Trinidad Mejia is safe to use).     Limit the use of alcohol       CALL YOUR DOCTOR THE FIRST DAY YOU NOTICE ANY OF THESE   SYMPTOMS:  You have a weight gain of 3 pounds or more in 1 day         OR 5 pounds or more in one week  More shortness of breath  More swelling of your stomach, legs, ankles or feet  Feeling more tired, No energy  Dry hacky cough  Dizziness  More chest pain / discomfort       (CALL 911 IF ANY OF THE FOLLOWING OCCURS  Chest pain (not relieved with nitroglycerine, if you have been prescribed this medication)  Severe shortness of breath  Faint / Pass out  Confusion / cannot think clearly  If symptoms get worse           SMOKING - TOBACCO USE:  * IF YOU SMOKE - STOP! Kick the habit. [address] Program is offered at [facility] and [address] [facility]. Call [phone] extension [phone] for more information. ACTIVITY:   (Ask your doctor when you will be able to return to work and before starting any exercise program.  Do not drive unless unless your doctor has given you permission to do so). Start light exercise. Even if you can only do a small amount, exercise will help you get stronger, have more energy, help manage your weight and decrease  stress. Walking is an easy way to get exercise. Start out slowly and  increase the amount you walk as tolerated  If you become short of breath, dizzy or have chest pain; stop, sit down, and rest.  If you feel \"wiped out\" the day after you exercise, walk at a slower pace or for a shorter distance. You can gradually increase the pace or amount of time. (Do not exercise right after a meal or in extreme temperatures, such as above 85 degrees, if the air is really humid, or wind chill is less than 20 degrees)                                             ADDITIONAL INFORMATION:  Avoid getting sick from colds and the flu. Stay away from friends or family that you know may have a contagious illness  Get plenty of rest   Get a flu shot each year. Get a pneumococcal vaccine shot.  If you have had one before, ask your doctor whether you need another dose. My Goal for Self-management of Heart Failure Includes 5 steps :    1. Notice a change in symptoms ( weight gain, short of breath, leg swelling, decreased activity level, bloating. ...)    2. Evaluate the change: (use the Heart Failure Zones )     3. Decide to take action: decide what your options are, such as: (call your doctor for an extra visit, take a prescribed medication, such as your water pill if your doctor has given you directions to do so, [address])    4. Come up with a strategy:  (now you call the doctor for advice / appointment. This is where you take action!!! Do not wait, catch the symptom early and treat it before it worsens. 5. Evaluate the response: The next day, check your Heart Failure Zones: are you in the GREEN ZONE (safe zone)? Worsening symptoms of YELLOW ZONE? Or have you moved to the RED ZONE and need to call 911 or go to the Emergency Room for evaluation? Call your doctor's office to update them on your symptoms of heart failure.

## 2023-02-17 NOTE — PROGRESS NOTES
Patient blood sugar recheck at 15 minutes post treatment with 4 ounces of juice was 57 patient treated again with 4 ounces of juice as well as  a piece of candy patient also eating dinner tray at this time

## 2023-02-17 NOTE — PROGRESS NOTES
INPATIENT CARDIOLOGY FOLLOW-UP    Name: Rocky Thurman    Age: 81 y.o. Date of Admission: 2/15/2023 12:33 PM    Date of Service: 2/17/2023    Primary Cardiologist: Dr. Uriah Aceves    Chief Complaint: Follow-up for decompensated heart failure, pulmonary hypertension, right ventricular failure    Interim History:  No new overnight cardiac complaints. Currently with no complaints of CP, SOB, palpitations, dizziness, or lightheadedness. SR on telemetry. Only half liters negative so far. States urine output has been accurately measured.     Review of Systems:   Negative except as described above    Problem List:  Patient Active Problem List   Diagnosis    Primary hypertension    Type 2 diabetes mellitus without complication, without long-term current use of insulin (HCC)    Severe pulmonary hypertension (HCC)    Tobacco abuse    Anemia    Morbid obesity ([facility]    Obstructive sleep apnea    Chronic systolic (congestive) heart failure    Chronic obstructive pulmonary disease, unspecified COPD type ([facility] [address]    Acute respiratory failure with hypoxia (HCC)    Acute on chronic heart failure with preserved ejection fraction (HFpEF) ([facility])    Elevated troponin    RVF (right ventricular failure) ([facility])    VHD (valvular heart disease)       Current Medications:    Current Facility-Administered Medications:     traMADol (ULTRAM) tablet 50 mg, 50 mg, Oral, Q6H PRN, Swetha Braga MD, 50 mg at 02/17/23 0841    insulin glargine (LANTUS) injection vial 10 Units, 10 Units, SubCUTAneous, BID, Swetha Braga MD, 10 Units at 02/17/23 0845    furosemide (LASIX) injection 40 mg, 40 mg, IntraVENous, BID, Silvana Braga MD, 40 mg at 02/16/23 1746    amLODIPine (NORVASC) tablet 5 mg, 5 mg, Oral, Daily, Silvana Braga MD, 5 mg at 02/16/23 0805    aspirin tablet 325 mg, 325 mg, Oral, Daily, Swetha Braga MD, 325 mg at 02/17/23 0840    glipiZIDE (GLUCOTROL) tablet 5 mg, 5 mg, Oral, Daily, Swetha Braga MD, 5 mg at 02/17/23 9644 hydroCHLOROthiazide (HYDRODIURIL) tablet 25 mg, 25 mg, Oral, Daily, Silvana Braag MD, 25 mg at 02/16/23 0804    lactobacillus (CULTURELLE) capsule 1 capsule, 1 capsule, Oral, Q12H, Swetha Braga MD, 1 capsule at 02/17/23 0840    lisinopril (PRINIVIL;ZESTRIL) tablet 40 mg, 40 mg, Oral, Daily, Sharri Braga MD, 40 mg at 02/16/23 0804    potassium chloride (KLOR-CON M) extended release tablet 40 mEq, 40 mEq, Oral, Daily, Swetha Braga MD, 40 mEq at 02/17/23 0840    thiamine mononitrate tablet 100 mg, 100 mg, Oral, Daily, Swetha Braga MD, 100 mg at 02/17/23 0841    ondansetron (ZOFRAN) tablet 4 mg, 4 mg, Oral, Daily PRN, Swetha Braga MD    insulin lispro (HUMALOG) injection vial 0-8 Units, 0-8 Units, SubCUTAneous, TID WC, Swetha Braga MD, 8 Units at 02/16/23 0813    glucose chewable tablet 16 g, 4 tablet, Oral, PRN, Swetha Braga MD    dextrose bolus 10% 125 mL, 125 mL, IntraVENous, PRN **OR** dextrose bolus 10% 250 mL, 250 mL, IntraVENous, PRN, Swetha Braga MD    glucagon (rDNA) injection 1 mg, 1 mg, SubCUTAneous, PRN, Swetha Braga MD    dextrose 10 % infusion, , IntraVENous, Continuous PRN, Swetha Braga MD    enoxaparin (LOVENOX) injection 30 mg, 30 mg, SubCUTAneous, BID, Swetha Braga MD, 30 mg at 02/17/23 0841    Physical Exam:  BP (!) 97/53   Pulse 58   Temp 97.5 °F (36.4 °C) (Oral)   Resp 18   Ht 5' 4\" (1.626 m)   Wt 244 lb 9.6 oz (110.9 kg)   SpO2 100%   BMI 41.99 kg/m²   Wt Readings from Last 3 Encounters:   02/17/23 244 lb 9.6 oz (110.9 kg)   02/15/23 236 lb (107 kg)   11/02/22 174 lb (78.9 kg)     Appearance: Awake, alert, no acute respiratory distress  Skin: Intact, no rash  Head: Normocephalic, atraumatic  Eyes: EOMI, no conjunctival erythema  ENMT: No pharyngeal erythema, MMM, no rhinorrhea  Neck: Supple, JVP elevated at 14 to 15 cm, no carotid bruits  Lungs: Clear to auscultation bilaterally. No wheezes, rales, or rhonchi.   Cardiac: PMI nondisplaced, Regular rhythm with a normal rate, S1 & S2 normal, no murmurs  Abdomen: Soft, nontender, +bowel sounds  Extremities: Moves all extremities x 4, no lower extremity edema  Neurologic: No focal motor deficits apparent, normal mood and affect  Peripheral Pulses: Intact posterior tibial pulses bilaterally    Intake/Output:    Intake/Output Summary (Last 24 hours) at 2/17/2023 1037  Last data filed at 2/16/2023 1450  Gross per 24 hour   Intake 360 ml   Output 300 ml   Net 60 ml     No intake/output data recorded.     Laboratory Tests:  Recent Labs     02/15/23  1313 02/16/23  0457 02/17/23  0555    138 139   K 3.2* 3.7 4.0   CL 98 99 98   CO2 33* 32* 35*   BUN 16 15 16   CREATININE 1.1* 1.0 1.3*   GLUCOSE 84 312* 66*   CALCIUM 8.4* 8.1* 8.4*     Lab Results   Component Value Date/Time    MG 2.0 02/15/2023 01:13 PM     Recent Labs     02/15/23  1313   ALKPHOS 101   ALT 12   AST 19   PROT 6.4   BILITOT 0.4   LABALBU 3.2*     Recent Labs     02/15/23  1313 02/16/23  0457 02/17/23  0555   WBC 5.5 3.7* 7.0   RBC 4.40 4.28 4.82   HGB 9.0* 8.6* 9.7*   HCT 32.9* 31.4* 36.5   MCV 74.8* 73.4* 75.7*   MCH 20.5* 20.1* 20.1*   MCHC 27.4* 27.4* 26.6*   RDW 19.9* 19.7* 19.5*    406 477*   MPV 9.9 10.0 10.3     Lab Results   Component Value Date    CKTOTAL 48 09/09/2022     Lab Results   Component Value Date    INR 1.1 09/09/2022    PROTIME 12.6 (H) 09/09/2022     Lab Results   Component Value Date    TSH 2.460 09/06/2022     Lab Results   Component Value Date    LABA1C 5.9 (H) 09/02/2022     No results found for: EAG  Lab Results   Component Value Date    CHOL 101 09/02/2022     Lab Results   Component Value Date    TRIG 85 09/02/2022     Lab Results   Component Value Date    HDL 38 09/02/2022     Lab Results   Component Value Date    LDLCALC 46 09/02/2022     Lab Results   Component Value Date    LABVLDL 17 09/02/2022     No results found for: CHOLHDLRATIO  Recent Labs     02/15/23  1313   PROBNP 5,127*       Cardiac Tests:    TTE, Dr. Lily Dykes 9/2022: Normal LV size and function, EF > 55%. Stage II DD. Dilated RV and reduced RV function. Dilated RA. Mild TR. Pulmonary hypertension with PASP 62 mmHg. ASSESSMENT:  Acute hypoxic/hypercapnic respiratory failure, currently on 8 L HF NC  Acute on chronic HFpEF/RHF, appears hypervolemic with proBNP 5100  Mild TR, moderate to severe pulmonary hypertension with PASP 62 mmHg on TTE 9/2022  Possible COPD exacerbation  Chronically elevated troponin, at baseline  Coronary artery calcifications on chest CTA 9/2022  History of sinus bradycardia on beta-blocker therapy  HTN, decent control  Insulin requiring T2DM  CKD  Hypokalemia, supplemented  Chronic anemia  Morbid obesity, BMI 41  Tobacco abuse  Probable SHAVONNE  Chronic back pain  History of mechanical fall and COVID-19 infection 9/2022        RECOMMENDATIONS:  Can decrease aspirin to 81 mg daily. Continue Lasix 40 IV twice daily. I will give a dose of metolazone today. Monitor strict intake and output, daily weights  Monitor renal function and electrolytes  Inpatient CHF nurse consultation  Aggressive risk factor modification  Rest as per primary service and other consultants  Counseled extensively about the need for compliance with dietary and fluid as well as salt restrictions. Pulmonary hypertension appears to be secondary to group 2 as well as group 3 etiologies. Continue home medications as able. Will avoid beta-blocker given history of sinus bradycardia and also to avoid negative inotropic effects on the right ventricle. She was slightly hypotensive overnight. Thus, I have held her HCTZ and ACE inhibitor. Renal function slightly worse today. We will continue to monitor closely. We will resume above medications gradually. She will be a good candidate for CardioMEMS device. We will discuss this further as an outpatient.     Greater than 50 minutes was spent counseling the patient, reviewing the rationale for the above recommendations and reviewing the patient's current medication list, problem list and results of all previously ordered testing. Pradeep Nguyen MD, [facility] Cardiology    NOTE: This report was transcribed using voice recognition software. Every effort was made to ensure accuracy; however, inadvertent computerized transcription errors may be present.

## 2023-02-17 NOTE — PROGRESS NOTES
Physical Therapy Treatment Note/Plan of Care    Room #:  0615/0615-01  Patient Name: Marlena Bermudez  YOB: 1950  MRN: [id number]    Date of Service: 2/17/2023     Tentative placement recommendation: Home with Home Health Physical Therapy  Equipment recommendation: Bedside commode  and possibly a cane, trial  next session as patient does not want to use wheeled walker      Evaluating Physical Therapist: Edith Lanier #[id number]      Specific Provider Orders/Date/Referring Provider :   02/15/23 2115    PT eval and treat  Start:  02/15/23 2115,   End:  02/15/23 2115,   ONE TIME,   Standing Count:  1 Occurrences,   Gilma Groves MD     Admitting Diagnosis:   COPD exacerbation ([address] [J44.1]  Acute respiratory failure with hypoxia ([address] [J96.01]      Surgery: none  Visit Diagnoses         Codes    COPD exacerbation ([address]     J44.1            Patient Active Problem List   Diagnosis    Primary hypertension    Type 2 diabetes mellitus without complication, without long-term current use of insulin ([address]    Severe pulmonary hypertension ([address]    Tobacco abuse    Anemia    Morbid obesity ([address]    Obstructive sleep apnea    Chronic systolic (congestive) heart failure    Chronic obstructive pulmonary disease, unspecified COPD type ([address]    Acute respiratory failure with hypoxia ([address]    Acute on chronic heart failure with preserved ejection fraction (HFpEF) ([facility])    Elevated troponin    RVF (right ventricular failure) ([facility])    VHD (valvular heart disease)        ASSESSMENT of Current Deficits Patient exhibits decreased strength, balance, and endurance impairing functional mobility, transfers, gait distance, and tolerance to activity. Patient only wanted to ambulate to bathroom in back to bedside chair. Education on seated exercises. Patient was requesting crackers and apple juice, however on carb restrictions and monitoring fluid intake, so she was not allowed anything else as of now.  Patient is slightly impulsive and wanted to do things as independent as possible. No buckling during session, just shortness of breath and fatigue noted. The patient will benefit from continued skilled therapy to increase strength and improve balance for safe functional mobility, to decrease risk of falls, and to meet goals at discharge. PHYSICAL THERAPY  PLAN OF CARE       Physical therapy plan of care is established based on physician order,  patient diagnosis and clinical assessment    Current Treatment Recommendations:    -Bed Mobility: Lower extremity exercises , Upper extremity exercises , and Trunk control activities   -Sitting Balance: Incorporate reaching activities to activate trunk muscles , Hands on support to maintain midline , and Facilitate active trunk muscle engagement   -Standing Balance: Perform strengthening exercises in standing to promote motor control with or without upper extremity support , Instruct patient on adequate base of support to maintain balance, and Challenge balance utilizing reaching  activities beyond center of gravity    -Transfers: Provide instruction on proper hand and foot position for adequate transfer of weight onto lower extremities and use of gait device if needed and Cues for hand placement, technique and safety. Provide stabilization to prevent fall   -Gait: Gait training and Standing activities to improve: base of support, weight shift, weight bearing    -Endurance: Utilize Supervised activities to increase level of endurance to allow for safe functional mobility including transfers and gait   -Stairs: Stair training with instruction on proper technique and hand placement on rail    PT long term treatment goals are located in below grid    Patient and or family understand(s) diagnosis, prognosis, and plan of care. Frequency of treatments: Patient will be seen  daily.          Prior Level of Function: Patient ambulated independently   Rehab Potential: good  for baseline    Past medical history:   Past Medical History:   Diagnosis Date    Chronic back pain 2018    10/10 on the pain scale    COPD (chronic obstructive pulmonary disease) (HCC)     GERD (gastroesophageal reflux disease)     Headache     Hyperlipidemia     Hypertension     Obesity     Scoliosis     Type 2 diabetes mellitus without complication [facility])      Past Surgical History:   Procedure Laterality Date    FINGER TRIGGER RELEASE Right 08/14/2018    right thumb    HYSTERECTOMY (CERVIX STATUS UNKNOWN)      RI TENDON SHEATH INCISION Right 8/14/2018    RIGHT THUMB TRIGGER THUMB RELEASE performed by Carlos Loyd DO at [facility] [address] Right 2002    RCR    WISDOM TOOTH EXTRACTION         SUBJECTIVE:    Precautions: Up with assistance, falls and alarm , L knee karel, CHF, O2 (6L)    Social history: Patient lives alone in a two story home bedroom and bathroom 2nd floor full flight stairs with Rail  with 1 step  to enter without Rail. Tub shower       Equipment owned: None,      AM-PAC Basic Mobility       AM-PAC Basic Mobility - Inpatient   How much help is needed turning from your back to your side while in a flat bed without using bedrails?: A Little  How much help is needed moving from lying on your back to sitting on the side of a flat bed without using bedrails?: A Little  How much help is needed moving to and from a bed to a chair?: A Little  How much help is needed standing up from a chair using your arms?: A Little  How much help is needed walking in hospital room?: A Little  How much help is needed climbing 3-5 steps with a railing?: A Lot  AM-PAC Inpatient Mobility Raw Score : 17  AM-PAC Inpatient T-Scale Score : 42.13  Mobility Inpatient CMS 0-100% Score: 50.57  Mobility Inpatient CMS G-Code Modifier : CK    Nursing cleared patient for PT treatment. Patient sitting up in bedside chair at start of session.       OBJECTIVE;   Initial Evaluation  Date: 2/16/2023 Treatment Date:  2/17/2023     Short Term/ Long Term   Goals   Was pt agreeable to Eval/treatment? Yes yes To be met in 4 days   Pain level   8/10  Back  Back pain and headache 10/10    Bed Mobility    Rolling: Supervision     Supine to sit: Supervision     Sit to supine: Supervision     Scooting: Supervision    Rolling: Not assessed patient in chair      Rolling: Independent    Supine to sit:  Independent    Sit to supine: Independent    Scooting: Independent     Transfers Sit to stand: Supervision  from EOB and toilet  Sit to stand: Supervision  cues for hand placement and safety x multiple reps   Sit to stand: Independent    Ambulation     2x15 feet using  wheeled walker with Minimal assist of 1   for walker approximation, balance, upright, safety, and O2 line management  2x10 feet using  wheeled walker with Supervision    cues for safety and obstacle negotiation     100 feet using  least restrictive device versus no device with Modified Independent    Stair negotiation: ascended and descended   Not assessed  Patient too fatigued to perform stair training   10 steps, 1 rail and supervision    ROM Within functional limits    Increase range of motion 10% of affected joints    Strength BUE:  refer to OT eval  RLE:  4/5  LLE:  4-/5  Increase strength in affected mm groups by 1/3 grade   Balance Sitting EOB:  good -  Dynamic Standing:  fair + wheeled walker   Sitting EOB: not assessed   Dynamic Standing: fair wheeled walker   Sitting EOB:  good   Dynamic Standing: good      Patient is Alert & Oriented x person, place, time, and situation and follows directions    Sensation:  Patient  denies numbness/tingling   Edema:  yes bilateral lower extremities   Endurance: fair      Vitals:  6 liters nasal cannula   Blood Pressure at rest  Blood Pressure during session    Heart Rate at rest 77 Heart Rate during session    SPO2 at rest 98%  SPO2 during session 91-95%     Patient education  Patient educated on role of Physical Therapy, risks of immobility, safety and plan of care, energy conservation,  importance of mobility while in hospital , purse lip breathing, safety , and seated exercises      Patient response to education:   Pt verbalized understanding Pt demonstrated skill Pt requires further education in this area   Yes Partial Yes      Treatment:  Patient practiced and was instructed/facilitated in the following treatment: Patient stood from chair to amb to bathroom. Patient amb back to bedside chair per request and educated on seated exercises. Therapeutic Exercises:  ankle pumps  x 10 reps. At end of session, patient in chair with  call light and phone within reach,  all lines and tubes intact, nursing notified. Patient would benefit from continued skilled Physical Therapy to improve functional independence and quality of life.          Patient's/ family goals   home    Time in  1034  Time out  1049    Total Treatment Time  15 minutes      CPT codes:  Therapeutic activities (19651)   15 minutes  1 unit(s)    Alvarado Richter PTA   #[id number]

## 2023-02-17 NOTE — CARE COORDINATION
2/17/2023 1040 CM for transition of care needs at d/c.  Plan is for pt to return home as prior. Pt denying the need for HHC/SNF. Pt was requesting a tub transfer bench but it's not covered by the insurance so pt's son is going to get her one at [facility]. Pt states she doesn't have any home going needs. Pt's family will transport her home. CM will follow.  Electronically signed by Taiwo Vanessa RN on 2/17/2023 at 10:55 AM

## 2023-02-17 NOTE — PROGRESS NOTES
Patient blood sugar was 56 patient was asymptomatic given 4 ounces of juice at this time and dinner tray arrived at this time

## 2023-02-17 NOTE — PROGRESS NOTES
Subjective: The patient is awake and alert. No problems overnight. Denies chest pain, angina, and dyspnea. Denies abdominal pain. Tolerating diet. No nausea or vomiting.     Objective:    BP (!) 94/51   Pulse 73   Temp 97.9 °F (36.6 °C) (Oral)   Resp 20   Ht 5' 4\" (1.626 m)   Wt 244 lb 9.6 oz (110.9 kg)   SpO2 98%   BMI 41.99 kg/m²   Head and Neck: normal atraumatic, no neck masses, normal thyroid, no jvd  Heart:  regular rate and rhythm, S1, S2 normal, no murmur, click, rub or gallop  Lungs:  chest clear, no wheezing, rales, normal symmetric air entry,  no chest wall deformities or tenderness  Abd: soft, non-tender, without masses or organomegaly  Extrem:  No clubbing, cyanosis, 2+ edema  Neuro:Normal, no focal neurological deficit     CBC with Differential:    Lab Results   Component Value Date/Time    WBC 7.0 02/17/2023 05:55 AM    RBC 4.82 02/17/2023 05:55 AM    HGB 9.7 02/17/2023 05:55 AM    HCT 36.5 02/17/2023 05:55 AM     02/17/2023 05:55 AM    MCV 75.7 02/17/2023 05:55 AM    MCH 20.1 02/17/2023 05:55 AM    MCHC 26.6 02/17/2023 05:55 AM    RDW 19.5 02/17/2023 05:55 AM    NRBC 0.9 02/17/2023 05:55 AM    LYMPHOPCT 27.8 02/17/2023 05:55 AM    MONOPCT 7.8 02/17/2023 05:55 AM    BASOPCT 0.9 02/17/2023 05:55 AM    MONOSABS 0.56 02/17/2023 05:55 AM    LYMPHSABS 1.96 02/17/2023 05:55 AM    EOSABS 0.36 02/17/2023 05:55 AM    BASOSABS 0.06 02/17/2023 05:55 AM     BMP:    Lab Results   Component Value Date/Time     02/17/2023 05:55 AM    K 4.0 02/17/2023 05:55 AM    K 3.2 02/15/2023 01:13 PM    CL 98 02/17/2023 05:55 AM    CO2 35 02/17/2023 05:55 AM    BUN 16 02/17/2023 05:55 AM    LABALBU 3.2 02/15/2023 01:13 PM    CREATININE 1.3 02/17/2023 05:55 AM    CALCIUM 8.4 02/17/2023 05:55 AM    GFRAA >60 09/14/2022 07:00 AM    LABGLOM 44 02/17/2023 05:55 AM    GLUCOSE 66 02/17/2023 05:55 AM      I's and O's -360 cc since admission  Assessment:    Principal Problem:    Acute respiratory failure with hypoxia ([address]  Active Problems:    Severe pulmonary hypertension (HCC)    Acute on chronic heart failure with preserved ejection fraction (HFpEF) (HCC)    Elevated troponin    RVF (right ventricular failure) (HCC)    VHD (valvular heart disease)  Resolved Problems:    * No resolved hospital problems. *        Plan:  Patient still on high flow oxygen continue the same continue diuretics monitor BMP monitor I's and O's  Severe pulmonary hypertension gets because of the obstructive sleep apnea morbid obesity and COPD all the above has been discussed with the patient explained to her that she needs to elevate her legs while she is sleeping because she sleeps in a chair all the time and that makes her leg swelling worse she agreed that she cannot sleep in bed tonight and try to elevate her legs. Continues diuretics.   Elevated troponin probably secondary to demand ischemia seen by cardiology plan to follow-up at the CHF clinic  Patient is living alone ambulation is very poor I discussed with her that she needs to go for rehab she agreed this has been discussed with the  who will arrange for discharge to rehab facility of patient chiqui Cavazos MD  4:47 PM  2/17/2023

## 2023-02-18 LAB
ANION GAP SERPL CALCULATED.3IONS-SCNC: 9 MMOL/L (ref 7–16)
BASOPHILS ABSOLUTE: 0 E9/L (ref 0–0.2)
BASOPHILS RELATIVE PERCENT: 0.3 % (ref 0–2)
BUN BLDV-MCNC: 15 MG/DL (ref 6–23)
CALCIUM SERPL-MCNC: 8.6 MG/DL (ref 8.6–10.2)
CHLORIDE BLD-SCNC: 99 MMOL/L (ref 98–107)
CO2: 33 MMOL/L (ref 22–29)
CREAT SERPL-MCNC: 1.2 MG/DL (ref 0.5–1)
EOSINOPHILS ABSOLUTE: 0.48 E9/L (ref 0.05–0.5)
EOSINOPHILS RELATIVE PERCENT: 7 % (ref 0–6)
GFR SERPL CREATININE-BSD FRML MDRD: 49 ML/MIN/1.73
GLUCOSE BLD-MCNC: 78 MG/DL (ref 74–99)
HCT VFR BLD CALC: 38 % (ref 34–48)
HEMOGLOBIN: 9.5 G/DL (ref 11.5–15.5)
HYPOCHROMIA: ABNORMAL
LYMPHOCYTES ABSOLUTE: 0.62 E9/L (ref 1.5–4)
LYMPHOCYTES RELATIVE PERCENT: 8.7 % (ref 20–42)
MCH RBC QN AUTO: 19.3 PG (ref 26–35)
MCHC RBC AUTO-ENTMCNC: 25 % (ref 32–34.5)
MCV RBC AUTO: 77.4 FL (ref 80–99.9)
METER GLUCOSE: 111 MG/DL (ref 74–99)
METER GLUCOSE: 117 MG/DL (ref 74–99)
METER GLUCOSE: 122 MG/DL (ref 74–99)
METER GLUCOSE: 98 MG/DL (ref 74–99)
MONOCYTES ABSOLUTE: 0.21 E9/L (ref 0.1–0.95)
MONOCYTES RELATIVE PERCENT: 2.6 % (ref 2–12)
NEUTROPHILS ABSOLUTE: 5.66 E9/L (ref 1.8–7.3)
NEUTROPHILS RELATIVE PERCENT: 81.7 % (ref 43–80)
OVALOCYTES: ABNORMAL
PDW BLD-RTO: 19.6 FL (ref 11.5–15)
PLATELET # BLD: 495 E9/L (ref 130–450)
PMV BLD AUTO: 9.9 FL (ref 7–12)
POIKILOCYTES: ABNORMAL
POLYCHROMASIA: ABNORMAL
POTASSIUM SERPL-SCNC: 4.7 MMOL/L (ref 3.5–5)
RBC # BLD: 4.91 E12/L (ref 3.5–5.5)
SODIUM BLD-SCNC: 141 MMOL/L (ref 132–146)
TARGET CELLS: ABNORMAL
WBC # BLD: 6.9 E9/L (ref 4.5–11.5)

## 2023-02-18 PROCEDURE — 6370000000 HC RX 637 (ALT 250 FOR IP): Performed by: INTERNAL MEDICINE

## 2023-02-18 PROCEDURE — 6360000002 HC RX W HCPCS: Performed by: INTERNAL MEDICINE

## 2023-02-18 PROCEDURE — 2060000000 HC ICU INTERMEDIATE R&B

## 2023-02-18 PROCEDURE — 36415 COLL VENOUS BLD VENIPUNCTURE: CPT

## 2023-02-18 PROCEDURE — 85025 COMPLETE CBC W/AUTO DIFF WBC: CPT

## 2023-02-18 PROCEDURE — 82962 GLUCOSE BLOOD TEST: CPT

## 2023-02-18 PROCEDURE — 99233 SBSQ HOSP IP/OBS HIGH 50: CPT | Performed by: INTERNAL MEDICINE

## 2023-02-18 PROCEDURE — 80048 BASIC METABOLIC PNL TOTAL CA: CPT

## 2023-02-18 PROCEDURE — 94660 CPAP INITIATION&MGMT: CPT

## 2023-02-18 PROCEDURE — 2700000000 HC OXYGEN THERAPY PER DAY

## 2023-02-18 RX ORDER — ATORVASTATIN CALCIUM 40 MG/1
40 TABLET, FILM COATED ORAL NIGHTLY
Status: DISCONTINUED | OUTPATIENT
Start: 2023-02-18 | End: 2023-02-22 | Stop reason: HOSPADM

## 2023-02-18 RX ADMIN — GLIPIZIDE 5 MG: 5 TABLET ORAL at 09:10

## 2023-02-18 RX ADMIN — ASPIRIN 81 MG: 81 TABLET, CHEWABLE ORAL at 09:09

## 2023-02-18 RX ADMIN — TRAMADOL HYDROCHLORIDE 50 MG: 50 TABLET, COATED ORAL at 16:22

## 2023-02-18 RX ADMIN — ATORVASTATIN CALCIUM 40 MG: 40 TABLET, FILM COATED ORAL at 19:54

## 2023-02-18 RX ADMIN — Medication 1 CAPSULE: at 09:10

## 2023-02-18 RX ADMIN — Medication 1 CAPSULE: at 19:54

## 2023-02-18 RX ADMIN — THIAMINE HCL TAB 100 MG 100 MG: 100 TAB at 09:09

## 2023-02-18 RX ADMIN — TRAMADOL HYDROCHLORIDE 50 MG: 50 TABLET, COATED ORAL at 05:47

## 2023-02-18 RX ADMIN — ENOXAPARIN SODIUM 30 MG: 100 INJECTION SUBCUTANEOUS at 09:10

## 2023-02-18 RX ADMIN — TRAMADOL HYDROCHLORIDE 50 MG: 50 TABLET, COATED ORAL at 22:54

## 2023-02-18 RX ADMIN — INSULIN GLARGINE 10 UNITS: 100 INJECTION, SOLUTION SUBCUTANEOUS at 09:10

## 2023-02-18 RX ADMIN — POTASSIUM CHLORIDE 40 MEQ: 1500 TABLET, EXTENDED RELEASE ORAL at 09:09

## 2023-02-18 RX ADMIN — ENOXAPARIN SODIUM 30 MG: 100 INJECTION SUBCUTANEOUS at 21:41

## 2023-02-18 ASSESSMENT — PAIN DESCRIPTION - LOCATION
LOCATION: BACK
LOCATION: GENERALIZED
LOCATION: BACK

## 2023-02-18 ASSESSMENT — PAIN DESCRIPTION - DESCRIPTORS
DESCRIPTORS: SHARP;ACHING;DISCOMFORT
DESCRIPTORS: DISCOMFORT
DESCRIPTORS: CRAMPING;DISCOMFORT;THROBBING

## 2023-02-18 ASSESSMENT — PAIN SCALES - WONG BAKER
WONGBAKER_NUMERICALRESPONSE: 0
WONGBAKER_NUMERICALRESPONSE: 0

## 2023-02-18 ASSESSMENT — PAIN SCALES - GENERAL
PAINLEVEL_OUTOF10: 8
PAINLEVEL_OUTOF10: 10
PAINLEVEL_OUTOF10: 7
PAINLEVEL_OUTOF10: 3
PAINLEVEL_OUTOF10: 0

## 2023-02-18 ASSESSMENT — PAIN DESCRIPTION - ORIENTATION: ORIENTATION: OTHER (COMMENT)

## 2023-02-18 NOTE — PROGRESS NOTES
Subjective: The patient is awake and alert. No problems overnight. Denies chest pain, angina, and dyspnea. Denies abdominal pain. Tolerating diet. No nausea or vomiting. Objective:  Pt oriented, alert, coherent and logical    BP (!) 93/44   Pulse 79   Temp 97.7 °F (36.5 °C) (Oral)   Resp 18   Ht 5' 4\" (1.626 m)   Wt 244 lb 8 oz (110.9 kg)   SpO2 98%   BMI 41.97 kg/m²     Head and Neck: normal atraumatic, no neck masses, normal thyroid, no jvd    Heart:  regular rate and rhythm, S1, S2 normal, no murmur, click, rub or gallop    Lungs:  chest clear, no wheezing, rales, normal symmetric air entry, pulse oximetry on oxygen is 98%, no chest wall deformities or tenderness    Abd: soft, non-tender, without masses or organomegaly    Extrem:  No clubbing, cyanosis, or edema    Neuro:   Cranial nerves: grossly intact  Motor system: bilateral upper extremity normal, bilateral lower extremity normal   Sensory system: bilateral upper extremity, bilateral lower extremity normal  no focal neurological deficit and DTRs Hypoactive    Musculoskeletal:   Spine no back tilt cervical, thoracic, lumbar, no vertebral tenderness, straight leg raising test negative on bilateral.   Upper extremity ROM within normal limits bilateral. Shoulder no tenderness bilateral. Elbow no tenderness bilateral. Wrist no tenderness bilateral. Hand bilateral. Fingers bilateral.   Lower extremity hip bilateral nl. Knee bilateral nl. Ankle bilateral nl. Foot bilateral nl. Toes bilateral nl,BILATERAL LL EDEMA     Skin: No rashes, lesions, or ecchymosis, no ulcers. Psych: No apparent anxiety, agitation, or depression.      CBC:   Lab Results   Component Value Date/Time    WBC 6.9 02/18/2023 05:08 AM    RBC 4.91 02/18/2023 05:08 AM    HGB 9.5 02/18/2023 05:08 AM    HCT 38.0 02/18/2023 05:08 AM    MCV 77.4 02/18/2023 05:08 AM    MCH 19.3 02/18/2023 05:08 AM    MCHC 25.0 02/18/2023 05:08 AM    RDW 19.6 02/18/2023 05:08 AM     02/18/2023 05:08 AM    MPV 9.9 02/18/2023 05:08 AM     CMP:    Lab Results   Component Value Date/Time     02/18/2023 05:08 AM    K 4.7 02/18/2023 05:08 AM    K 3.2 02/15/2023 01:13 PM    CL 99 02/18/2023 05:08 AM    CO2 33 02/18/2023 05:08 AM    BUN 15 02/18/2023 05:08 AM    CREATININE 1.2 02/18/2023 05:08 AM    GFRAA >60 09/14/2022 07:00 AM    LABGLOM 49 02/18/2023 05:08 AM    GLUCOSE 78 02/18/2023 05:08 AM    PROT 6.4 02/15/2023 01:13 PM    LABALBU 3.2 02/15/2023 01:13 PM    CALCIUM 8.6 02/18/2023 05:08 AM    BILITOT 0.4 02/15/2023 01:13 PM    ALKPHOS 101 02/15/2023 01:13 PM    AST 19 02/15/2023 01:13 PM    ALT 12 02/15/2023 01:13 PM          Assessment:    Principal Problem:    Acute respiratory failure with hypoxia (HCC)  Active Problems:    Type 2 diabetes mellitus without complication, without long-term current use of insulin (HCC)    Severe pulmonary hypertension (HCC)    Anemia    Morbid obesity (HCC)    Obstructive sleep apnea    Chronic systolic (congestive) heart failure    Chronic obstructive pulmonary disease, unspecified COPD type ([facility]    Acute on chronic heart failure with preserved ejection fraction (HFpEF) (HCC)    Elevated troponin    RVF (right ventricular failure) ([facility])    VHD (valvular heart disease)  Resolved Problems:    * No resolved hospital problems.  *      Plan:  1) Resp failure with hypoxia patient is on oxygen  2) congestive heart failure patient has been getting diuretics however it is being held because of her blood pressure  3) severe obstructive sleep apnea patient is refusing to put the CPAP on  4) morbid obesity patient is on 1500-calorie diet  5) COPD stable  Emilee Webb MD  3:53 PM  2/18/2023

## 2023-02-18 NOTE — PROGRESS NOTES
[facility] Cardiology Inpatient Progress Note    Patient is a 81 y.o. female of Emilee Webb MD seen in hospital follow up. Chief complaint: CHF    HPI: Some SOB. No CP.      Patient Active Problem List   Diagnosis    Primary hypertension    Type 2 diabetes mellitus without complication, without long-term current use of insulin [facility]    Severe pulmonary hypertension (HCC)    Tobacco abuse    Anemia    Morbid obesity ([facility]    Obstructive sleep apnea    Chronic systolic (congestive) heart failure    Chronic obstructive pulmonary disease, unspecified COPD type ([facility]    Acute respiratory failure with hypoxia ([facility])    Acute on chronic heart failure with preserved ejection fraction (HFpEF) ([facility])    Elevated troponin    RVF (right ventricular failure) ([facility])    VHD (valvular heart disease)       No Known Allergies    Current Facility-Administered Medications   Medication Dose Route Frequency Provider Last Rate Last Admin    metOLazone (ZAROXOLYN) tablet 2.5 mg  2.5 mg Oral Once Marlena Cosme MD        aspirin chewable tablet 81 mg  81 mg Oral Daily Swetha Braga MD   81 mg at 02/18/23 0909    traMADol (ULTRAM) tablet 50 mg  50 mg Oral Q6H PRN Swetha Braga MD   50 mg at 02/18/23 0547    insulin glargine (LANTUS) injection vial 10 Units  10 Units SubCUTAneous BID Swetha Braga MD   10 Units at 02/18/23 0910    furosemide (LASIX) injection 40 mg  40 mg IntraVENous BID Silvana Braga MD   40 mg at 02/16/23 1746    amLODIPine (NORVASC) tablet 5 mg  5 mg Oral Daily Silvana Braga MD   5 mg at 02/16/23 0805    glipiZIDE (GLUCOTROL) tablet 5 mg  5 mg Oral Daily Swetha Braga MD   5 mg at 02/18/23 0910    [Held by provider] hydroCHLOROthiazide (HYDRODIURIL) tablet 25 mg  25 mg Oral Daily Silvana Braga MD   25 mg at 02/16/23 0804    lactobacillus (CULTURELLE) capsule 1 capsule  1 capsule Oral Q12H Swetha Braga MD   1 capsule at 02/18/23 0910    [Held by provider] lisinopril (PRINIVIL;ZESTRIL) tablet 40 mg  40 mg Oral Daily Dread Torre MD   40 mg at 02/16/23 0804    potassium chloride (KLOR-CON M) extended release tablet 40 mEq  40 mEq Oral Daily Swetha Braga MD   40 mEq at 02/18/23 3078    thiamine mononitrate tablet 100 mg  100 mg Oral Daily Swetha Braga MD   100 mg at 02/18/23 0909    ondansetron (ZOFRAN) tablet 4 mg  4 mg Oral Daily PRN Swetha Braga MD        insulin lispro (HUMALOG) injection vial 0-8 Units  0-8 Units SubCUTAneous TID WC Swetha Braga MD   8 Units at 02/16/23 0813    glucose chewable tablet 16 g  4 tablet Oral PRN Swetha Braga MD        dextrose bolus 10% 125 mL  125 mL IntraVENous PRN Swetah Braga MD        Or    dextrose bolus 10% 250 mL  250 mL IntraVENous PRN Swetha Braga MD        glucagon (rDNA) injection 1 mg  1 mg SubCUTAneous PRN Swetha Braga MD        dextrose 10 % infusion   IntraVENous Continuous PRN Swetha Braga MD        enoxaparin (LOVENOX) injection 30 mg  30 mg SubCUTAneous BID Swetha Braga MD   30 mg at 02/18/23 0910       Review of systems:   Heart: as above   Lungs: as above   Eyes: denies changes in vision or discharge. Ears: denies changes in hearing or pain. Nose: denies epistaxis or masses   Throat: denies sore throat or trouble swallowing. Neuro: denies numbness, tingling, tremors. Skin: denies rashes or itching. : denies hematuria, dysuria   GI: denies vomiting, diarrhea   Psych: denies mood changed, anxiety, depression. Physical Exam   BP (!) 93/44   Pulse 79   Temp 97.7 °F (36.5 °C) (Oral)   Resp 18   Ht 5' 4\" (1.626 m)   Wt 244 lb 8 oz (110.9 kg)   SpO2 98%   BMI 41.97 kg/m²   Constitutional: Oriented to person, place, and time. No distress. Well developed. Head: Normocephalic and atraumatic. Neck: Neck supple. No hepatojugular reflux. No JVD present. Carotid bruit is not present. No tracheal deviation present. No thyromegaly present.    Cardiovascular: Normal rate, regular rhythm, normal heart sounds. intact distal pulses. No gallop and no friction rub. No murmur heard. Pulmonary: Breath sounds normal. No respiratory distress. No wheezes. No rales. Chest: Effort normal. No tenderness. Abdominal: Soft. Bowel sounds are normal. No distension or mass. No tenderness, rebound or guarding. Musculoskeletal: . No tenderness. No clubbing or cyanosis. Extremitites: Intact distal pulses. No edema  Neurological: Alert and oriented to person, place, and time. Skin: Skin is warm and dry. No rash noted. Not diaphoretic. No erythema. Psychiatric: Normal mood and affect. Behavior is normal.   Lymphadenopathy: No cervical adenopathy. No groin adenopathy.     CBC:   Lab Results   Component Value Date/Time    WBC 6.9 02/18/2023 05:08 AM    RBC 4.91 02/18/2023 05:08 AM    HGB 9.5 02/18/2023 05:08 AM    HCT 38.0 02/18/2023 05:08 AM    MCV 77.4 02/18/2023 05:08 AM    MCH 19.3 02/18/2023 05:08 AM    MCHC 25.0 02/18/2023 05:08 AM    RDW 19.6 02/18/2023 05:08 AM     02/18/2023 05:08 AM    MPV 9.9 02/18/2023 05:08 AM     BMP:   Lab Results   Component Value Date/Time     02/18/2023 05:08 AM    K 4.7 02/18/2023 05:08 AM    K 3.2 02/15/2023 01:13 PM    CL 99 02/18/2023 05:08 AM    CO2 33 02/18/2023 05:08 AM    BUN 15 02/18/2023 05:08 AM    LABALBU 3.2 02/15/2023 01:13 PM    CREATININE 1.2 02/18/2023 05:08 AM    CALCIUM 8.6 02/18/2023 05:08 AM    GFRAA >60 09/14/2022 07:00 AM    LABGLOM 49 02/18/2023 05:08 AM     Magnesium:    Lab Results   Component Value Date/Time    MG 2.0 02/15/2023 01:13 PM     Cardiac Enzymes:   Lab Results   Component Value Date    CKTOTAL 48 09/09/2022    CKTOTAL 112 09/01/2022    TROPHS 43 (H) 02/15/2023    TROPHS 54 (H) 02/15/2023    TROPHS 67 (H) 09/09/2022      PT/INR:    Lab Results   Component Value Date/Time    PROTIME 12.6 09/09/2022 10:20 AM    INR 1.1 09/09/2022 10:20 AM     TSH:    Lab Results   Component Value Date/Time    TSH 2.460 09/06/2022 11:01 AM     Rhythm Strip: normal sinus rhythm. TTE, Dr. Darryle Sauers 9/2022: Normal LV size and function, EF > 55%. Stage II DD. Dilated RV and reduced RV function. Dilated RA. Mild TR. Pulmonary hypertension with PASP 62 mmHg. ASSESSMENT & PLAN:    Patient Active Problem List   Diagnosis    Primary hypertension    Type 2 diabetes mellitus without complication, without long-term current use of insulin (HCC)    Severe pulmonary hypertension (HCC)    Tobacco abuse    Anemia    Morbid obesity ([facility]    Obstructive sleep apnea    Chronic systolic (congestive) heart failure    Chronic obstructive pulmonary disease, unspecified COPD type ([facility]    Acute respiratory failure with hypoxia ([facility])    Acute on chronic heart failure with preserved ejection fraction (HFpEF) ([facility])    Elevated troponin    RVF (right ventricular failure) ([facility])    VHD (valvular heart disease)     1. Acute on chronic diastolic and right-sided CHF:     IV lasix. ACEI held due to bump in Cr. No BB due to bradycardia issues. Consider CardioMEMS as outpatient. 2. Elevated troponin:    Coronary artery calcifications on chest CTA 9/2022. Medically manage. ASA/statin. No BB due to bradycardia issues. 3. VHD: Mild TR, mod to sev PH with PASP 62 mmHg on TTE 9/2022.    4. COPD/Hypoxic hypercapnic respiratory failure: Supportive care. 5. HTN: Observe. 6. Lipids: Statin. 7. DM: Per primary service. 8. Acute on CKD: Follow labs. 9. Anemia: Follow labs. 10. Tobacco abuse    11. Probable SHAVONNE    Jeffory Snellen, D.O.   Cardiologist  Cardiology, [address] [facility]

## 2023-02-19 LAB
METER GLUCOSE: 100 MG/DL (ref 74–99)
METER GLUCOSE: 106 MG/DL (ref 74–99)
METER GLUCOSE: 74 MG/DL (ref 74–99)
METER GLUCOSE: 91 MG/DL (ref 74–99)

## 2023-02-19 PROCEDURE — 6370000000 HC RX 637 (ALT 250 FOR IP): Performed by: INTERNAL MEDICINE

## 2023-02-19 PROCEDURE — 6360000002 HC RX W HCPCS: Performed by: INTERNAL MEDICINE

## 2023-02-19 PROCEDURE — 2700000000 HC OXYGEN THERAPY PER DAY

## 2023-02-19 PROCEDURE — 94660 CPAP INITIATION&MGMT: CPT

## 2023-02-19 PROCEDURE — 82962 GLUCOSE BLOOD TEST: CPT

## 2023-02-19 PROCEDURE — 2060000000 HC ICU INTERMEDIATE R&B

## 2023-02-19 PROCEDURE — 99232 SBSQ HOSP IP/OBS MODERATE 35: CPT | Performed by: INTERNAL MEDICINE

## 2023-02-19 RX ORDER — FUROSEMIDE 10 MG/ML
60 INJECTION INTRAMUSCULAR; INTRAVENOUS 2 TIMES DAILY
Status: DISCONTINUED | OUTPATIENT
Start: 2023-02-19 | End: 2023-02-21

## 2023-02-19 RX ADMIN — TRAMADOL HYDROCHLORIDE 50 MG: 50 TABLET, COATED ORAL at 21:01

## 2023-02-19 RX ADMIN — GLIPIZIDE 5 MG: 5 TABLET ORAL at 08:57

## 2023-02-19 RX ADMIN — TRAMADOL HYDROCHLORIDE 50 MG: 50 TABLET, COATED ORAL at 14:53

## 2023-02-19 RX ADMIN — POTASSIUM CHLORIDE 40 MEQ: 1500 TABLET, EXTENDED RELEASE ORAL at 08:56

## 2023-02-19 RX ADMIN — Medication 1 CAPSULE: at 20:14

## 2023-02-19 RX ADMIN — ENOXAPARIN SODIUM 30 MG: 100 INJECTION SUBCUTANEOUS at 20:14

## 2023-02-19 RX ADMIN — ATORVASTATIN CALCIUM 40 MG: 40 TABLET, FILM COATED ORAL at 20:14

## 2023-02-19 RX ADMIN — INSULIN GLARGINE 10 UNITS: 100 INJECTION, SOLUTION SUBCUTANEOUS at 08:58

## 2023-02-19 RX ADMIN — THIAMINE HCL TAB 100 MG 100 MG: 100 TAB at 08:57

## 2023-02-19 RX ADMIN — ONDANSETRON HYDROCHLORIDE 4 MG: 4 TABLET, FILM COATED ORAL at 20:14

## 2023-02-19 RX ADMIN — ENOXAPARIN SODIUM 30 MG: 100 INJECTION SUBCUTANEOUS at 08:57

## 2023-02-19 RX ADMIN — FUROSEMIDE 60 MG: 10 INJECTION, SOLUTION INTRAMUSCULAR; INTRAVENOUS at 17:20

## 2023-02-19 RX ADMIN — ASPIRIN 81 MG: 81 TABLET, CHEWABLE ORAL at 08:56

## 2023-02-19 RX ADMIN — Medication 1 CAPSULE: at 08:57

## 2023-02-19 RX ADMIN — TRAMADOL HYDROCHLORIDE 50 MG: 50 TABLET, COATED ORAL at 05:19

## 2023-02-19 ASSESSMENT — PAIN SCALES - GENERAL
PAINLEVEL_OUTOF10: 6
PAINLEVEL_OUTOF10: 5
PAINLEVEL_OUTOF10: 2
PAINLEVEL_OUTOF10: 10
PAINLEVEL_OUTOF10: 9
PAINLEVEL_OUTOF10: 2

## 2023-02-19 ASSESSMENT — PAIN DESCRIPTION - ONSET: ONSET: ON-GOING

## 2023-02-19 ASSESSMENT — PAIN DESCRIPTION - DESCRIPTORS
DESCRIPTORS: ACHING
DESCRIPTORS: ACHING;DISCOMFORT;HEAVINESS
DESCRIPTORS: ACHING;DISCOMFORT
DESCRIPTORS: ACHING

## 2023-02-19 ASSESSMENT — PAIN DESCRIPTION - ORIENTATION
ORIENTATION: LEFT;RIGHT
ORIENTATION: LOWER
ORIENTATION: LEFT;RIGHT

## 2023-02-19 ASSESSMENT — PAIN DESCRIPTION - LOCATION
LOCATION: BACK
LOCATION: GENERALIZED
LOCATION: LEG
LOCATION: LEG

## 2023-02-19 ASSESSMENT — PAIN DESCRIPTION - PAIN TYPE: TYPE: CHRONIC PAIN

## 2023-02-19 ASSESSMENT — PAIN - FUNCTIONAL ASSESSMENT
PAIN_FUNCTIONAL_ASSESSMENT: PREVENTS OR INTERFERES SOME ACTIVE ACTIVITIES AND ADLS
PAIN_FUNCTIONAL_ASSESSMENT: PREVENTS OR INTERFERES WITH MANY ACTIVE NOT PASSIVE ACTIVITIES

## 2023-02-19 ASSESSMENT — PAIN DESCRIPTION - FREQUENCY: FREQUENCY: CONTINUOUS

## 2023-02-19 ASSESSMENT — PAIN SCALES - WONG BAKER
WONGBAKER_NUMERICALRESPONSE: 0
WONGBAKER_NUMERICALRESPONSE: 0

## 2023-02-19 NOTE — PROGRESS NOTES
[facility] Cardiology Inpatient Progress Note    Patient is a 81 y.o. female of Sheila Ramirez MD seen in hospital follow up. Chief complaint: CHF    HPI: Some SOB. No CP.      Patient Active Problem List   Diagnosis    Primary hypertension    Type 2 diabetes mellitus without complication, without long-term current use of insulin [facility]    Severe pulmonary hypertension (HCC)    Tobacco abuse    Anemia    Morbid obesity ([facility]    Obstructive sleep apnea    Chronic systolic (congestive) heart failure    Chronic obstructive pulmonary disease, unspecified COPD type ([facility]    Acute respiratory failure with hypoxia (HCC)    Acute on chronic heart failure with preserved ejection fraction (HFpEF) ([facility])    Elevated troponin    RVF (right ventricular failure) ([facility])    VHD (valvular heart disease)       No Known Allergies    Current Facility-Administered Medications   Medication Dose Route Frequency Provider Last Rate Last Admin    atorvastatin (LIPITOR) tablet 40 mg  40 mg Oral Nightly Marcheta Croon, DO   40 mg at 02/18/23 1954    metOLazone (ZAROXOLYN) tablet 2.5 mg  2.5 mg Oral Once Seble Blackwell MD        aspirin chewable tablet 81 mg  81 mg Oral Daily Swetha Braga MD   81 mg at 02/19/23 0856    traMADol (ULTRAM) tablet 50 mg  50 mg Oral Q6H PRN Swetha Braga MD   50 mg at 02/19/23 0519    insulin glargine (LANTUS) injection vial 10 Units  10 Units SubCUTAneous BID Swetha Braga MD   10 Units at 02/19/23 0858    furosemide (LASIX) injection 40 mg  40 mg IntraVENous BID Silvana Braga MD   40 mg at 02/16/23 1746    amLODIPine (NORVASC) tablet 5 mg  5 mg Oral Daily Silvana Braga MD   5 mg at 02/16/23 0805    glipiZIDE (GLUCOTROL) tablet 5 mg  5 mg Oral Daily Swetha Braga MD   5 mg at 02/19/23 0857    lactobacillus (CULTURELLE) capsule 1 capsule  1 capsule Oral Q12H Swetha Braga MD   1 capsule at 02/19/23 0857    [Held by provider] lisinopril (PRINIVIL;ZESTRIL) tablet 40 mg  40 mg Oral Daily Silvana BARRETT MD Omi   40 mg at 02/16/23 0804    potassium chloride (KLOR-CON M) extended release tablet 40 mEq  40 mEq Oral Daily Swetha Braga MD   40 mEq at 02/19/23 0856    thiamine mononitrate tablet 100 mg  100 mg Oral Daily Swetha Braga MD   100 mg at 02/19/23 0857    ondansetron (ZOFRAN) tablet 4 mg  4 mg Oral Daily PRN Swetha Braga MD        insulin lispro (HUMALOG) injection vial 0-8 Units  0-8 Units SubCUTAneous TID WC Swetha Braga MD   8 Units at 02/16/23 0813    glucose chewable tablet 16 g  4 tablet Oral PRN Swetha Braga MD        dextrose bolus 10% 125 mL  125 mL IntraVENous PRN Swetha Braga MD        Or    dextrose bolus 10% 250 mL  250 mL IntraVENous PRN Swetha Braga MD        glucagon (rDNA) injection 1 mg  1 mg SubCUTAneous PRN Swetha Braga MD        dextrose 10 % infusion   IntraVENous Continuous PRN Swetha Braga MD        enoxaparin Sodium (LOVENOX) injection 30 mg  30 mg SubCUTAneous BID Swetha Braga MD   30 mg at 02/19/23 0857       Review of systems:   Heart: as above   Lungs: as above   Eyes: denies changes in vision or discharge. Ears: denies changes in hearing or pain. Nose: denies epistaxis or masses   Throat: denies sore throat or trouble swallowing. Neuro: denies numbness, tingling, tremors. Skin: denies rashes or itching. : denies hematuria, dysuria   GI: denies vomiting, diarrhea   Psych: denies mood changed, anxiety, depression. Physical Exam   BP (!) 102/50   Pulse 82   Temp 98.9 °F (37.2 °C) (Oral)   Resp 16   Ht 5' 4\" (1.626 m)   Wt 238 lb 3.2 oz (108 kg)   SpO2 97%   BMI 40.89 kg/m²   Constitutional: Oriented to person, place, and time. No distress. Well developed. Head: Normocephalic and atraumatic. Neck: Neck supple. No hepatojugular reflux. No JVD present. Carotid bruit is not present. No tracheal deviation present. No thyromegaly present. Cardiovascular: Normal rate, regular rhythm, normal heart sounds.   intact distal pulses. No gallop and no friction rub. No murmur heard. Pulmonary: Breath sounds normal. No respiratory distress. No wheezes. No rales. Chest: Effort normal. No tenderness. Abdominal: Soft. Bowel sounds are normal. No distension or mass. No tenderness, rebound or guarding. Musculoskeletal: . No tenderness. No clubbing or cyanosis. Extremitites: Intact distal pulses. No edema  Neurological: Alert and oriented to person, place, and time. Skin: Skin is warm and dry. No rash noted. Not diaphoretic. No erythema. Psychiatric: Normal mood and affect. Behavior is normal.   Lymphadenopathy: No cervical adenopathy. No groin adenopathy.     CBC:   Lab Results   Component Value Date/Time    WBC 6.9 02/18/2023 05:08 AM    RBC 4.91 02/18/2023 05:08 AM    HGB 9.5 02/18/2023 05:08 AM    HCT 38.0 02/18/2023 05:08 AM    MCV 77.4 02/18/2023 05:08 AM    MCH 19.3 02/18/2023 05:08 AM    MCHC 25.0 02/18/2023 05:08 AM    RDW 19.6 02/18/2023 05:08 AM     02/18/2023 05:08 AM    MPV 9.9 02/18/2023 05:08 AM     BMP:   Lab Results   Component Value Date/Time     02/18/2023 05:08 AM    K 4.7 02/18/2023 05:08 AM    K 3.2 02/15/2023 01:13 PM    CL 99 02/18/2023 05:08 AM    CO2 33 02/18/2023 05:08 AM    BUN 15 02/18/2023 05:08 AM    LABALBU 3.2 02/15/2023 01:13 PM    CREATININE 1.2 02/18/2023 05:08 AM    CALCIUM 8.6 02/18/2023 05:08 AM    GFRAA >60 09/14/2022 07:00 AM    LABGLOM 49 02/18/2023 05:08 AM     Magnesium:    Lab Results   Component Value Date/Time    MG 2.0 02/15/2023 01:13 PM     Cardiac Enzymes:   Lab Results   Component Value Date    CKTOTAL 48 09/09/2022    CKTOTAL 112 09/01/2022    TROPHS 43 (H) 02/15/2023    TROPHS 54 (H) 02/15/2023    TROPHS 67 (H) 09/09/2022      PT/INR:    Lab Results   Component Value Date/Time    PROTIME 12.6 09/09/2022 10:20 AM    INR 1.1 09/09/2022 10:20 AM     TSH:    Lab Results   Component Value Date/Time    TSH 2.460 09/06/2022 11:01 AM     Rhythm Strip: normal sinus rhythm. TTE, Dr. Thai Servin 9/2022: Normal LV size and function, EF > 55%. Stage II DD. Dilated RV and reduced RV function. Dilated RA. Mild TR. Pulmonary hypertension with PASP 62 mmHg. ASSESSMENT & PLAN:    Patient Active Problem List   Diagnosis    Primary hypertension    Type 2 diabetes mellitus without complication, without long-term current use of insulin (HCC)    Severe pulmonary hypertension (HCC)    Tobacco abuse    Anemia    Morbid obesity ([facility]    Obstructive sleep apnea    Chronic systolic (congestive) heart failure    Chronic obstructive pulmonary disease, unspecified COPD type ([facility]    Acute respiratory failure with hypoxia (HCC)    Acute on chronic heart failure with preserved ejection fraction (HFpEF) ([facility])    Elevated troponin    RVF (right ventricular failure) ([facility])    VHD (valvular heart disease)     1. Acute on chronic diastolic and right-sided CHF:     IV lasix. ACEI held due to bump in Cr. No BB due to bradycardia issues. Consider CardioMEMS as outpatient. 2. Elevated troponin:    Coronary artery calcifications on chest CTA 9/2022. Medically manage. ASA/statin. No BB due to bradycardia issues. 3. VHD: Mild TR, mod to sev PH with PASP 62 mmHg on TTE 9/2022.    4. COPD/Hypoxic hypercapnic respiratory failure: Supportive care. 5. HTN: Observe. 6. Lipids: Statin. 7. DM: Per primary service. 8. Acute on CKD: Follow labs. 9. Anemia: Follow labs. 10. Tobacco abuse    11. Probable SHAVONNE    Liz Woodard D.O.   Cardiologist  Cardiology, [address] [place] [address]

## 2023-02-19 NOTE — CARE COORDINATION
2-19-Cm note: Dr. Jn Johnson said Pt now want to go to [facility], ordered PT/OT, No referal called as no PT/OT on case , cm will follow for recommendations and make referral if appropriate.  Electronically signed by Ari Lancaster RN on 2/19/2023 at 1:24 PM

## 2023-02-19 NOTE — PLAN OF CARE
Problem: Discharge Planning  Goal: Discharge to home or other facility with appropriate resources  Outcome: Progressing  Flowsheets (Taken 2/19/2023 0900 by Carlito Delgado, RN)  Discharge to home or other facility with appropriate resources: Identify barriers to discharge with patient and caregiver     Problem: Pain  Goal: Verbalizes/displays adequate comfort level or baseline comfort level  Outcome: Progressing     Problem: Chronic Conditions and Co-morbidities  Goal: Patient's chronic conditions and co-morbidity symptoms are monitored and maintained or improved  Outcome: Progressing  Flowsheets (Taken 2/19/2023 0900 by Carlito Delgado, RN)  Care Plan - Patient's Chronic Conditions and Co-Morbidity Symptoms are Monitored and Maintained or Improved: Monitor and assess patient's chronic conditions and comorbid symptoms for stability, deterioration, or improvement     Problem: Safety - Adult  Goal: Free from fall injury  Outcome: Progressing

## 2023-02-19 NOTE — PROGRESS NOTES
Subjective: The patient is awake sleepy lying in bed Dr. Hope Rios the cardiologist increased her diuretics because of the I's and O's are not sufficient to improve her CHF patient leg swelling is still present denies chest pain, angina, and dyspnea. Denies abdominal pain. Tolerating diet. No nausea or vomiting. Objective:    BP (!) 108/50   Pulse 91   Temp 98.6 °F (37 °C) (Oral)   Resp 14   Ht 5' 4\" (1.626 m)   Wt 238 lb 3.2 oz (108 kg)   SpO2 100%   BMI 40.89 kg/m²   Head and Neck: normal atraumatic, no neck masses, normal thyroid, no jvd  Heart:  regular rate and rhythm, S1, S2 normal, no murmur, click, rub or gallop  Lungs:  chest clear, no wheezing, rales, normal symmetric air entry, no chest wall deformities or tenderness  Abd: soft, non-tender, without masses or organomegaly  Extrem:  No clubbing, cyanosis, 2+ edema both lower extremity  Neuro:Normal, no focal neurological deficit        Meter Glucose    [phone] 97 85 80 124 122 98 111 91 100      Assessment:    Principal Problem:    Acute respiratory failure with hypoxia (HCC)  Active Problems:    Type 2 diabetes mellitus without complication, without long-term current use of insulin (HCC)    Severe pulmonary hypertension (HCC)    Anemia    Morbid obesity (HCC)    Obstructive sleep apnea    Chronic systolic (congestive) heart failure    Chronic obstructive pulmonary disease, unspecified COPD type (HCC)    Acute on chronic heart failure with preserved ejection fraction (HFpEF) (HCC)    Elevated troponin    RVF (right ventricular failure) (HCC)    VHD (valvular heart disease)  Resolved Problems:    * No resolved hospital problems. *        Plan:  Chronic right-sided heart failure.   Diuretic dose increased monitor I's and O's and monitor renal function    DM type II blood sugar well controlled check hemoglobin A1c    Anemia check iron TIBC    Chronic kidney disease stage III keep monitoring BMP    Decreased activity PT and OT discussed with the patient she wants to go to [facility] for rehab    Pulmonary hypertension multifactorial secondary to COPD and obstructive sleep apnea and obesity patient refusing to use CPAP because of claustrophobia we will check blood gases for hypercapnia  She might need sleep study as outpatient and if showed that she has severe obstructive sleep apnea will consider ENT evaluation for alternative for CPAP    Arlen Mueller MD  2:46 PM  2/19/2023

## 2023-02-19 NOTE — PLAN OF CARE
Problem: Discharge Planning  Goal: Discharge to home or other facility with appropriate resources  Outcome: Progressing  Flowsheets (Taken 2/18/2023 0845 by Adriana Olivier, RN)  Discharge to home or other facility with appropriate resources: Identify barriers to discharge with patient and caregiver     Problem: Pain  Goal: Verbalizes/displays adequate comfort level or baseline comfort level  Outcome: Progressing     Problem: Chronic Conditions and Co-morbidities  Goal: Patient's chronic conditions and co-morbidity symptoms are monitored and maintained or improved  Outcome: Progressing  Flowsheets (Taken 2/18/2023 0845 by Adriana Olivier, RN)  Care Plan - Patient's Chronic Conditions and Co-Morbidity Symptoms are Monitored and Maintained or Improved: Monitor and assess patient's chronic conditions and comorbid symptoms for stability, deterioration, or improvement     Problem: Safety - Adult  Goal: Free from fall injury  Outcome: Progressing

## 2023-02-19 NOTE — PROGRESS NOTES
[facility] Cardiology Inpatient Progress Note    Patient is a 81 y.o. female of Dionte Morgan MD seen in hospital follow up. Chief complaint: CHF    HPI: Some SOB. No CP.      Patient Active Problem List   Diagnosis    Primary hypertension    Type 2 diabetes mellitus without complication, without long-term current use of insulin ([facility]    Severe pulmonary hypertension (HCC)    Tobacco abuse    Anemia    Morbid obesity ([facility]    Obstructive sleep apnea    Chronic systolic (congestive) heart failure    Chronic obstructive pulmonary disease, unspecified COPD type ([facility]    Acute respiratory failure with hypoxia ([facility])    Acute on chronic heart failure with preserved ejection fraction (HFpEF) ([facility])    Elevated troponin    RVF (right ventricular failure) ([facility])    VHD (valvular heart disease)       No Known Allergies    Current Facility-Administered Medications   Medication Dose Route Frequency Provider Last Rate Last Admin    furosemide (LASIX) injection 60 mg  60 mg IntraVENous BID Steven Lizbeth, DO        atorvastatin (LIPITOR) tablet 40 mg  40 mg Oral Nightly Steven DO Lizbeth   40 mg at 02/18/23 1954    metOLazone (ZAROXOLYN) tablet 2.5 mg  2.5 mg Oral Once Kirstin Jim MD        aspirin chewable tablet 81 mg  81 mg Oral Daily Swetha Braga MD   81 mg at 02/19/23 0856    traMADol (ULTRAM) tablet 50 mg  50 mg Oral Q6H PRN Swetha Braga MD   50 mg at 02/19/23 0519    insulin glargine (LANTUS) injection vial 10 Units  10 Units SubCUTAneous BID Swetha Braga MD   10 Units at 02/19/23 0858    glipiZIDE (GLUCOTROL) tablet 5 mg  5 mg Oral Daily Swetha Braga MD   5 mg at 02/19/23 0857    lactobacillus (CULTURELLE) capsule 1 capsule  1 capsule Oral Q12H Swetha Braga MD   1 capsule at 02/19/23 0857    [Held by provider] lisinopril (PRINIVIL;ZESTRIL) tablet 40 mg  40 mg Oral Daily Silvana Braga MD   40 mg at 02/16/23 0804    potassium chloride (KLOR-CON M) extended release tablet 40 mEq  40 mEq Oral Daily Swetha Braga MD   40 mEq at 02/19/23 0856    thiamine mononitrate tablet 100 mg  100 mg Oral Daily Swetha Braga MD   100 mg at 02/19/23 0857    ondansetron (ZOFRAN) tablet 4 mg  4 mg Oral Daily PRN Swetha Braga MD        insulin lispro (HUMALOG) injection vial 0-8 Units  0-8 Units SubCUTAneous TID WC Swetha Braga MD   8 Units at 02/16/23 0813    glucose chewable tablet 16 g  4 tablet Oral PRN Swetha Braga MD        dextrose bolus 10% 125 mL  125 mL IntraVENous PRN Swetha Braga MD        Or    dextrose bolus 10% 250 mL  250 mL IntraVENous PRN Swetha Braga MD        glucagon (rDNA) injection 1 mg  1 mg SubCUTAneous PRN Swetha Braga MD        dextrose 10 % infusion   IntraVENous Continuous PRN Swetha Braga MD        enoxaparin Sodium (LOVENOX) injection 30 mg  30 mg SubCUTAneous BID Swetha Braga MD   30 mg at 02/19/23 0857       Review of systems:   Heart: as above   Lungs: as above   Eyes: denies changes in vision or discharge.   Ears: denies changes in hearing or pain.   Nose: denies epistaxis or masses   Throat: denies sore throat or trouble swallowing.   Neuro: denies numbness, tingling, tremors.   Skin: denies rashes or itching.   : denies hematuria, dysuria   GI: denies vomiting, diarrhea   Psych: denies mood changed, anxiety, depression.    Physical Exam   BP (!) 102/50   Pulse 82   Temp 98.9 °F (37.2 °C) (Oral)   Resp 16   Ht 5' 4\" (1.626 m)   Wt 238 lb 3.2 oz (108 kg)   SpO2 97%   BMI 40.89 kg/m²   Constitutional: Oriented to person, place, and time. No distress.  Well developed.  Head: Normocephalic and atraumatic.    Neck: Neck supple. No hepatojugular reflux. No JVD present. Carotid bruit is not present. No tracheal deviation present. No thyromegaly present.   Cardiovascular: Normal rate, regular rhythm, normal heart sounds.  intact distal pulses.  No gallop and no friction rub.  No murmur heard.  Pulmonary: Breath sounds normal. No respiratory distress. No  wheezes. No rales.   Chest: Effort normal. No tenderness.  Abdominal: Soft. Bowel sounds are normal. No distension or mass. No tenderness, rebound or guarding.   Musculoskeletal: . No tenderness. No clubbing or cyanosis.  Extremitites: Intact distal pulses. No edema  Neurological: Alert and oriented to person, place, and time.   Skin: Skin is warm and dry. No rash noted. Not diaphoretic. No erythema.   Psychiatric: Normal mood and affect. Behavior is normal.   Lymphadenopathy: No cervical adenopathy. No groin adenopathy.    CBC:   Lab Results   Component Value Date/Time    WBC 6.9 02/18/2023 05:08 AM    RBC 4.91 02/18/2023 05:08 AM    HGB 9.5 02/18/2023 05:08 AM    HCT 38.0 02/18/2023 05:08 AM    MCV 77.4 02/18/2023 05:08 AM    MCH 19.3 02/18/2023 05:08 AM    MCHC 25.0 02/18/2023 05:08 AM    RDW 19.6 02/18/2023 05:08 AM     02/18/2023 05:08 AM    MPV 9.9 02/18/2023 05:08 AM     BMP:   Lab Results   Component Value Date/Time     02/18/2023 05:08 AM    K 4.7 02/18/2023 05:08 AM    K 3.2 02/15/2023 01:13 PM    CL 99 02/18/2023 05:08 AM    CO2 33 02/18/2023 05:08 AM    BUN 15 02/18/2023 05:08 AM    LABALBU 3.2 02/15/2023 01:13 PM    CREATININE 1.2 02/18/2023 05:08 AM    CALCIUM 8.6 02/18/2023 05:08 AM    GFRAA >60 09/14/2022 07:00 AM    LABGLOM 49 02/18/2023 05:08 AM     Magnesium:    Lab Results   Component Value Date/Time    MG 2.0 02/15/2023 01:13 PM     Cardiac Enzymes:   Lab Results   Component Value Date    CKTOTAL 48 09/09/2022    CKTOTAL 112 09/01/2022    TROPHS 43 (H) 02/15/2023    TROPHS 54 (H) 02/15/2023    TROPHS 67 (H) 09/09/2022      PT/INR:    Lab Results   Component Value Date/Time    PROTIME 12.6 09/09/2022 10:20 AM    INR 1.1 09/09/2022 10:20 AM     TSH:    Lab Results   Component Value Date/Time    TSH 2.460 09/06/2022 11:01 AM     Rhythm Strip: normal sinus rhythm.    TTE, Dr. Espitia 9/2022: Normal LV size and function, EF > 55%.  Stage II DD.  Dilated RV and reduced RV function.   Dilated RA. Mild TR. Pulmonary hypertension with PASP 62 mmHg. ASSESSMENT & PLAN:    Patient Active Problem List   Diagnosis    Primary hypertension    Type 2 diabetes mellitus without complication, without long-term current use of insulin (HCC)    Severe pulmonary hypertension (HCC)    Tobacco abuse    Anemia    Morbid obesity ([facility]    Obstructive sleep apnea    Chronic systolic (congestive) heart failure    Chronic obstructive pulmonary disease, unspecified COPD type ([facility]    Acute respiratory failure with hypoxia ([facility])    Acute on chronic heart failure with preserved ejection fraction (HFpEF) ([facility])    Elevated troponin    RVF (right ventricular failure) ([facility])    VHD (valvular heart disease)     1. Acute on chronic diastolic and right-sided CHF:     Increase IV lasix. ACEI held due to bump in Cr. Stop norvasc due to edema. No BB due to bradycardia issues. Consider CardioMEMS as outpatient. 2. Elevated troponin:    Coronary artery calcifications on chest CTA 9/2022. Medically manage. ASA/statin. No BB due to bradycardia issues. 3. VHD: Mild TR, mod to sev PH with PASP 62 mmHg on TTE 9/2022.    4. COPD/Hypoxic hypercapnic respiratory failure: Supportive care. 5. HTN: Observe. 6. Lipids: Statin. 7. DM: Per primary service. 8. Acute on CKD: Follow labs. 9. Anemia: Follow labs. 10. Tobacco abuse    11. Probable SHAVONNE    Dale Tamayo D.O.   Cardiologist  Cardiology, [facility]

## 2023-02-20 LAB
ALBUMIN SERPL-MCNC: 3 G/DL (ref 3.5–5.2)
ALP BLD-CCNC: 88 U/L (ref 35–104)
ALT SERPL-CCNC: 9 U/L (ref 0–32)
ANION GAP SERPL CALCULATED.3IONS-SCNC: 6 MMOL/L (ref 7–16)
ANISOCYTOSIS: ABNORMAL
AST SERPL-CCNC: 16 U/L (ref 0–31)
B.E.: 12.7 MMOL/L (ref -3–3)
BASOPHILS ABSOLUTE: 0.06 E9/L (ref 0–0.2)
BASOPHILS RELATIVE PERCENT: 0.8 % (ref 0–2)
BILIRUB SERPL-MCNC: 0.4 MG/DL (ref 0–1.2)
BUN BLDV-MCNC: 9 MG/DL (ref 6–23)
CALCIUM SERPL-MCNC: 8.7 MG/DL (ref 8.6–10.2)
CHLORIDE BLD-SCNC: 98 MMOL/L (ref 98–107)
CO2: 37 MMOL/L (ref 22–29)
CREAT SERPL-MCNC: 0.9 MG/DL (ref 0.5–1)
DELIVERY SYSTEMS: ABNORMAL
DEVICE: ABNORMAL
EOSINOPHILS ABSOLUTE: 0.6 E9/L (ref 0.05–0.5)
EOSINOPHILS RELATIVE PERCENT: 8.2 % (ref 0–6)
GFR SERPL CREATININE-BSD FRML MDRD: >60 ML/MIN/1.73
GLUCOSE BLD-MCNC: 66 MG/DL (ref 74–99)
HCO3: 40.3 MMOL/L (ref 22–26)
HCT VFR BLD CALC: 33.1 % (ref 34–48)
HEMOGLOBIN: 9.2 G/DL (ref 11.5–15.5)
HYPOCHROMIA: ABNORMAL
IMMATURE GRANULOCYTES #: 0.04 E9/L
IMMATURE GRANULOCYTES %: 0.5 % (ref 0–5)
LYMPHOCYTES ABSOLUTE: 1.28 E9/L (ref 1.5–4)
LYMPHOCYTES RELATIVE PERCENT: 17.5 % (ref 20–42)
MCH RBC QN AUTO: 20.4 PG (ref 26–35)
MCHC RBC AUTO-ENTMCNC: 27.8 % (ref 32–34.5)
MCV RBC AUTO: 73.6 FL (ref 80–99.9)
METER GLUCOSE: 173 MG/DL (ref 74–99)
METER GLUCOSE: 74 MG/DL (ref 74–99)
METER GLUCOSE: 77 MG/DL (ref 74–99)
METER GLUCOSE: 93 MG/DL (ref 74–99)
MONOCYTES ABSOLUTE: 0.72 E9/L (ref 0.1–0.95)
MONOCYTES RELATIVE PERCENT: 9.9 % (ref 2–12)
NEUTROPHILS ABSOLUTE: 4.6 E9/L (ref 1.8–7.3)
NEUTROPHILS RELATIVE PERCENT: 63.1 % (ref 43–80)
O2 SATURATION: 96.4 % (ref 92–98.5)
OPERATOR ID: 2323
PATIENT TEMP: 37
PCO2 (TEMP CORRECTED): 66.1 MMHG (ref 35–45)
PDW BLD-RTO: 19.5 FL (ref 11.5–15)
PH (TEMPERATURE CORRECTED): 7.39 (ref 7.35–7.45)
PLATELET # BLD: 437 E9/L (ref 130–450)
PMV BLD AUTO: 9.4 FL (ref 7–12)
PO2 (TEMP CORRECTED): 89.3 MMHG (ref 60–80)
POC SOURCE: ABNORMAL
POIKILOCYTES: ABNORMAL
POLYCHROMASIA: ABNORMAL
POTASSIUM SERPL-SCNC: 4.6 MMOL/L (ref 3.5–5)
POTASSIUM SERPL-SCNC: 5.5 MMOL/L (ref 3.5–5)
RBC # BLD: 4.5 E12/L (ref 3.5–5.5)
SODIUM BLD-SCNC: 141 MMOL/L (ref 132–146)
TARGET CELLS: ABNORMAL
TOTAL PROTEIN: 6.1 G/DL (ref 6.4–8.3)
WBC # BLD: 7.3 E9/L (ref 4.5–11.5)

## 2023-02-20 PROCEDURE — 6360000002 HC RX W HCPCS: Performed by: INTERNAL MEDICINE

## 2023-02-20 PROCEDURE — 94660 CPAP INITIATION&MGMT: CPT

## 2023-02-20 PROCEDURE — 2060000000 HC ICU INTERMEDIATE R&B

## 2023-02-20 PROCEDURE — 2700000000 HC OXYGEN THERAPY PER DAY

## 2023-02-20 PROCEDURE — 36415 COLL VENOUS BLD VENIPUNCTURE: CPT

## 2023-02-20 PROCEDURE — 6370000000 HC RX 637 (ALT 250 FOR IP): Performed by: INTERNAL MEDICINE

## 2023-02-20 PROCEDURE — 80053 COMPREHEN METABOLIC PANEL: CPT

## 2023-02-20 PROCEDURE — 97530 THERAPEUTIC ACTIVITIES: CPT | Performed by: PHYSICAL THERAPIST

## 2023-02-20 PROCEDURE — 82803 BLOOD GASES ANY COMBINATION: CPT

## 2023-02-20 PROCEDURE — 84132 ASSAY OF SERUM POTASSIUM: CPT

## 2023-02-20 PROCEDURE — 82962 GLUCOSE BLOOD TEST: CPT

## 2023-02-20 PROCEDURE — 85025 COMPLETE CBC W/AUTO DIFF WBC: CPT

## 2023-02-20 PROCEDURE — 36600 WITHDRAWAL OF ARTERIAL BLOOD: CPT

## 2023-02-20 RX ADMIN — TRAMADOL HYDROCHLORIDE 50 MG: 50 TABLET, COATED ORAL at 05:41

## 2023-02-20 RX ADMIN — ENOXAPARIN SODIUM 30 MG: 100 INJECTION SUBCUTANEOUS at 22:49

## 2023-02-20 RX ADMIN — FUROSEMIDE 60 MG: 10 INJECTION, SOLUTION INTRAMUSCULAR; INTRAVENOUS at 17:16

## 2023-02-20 RX ADMIN — Medication 1 CAPSULE: at 08:00

## 2023-02-20 RX ADMIN — ASPIRIN 81 MG: 81 TABLET, CHEWABLE ORAL at 08:00

## 2023-02-20 RX ADMIN — FUROSEMIDE 60 MG: 10 INJECTION, SOLUTION INTRAMUSCULAR; INTRAVENOUS at 08:00

## 2023-02-20 RX ADMIN — Medication 1 CAPSULE: at 20:22

## 2023-02-20 RX ADMIN — GLIPIZIDE 5 MG: 5 TABLET ORAL at 08:00

## 2023-02-20 RX ADMIN — TRAMADOL HYDROCHLORIDE 50 MG: 50 TABLET, COATED ORAL at 14:40

## 2023-02-20 RX ADMIN — ENOXAPARIN SODIUM 30 MG: 100 INJECTION SUBCUTANEOUS at 08:01

## 2023-02-20 RX ADMIN — THIAMINE HCL TAB 100 MG 100 MG: 100 TAB at 08:00

## 2023-02-20 RX ADMIN — POTASSIUM CHLORIDE 40 MEQ: 1500 TABLET, EXTENDED RELEASE ORAL at 08:00

## 2023-02-20 RX ADMIN — ATORVASTATIN CALCIUM 40 MG: 40 TABLET, FILM COATED ORAL at 20:22

## 2023-02-20 ASSESSMENT — PAIN SCALES - GENERAL
PAINLEVEL_OUTOF10: 5
PAINLEVEL_OUTOF10: 0

## 2023-02-20 ASSESSMENT — PAIN DESCRIPTION - LOCATION
LOCATION: LEG
LOCATION: LEG

## 2023-02-20 ASSESSMENT — PAIN DESCRIPTION - DESCRIPTORS: DESCRIPTORS: ACHING

## 2023-02-20 ASSESSMENT — PAIN DESCRIPTION - ORIENTATION: ORIENTATION: LEFT;RIGHT

## 2023-02-20 ASSESSMENT — PAIN SCALES - WONG BAKER: WONGBAKER_NUMERICALRESPONSE: 0

## 2023-02-20 NOTE — PROGRESS NOTES
Physical Therapy Treatment Note/Plan of Care    Room #:  9202/5756-20  Patient Name: Therese Parry  YOB: 1950  MRN: [id number]    Date of Service: 2/20/2023     Tentative placement recommendation: Acute Rehab  Equipment recommendation: Bedside commode  and possibly a cane, trial  next session as patient does not want to use wheeled walker      Evaluating Physical Therapist: Edith Melchor #[id number]      Specific Provider Orders/Date/Referring Provider :   02/15/23 2115    PT eval and treat  Start:  02/15/23 2115,   End:  02/15/23 2115,   ONE TIME,   Standing Count:  1 Occurrences,   Patrick Patel MD     Admitting Diagnosis:   COPD exacerbation ([address] [J44.1]  Acute respiratory failure with hypoxia ([address] [J96.01]      Surgery: none  Visit Diagnoses         Codes    COPD exacerbation ([address]     J44.1            Patient Active Problem List   Diagnosis    Primary hypertension    Type 2 diabetes mellitus without complication, without long-term current use of insulin ([address]    Severe pulmonary hypertension ([address]    Tobacco abuse    Anemia    Morbid obesity ([address]    Obstructive sleep apnea    Chronic systolic (congestive) heart failure    Chronic obstructive pulmonary disease, unspecified COPD type ([address]    Acute respiratory failure with hypoxia ([address]    Acute on chronic heart failure with preserved ejection fraction (HFpEF) ([facility])    Elevated troponin    RVF (right ventricular failure) ([address]    VHD (valvular heart disease)        ASSESSMENT of Current Deficits Patient exhibits decreased strength, balance, and endurance impairing functional mobility, transfers, gait distance, and tolerance to activity. Encouragement provided for participation, agreeable to walking to bathroom x 2 reps. No buckling during session,  slight shortness of breath and fatigue noted.  The patient will benefit from continued skilled therapy to increase strength and improve balance for safe functional mobility, to decrease risk of falls, and to meet goals at discharge. PHYSICAL THERAPY  PLAN OF CARE       Physical therapy plan of care is established based on physician order,  patient diagnosis and clinical assessment    Current Treatment Recommendations:    -Bed Mobility: Lower extremity exercises , Upper extremity exercises , and Trunk control activities   -Sitting Balance: Incorporate reaching activities to activate trunk muscles , Hands on support to maintain midline , and Facilitate active trunk muscle engagement   -Standing Balance: Perform strengthening exercises in standing to promote motor control with or without upper extremity support , Instruct patient on adequate base of support to maintain balance, and Challenge balance utilizing reaching  activities beyond center of gravity    -Transfers: Provide instruction on proper hand and foot position for adequate transfer of weight onto lower extremities and use of gait device if needed and Cues for hand placement, technique and safety. Provide stabilization to prevent fall   -Gait: Gait training and Standing activities to improve: base of support, weight shift, weight bearing    -Endurance: Utilize Supervised activities to increase level of endurance to allow for safe functional mobility including transfers and gait   -Stairs: Stair training with instruction on proper technique and hand placement on rail    PT long term treatment goals are located in below grid    Patient and or family understand(s) diagnosis, prognosis, and plan of care. Frequency of treatments: Patient will be seen  daily.          Prior Level of Function: Patient ambulated independently   Rehab Potential: good  for baseline    Past medical history:   Past Medical History:   Diagnosis Date    Chronic back pain 2018    10/10 on the pain scale    COPD (chronic obstructive pulmonary disease) (HCC)     GERD (gastroesophageal reflux disease)     Headache     Hyperlipidemia     Hypertension Obesity     Scoliosis     Type 2 diabetes mellitus without complication [facility])      Past Surgical History:   Procedure Laterality Date    FINGER TRIGGER RELEASE Right 08/14/2018    right thumb    HYSTERECTOMY (CERVIX STATUS UNKNOWN)      DC TENDON SHEATH INCISION Right 8/14/2018    RIGHT THUMB TRIGGER THUMB RELEASE performed by Contreras Laws DO at [facility] Right 2002    RCR    WISDOM TOOTH EXTRACTION         SUBJECTIVE:    Precautions: Up with assistance, falls and alarm , L knee karel, CHF, O2 (5L)    Social history: Patient lives alone in a two story home bedroom and bathroom 2nd floor full flight stairs with Rail  with 1 step  to enter without Rail. Tub shower       Equipment owned: None,      AM-PAC Basic Mobility       AM-PAC Basic Mobility - Inpatient   How much help is needed turning from your back to your side while in a flat bed without using bedrails?: A Little  How much help is needed moving from lying on your back to sitting on the side of a flat bed without using bedrails?: A Little  How much help is needed moving to and from a bed to a chair?: A Little  How much help is needed standing up from a chair using your arms?: A Little  How much help is needed walking in hospital room?: A Little  How much help is needed climbing 3-5 steps with a railing?: A Lot  AM-PAC Inpatient Mobility Raw Score : 17  AM-PAC Inpatient T-Scale Score : 42.13  Mobility Inpatient CMS 0-100% Score: 50.57  Mobility Inpatient CMS G-Code Modifier : CK    Nursing cleared patient for PT treatment. Patient sitting edge of bed at start of session. OBJECTIVE;   Initial Evaluation  Date: 2/16/2023 Treatment Date:  2/20/2023     Short Term/ Long Term   Goals   Was pt agreeable to Eval/treatment?  Yes yes To be met in 4 days   Pain level   8/10  Back  Back pain and headache 11/10    Bed Mobility    Rolling: Supervision     Supine to sit: Supervision     Sit to supine: Supervision     Scooting: Supervision Rolling: Not assessed patient seated edge of bed      Rolling: Independent    Supine to sit: Independent    Sit to supine: Independent    Scooting: Independent     Transfers Sit to stand: Supervision  from EOB and toilet  Sit to stand: Supervision  cues for hand placement and safety x multiple reps   Sit to stand: Independent    Ambulation     2x15 feet using  wheeled walker with Minimal assist of 1   for walker approximation, balance, upright, safety, and O2 line management  4 x 15 feet using  wheeled walker with Supervision    for walker control and cues for walker approximation, safety, pacing, and obstacle negotiation     100 feet using  least restrictive device versus no device with Modified Independent    Stair negotiation: ascended and descended   Not assessed    not assessed due to patient's overall debility, decreased activity tolerance, balance deficits, safety and fall risk.         10 steps, 1 rail and supervision    ROM Within functional limits    Increase range of motion 10% of affected joints    Strength BUE:  refer to OT eval  RLE:  4/5  LLE:  4-/5  Increase strength in affected mm groups by 1/3 grade   Balance Sitting EOB:  good -  Dynamic Standing:  fair + wheeled walker   Sitting EOB: good   Dynamic Standing: fair +wheeled walker   Sitting EOB:  good   Dynamic Standing: good      Patient is Alert & Oriented x person, place, time, and situation and follows directions    Sensation:  Patient  denies numbness/tingling states does have at times  Edema:  yes bilateral lower extremities   Endurance: fair      Vitals:  5 liters nasal cannula   Blood Pressure at rest  Blood Pressure during session    Heart Rate at rest 72-74 Heart Rate during session 80   SPO2 at rest 89-93%  SPO2 during session 91-96%     Patient education  Patient educated on role of Physical Therapy, risks of immobility, safety and plan of care, energy conservation,  importance of mobility while in hospital , purse lip breathing, safety , and seated exercises      Patient response to education:   Pt verbalized understanding Pt demonstrated skill Pt requires further education in this area   Yes Partial Yes      Treatment:  Patient practiced and was instructed/facilitated in the following treatment: Patient ambulated  to/from bathroom x 2  assisted up to chair performed exercises. Therapeutic Exercises:  ankle pumps and heel raises  x 10 reps. At end of session, patient in chair with  call light and phone within reach,  all lines and tubes intact, nursing notified. Patient would benefit from continued skilled Physical Therapy to improve functional independence and quality of life.          Patient's/ family goals   home    Time in  0806  Time out  0833    Total Treatment Time  27 minutes      CPT codes:  Therapeutic activities (55061)   27 minutes  2 unit(s)    Randy Ying, PT  #[id number]

## 2023-02-20 NOTE — PROGRESS NOTES
[facility] Cardiology Inpatient Progress Note    Patient is a 81 y.o. female of Claudette Mixer, MD seen in hospital follow up. Chief complaint: CHF    HPI: Some SOB. No CP.      Patient Active Problem List   Diagnosis    Primary hypertension    Type 2 diabetes mellitus without complication, without long-term current use of insulin ([facility]    Severe pulmonary hypertension (HCC)    Tobacco abuse    Anemia    Morbid obesity ([facility]    Obstructive sleep apnea    Chronic systolic (congestive) heart failure    Chronic obstructive pulmonary disease, unspecified COPD type ([facility]    Acute respiratory failure with hypoxia ([facility])    Acute on chronic heart failure with preserved ejection fraction (HFpEF) ([facility])    Elevated troponin    RVF (right ventricular failure) ([facility])    VHD (valvular heart disease)       No Known Allergies    Current Facility-Administered Medications   Medication Dose Route Frequency Provider Last Rate Last Admin    furosemide (LASIX) injection 60 mg  60 mg IntraVENous BID Dalton Fermin DO   60 mg at 02/20/23 0800    atorvastatin (LIPITOR) tablet 40 mg  40 mg Oral Nightly Dalton Fermin DO   40 mg at 02/19/23 2014    metOLazone (ZAROXOLYN) tablet 2.5 mg  2.5 mg Oral Once Estrella Cuadra MD        aspirin chewable tablet 81 mg  81 mg Oral Daily Swetha Braga MD   81 mg at 02/20/23 0800    traMADol (ULTRAM) tablet 50 mg  50 mg Oral Q6H PRN Swetha Braga MD   50 mg at 02/20/23 0541    insulin glargine (LANTUS) injection vial 10 Units  10 Units SubCUTAneous BID Swetha Braga MD   10 Units at 02/19/23 0858    glipiZIDE (GLUCOTROL) tablet 5 mg  5 mg Oral Daily Swetha Braga MD   5 mg at 02/20/23 0800    lactobacillus (CULTURELLE) capsule 1 capsule  1 capsule Oral Q12H Swetha Braga MD   1 capsule at 02/20/23 0800    [Held by provider] lisinopril (PRINIVIL;ZESTRIL) tablet 40 mg  40 mg Oral Daily Silvnaa Braga MD   40 mg at 02/16/23 0804    potassium chloride (KLOR-CON M) extended release tablet 40 mEq  40 mEq Oral Daily Swetha Braga MD   40 mEq at 02/20/23 0800    thiamine mononitrate tablet 100 mg  100 mg Oral Daily Swetha Braga MD   100 mg at 02/20/23 0800    ondansetron (ZOFRAN) tablet 4 mg  4 mg Oral Daily PRN Ricky Braga MD   4 mg at 02/19/23 2014    insulin lispro (HUMALOG) injection vial 0-8 Units  0-8 Units SubCUTAneous TID WC Swetha Braga MD   8 Units at 02/16/23 0813    glucose chewable tablet 16 g  4 tablet Oral PRN Swetha Braga MD        dextrose bolus 10% 125 mL  125 mL IntraVENous PRN Swetha Braga MD        Or    dextrose bolus 10% 250 mL  250 mL IntraVENous PRN Swetha Braga MD        glucagon (rDNA) injection 1 mg  1 mg SubCUTAneous PRN Swetha Braga MD        dextrose 10 % infusion   IntraVENous Continuous PRN Swetha Braga MD        enoxaparin Sodium (LOVENOX) injection 30 mg  30 mg SubCUTAneous BID Swetha Braga MD   30 mg at 02/20/23 0801       Review of systems:   Heart: as above   Lungs: as above   Eyes: denies changes in vision or discharge. Ears: denies changes in hearing or pain. Nose: denies epistaxis or masses   Throat: denies sore throat or trouble swallowing. Neuro: denies numbness, tingling, tremors. Skin: denies rashes or itching. : denies hematuria, dysuria   GI: denies vomiting, diarrhea   Psych: denies mood changed, anxiety, depression. Physical Exam   BP (!) 148/72   Pulse 71   Temp 97.6 °F (36.4 °C) (Oral)   Resp 17   Ht 5' 4\" (1.626 m)   Wt 232 lb 9.6 oz (105.5 kg)   SpO2 92% Comment: ambulated from bath room  BMI 39.93 kg/m²   Constitutional: Oriented to person, place, and time. No distress. Well developed. Head: Normocephalic and atraumatic. Neck: Neck supple. No hepatojugular reflux. No JVD present. Carotid bruit is not present. No tracheal deviation present. No thyromegaly present. Cardiovascular: Normal rate, regular rhythm, normal heart sounds. intact distal pulses. No gallop and no friction rub.   No murmur heard. Pulmonary: Breath sounds normal. No respiratory distress. No wheezes. No rales. Chest: Effort normal. No tenderness. Abdominal: Soft. Bowel sounds are normal. No distension or mass. No tenderness, rebound or guarding. Musculoskeletal: . No tenderness. No clubbing or cyanosis. Extremitites: Intact distal pulses. No edema  Neurological: Alert and oriented to person, place, and time. Skin: Skin is warm and dry. No rash noted. Not diaphoretic. No erythema. Psychiatric: Normal mood and affect. Behavior is normal.   Lymphadenopathy: No cervical adenopathy. No groin adenopathy. CBC:   Lab Results   Component Value Date/Time    WBC 7.3 02/20/2023 06:03 AM    RBC 4.50 02/20/2023 06:03 AM    HGB 9.2 02/20/2023 06:03 AM    HCT 33.1 02/20/2023 06:03 AM    MCV 73.6 02/20/2023 06:03 AM    MCH 20.4 02/20/2023 06:03 AM    MCHC 27.8 02/20/2023 06:03 AM    RDW 19.5 02/20/2023 06:03 AM     02/20/2023 06:03 AM    MPV 9.4 02/20/2023 06:03 AM     BMP:   Lab Results   Component Value Date/Time     02/20/2023 06:03 AM    K 5.5 02/20/2023 06:03 AM    K 3.2 02/15/2023 01:13 PM    CL 98 02/20/2023 06:03 AM    CO2 37 02/20/2023 06:03 AM    BUN 9 02/20/2023 06:03 AM    LABALBU 3.0 02/20/2023 06:03 AM    CREATININE 0.9 02/20/2023 06:03 AM    CALCIUM 8.7 02/20/2023 06:03 AM    GFRAA >60 09/14/2022 07:00 AM    LABGLOM >60 02/20/2023 06:03 AM     Magnesium:    Lab Results   Component Value Date/Time    MG 2.0 02/15/2023 01:13 PM     Cardiac Enzymes:   Lab Results   Component Value Date    CKTOTAL 48 09/09/2022    CKTOTAL 112 09/01/2022    TROPHS 43 (H) 02/15/2023    TROPHS 54 (H) 02/15/2023    TROPHS 67 (H) 09/09/2022      PT/INR:    Lab Results   Component Value Date/Time    PROTIME 12.6 09/09/2022 10:20 AM    INR 1.1 09/09/2022 10:20 AM     TSH:    Lab Results   Component Value Date/Time    TSH 2.460 09/06/2022 11:01 AM     Rhythm Strip: normal sinus rhythm.     TTE, Dr. Kacey Barnes 9/2022: Normal LV size and function, EF > 55%.  Stage II DD.  Dilated RV and reduced RV function.  Dilated RA.  Mild TR.  Pulmonary hypertension with PASP 62 mmHg.    ASSESSMENT & PLAN:    Patient Active Problem List   Diagnosis    Primary hypertension    Type 2 diabetes mellitus without complication, without long-term current use of insulin ([facility])    Severe pulmonary hypertension (HCC)    Tobacco abuse    Anemia    Morbid obesity ([facility])    Obstructive sleep apnea    Chronic systolic (congestive) heart failure    Chronic obstructive pulmonary disease, unspecified COPD type ([facility])    Acute respiratory failure with hypoxia ([facility])    Acute on chronic heart failure with preserved ejection fraction (HFpEF) ([facility])    Elevated troponin    RVF (right ventricular failure) ([facility])    VHD (valvular heart disease)     1. Acute on chronic diastolic and right-sided CHF:     Increase IV lasix. ACEI held due to bump in Cr. Stop norvasc due to edema.    No BB due to bradycardia issues.    Consider CardioMEMS as outpatient.    2. Elevated troponin:    Coronary artery calcifications on chest CTA 9/2022.    Medically manage.     ASA/statin. No BB due to bradycardia issues.     3.  VHD: Mild TR, mod to sev PH with PASP 62 mmHg on TTE 9/2022.    4. COPD/Hypoxic hypercapnic respiratory failure: Supportive care.     5. HTN: Observe.     6. Lipids: Statin.    7. DM: Per primary service.     8. Acute on CKD: Follow labs.    9. Anemia: Follow labs.     10. Tobacco abuse    11. Probable SHAVONNE    Angela Davidson D.O.  Cardiologist  Cardiology, [facility]

## 2023-02-20 NOTE — PLAN OF CARE
Problem: Discharge Planning  Goal: Discharge to home or other facility with appropriate resources  1/47/6794 [id number] by Wojciech Emery RN  Outcome: Progressing     Problem: Pain  Goal: Verbalizes/displays adequate comfort level or baseline comfort level  4/97/2316 1001 by Wojciech Emery RN  Outcome: Progressing     Problem: Chronic Conditions and Co-morbidities  Goal: Patient's chronic conditions and co-morbidity symptoms are monitored and maintained or improved  8/01/6245 4559 by Wojciech Emery RN  Outcome: Progressing     Problem: Safety - Adult  Goal: Free from fall injury  5/70/4943 6443 by Wojciech Emery RN  Outcome: Progressing

## 2023-02-20 NOTE — CARE COORDINATION
2/20/2023 1010 CM met with pt at the bedside for transition of care needs at d/c. Pt wanted referral made to HALIMA Lugo left for [facility] for review. CM provided pt a list of SNF's to make alternate choices for rehab. Pt requested a tub transfer bench but it's not covered by the insurance so pt's son is going to get her one at [facility]. CM will follow for rehab placement.   Electronically signed by Charm Hashimoto, RN on 2/20/2023 at 10:17 AM

## 2023-02-21 LAB
METER GLUCOSE: 100 MG/DL (ref 74–99)
METER GLUCOSE: 70 MG/DL (ref 74–99)
METER GLUCOSE: 77 MG/DL (ref 74–99)
METER GLUCOSE: 96 MG/DL (ref 74–99)

## 2023-02-21 PROCEDURE — 82962 GLUCOSE BLOOD TEST: CPT

## 2023-02-21 PROCEDURE — 6370000000 HC RX 637 (ALT 250 FOR IP): Performed by: INTERNAL MEDICINE

## 2023-02-21 PROCEDURE — 2700000000 HC OXYGEN THERAPY PER DAY

## 2023-02-21 PROCEDURE — 2060000000 HC ICU INTERMEDIATE R&B

## 2023-02-21 PROCEDURE — 99232 SBSQ HOSP IP/OBS MODERATE 35: CPT | Performed by: INTERNAL MEDICINE

## 2023-02-21 PROCEDURE — 97116 GAIT TRAINING THERAPY: CPT

## 2023-02-21 PROCEDURE — 97110 THERAPEUTIC EXERCISES: CPT

## 2023-02-21 PROCEDURE — 6360000002 HC RX W HCPCS: Performed by: INTERNAL MEDICINE

## 2023-02-21 PROCEDURE — 94660 CPAP INITIATION&MGMT: CPT

## 2023-02-21 RX ORDER — FUROSEMIDE 40 MG/1
40 TABLET ORAL 2 TIMES DAILY
Status: DISCONTINUED | OUTPATIENT
Start: 2023-02-21 | End: 2023-02-21 | Stop reason: SDUPTHER

## 2023-02-21 RX ORDER — FUROSEMIDE 40 MG/1
40 TABLET ORAL 2 TIMES DAILY
Status: DISCONTINUED | OUTPATIENT
Start: 2023-02-21 | End: 2023-02-22 | Stop reason: HOSPADM

## 2023-02-21 RX ADMIN — POTASSIUM CHLORIDE 40 MEQ: 1500 TABLET, EXTENDED RELEASE ORAL at 07:43

## 2023-02-21 RX ADMIN — TRAMADOL HYDROCHLORIDE 50 MG: 50 TABLET, COATED ORAL at 15:15

## 2023-02-21 RX ADMIN — ONDANSETRON HYDROCHLORIDE 4 MG: 4 TABLET, FILM COATED ORAL at 09:50

## 2023-02-21 RX ADMIN — ASPIRIN 81 MG: 81 TABLET, CHEWABLE ORAL at 07:42

## 2023-02-21 RX ADMIN — Medication 1 CAPSULE: at 07:43

## 2023-02-21 RX ADMIN — INSULIN GLARGINE 10 UNITS: 100 INJECTION, SOLUTION SUBCUTANEOUS at 07:45

## 2023-02-21 RX ADMIN — FUROSEMIDE 40 MG: 40 TABLET ORAL at 16:21

## 2023-02-21 RX ADMIN — Medication 1 CAPSULE: at 21:51

## 2023-02-21 RX ADMIN — ENOXAPARIN SODIUM 30 MG: 100 INJECTION SUBCUTANEOUS at 09:50

## 2023-02-21 RX ADMIN — TRAMADOL HYDROCHLORIDE 50 MG: 50 TABLET, COATED ORAL at 21:51

## 2023-02-21 RX ADMIN — ENOXAPARIN SODIUM 30 MG: 100 INJECTION SUBCUTANEOUS at 21:52

## 2023-02-21 RX ADMIN — TRAMADOL HYDROCHLORIDE 50 MG: 50 TABLET, COATED ORAL at 09:50

## 2023-02-21 RX ADMIN — THIAMINE HCL TAB 100 MG 100 MG: 100 TAB at 07:43

## 2023-02-21 RX ADMIN — INSULIN GLARGINE 10 UNITS: 100 INJECTION, SOLUTION SUBCUTANEOUS at 21:54

## 2023-02-21 RX ADMIN — TRAMADOL HYDROCHLORIDE 50 MG: 50 TABLET, COATED ORAL at 03:24

## 2023-02-21 RX ADMIN — FUROSEMIDE 60 MG: 10 INJECTION, SOLUTION INTRAMUSCULAR; INTRAVENOUS at 07:42

## 2023-02-21 RX ADMIN — GLIPIZIDE 5 MG: 5 TABLET ORAL at 07:43

## 2023-02-21 RX ADMIN — ATORVASTATIN CALCIUM 40 MG: 40 TABLET, FILM COATED ORAL at 21:51

## 2023-02-21 ASSESSMENT — PAIN SCALES - GENERAL
PAINLEVEL_OUTOF10: 10
PAINLEVEL_OUTOF10: 4
PAINLEVEL_OUTOF10: 5
PAINLEVEL_OUTOF10: 10
PAINLEVEL_OUTOF10: 0

## 2023-02-21 ASSESSMENT — PAIN DESCRIPTION - LOCATION
LOCATION: BACK
LOCATION: BACK

## 2023-02-21 ASSESSMENT — PAIN DESCRIPTION - DESCRIPTORS
DESCRIPTORS: DISCOMFORT;SORE;SHARP
DESCRIPTORS: ACHING

## 2023-02-21 ASSESSMENT — PAIN - FUNCTIONAL ASSESSMENT: PAIN_FUNCTIONAL_ASSESSMENT: PREVENTS OR INTERFERES SOME ACTIVE ACTIVITIES AND ADLS

## 2023-02-21 NOTE — PROGRESS NOTES
Physical Therapy Treatment Note/Plan of Care    Room #:  1422/0895-77  Patient Name: Vernadine Nissen  YOB: 1950  MRN: [id number]    Date of Service: 2/21/2023     Tentative placement recommendation: Acute Rehab  Equipment recommendation: Bedside commode  and possibly a cane, trial  next session as patient does not want to use wheeled walker      Evaluating Physical Therapist: Edith Murdock #[id number]      Specific Provider Orders/Date/Referring Provider :   02/15/23 2115    PT eval and treat  Start:  02/15/23 2115,   End:  02/15/23 2115,   ONE TIME,   Standing Count:  1 Occurrences,   Joseph Vitale MD     Admitting Diagnosis:   COPD exacerbation ([address] [J44.1]  Acute respiratory failure with hypoxia ([address] [J96.01]      Surgery: none  Visit Diagnoses         Codes    COPD exacerbation ([address]     J44.1            Patient Active Problem List   Diagnosis    Primary hypertension    Type 2 diabetes mellitus without complication, without long-term current use of insulin ([address]    Severe pulmonary hypertension ([address]    Tobacco abuse    Anemia    Morbid obesity ([address]    Obstructive sleep apnea    Chronic systolic (congestive) heart failure    Chronic obstructive pulmonary disease, unspecified COPD type ([address]    Acute respiratory failure with hypoxia ([address]    Acute on chronic heart failure with preserved ejection fraction (HFpEF) ([facility])    Elevated troponin    RVF (right ventricular failure) ([address]    VHD (valvular heart disease)        ASSESSMENT of Current Deficits Patient exhibits decreased strength, balance, and endurance impairing functional mobility, transfers, gait distance, and tolerance to activity. Patient tried using quad cane today with ambulation due to not wanting to use wheeled walker. Patient slightly unsteady, however no loss of balance. Patient would benefit from use of wheeled walker for inc stability, however resistant to idea.  Quad cane would be the next alternative to inc safety. No buckling in knees noted, however only ambulated short distance. The patient will benefit from continued skilled therapy to increase strength and improve balance for safe functional mobility, to decrease risk of falls, and to meet goals at discharge. PHYSICAL THERAPY  PLAN OF CARE       Physical therapy plan of care is established based on physician order,  patient diagnosis and clinical assessment    Current Treatment Recommendations:    -Bed Mobility: Lower extremity exercises , Upper extremity exercises , and Trunk control activities   -Sitting Balance: Incorporate reaching activities to activate trunk muscles , Hands on support to maintain midline , and Facilitate active trunk muscle engagement   -Standing Balance: Perform strengthening exercises in standing to promote motor control with or without upper extremity support , Instruct patient on adequate base of support to maintain balance, and Challenge balance utilizing reaching  activities beyond center of gravity    -Transfers: Provide instruction on proper hand and foot position for adequate transfer of weight onto lower extremities and use of gait device if needed and Cues for hand placement, technique and safety. Provide stabilization to prevent fall   -Gait: Gait training and Standing activities to improve: base of support, weight shift, weight bearing    -Endurance: Utilize Supervised activities to increase level of endurance to allow for safe functional mobility including transfers and gait   -Stairs: Stair training with instruction on proper technique and hand placement on rail    PT long term treatment goals are located in below grid    Patient and or family understand(s) diagnosis, prognosis, and plan of care. Frequency of treatments: Patient will be seen  daily.          Prior Level of Function: Patient ambulated independently   Rehab Potential: good  for baseline    Past medical history:   Past Medical History:   Diagnosis Date Chronic back pain 2018    10/10 on the pain scale    COPD (chronic obstructive pulmonary disease) [facility]     GERD (gastroesophageal reflux disease)     Headache     Hyperlipidemia     Hypertension     Obesity     Scoliosis     Type 2 diabetes mellitus without complication [facility])      Past Surgical History:   Procedure Laterality Date    FINGER TRIGGER RELEASE Right 08/14/2018    right thumb    HYSTERECTOMY (CERVIX STATUS UNKNOWN)      MA TENDON SHEATH INCISION Right 8/14/2018    RIGHT THUMB TRIGGER THUMB RELEASE performed by Tien Meier DO at [facility] Right 2002    RCR    WISDOM TOOTH EXTRACTION         SUBJECTIVE:    Precautions: Up with assistance, falls and alarm , L knee karel, CHF, O2 (5L)    Social history: Patient lives alone in a two story home bedroom and bathroom 2nd floor full flight stairs with Rail  with 1 step  to enter without Rail. Tub shower       Equipment owned: None,      AM-PAC Basic Mobility       AM-PAC Basic Mobility - Inpatient   How much help is needed turning from your back to your side while in a flat bed without using bedrails?: A Little  How much help is needed moving from lying on your back to sitting on the side of a flat bed without using bedrails?: A Little  How much help is needed moving to and from a bed to a chair?: A Little  How much help is needed standing up from a chair using your arms?: A Little  How much help is needed walking in hospital room?: A Little  How much help is needed climbing 3-5 steps with a railing?: A Lot  AM-PAC Inpatient Mobility Raw Score : 17  AM-PAC Inpatient T-Scale Score : 42.13  Mobility Inpatient CMS 0-100% Score: 50.57  Mobility Inpatient CMS G-Code Modifier : CK    Nursing cleared patient for PT treatment. Patient sitting up in bedside chair at start of session. OBJECTIVE;   Initial Evaluation  Date: 2/16/2023 Treatment Date:  2/21/2023     Short Term/ Long Term   Goals   Was pt agreeable to Eval/treatment? Yes yes To be met in 4 days   Pain level   8/10  Back  No number assigned to pain    Bed Mobility    Rolling: Supervision     Supine to sit: Supervision     Sit to supine: Supervision     Scooting: Supervision    Rolling: Not assessed patient in chair      Rolling: Independent    Supine to sit: Independent    Sit to supine: Independent    Scooting: Independent     Transfers Sit to stand: Supervision  from EOB and toilet  Sit to stand: Supervision  cues for hand placement and safety x multiple reps   Sit to stand: Independent    Ambulation     2x15 feet using  wheeled walker with Minimal assist of 1   for walker approximation, balance, upright, safety, and O2 line management  2x12 feet using  quad cane with Minimal assist of 1   cues for upright posture, safety, and pacing     100 feet using  least restrictive device versus no device with Modified Independent    Stair negotiation: ascended and descended   Not assessed    not assessed due to patient's overall debility, decreased activity tolerance, balance deficits, safety and fall risk.         10 steps, 1 rail and supervision    ROM Within functional limits    Increase range of motion 10% of affected joints    Strength BUE:  refer to OT eval  RLE:  4/5  LLE:  4-/5  Increase strength in affected mm groups by 1/3 grade   Balance Sitting EOB:  good -  Dynamic Standing:  fair + wheeled walker   Sitting EOB: not assessed   Dynamic Standing: fair with quad cane   Sitting EOB:  good   Dynamic Standing: good      Patient is Alert & Oriented x person, place, time, and situation and follows directions    Sensation:  Patient  denies numbness/tingling states does have at times  Edema:  yes bilateral lower extremities   Endurance: fair  -    Vitals:  5 liters nasal cannula   Blood Pressure at rest  Blood Pressure during session    Heart Rate at rest  Heart Rate during session    SPO2 at rest  SPO2 during session      Patient education  Patient educated on role of Physical Therapy, risks of immobility, safety and plan of care, energy conservation,  importance of mobility while in hospital , importance and purpose of adaptive device and adjusted to proper height for the patient. , safety , and seated exercises      Patient response to education:   Pt verbalized understanding Pt demonstrated skill Pt requires further education in this area   Yes Partial Yes      Treatment:  Patient practiced and was instructed/facilitated in the following treatment: Patient stood from chair to amb into hallway and back to bedside chair with quad cane. Patient education on the use if assistive device for safety and stability. Therapeutic Exercises:  not performed       At end of session, patient in chair with lunch tray call light and phone within reach,  all lines and tubes intact, nursing notified. Patient would benefit from continued skilled Physical Therapy to improve functional independence and quality of life.          Patient's/ family goals   home    Time in  1122  Time out  1133    Total Treatment Time  11 minutes      CPT codes:  Gait Training (15856) 11 minutes 1 unit(s)    Joshua Salomon, [facility]  #[id number]

## 2023-02-21 NOTE — PROGRESS NOTES
Date: 2/20/2023    Time: 10:31 PM    Patient Placed On BIPAP/CPAP/ Non-Invasive Ventilation?   No    If no must comment    Comments: patient has been refusing to wear BIPAP since admission        Kehinde Figueredo RCP

## 2023-02-21 NOTE — PROGRESS NOTES
[facility] Cardiology Inpatient Progress Note    Patient is a 81 y.o. female of Claudette Mixer, MD seen in hospital follow up. Chief complaint: CHF    HPI: Some SOB. No CP.      Patient Active Problem List   Diagnosis    Primary hypertension    Type 2 diabetes mellitus without complication, without long-term current use of insulin ([address]    Severe pulmonary hypertension (HCC)    Tobacco abuse    Anemia    Morbid obesity ([address]    Obstructive sleep apnea    Chronic systolic (congestive) heart failure    Chronic obstructive pulmonary disease, unspecified COPD type ([facility]    Acute respiratory failure with hypoxia ([facility])    Acute on chronic heart failure with preserved ejection fraction (HFpEF) ([facility])    Elevated troponin    RVF (right ventricular failure) ([facility])    VHD (valvular heart disease)       No Known Allergies    Current Facility-Administered Medications   Medication Dose Route Frequency Provider Last Rate Last Admin    furosemide (LASIX) tablet 40 mg  40 mg Oral BID Silvana Braga MD        atorvastatin (LIPITOR) tablet 40 mg  40 mg Oral Nightly Dalton Fermin DO   40 mg at 02/20/23 2022    metOLazone (ZAROXOLYN) tablet 2.5 mg  2.5 mg Oral Once Estrella Cuadra MD        aspirin chewable tablet 81 mg  81 mg Oral Daily Swetha Braga MD   81 mg at 02/21/23 0742    traMADol (ULTRAM) tablet 50 mg  50 mg Oral Q6H PRN Swetha Braga MD   50 mg at 02/21/23 0324    insulin glargine (LANTUS) injection vial 10 Units  10 Units SubCUTAneous BID Swetha Braga MD   10 Units at 02/21/23 0745    glipiZIDE (GLUCOTROL) tablet 5 mg  5 mg Oral Daily Swetha Braga MD   5 mg at 02/21/23 0743    lactobacillus (CULTURELLE) capsule 1 capsule  1 capsule Oral Q12H Swetha Braga MD   1 capsule at 02/21/23 0743    [Held by provider] lisinopril (PRINIVIL;ZESTRIL) tablet 40 mg  40 mg Oral Daily Silvana Braga MD   40 mg at 02/16/23 0804    potassium chloride (KLOR-CON M) extended release tablet 40 mEq  40 mEq Oral Daily Swetha MI Braga MD   40 mEq at 02/21/23 0743    thiamine mononitrate tablet 100 mg  100 mg Oral Daily Swetha Braga MD   100 mg at 02/21/23 0743    ondansetron (ZOFRAN) tablet 4 mg  4 mg Oral Daily PRN Declan Braga MD   4 mg at 02/19/23 2014    insulin lispro (HUMALOG) injection vial 0-8 Units  0-8 Units SubCUTAneous TID WC Swetha Braga MD   8 Units at 02/16/23 0813    glucose chewable tablet 16 g  4 tablet Oral PRN Swetha Braga MD        dextrose bolus 10% 125 mL  125 mL IntraVENous PRN Swetha Braga MD        Or    dextrose bolus 10% 250 mL  250 mL IntraVENous PRN Swetha Braga MD        glucagon (rDNA) injection 1 mg  1 mg SubCUTAneous PRN Swetha Braga MD        dextrose 10 % infusion   IntraVENous Continuous PRN Swetha Braga MD        enoxaparin Sodium (LOVENOX) injection 30 mg  30 mg SubCUTAneous BID Swetha Braga MD   30 mg at 02/20/23 9728       Review of systems:   Heart: as above   Lungs: as above   Eyes: denies changes in vision or discharge. Ears: denies changes in hearing or pain. Nose: denies epistaxis or masses   Throat: denies sore throat or trouble swallowing. Neuro: denies numbness, tingling, tremors. Skin: denies rashes or itching. : denies hematuria, dysuria   GI: denies vomiting, diarrhea   Psych: denies mood changed, anxiety, depression. Physical Exam   BP (!) 110/51   Pulse 87   Temp 97.9 °F (36.6 °C) (Oral)   Resp 18   Ht 5' 4\" (1.626 m)   Wt 212 lb 9.6 oz (96.4 kg)   SpO2 92%   BMI 36.49 kg/m²   Constitutional: Oriented to person, place, and time. No distress. Well developed. Head: Normocephalic and atraumatic. Neck: Neck supple. No hepatojugular reflux. No JVD present. Carotid bruit is not present. No tracheal deviation present. No thyromegaly present. Cardiovascular: Normal rate, regular rhythm, normal heart sounds. intact distal pulses. No gallop and no friction rub. No murmur heard.   Pulmonary: Breath sounds normal. No respiratory distress. No wheezes. No rales.   Chest: Effort normal. No tenderness.  Abdominal: Soft. Bowel sounds are normal. No distension or mass. No tenderness, rebound or guarding.   Musculoskeletal: . No tenderness. No clubbing or cyanosis.  Extremitites: Intact distal pulses. No edema  Neurological: Alert and oriented to person, place, and time.   Skin: Skin is warm and dry. No rash noted. Not diaphoretic. No erythema.   Psychiatric: Normal mood and affect. Behavior is normal.   Lymphadenopathy: No cervical adenopathy. No groin adenopathy.    CBC:   Lab Results   Component Value Date/Time    WBC 7.3 02/20/2023 06:03 AM    RBC 4.50 02/20/2023 06:03 AM    HGB 9.2 02/20/2023 06:03 AM    HCT 33.1 02/20/2023 06:03 AM    MCV 73.6 02/20/2023 06:03 AM    MCH 20.4 02/20/2023 06:03 AM    MCHC 27.8 02/20/2023 06:03 AM    RDW 19.5 02/20/2023 06:03 AM     02/20/2023 06:03 AM    MPV 9.4 02/20/2023 06:03 AM     BMP:   Lab Results   Component Value Date/Time     02/20/2023 06:03 AM    K 4.6 02/20/2023 09:33 AM    K 3.2 02/15/2023 01:13 PM    CL 98 02/20/2023 06:03 AM    CO2 37 02/20/2023 06:03 AM    BUN 9 02/20/2023 06:03 AM    LABALBU 3.0 02/20/2023 06:03 AM    CREATININE 0.9 02/20/2023 06:03 AM    CALCIUM 8.7 02/20/2023 06:03 AM    GFRAA >60 09/14/2022 07:00 AM    LABGLOM >60 02/20/2023 06:03 AM     Magnesium:    Lab Results   Component Value Date/Time    MG 2.0 02/15/2023 01:13 PM     Cardiac Enzymes:   Lab Results   Component Value Date    CKTOTAL 48 09/09/2022    CKTOTAL 112 09/01/2022    TROPHS 43 (H) 02/15/2023    TROPHS 54 (H) 02/15/2023    TROPHS 67 (H) 09/09/2022      PT/INR:    Lab Results   Component Value Date/Time    PROTIME 12.6 09/09/2022 10:20 AM    INR 1.1 09/09/2022 10:20 AM     TSH:    Lab Results   Component Value Date/Time    TSH 2.460 09/06/2022 11:01 AM     Rhythm Strip: normal sinus rhythm.    TTE, Dr. Espitia 9/2022: Normal LV size and function, EF > 55%.  Stage II DD.  Dilated RV and reduced RV  function. Dilated RA. Mild TR. Pulmonary hypertension with PASP 62 mmHg. ASSESSMENT & PLAN:    Patient Active Problem List   Diagnosis    Primary hypertension    Type 2 diabetes mellitus without complication, without long-term current use of insulin (HCC)    Severe pulmonary hypertension (HCC)    Tobacco abuse    Anemia    Morbid obesity ([facility]    Obstructive sleep apnea    Chronic systolic (congestive) heart failure    Chronic obstructive pulmonary disease, unspecified COPD type ([facility]    Acute respiratory failure with hypoxia ([facility])    Acute on chronic heart failure with preserved ejection fraction (HFpEF) ([facility])    Elevated troponin    RVF (right ventricular failure) ([facility])    VHD (valvular heart disease)     1. Acute on chronic diastolic and right-sided CHF:     Increase IV lasix. ACEI held due to bump in Cr. Stop norvasc due to edema. No BB due to bradycardia issues. Consider CardioMEMS as outpatient. 2. Elevated troponin:    Coronary artery calcifications on chest CTA 9/2022. Medically manage. ASA/statin. No BB due to bradycardia issues. 3. VHD: Mild TR, mod to sev PH with PASP 62 mmHg on TTE 9/2022.    4. COPD/Hypoxic hypercapnic respiratory failure: Supportive care. 5. HTN: Observe. 6. Lipids: Statin. 7. DM: Per primary service. 8. Acute on CKD: Follow labs. 9. Anemia: Follow labs. 10. Tobacco abuse    11. Probable SHAVONNE    Zohra Urrutia D.O.   Cardiologist  Cardiology, [facility]

## 2023-02-21 NOTE — PLAN OF CARE
Problem: Discharge Planning  Goal: Discharge to home or other facility with appropriate resources  2/21/2023 0016 by Jennifer Enrique RN  Outcome: Progressing     Problem: Pain  Goal: Verbalizes/displays adequate comfort level or baseline comfort level  2/21/2023 0016 by Jennifer Enrique RN  Outcome: Progressing     Problem: Chronic Conditions and Co-morbidities  Goal: Patient's chronic conditions and co-morbidity symptoms are monitored and maintained or improved  2/21/2023 0016 by Jennifer Enrique RN  Outcome: Progressing

## 2023-02-21 NOTE — PROGRESS NOTES
Subjective: The patient is awake and alert. No problems overnight. Denies chest pain, angina, and dyspnea. Denies abdominal pain. Tolerating diet. No nausea or vomiting. Objective:  Pt oriented, alert, coherent and logical    BP (!) 110/51   Pulse 87   Temp 97.9 °F (36.6 °C) (Oral)   Resp 18   Ht 5' 4\" (1.626 m)   Wt 212 lb 9.6 oz (96.4 kg)   SpO2 92%   BMI 36.49 kg/m²     Head and Neck: normal atraumatic, no neck masses, normal thyroid, no jvd    Heart:  regular rate and rhythm, S1, S2 normal, no murmur, click, rub or gallop    Lungs:  chest clear, no wheezing, rales, normal symmetric air entry, pulse oximetry on oxygen is 92%, no chest wall deformities or tenderness    Abd: soft, non-tender, without masses or organomegaly    Extrem:  No clubbing, cyanosis, or edema    Neuro:   Cranial nerves: grossly intact  Motor system: bilateral upper extremity normal, bilateral lower extremity normal   Sensory system: bilateral upper extremity, bilateral lower extremity normal  no focal neurological deficit and DTRs Hypoactive    Musculoskeletal:   Spine no back tilt cervical, thoracic, lumbar, no vertebral tenderness, straight leg raising test negative on bilateral.   Upper extremity ROM within normal limits bilateral. Shoulder no tenderness bilateral. Elbow no tenderness bilateral. Wrist no tenderness bilateral. Hand bilateral. Fingers bilateral.   Lower extremity hip bilateral nl. Knee bilateral nl. Ankle bilateral nl. Foot bilateral nl. Toes bilateral nl     Skin: No rashes, lesions, or ecchymosis, no ulcers. Psych: No apparent anxiety, agitation, or depression.      CBC:   Lab Results   Component Value Date/Time    WBC 7.3 02/20/2023 06:03 AM    RBC 4.50 02/20/2023 06:03 AM    HGB 9.2 02/20/2023 06:03 AM    HCT 33.1 02/20/2023 06:03 AM    MCV 73.6 02/20/2023 06:03 AM    MCH 20.4 02/20/2023 06:03 AM    MCHC 27.8 02/20/2023 06:03 AM    RDW 19.5 02/20/2023 06:03 AM     02/20/2023 06:03 AM    MPV 9.4 02/20/2023 06:03 AM     CMP:    Lab Results   Component Value Date/Time     02/20/2023 06:03 AM    K 4.6 02/20/2023 09:33 AM    K 3.2 02/15/2023 01:13 PM    CL 98 02/20/2023 06:03 AM    CO2 37 02/20/2023 06:03 AM    BUN 9 02/20/2023 06:03 AM    CREATININE 0.9 02/20/2023 06:03 AM    GFRAA >60 09/14/2022 07:00 AM    LABGLOM >60 02/20/2023 06:03 AM    GLUCOSE 66 02/20/2023 06:03 AM    PROT 6.1 02/20/2023 06:03 AM    LABALBU 3.0 02/20/2023 06:03 AM    CALCIUM 8.7 02/20/2023 06:03 AM    BILITOT 0.4 02/20/2023 06:03 AM    ALKPHOS 88 02/20/2023 06:03 AM    AST 16 02/20/2023 06:03 AM    ALT 9 02/20/2023 06:03 AM          Assessment:    Principal Problem:    Acute respiratory failure with hypoxia (HCC)  Active Problems:    Type 2 diabetes mellitus without complication, without long-term current use of insulin (HCC)    Severe pulmonary hypertension (HCC)    Anemia    Morbid obesity ([address]    Obstructive sleep apnea    Chronic systolic (congestive) heart failure    Chronic obstructive pulmonary disease, unspecified COPD type ([address]    Acute on chronic heart failure with preserved ejection fraction (HFpEF) (HCC)    Elevated troponin    RVF (right ventricular failure) (HCC)    VHD (valvular heart disease)  Resolved Problems:    * No resolved hospital problems.  *      Plan:  1) respiratory failure: still on oxygen,pt will be discharged on home o2 fro rehab  2)linda: pt refused to use cpap machine  3) weakness: going [place]  4)diabetes: on glipizide  5) mixed hyperlipidemia: on atorvastatin    Vinny Davis MD  8:59 AM  2/21/2023

## 2023-02-21 NOTE — PROGRESS NOTES
OCCUPATIONAL THERAPY BEDSIDE TREATMENT NOTE   [email or web address] [facility]  [facility] [place]. [place]    Date:2023  Patient Name: Therese Parry  MRN: [id number]  : 1950  Room: [address] [place] [address]       Evaluating OT: Bettina Lopez OTR/L #[id number]      Referring Provider and Specific Provider Orders/Date:      02/15/23 2115   OT eval and treat  Start:  02/15/23 2115,   End:  02/15/23 2115,   ONE TIME,   Standing Count:  1 Occurrences,   Patrick Patel MD       Placement Recommendation: Home with Home Health OT         Diagnosis:   1. Acute respiratory failure with hypoxia ([facility] [address]    2.  COPD exacerbation [facility])         Surgery: None        Pertinent Medical History:       Past Medical History        Past Medical History:   Diagnosis Date    Chronic back pain 2018     10/10 on the pain scale    COPD (chronic obstructive pulmonary disease) (HCC)      GERD (gastroesophageal reflux disease)      Headache      Hyperlipidemia      Hypertension      Obesity      Scoliosis      Type 2 diabetes mellitus without complication [facility])              Past Surgical History         Past Surgical History:   Procedure Laterality Date    FINGER TRIGGER RELEASE Right 2018     right thumb    HYSTERECTOMY (CERVIX STATUS UNKNOWN)        SC TENDON SHEATH INCISION Right 2018     RIGHT THUMB TRIGGER THUMB RELEASE performed by Ese Almanza DO at [place] [address] Right 2002     RCR    WISDOM TOOTH EXTRACTION                 Precautions:  Fall Risk, activity as tolerated, 6L O2 via nasal canula       Assessment of current deficits    [x] Functional mobility            [x]ADLs           [x] Strength                   []Cognition    [x] Functional transfers          [x] IADLs          [x] Safety Awareness   [x]Endurance    [] Fine Coordination              [x] Balance      [] Vision/perception    []Sensation      []Gross Motor Coordination  [] ROM [] Delirium                   [] Motor Control      OT PLAN OF CARE   OT POC based on physician orders, patient diagnosis and results of clinical assessment     Frequency/Duration 1-3 days/wk for 2 weeks PRN      Specific OT Treatment Interventions to include:   * Instruction/training on adapted ADL techniques and AE recommendations to increase functional independence within precautions       * Training on energy conservation strategies, correct breathing pattern and techniques to improve independence/tolerance for self-care routine  * Functional transfer/mobility training/DME recommendations for increased independence, safety, and fall prevention  * Patient/Family education to increase follow through with safety techniques and functional independence  * Recommendation of environmental modifications for increased safety with functional transfers/mobility and ADLs  * Therapeutic exercise to improve motor endurance, ROM, and functional strength for ADLs/functional transfers  * Therapeutic activities to facilitate/challenge dynamic balance, stand tolerance for increased safety and independence with ADLs     Recommended Adaptive Equipment: Tub transfer bench , pt declined 3:1 commode. Home Living: Patient lives alone in a two story home bedroom and bathroom 2nd floor full flight stairs with Rail  with 1 step  to enter without Rail. Tub shower        Equipment owned: reacher, sock aide. Prior Level of Function: Independent with ADLs , Independent with IADLs; ambulated independently.       Driving: Yes      Pain Level: 8/10 pain in back and head; Nursing notified; positioning in chair provided       Cognition: A&O: 4/4; Follows 3 step directions              Memory: intact              Sequencing: intact              Problem solving: fair               Judgement/safety: fair      [facility]   AM-PAC Daily Activity - Inpatient   How much help is needed for putting on and taking off regular lower body clothing?: A Lot  How much help is needed for bathing (which includes washing, rinsing, drying)?: A Lot  How much help is needed for toileting (which includes using toilet, bedpan, or urinal)?: A Little  How much help is needed for putting on and taking off regular upper body clothing?: A Little  How much help is needed for taking care of personal grooming?: A Little  How much help for eating meals?: A Little  AM-PAC Inpatient Daily Activity Raw Score: 16  AM-PAC Inpatient ADL T-Scale Score : 35.96  ADL Inpatient CMS 0-100% Score: 53.32  ADL Inpatient CMS G-Code Modifier : CK                Functional Assessment:     Initial Eval Status  Date: 2/16/23    Treatment Status  Date:2/21/23 STGs = LTGs  Time frame: 10-14 days   Feeding Supervision     N/T  Independent    Grooming Stand by Assist     N/T Moderate [place]    UB Dressing Minimal Assist    Min A for gown management Moderate [place]    LB Dressing Moderate Assist   Discussed benefits for AE to improve indep and safety with LB dressing. Pt states she owns a reacher and sock aide but doesn't use it. Pt declined AE trial at time of eval.      N/T Moderate [place]    Bathing Moderate Assist     N/T; pt declined at this time Moderate [place]    Toileting Stand by Assist   Completed perineal hygiene standing at commode with SBA for safety. SBA; pt seated on commode at beginning of session  Moderate [place]    Bed Mobility  Supine to sit:  Not Assessed; in chair   Sit to supine:  Not Assessed; in chair     N/T  Supine to sit: Independent   Sit to supine: Independent    Functional Transfers Sit to stand: Supervision  Stand to sit: Supervision      Transfer training with verbal cues for hand placement throughout session to improve safety.       Supervision for sit to stand transfers from toilet ; transfer to bedside chair with min A with HHA; assist for O2 line management Independent    Functional Mobility Minimal Assist with wheeled walker to improve balance to/from bathroom, verbal cues for walker sequence and safety. Assist for mgmt of O2 line. Min A with FWW for short household distances from bathroom to chair ; assist for O2 line management; pt declined use of FWW or quad can; Moderate [place] with use of appropriate AD , prn    Balance Sitting:     Static: fair plus     Dynamic: fair plus   Standing: fair with walker      Sitting:     Static: fair plus     Dynamic: fair   Standing: fair /fair minus with HHA with assist for O2 line management  Sitting:     Static: good    Dynamic: good  Standing: good with AD    Activity Tolerance fair   fair /fair minus Increase standing tolerance >3-5 minutes for improved engagement with functional transfers and indep in ADLs      Visual/  Perceptual Glasses: Yes      Reports changes in vision since admission: No       NA       Hand Dominance:        AROM (PROM) Strength Additional Info:  Goal:   RUE  WFL 4-/5 Fair  and wfl FMC/dexterity noted during ADL tasks    Improve overall RUE strength  for participation in functional tasks   LUE WFL 4-/5 Fair  and wfl FMC/dexterity noted during ADL tasks    Improve overall LUE strength  for participation in functional tasks    - BUE AROM exercises: 15 reps in all planes of movement to increase ROM required for functional transfers/ADL participation. Exercises completed in shoulder and elbow flexion/extension, internal/external rotation, abduction/adduction, supination/pronation, digit and wrist flexion/extension, abduction/adduction and digit opposition. B UE ROM appears to be [facility] with limited ROM in L supination and L wrist flexion. Min rest breaks provided 2* to decreased endurance/tolerance. Ex's completed when pt seated in chair. Hearing:  WFL   Sensation:   No c/o numbness or tingling  Tone:  WFL   Edema:     Comments: Patient cleared by nursing staff. Upon arrival pt seated in bathroom. Pt agreeable to OT tx session.   Pt educated with regards to hand placement, safety awareness, static sitting balance,  standing balance, transfer training, functional mobility, toilet transfer, B UE ROM ex's, O2 line management, ECT's. At end of session pt seated in bedside chair  with all lines and tubes intact, call light within reach. Overall, pt demonstrated decreased independence and safety during completion of ADL/functional transfers/mobility tasks. Pt would benefit from continued skilled OT to increase safety and independence with completion of ADL/IADL tasks for functional independence and quality of life. Pt required cues and education as noted above for safe facilitation and completion of tasks. Therapist provided skilled monitoring of patient's response during treatment session. Prior to and at the end of session, environmental modifications /O2 line management completed for patients safety and efficiency of treatment session. Overall, patient demonstrates minimal/moderate difficulties with completion of BADLs and IADLs. Factors contributing to these difficulties include O2 line management, decreased endurance, and generalized weakness. As noted above, patient likely to benefit from further OT intervention to increase independence, safety, and overall quality of life. Treatment:     Functional transfers: Facilitated transfers from various surfaces with cues for body alignment, safety and hand placement. Postural Balance: Sitting/standing balance retraining to improve righting reactions with postural changes during ADLs. Assist for O2 line management  Skilled positioning: Proper positioning to improve interaction with environment, overall functioning   Therapeutic Ex's: To increase B UE strength/ROM and endurance required for functional transfers and ADL participation.      Pt has made fair progress towards set goals      OT 1-3 days/wk for 2 weeks PRN     Treatment Time also includes thorough review of current medical information, gathering information on past medical history/social history and prior level of function, informal observation of tasks, assessment of data and education on plan of care and goals.     Treatment Time In: 12:14 PM      Treatment Time Out: 12:24 PM               Treatment Charges: Mins Units   ADL/Home Mgt     [id number]       Thera Activities     05347 2 0   Ther Ex                 95649 8 1   Manual Therapy    58075     Neuro Re-ed         96640     Orthotic manage/training                               92250     Non Billable Time     Total Timed Treatment 10 6673 BO Guerrero/MI #[id number]

## 2023-02-21 NOTE — CARE COORDINATION
2/21/2023 1100 CM for transition of care needs at d/c.  Plan is for pt to go to SNF for a short time for rehab. Referral was made to [facility] but was denied. CM provided a list of SNF's and pt gave choices and they all have a female rehab bed. 1) [facility] referral made to [facility], 2) [facility] referral made to [facility], 3) [facility] referral made to [facility]. Will await for them do review for acceptance. WILL NEED PRECERT, Signed LES, HENS, and Rapid Covid test.  CM will follow. Electronically signed by Rigo Hackett RN on 2/21/2023 at 11:06 AM     Addendum 0 Per Mima salazar at [facility] they can accept and will start [address] [facility] today. CM notified Severa Pierre liaison at [place] [address] and Kalpesh at [facility] made awre of her acceptance at [facility]. CM will follow.  Electronically signed by Rigo Hackett RN on 2/21/2023 at 1:29 PM

## 2023-02-22 VITALS
TEMPERATURE: 98.4 F | DIASTOLIC BLOOD PRESSURE: 51 MMHG | RESPIRATION RATE: 16 BRPM | HEART RATE: 87 BPM | OXYGEN SATURATION: 92 % | SYSTOLIC BLOOD PRESSURE: 102 MMHG | HEIGHT: 64 IN | WEIGHT: 212.6 LBS | BODY MASS INDEX: 36.29 KG/M2

## 2023-02-22 LAB
ANION GAP SERPL CALCULATED.3IONS-SCNC: 9 MMOL/L (ref 7–16)
BUN BLDV-MCNC: 7 MG/DL (ref 6–23)
CALCIUM SERPL-MCNC: 9.7 MG/DL (ref 8.6–10.2)
CHLORIDE BLD-SCNC: 91 MMOL/L (ref 98–107)
CO2: 40 MMOL/L (ref 22–29)
CREAT SERPL-MCNC: 1.1 MG/DL (ref 0.5–1)
GFR SERPL CREATININE-BSD FRML MDRD: 54 ML/MIN/1.73
GLUCOSE BLD-MCNC: 42 MG/DL (ref 74–99)
HBA1C MFR BLD: 5.7 % (ref 4–5.6)
METER GLUCOSE: 111 MG/DL (ref 74–99)
METER GLUCOSE: 121 MG/DL (ref 74–99)
METER GLUCOSE: 66 MG/DL (ref 74–99)
METER GLUCOSE: 73 MG/DL (ref 74–99)
METER GLUCOSE: 99 MG/DL (ref 74–99)
POTASSIUM SERPL-SCNC: 4.4 MMOL/L (ref 3.5–5)
SARS-COV-2, NAAT: NOT DETECTED
SODIUM BLD-SCNC: 140 MMOL/L (ref 132–146)

## 2023-02-22 PROCEDURE — 6370000000 HC RX 637 (ALT 250 FOR IP): Performed by: INTERNAL MEDICINE

## 2023-02-22 PROCEDURE — 87635 SARS-COV-2 COVID-19 AMP PRB: CPT

## 2023-02-22 PROCEDURE — 83036 HEMOGLOBIN GLYCOSYLATED A1C: CPT

## 2023-02-22 PROCEDURE — 6360000002 HC RX W HCPCS: Performed by: INTERNAL MEDICINE

## 2023-02-22 PROCEDURE — 2700000000 HC OXYGEN THERAPY PER DAY

## 2023-02-22 PROCEDURE — 36415 COLL VENOUS BLD VENIPUNCTURE: CPT

## 2023-02-22 PROCEDURE — 99239 HOSP IP/OBS DSCHRG MGMT >30: CPT | Performed by: INTERNAL MEDICINE

## 2023-02-22 PROCEDURE — APPSS45 APP SPLIT SHARED TIME 31-45 MINUTES

## 2023-02-22 PROCEDURE — 82962 GLUCOSE BLOOD TEST: CPT

## 2023-02-22 PROCEDURE — 94660 CPAP INITIATION&MGMT: CPT

## 2023-02-22 PROCEDURE — 80048 BASIC METABOLIC PNL TOTAL CA: CPT

## 2023-02-22 RX ORDER — FUROSEMIDE 40 MG/1
40 TABLET ORAL 2 TIMES DAILY
Qty: 90 TABLET | Refills: 0 | DISCHARGE
Start: 2023-02-22

## 2023-02-22 RX ORDER — LISINOPRIL 10 MG/1
10 TABLET ORAL DAILY
Qty: 60 TABLET | Refills: 0 | Status: SHIPPED | OUTPATIENT
Start: 2023-02-22

## 2023-02-22 RX ORDER — ATORVASTATIN CALCIUM 40 MG/1
40 TABLET, FILM COATED ORAL NIGHTLY
Qty: 30 TABLET | Refills: 3 | DISCHARGE
Start: 2023-02-22

## 2023-02-22 RX ORDER — TRAMADOL HYDROCHLORIDE 50 MG/1
50 TABLET ORAL EVERY 6 HOURS PRN
Qty: 12 TABLET | Refills: 0 | Status: SHIPPED | OUTPATIENT
Start: 2023-02-22 | End: 2023-02-25

## 2023-02-22 RX ADMIN — ENOXAPARIN SODIUM 30 MG: 100 INJECTION SUBCUTANEOUS at 10:20

## 2023-02-22 RX ADMIN — INSULIN GLARGINE 10 UNITS: 100 INJECTION, SOLUTION SUBCUTANEOUS at 08:08

## 2023-02-22 RX ADMIN — Medication 16 G: at 11:39

## 2023-02-22 RX ADMIN — FUROSEMIDE 40 MG: 40 TABLET ORAL at 08:09

## 2023-02-22 RX ADMIN — TRAMADOL HYDROCHLORIDE 50 MG: 50 TABLET, COATED ORAL at 10:20

## 2023-02-22 RX ADMIN — THIAMINE HCL TAB 100 MG 100 MG: 100 TAB at 08:09

## 2023-02-22 RX ADMIN — ASPIRIN 81 MG: 81 TABLET, CHEWABLE ORAL at 08:09

## 2023-02-22 RX ADMIN — TRAMADOL HYDROCHLORIDE 50 MG: 50 TABLET, COATED ORAL at 04:24

## 2023-02-22 RX ADMIN — POTASSIUM CHLORIDE 40 MEQ: 1500 TABLET, EXTENDED RELEASE ORAL at 08:10

## 2023-02-22 RX ADMIN — Medication 1 CAPSULE: at 08:09

## 2023-02-22 RX ADMIN — GLIPIZIDE 5 MG: 5 TABLET ORAL at 08:09

## 2023-02-22 ASSESSMENT — PAIN DESCRIPTION - PAIN TYPE: TYPE: CHRONIC PAIN

## 2023-02-22 ASSESSMENT — PAIN DESCRIPTION - ORIENTATION
ORIENTATION: MID
ORIENTATION: LEFT;RIGHT;UPPER

## 2023-02-22 ASSESSMENT — PAIN DESCRIPTION - LOCATION
LOCATION: BACK
LOCATION: BACK

## 2023-02-22 ASSESSMENT — PAIN SCALES - GENERAL
PAINLEVEL_OUTOF10: 9
PAINLEVEL_OUTOF10: 10
PAINLEVEL_OUTOF10: 5

## 2023-02-22 ASSESSMENT — PAIN DESCRIPTION - DESCRIPTORS
DESCRIPTORS: DISCOMFORT
DESCRIPTORS: ACHING;DISCOMFORT;DULL

## 2023-02-22 ASSESSMENT — PAIN SCALES - WONG BAKER: WONGBAKER_NUMERICALRESPONSE: 0

## 2023-02-22 NOTE — CARE COORDINATION
2/22/2023 1115 CM Note:  Pt is being discharged to [facility] and per Tommy Spears liaison they obtained [facility]. HENS is Completed, LES needs Signed, Rapid Covid is Negative. [facility] will transport pt at 1230 via ambulette, ambulette form completed and placed with paperwork for the NH. Pt's son is aware of discharge and time.   Electronically signed by Jennifer Villarreal RN on 2/22/2023 at 11:26 AM

## 2023-02-22 NOTE — DISCHARGE INSTR - COC
Continuity of Care Form    Patient Name: Nidia Thomas   :  1950  MRN:  [id number]    Admit date:  2/15/2023  Discharge date:  2023     Code Status Order: Full Code   Advance Directives:     Admitting Physician:  Nory Long MD  PCP: Nory Long MD    Discharging Nurse: [facility] Unit/Room#: 0461/1437-79  Discharging Unit Phone Number: [phone]    Emergency Contact:   Extended Emergency Contact Information  Primary Emergency Contact: Whitney Duncan  Address: [address] Misty Hidalgo [address] Phone: [phone]  Relation: Other  Secondary Emergency Contact: Rodney Duncan  Address: [address] [place] [address] [address], [address] [facility] Phone: [phone]  Relation: Child    Past Surgical History:  Past Surgical History:   Procedure Laterality Date    FINGER TRIGGER RELEASE Right 2018    right thumb    HYSTERECTOMY (CERVIX STATUS UNKNOWN)      ID TENDON SHEATH INCISION Right 2018    RIGHT THUMB TRIGGER THUMB RELEASE performed by Irwin Camarena DO at [place] [address] Right 2002    RCR    WISDOM TOOTH EXTRACTION         Immunization History:   Immunization History   Administered Date(s) Administered    COVID-19, J&J, (age 18y+), IM, 0.5 mL 2021       Active Problems:  Patient Active Problem List   Diagnosis Code    Primary hypertension I10    Type 2 diabetes mellitus without complication, without long-term current use of insulin ([address] E11.9    Severe pulmonary hypertension ([address] I27.20    Tobacco abuse Z72.0    Anemia D64.9    Morbid obesity ([address] E66.01    Obstructive sleep apnea G47.33    Chronic systolic (congestive) heart failure I50.22    Chronic obstructive pulmonary disease, unspecified COPD type ([address] J44.9    Acute respiratory failure with hypoxia ([address] J96.01    Acute on chronic heart failure with preserved ejection fraction (HFpEF) ([facility]) I50.33    Elevated troponin R77.8    RVF (right ventricular failure) ([facility]) I50.810    VHD (valvular heart disease) I38       Isolation/Infection:   Isolation            No Isolation          Patient Infection Status       Infection Onset Added Last Indicated Last Indicated By Review Planned Expiration Resolved Resolved By    None active    Resolved    COVID-19 (Rule Out) 02/15/23 02/15/23 02/15/23 COVID-19, Rapid (Ordered)   02/15/23 Rule-Out Test Resulted    COVID-19 22 COVID-19 & Influenza Combo   22     COVID-19 (Rule Out) 22 COVID-19 & Influenza Combo (Ordered)   22 Rule-Out Test Resulted            Nurse Assessment:  Last Vital Signs: BP (!) 102/51   Pulse 87   Temp 98.4 °F (36.9 °C) (Oral)   Resp 16   Ht 5' 4\" (1.626 m)   Wt 212 lb 9.6 oz (96.4 kg)   SpO2 92%   BMI 36.49 kg/m²     Last documented pain score (0-10 scale): Pain Level: 10  Last Weight:   Wt Readings from Last 1 Encounters:   23 212 lb 9.6 oz (96.4 kg)     Mental Status:  oriented and alert    IV Access:  - None    Nursing Mobility/ADLs:  Walking   Assisted  Transfer  Assisted  Bathing  Assisted  Dressing  Assisted  Toileting  Assisted  Feeding  Independent  Med Admin  Assisted  Med Delivery   whole    Wound Care Documentation and Therapy:  Incision 18 Hand Right (Active)   Number of days: 1653        Elimination:  Continence: Bowel: Yes  Bladder: Yes  Urinary Catheter: None   Colostomy/Ileostomy/Ileal Conduit: No       Date of Last BM: ***    Intake/Output Summary (Last 24 hours) at 2023 0923  Last data filed at 2023 7958  Gross per 24 hour   Intake 520 ml   Output 2800 ml   Net -2280 ml     I/O last 3 completed shifts: In: 26 [P.O.:460]  Out: 3000 [Urine:3000]    Safety Concerns:      At Risk for Falls    Impairments/Disabilities:      None    Nutrition Therapy:  Current Nutrition Therapy:   - Oral Diet:  Carb Control 4 carbs/meal (1800kcals/day), Low Fat, and high fiber and no added salt    Routes of Feeding: Oral  Liquids: Thin Liquids  Daily Fluid Restriction: yes - amount 1500  Last Modified Barium Swallow with Video (Video Swallowing Test): not done    Treatments at the Time of Hospital Discharge:   Respiratory Treatments: ***  Oxygen Therapy:  is on oxygen at 3 L/min per nasal cannula. Ventilator:    - No ventilator support    Rehab Therapies: Physical Therapy and Occupational Therapy  Weight Bearing Status/Restrictions: No weight bearing restrictions  Other Medical Equipment (for information only, NOT a DME order):  cane  Other Treatments: ***    Patient's personal belongings (please select all that are sent with patient):  Glasses, clothing and cell phone    RN SIGNATURE:  Electronically signed by Madeleine Vargas RN on 2/22/23 at 11:03 AM EST    CASE MANAGEMENT/SOCIAL WORK SECTION    Inpatient Status Date: 2/22/2023    Readmission Risk Assessment Score:  Readmission Risk              Risk of Unplanned Readmission:  18           Discharging to Facility/ Agency   Name: [address] [place] [address]  [phone]  [phone]    Dialysis Facility (if applicable)   Name:  Address:  Dialysis Schedule:  Phone:  Fax:    / signature: Electronically signed by Ange Faria RN on 2/22/23 at 9:23 AM EST    PHYSICIAN SECTION    Prognosis: Good    Condition at Discharge: Stable    Rehab Potential (if transferring to Rehab): Good    Recommended Labs or Other Treatments After Discharge: PT/OT    Physician Certification: I certify the above information and transfer of Mikayla Dodd  is necessary for the continuing treatment of the diagnosis listed and that she requires [facility] for less 30 days.      Update Admission H&P: No change in H&P    PHYSICIAN SIGNATURE:  Electronically signed by Sangita Trinh MD on 2/22/23 at 11:31 AM EST

## 2023-02-22 NOTE — PROGRESS NOTES
Glucose 66 before lunchtime, given 4 glucose tablets and rechecked 15 minutes later. Glucose back up to 121. Patient being discharged to [place] [address]. Called and gave report to Otilia Clay. Also informed her about patient given glipizide this morning and to continue to recheck her glucose when she gets there until it is out of her system.

## 2023-02-22 NOTE — DISCHARGE SUMMARY
[facility]– [place] Physician Discharge Summary       Vinny Davis MD  [address] [id number] [phone]    Go on 3/7/2023  Appointment scheduled on 03/07/2023 5:15, hospital follow up, Bring all pill bottles to appointment    Vinny Davis MD  [address], [address] [place] [address] [address]  [phone]    Call today  hospital follow up, to schedule appointment within 5- 7 days, Bring all pill bottles to appointment    [address]  [address] [facility] [address]  [phone]  Go on 3/13/2023  Appointment scheduled on 03/13/2023 9am, Heart failure follow up and education, Bring all pill bottles to appointment    SERGIO Bain CNP  [address] [facility]  [phone]    Go on 3/3/2023  Appointment scheduled on 03/03/2023 at 8am, hospital follow up, Provider visit, with Carol BAILON, in the CHF clinic (Entrance A), Bring all pill bottles to appointment      Activity level: As tolerated    Diet: ADULT DIET; Regular; 4 carb choices (60 gm/meal);  Low Fat/Low Chol/High Fiber/CONNOR; Low Sodium (2 gm); 1500 ml    Labs: No pending lab    Condition at discharge: Stable    Dispo: Discharged to [address] [place] [address] oxygen via nasal canula @ 3 LPM round-the-clock and wean as able      Patient ID:  Dougie Denny  [id number]  81 y.o.  1950    Admit date: 2/15/2023    Discharge date and time:  2/22/2023  12:34 PM    Admission Diagnoses: Principal Problem:    Acute respiratory failure with hypoxia ([facility]  Active Problems:    Type 2 diabetes mellitus without complication, without long-term current use of insulin (HCC)    Severe pulmonary hypertension (HCC)    Anemia    Morbid obesity ([address]    Obstructive sleep apnea    Chronic systolic (congestive) heart failure    Chronic obstructive pulmonary disease, unspecified COPD type ([facility] Acute on chronic heart failure with preserved ejection fraction (HFpEF) ([facility])    Elevated troponin    RVF (right ventricular failure) (HCC)    VHD (valvular heart disease)  Resolved Problems:    * No resolved hospital problems. *      Discharge Diagnoses: Principal Problem:    Acute respiratory failure with hypoxia ([facility])  Active Problems:    Type 2 diabetes mellitus without complication, without long-term current use of insulin (HCC)    Severe pulmonary hypertension (HCC)    Anemia    Morbid obesity (HCC)    Obstructive sleep apnea    Chronic systolic (congestive) heart failure    Chronic obstructive pulmonary disease, unspecified COPD type ([facility]    Acute on chronic heart failure with preserved ejection fraction (HFpEF) ([facility])    Elevated troponin    RVF (right ventricular failure) (HCC)    VHD (valvular heart disease)  Resolved Problems:    * No resolved hospital problems. *      Consults:  IP CONSULT TO CARDIOLOGY  IP CONSULT TO HEART FAILURE NURSE/COORDINATOR  IP CONSULT TO SOCIAL WORK    Procedures: n/a    Hospital Course: Nyla Duncan is a 81 y.o. female who presents to the ED due to shortness of breath and hypoxia. Patient was seen at her primary care physician's office this morning and her pulse oximetry was registering in the 60%. On arrival she was 79% in triage. She reports that she has been having worsening shortness of breath over the past week. She denies any fevers or chills. States that she has not had any increased cough. She notes that she has had increased lower extremity edema over the past several weeks as well. She states her shortness of breath is worsened with exertion and laying flat. Patient with acute on chronic diastolic and right-sided CHF. Placed on IV Lasix and was increased. ACEI was held due to bump in creatinine Norvasc held due to edema. Not on beta-blocker due to bradycardic issues. Patient  negative 3.4 L total upon admission.   Lower extremity edema resolving however, still present. Still requiring O2 at 3 L nasal cannula. Still dyspneic with exertion. Overall symptoms have improved she did see cardiology during the stay who is managing her diuretics. Patient was placed on Lantus and sliding scale during admission normally on metformin and glipizide was noted to have episodes of hypoglycemia as low as 42 on day of discharge last A1c noted to be 5.9. Will discontinue metformin and glipizide will need to follow-up outpatient. , Potassium 4.4 and creatinine 1.1 at time of discharge. Would recommend follow-up BMP in 1 week as Lasix was increased to 40 mg twice daily and lisinopril was resumed.   Will follow-up with cardiology outpatient as well as primary    Discharge Exam:  Vitals:    02/21/23 1515 02/21/23 2300 02/22/23 0800 02/22/23 1050   BP:  (!) 144/91 (!) 102/51    Pulse:  87 87    Resp: 18 16 16 16   Temp:  99.1 °F (37.3 °C) 98.4 °F (36.9 °C)    TempSrc:  Oral Oral    SpO2:  95% 92%    Weight:       Height:           General Appearance: alert and oriented to person, place and time, well developed and well- nourished, in no acute distress  Skin: warm and dry, no rash or erythema  Head: normocephalic and atraumatic  Eyes: pupils equal, round, and reactive to light, extraocular eye movements intact, conjunctivae normal  ENT: tympanic membrane, external ear and ear canal normal bilaterally, nose without deformity, nasal mucosa and turbinates normal without polyps  Neck: supple and non-tender without mass, no thyromegaly or thyroid nodules, no cervical lymphadenopathy  Pulmonary/Chest: clear to auscultation bilaterally on 3 L nasal cannula- no wheezes, rales or rhonchi, normal air movement, no respiratory distress  Cardiovascular: normal rate, regular rhythm, normal S1 and S2, no murmurs, rubs, clicks, or gallops, distal pulses intact, no carotid bruits  Abdomen: soft, non-tender, non-distended, obese, normal bowel sounds, no masses or organomegaly  Extremities: no cyanosis, clubbing moderate BLE pitting edema  Musculoskeletal: normal range of motion, no joint swelling, deformity or tenderness  Neurologic: reflexes normal and symmetric, no cranial nerve deficit, gait, coordination and speech normal    I/O last 3 completed shifts: In: 460 [P.O.:460]  Out: 3000 [Urine:3000]  I/O this shift: In: 5 [P.O.:420]  Out: -       LABS:  Recent Labs     02/20/23  0603 02/20/23  0933 02/22/23  1045     --  140   K 5.5* 4.6 4.4   CL 98  --  91*   CO2 37*  --  40*   BUN 9  --  7   CREATININE 0.9  --  1.1*   GLUCOSE 66*  --  42*   CALCIUM 8.7  --  9.7       Recent Labs     02/20/23  0603   WBC 7.3   RBC 4.50   HGB 9.2*   HCT 33.1*   MCV 73.6*   MCH 20.4*   MCHC 27.8*   RDW 19.5*      MPV 9.4       No results for input(s): POCGLU in the last 72 hours. Imaging:  XR CHEST PORTABLE    Result Date: 2/15/2023  EXAMINATION: ONE XRAY VIEW OF THE CHEST 2/15/2023 12:14 pm COMPARISON: 5 September 2022 HISTORY: ORDERING SYSTEM PROVIDED HISTORY: shortness of breath TECHNOLOGIST PROVIDED HISTORY: Reason for exam:->shortness of breath FINDINGS: Postoperative changes redemonstrated near the right apex. Normal heart and pulmonary vascularity. Lungs are clear. Neither costophrenic angle is blunted. No active process. Postsurgical changes. CTA PULMONARY W CONTRAST    Result Date: 2/15/2023  EXAMINATION: CTA OF THE CHEST 2/15/2023 3:49 pm TECHNIQUE: CTA of the chest was performed after the administration of intravenous contrast.  Multiplanar reformatted images are provided for review. MIP images are provided for review. Automated exposure control, iterative reconstruction, and/or weight based adjustment of the mA/kV was utilized to reduce the radiation dose to as low as reasonably achievable.  COMPARISON: 09/02/2022 CTA chest. HISTORY: ORDERING SYSTEM PROVIDED HISTORY: rule out PE TECHNOLOGIST PROVIDED HISTORY: Reason for exam:->rule out PE Decision Support Exception - unselect if not a suspected or confirmed emergency medical condition->Emergency Medical Condition (MA) FINDINGS: Pulmonary Arteries: The current examination is limited due to motion and beam hardening artifact particularly in evaluating the lower lobes. No obvious central pulmonary artery filling defect or defect involving the secondary branches is identified. The main pulmonary artery measures 2.9 cm diameter. Mediastinum: Cardiac size is enlarged. There is enlargement of all 4 chambers with greatest enlargement of the right atrium followed by right ventricle suggesting chronic increased pulmonary artery pressure. There are scattered atherosclerotic calcifications of the thoracic aorta. No thoracic aortic aneurysm or dissection. No mediastinal or hilar lymphadenopathy. Density within the esophagus is presumably oral contrast although there is no contrast seen within the stomach. There is a small hiatal hernia. Lungs/pleura: There are linear densities within the lingula and at both lung bases. Small areas of hyperdense material at the lung bases may be due to aspirated barium or parenchymal calcifications. There is a small area of consolidation right posterior costophrenic sulcus. No pleural effusions. Upper Abdomen: There is diffuse enlargement of the adrenal glands most likely reflecting benign adrenal hyperplasia. There is a partially visualized cyst left kidney superior pole. Soft Tissues/Bones: No acute bone or soft tissue abnormality. Limited CTA chest without definite pulmonary embolism, particularly centrally or within the secondary branches. Pulmonary hypertension. Cardiomegaly and atherosclerosis. Lingular and bilateral lower lobe atelectasis/pneumonitis.          Patient Instructions:   Current Discharge Medication List        START taking these medications    Details   atorvastatin (LIPITOR) 40 MG tablet Take 1 tablet by mouth nightly  Qty: 30 tablet, Refills: 3 traMADol (ULTRAM) 50 MG tablet Take 1 tablet by mouth every 6 hours as needed for Pain for up to 3 days. Max Daily Amount: 200 mg  Qty: 12 tablet, Refills: 0    Comments: Reduce doses taken as pain becomes manageable  Associated Diagnoses: Chronic midline low back pain without sciatica           CONTINUE these medications which have CHANGED    Details   furosemide (LASIX) 40 MG tablet Take 1 tablet by mouth 2 times daily  Qty: 90 tablet, Refills: 0      lisinopril (PRINIVIL;ZESTRIL) 10 MG tablet Take 1 tablet by mouth daily  Qty: 60 tablet, Refills: 0           CONTINUE these medications which have NOT CHANGED    Details   ondansetron (ZOFRAN) 4 MG tablet Take 1 tablet by mouth daily as needed for Nausea or Vomiting  Qty: 30 tablet, Refills: 0      Insulin Pen Needle 32G X 5 MM MISC 1 each by Does not apply route daily  Qty: 100 each, Refills: 1      potassium chloride (KLOR-CON M) 20 MEQ TBCR extended release tablet Take 2 tablets by mouth daily  Qty: 180 tablet, Refills: 0      Lancets MISC Use to check blood sugar 2 times daily. Qty: 200 each, Refills: 1    Associated Diagnoses: Type 2 diabetes mellitus without complication, without long-term current use of insulin ([facility])      lactobacillus (CULTURELLE) CAPS capsule Take 1 capsule by mouth in the morning and 1 capsule in the evening. Do all this for 21 days.   Qty: 42 capsule, Refills: 0      thiamine mononitrate (THIAMINE) 100 MG tablet Take 1 tablet by mouth daily for 10 days  Qty: 10 tablet, Refills: 0      aspirin 325 MG tablet Take 325 mg by mouth daily           STOP taking these medications       amLODIPine (NORVASC) 5 MG tablet Comments:   Reason for Stopping:         glipiZIDE (GLUCOTROL) 5 MG tablet Comments:   Reason for Stopping:         metFORMIN (GLUCOPHAGE) 500 MG tablet Comments:   Reason for Stopping:         quinapril (ACCUPRIL) 20 MG tablet Comments:   Reason for Stopping:         hydroCHLOROthiazide (MICROZIDE) 12.5 MG capsule Comments: Reason for Stopping:         insulin detemir (LEVEMIR FLEXTOUCH) 100 UNIT/ML injection pen Comments:   Reason for Stopping:         blood glucose monitor strips Comments:   Reason for Stopping:         atenolol (TENORMIN) 50 MG tablet Comments:   Reason for Stopping:                 Note  that  42  minutes were spent in preparing discharge papers, discussing discharge with patient, medication review, etc.    NOTE: This report was transcribed using voice recognition software. Every effort was made to ensure accuracy; however, inadvertent computerized transcription errors may be present.      Signed:  Electronically signed by SERGIO Drew CNP on 2/22/2023 at 12:34 PM

## 2023-02-22 NOTE — PLAN OF CARE
Problem: Discharge Planning  Goal: Discharge to home or other facility with appropriate resources  Outcome: Progressing  Flowsheets (Taken 2/22/2023 0800)  Discharge to home or other facility with appropriate resources:   Identify barriers to discharge with patient and caregiver   Arrange for needed discharge resources and transportation as appropriate   Identify discharge learning needs (meds, wound care, etc)   Refer to discharge planning if patient needs post-hospital services based on physician order or complex needs related to functional status, cognitive ability or social support system     Problem: Pain  Goal: Verbalizes/displays adequate comfort level or baseline comfort level  Outcome: Progressing     Problem: Chronic Conditions and Co-morbidities  Goal: Patient's chronic conditions and co-morbidity symptoms are monitored and maintained or improved  Outcome: Progressing  Flowsheets (Taken 2/22/2023 0800)  Care Plan - Patient's Chronic Conditions and Co-Morbidity Symptoms are Monitored and Maintained or Improved:   Monitor and assess patient's chronic conditions and comorbid symptoms for stability, deterioration, or improvement   Collaborate with multidisciplinary team to address chronic and comorbid conditions and prevent exacerbation or deterioration   Update acute care plan with appropriate goals if chronic or comorbid symptoms are exacerbated and prevent overall improvement and discharge     Problem: Safety - Adult  Goal: Free from fall injury  Outcome: Progressing  Flowsheets (Taken 2/22/2023 0800)  Free From Fall Injury:   Instruct family/caregiver on patient safety   Based on caregiver fall risk screen, instruct family/caregiver to ask for assistance with transferring infant if caregiver noted to have fall risk factors     Problem: ABCDS Injury Assessment  Goal: Absence of physical injury  Outcome: Progressing  Flowsheets (Taken 2/22/2023 0800)  Absence of Physical Injury: Implement safety measures based on patient assessment

## 2023-02-22 NOTE — PROGRESS NOTES
[facility] Cardiology Inpatient Progress Note    Patient is a 81 y.o. female of Karli Baires MD seen in hospital follow up. Chief complaint: CHF    HPI: Some SOB. No CP.      Patient Active Problem List   Diagnosis    Primary hypertension    Type 2 diabetes mellitus without complication, without long-term current use of insulin ([facility]    Severe pulmonary hypertension (HCC)    Tobacco abuse    Anemia    Morbid obesity ([address]    Obstructive sleep apnea    Chronic systolic (congestive) heart failure    Chronic obstructive pulmonary disease, unspecified COPD type ([facility]    Acute respiratory failure with hypoxia ([facility])    Acute on chronic heart failure with preserved ejection fraction (HFpEF) ([facility])    Elevated troponin    RVF (right ventricular failure) ([facility])    VHD (valvular heart disease)       No Known Allergies    Current Facility-Administered Medications   Medication Dose Route Frequency Provider Last Rate Last Admin    furosemide (LASIX) tablet 40 mg  40 mg Oral BID Silvana Braga MD   40 mg at 02/22/23 0809    atorvastatin (LIPITOR) tablet 40 mg  40 mg Oral Nightly Benji Duel, DO   40 mg at 02/21/23 2151    metOLazone (ZAROXOLYN) tablet 2.5 mg  2.5 mg Oral Once Julieth Singer MD        aspirin chewable tablet 81 mg  81 mg Oral Daily Swetha Braga MD   81 mg at 02/22/23 0809    traMADol (ULTRAM) tablet 50 mg  50 mg Oral Q6H PRN Swetha Braga MD   50 mg at 02/22/23 0424    insulin glargine (LANTUS) injection vial 10 Units  10 Units SubCUTAneous BID Swetha Braga MD   10 Units at 02/22/23 0808    glipiZIDE (GLUCOTROL) tablet 5 mg  5 mg Oral Daily Swetha Braga MD   5 mg at 02/22/23 0809    lactobacillus (CULTURELLE) capsule 1 capsule  1 capsule Oral Q12H Swetha Braga MD   1 capsule at 02/22/23 0809    [Held by provider] lisinopril (PRINIVIL;ZESTRIL) tablet 40 mg  40 mg Oral Daily Silvana Braga MD   40 mg at 02/16/23 0804    potassium chloride (KLOR-CON M) extended release tablet 40 mEq  40 mEq Oral Daily Rupal Braga MD   40 mEq at 02/22/23 0810    thiamine mononitrate tablet 100 mg  100 mg Oral Daily Swetha Braga MD   100 mg at 02/22/23 0809    ondansetron (ZOFRAN) tablet 4 mg  4 mg Oral Daily PRN Rupal Braga MD   4 mg at 02/21/23 0950    insulin lispro (HUMALOG) injection vial 0-8 Units  0-8 Units SubCUTAneous TID WC Swetha Braga MD   8 Units at 02/16/23 0813    glucose chewable tablet 16 g  4 tablet Oral PRN Swetha Braga MD        dextrose bolus 10% 125 mL  125 mL IntraVENous PRN Swetha Braga MD        Or    dextrose bolus 10% 250 mL  250 mL IntraVENous PRN Swetha Braga MD        glucagon (rDNA) injection 1 mg  1 mg SubCUTAneous PRN Swetha Braga MD        dextrose 10 % infusion   IntraVENous Continuous PRN Swetha Braga MD        enoxaparin Sodium (LOVENOX) injection 30 mg  30 mg SubCUTAneous BID Swetha Braga MD   30 mg at 02/21/23 2152       Review of systems:   Heart: as above   Lungs: as above   Eyes: denies changes in vision or discharge. Ears: denies changes in hearing or pain. Nose: denies epistaxis or masses   Throat: denies sore throat or trouble swallowing. Neuro: denies numbness, tingling, tremors. Skin: denies rashes or itching. : denies hematuria, dysuria   GI: denies vomiting, diarrhea   Psych: denies mood changed, anxiety, depression. Physical Exam   BP (!) 102/51   Pulse 87   Temp 98.4 °F (36.9 °C) (Oral)   Resp 16   Ht 5' 4\" (1.626 m)   Wt 212 lb 9.6 oz (96.4 kg)   SpO2 92%   BMI 36.49 kg/m²   Constitutional: Oriented to person, place, and time. No distress. Well developed. Head: Normocephalic and atraumatic. Neck: Neck supple. No hepatojugular reflux. No JVD present. Carotid bruit is not present. No tracheal deviation present. No thyromegaly present. Cardiovascular: Normal rate, regular rhythm, normal heart sounds. intact distal pulses. No gallop and no friction rub. No murmur heard.   Pulmonary: Breath sounds normal. No respiratory distress. No wheezes. No rales. Chest: Effort normal. No tenderness. Abdominal: Soft. Bowel sounds are normal. No distension or mass. No tenderness, rebound or guarding. Musculoskeletal: . No tenderness. No clubbing or cyanosis. Extremitites: Intact distal pulses. No edema  Neurological: Alert and oriented to person, place, and time. Skin: Skin is warm and dry. No rash noted. Not diaphoretic. No erythema. Psychiatric: Normal mood and affect. Behavior is normal.   Lymphadenopathy: No cervical adenopathy. No groin adenopathy. CBC:   Lab Results   Component Value Date/Time    WBC 7.3 02/20/2023 06:03 AM    RBC 4.50 02/20/2023 06:03 AM    HGB 9.2 02/20/2023 06:03 AM    HCT 33.1 02/20/2023 06:03 AM    MCV 73.6 02/20/2023 06:03 AM    MCH 20.4 02/20/2023 06:03 AM    MCHC 27.8 02/20/2023 06:03 AM    RDW 19.5 02/20/2023 06:03 AM     02/20/2023 06:03 AM    MPV 9.4 02/20/2023 06:03 AM     BMP:   Lab Results   Component Value Date/Time     02/20/2023 06:03 AM    K 4.6 02/20/2023 09:33 AM    K 3.2 02/15/2023 01:13 PM    CL 98 02/20/2023 06:03 AM    CO2 37 02/20/2023 06:03 AM    BUN 9 02/20/2023 06:03 AM    LABALBU 3.0 02/20/2023 06:03 AM    CREATININE 0.9 02/20/2023 06:03 AM    CALCIUM 8.7 02/20/2023 06:03 AM    GFRAA >60 09/14/2022 07:00 AM    LABGLOM >60 02/20/2023 06:03 AM     Magnesium:    Lab Results   Component Value Date/Time    MG 2.0 02/15/2023 01:13 PM     Cardiac Enzymes:   Lab Results   Component Value Date    CKTOTAL 48 09/09/2022    CKTOTAL 112 09/01/2022    TROPHS 43 (H) 02/15/2023    TROPHS 54 (H) 02/15/2023    TROPHS 67 (H) 09/09/2022      PT/INR:    Lab Results   Component Value Date/Time    PROTIME 12.6 09/09/2022 10:20 AM    INR 1.1 09/09/2022 10:20 AM     TSH:    Lab Results   Component Value Date/Time    TSH 2.460 09/06/2022 11:01 AM     Rhythm Strip: normal sinus rhythm. TTE, Dr. Dior Abdul 9/2022: Normal LV size and function, EF > 55%. Stage II DD. Dilated RV and reduced RV function. Dilated RA. Mild TR. Pulmonary hypertension with PASP 62 mmHg. ASSESSMENT & PLAN:    Patient Active Problem List   Diagnosis    Primary hypertension    Type 2 diabetes mellitus without complication, without long-term current use of insulin (HCC)    Severe pulmonary hypertension (HCC)    Tobacco abuse    Anemia    Morbid obesity ([facility]    Obstructive sleep apnea    Chronic systolic (congestive) heart failure    Chronic obstructive pulmonary disease, unspecified COPD type ([facility]    Acute respiratory failure with hypoxia ([facility])    Acute on chronic heart failure with preserved ejection fraction (HFpEF) ([facility])    Elevated troponin    RVF (right ventricular failure) ([facility])    VHD (valvular heart disease)     1. Acute on chronic diastolic and right-sided CHF:     Increase IV lasix. ACEI held due to bump in Cr. Stop norvasc due to edema. No BB due to bradycardia issues. Consider CardioMEMS as outpatient. 2. Elevated troponin:    Coronary artery calcifications on chest CTA 9/2022. Medically manage. ASA/statin. No BB due to bradycardia issues. 3. VHD: Mild TR, mod to sev PH with PASP 62 mmHg on TTE 9/2022.    4. COPD/Hypoxic hypercapnic respiratory failure: Supportive care. 5. HTN: Observe. 6. Lipids: Statin. 7. DM: Per primary service. 8. Acute on CKD: Follow labs. 9. Anemia: Follow labs. 10. Tobacco abuse    11. Probable SHAVONNE    Vane Wallace D.O.   Cardiologist  Cardiology, [facility]

## 2023-02-23 ENCOUNTER — TELEPHONE (OUTPATIENT)
Dept: CARDIOLOGY CLINIC | Age: 73
End: 2023-02-23

## 2023-02-23 NOTE — TELEPHONE ENCOUNTER
Breana Back at [facility] notified of Dr. Castellon Held recommendation. F/U is already scheduled with Chandana Choi NP on 3/3/23.

## 2023-03-03 ENCOUNTER — HOSPITAL ENCOUNTER (OUTPATIENT)
Dept: OTHER | Age: 73
Setting detail: THERAPIES SERIES
Discharge: HOME | End: 2023-03-03
Payer: MEDICARE

## 2023-03-03 ENCOUNTER — OUTSIDE SERVICES (OUTPATIENT)
Dept: PRIMARY CARE CLINIC | Age: 73
End: 2023-03-03

## 2023-03-03 VITALS
OXYGEN SATURATION: 92 % | BODY MASS INDEX: 31.31 KG/M2 | HEIGHT: 64 IN | RESPIRATION RATE: 16 BRPM | HEART RATE: 81 BPM | WEIGHT: 183.4 LBS | SYSTOLIC BLOOD PRESSURE: 127 MMHG | DIASTOLIC BLOOD PRESSURE: 60 MMHG

## 2023-03-03 DIAGNOSIS — I10 PRIMARY HYPERTENSION: ICD-10-CM

## 2023-03-03 DIAGNOSIS — I50.33 ACUTE ON CHRONIC HEART FAILURE WITH PRESERVED EJECTION FRACTION (HFPEF) (HCC): Primary | ICD-10-CM

## 2023-03-03 DIAGNOSIS — J96.01 ACUTE RESPIRATORY FAILURE WITH HYPOXIA (HCC): ICD-10-CM

## 2023-03-03 DIAGNOSIS — J44.9 CHRONIC OBSTRUCTIVE PULMONARY DISEASE, UNSPECIFIED COPD TYPE (HCC): ICD-10-CM

## 2023-03-03 DIAGNOSIS — E11.9 TYPE 2 DIABETES MELLITUS WITHOUT COMPLICATION, WITHOUT LONG-TERM CURRENT USE OF INSULIN (HCC): ICD-10-CM

## 2023-03-03 LAB
ANION GAP SERPL CALCULATED.3IONS-SCNC: 8 MMOL/L (ref 7–16)
BUN BLDV-MCNC: 13 MG/DL (ref 6–23)
CALCIUM SERPL-MCNC: 9.4 MG/DL (ref 8.6–10.2)
CHLORIDE BLD-SCNC: 101 MMOL/L (ref 98–107)
CO2: 31 MMOL/L (ref 22–29)
CREAT SERPL-MCNC: 1 MG/DL (ref 0.5–1)
GFR SERPL CREATININE-BSD FRML MDRD: >60 ML/MIN/1.73
GLUCOSE BLD-MCNC: 101 MG/DL (ref 74–99)
POTASSIUM SERPL-SCNC: 4.2 MMOL/L (ref 3.5–5)
PRO-BNP: 306 PG/ML (ref 0–125)
SODIUM BLD-SCNC: 140 MMOL/L (ref 132–146)

## 2023-03-03 PROCEDURE — 83880 ASSAY OF NATRIURETIC PEPTIDE: CPT

## 2023-03-03 PROCEDURE — 80048 BASIC METABOLIC PNL TOTAL CA: CPT

## 2023-03-03 PROCEDURE — 36415 COLL VENOUS BLD VENIPUNCTURE: CPT

## 2023-03-03 PROCEDURE — 99214 OFFICE O/P EST MOD 30 MIN: CPT | Performed by: NURSE PRACTITIONER

## 2023-03-03 RX ORDER — BISACODYL 10 MG
10 SUPPOSITORY, RECTAL RECTAL DAILY PRN
COMMUNITY

## 2023-03-03 RX ORDER — ACETAMINOPHEN 325 MG/1
650 TABLET ORAL EVERY 4 HOURS PRN
COMMUNITY

## 2023-03-03 ASSESSMENT — PATIENT HEALTH QUESTIONNAIRE - PHQ9
2. FEELING DOWN, DEPRESSED OR HOPELESS: NOT AT ALL
SUM OF ALL RESPONSES TO PHQ9 QUESTIONS 1 & 2: 0
1. LITTLE INTEREST OR PLEASURE IN DOING THINGS: NOT AT ALL

## 2023-03-03 ASSESSMENT — EJECTION FRACTION: EF_SOURCE: 2D ECHO

## 2023-03-03 ASSESSMENT — PAIN SCALES - GENERAL: PAINLEVEL_OUTOF10: 0

## 2023-03-03 NOTE — PROGRESS NOTES
[address] [facility]F [facility]  [facility] Clinic Visit         Reason for Visit: Heart Failure    Primary Cardiologist: Dr. Yao Ricardo       History of Present Illness:     Ms. Dori Ames is a 81year old female with a PMHx of chronic HFpEF/RHF, RV dysfunction, pulmonary hypertension, COPD with chronic hypoxic respiratory failure, ongoing tobacco abuse, bradycardia, CKD, chronic anemia, HLD, probable SHAVONNE. She presents today in hospital follow-up, since discharge from the hospital she has been compliant with all of her current cardiac medications while in SNF facility. She reports excellent urinary response to oral furosemide with clear yellow urine production. She has extensive improvement of bilateral lower extremity edema with wrinkling of her lower extremities. She has chronic [place] but denies any orthopnea, PND, abdominal bloating, early satiety. She denies any dizziness, lightheadedness, near-syncope, syncope, strokelike symptoms, chest pain, pressure, heaviness, palpitations. She is unable to monitor her diet for sodium content while at SNF facility but denies adding any extra sodium to her food. She does not like the food at the facility so is frequently eating some saltines.       Patient Active Problem List    Diagnosis Date Noted    Acute on chronic heart failure with preserved ejection fraction (HFpEF) ([address] 02/16/2023     Priority: Medium    Elevated troponin 02/16/2023     Priority: Medium    RVF (right ventricular failure) ([address] 02/16/2023     Priority: Medium    VHD (valvular heart disease) 02/16/2023     Priority: Medium    Chronic obstructive pulmonary disease, unspecified COPD type ([address] 02/15/2023     Priority: Medium    Acute respiratory failure with hypoxia ([address] 02/15/2023     Priority: Medium    Chronic systolic (congestive) heart failure 11/02/2022     Priority: Medium    Morbid obesity ([address] 09/06/2022     Priority: Medium    Obstructive sleep apnea 09/06/2022     Priority: Medium    Primary hypertension 09/02/2022     Priority: Medium    Type 2 diabetes mellitus without complication, without long-term current use of insulin ([facility] 09/02/2022     Priority: Medium    Severe pulmonary hypertension ([facility] 09/02/2022     Priority: Medium    Tobacco abuse 09/02/2022     Priority: Medium    Anemia 09/02/2022     Priority: Medium           Past Medical History:   Diagnosis Date    Chronic back pain 2018    10/10 on the pain scale    COPD (chronic obstructive pulmonary disease) ([facility]     GERD (gastroesophageal reflux disease)     Headache     Hyperlipidemia     Hypertension     Obesity     Scoliosis     Type 2 diabetes mellitus without complication ([facility]            Past Surgical History:   Procedure Laterality Date    FINGER TRIGGER RELEASE Right 08/14/2018    right thumb    HYSTERECTOMY (CERVIX STATUS UNKNOWN)      LA TENDON SHEATH INCISION Right 8/14/2018    RIGHT THUMB TRIGGER THUMB RELEASE performed by Dayna Rocha DO at [place] [address] Right 2002    RCR    WISDOM TOOTH EXTRACTION               No Known Allergies      Outpatient Medications Marked as Taking for the 3/3/23 encounter [facility] HOSPITAL Encounter) with SERGIO Yanez CNP   Medication Sig Dispense Refill    bisacodyl (DULCOLAX) 10 MG suppository Place 10 mg rectally daily as needed for Constipation      acetaminophen (TYLENOL) 325 MG tablet Take 650 mg by mouth every 4 hours as needed for Pain      magnesium hydroxide (MILK OF MAGNESIA) 400 MG/5ML suspension Take 30 mLs by mouth daily as needed for Constipation           Review of Systems:   Cardiac: As per HPI  General: No fever, chills, rigors  Pulmonary: As per HPI  HEENT: No visual disturbances, difficult swallowing  GI: No nausea, vomiting, abdominal pain  : No dysuria or hematuria  Endocrine: No thyroid disease or diabetes  Musculoskeletal: CASILLAS x 4, no focal motor deficits  Skin: Intact, no rashes  Neuro/Psych: No headache or seizures      Weights:   Wt Readings from Last 3 Encounters:   03/03/23 183 lb 6.4 oz (83.2 kg)   02/21/23 212 lb 9.6 oz (96.4 kg)   02/15/23 236 lb (107 kg)             Physical Examination:     /60   Pulse 81   Resp 16   Ht 5' 4\" (1.626 m)   Wt 183 lb 6.4 oz (83.2 kg)   SpO2 92%   BMI 31.48 kg/m²     CONSTITUTIONAL: Alert and oriented times 3, no acute distress and cooperative to examination with proper mood and affect. SKIN: Skin color, texture, turgor normal. No rashes or lesions. LYMPH: no cervical nodes, no inguinal nodes  HEENT: Head is normocephalic, atraumatic. EOMI, PERRLA. NECK: Supple, symmetrical, trachea midline, no adenopathy, thyroid symmetric, not enlarged and no tenderness, skin normal.  CHEST/LUNGS: chest symmetric with normal A/P diameter, normal respiratory rate and rhythm, lungs clear to auscultation without wheezes, rales or rhonchi. No accessory muscle use. Scars None   CARDIOVASCULAR: Heart sounds are normal.  Regular rate and rhythm without murmur, gallop or rub. Normal S1 and S2. . Carotid and femoral pulses 2+/4 and equal bilaterally. ABDOMEN: Obese No and Laparoscopic scar(s) present. Normal bowel sounds. No bruits. soft, nondistended, no masses or organomegaly. no evidence of hernia. Percussion: Normal without hepatosplenomegally. Tenderness: absent. RECTAL: deferred, not clinically indicated  NEUROLOGIC: There are no focalizing motor or sensory deficits. CN II-XII are grossly intact. EXTREMITIES: no cyanosis, no clubbing, and no edema. All the following diagnostics were personally reviewed and interpreted by me.        LAB DATA:     2/22/23 10:45   Sodium 140   Potassium 4.4   Chloride 91 (L)   CO2 40 (H)   BUN,BUNPL 7   Creatinine 1.1 (H)   Anion Gap 9   Est, Glom Filt Rate 54   Glucose, Random 42 (L)   CALCIUM, SERUM, 180739 9.7   Hemoglobin A1C 5.7 (H)       IMAGING:    CTA Pulmonary (2/15/2023)  Impression  Limited CTA chest without definite pulmonary embolism, particularly centrally  or within the secondary branches. Pulmonary hypertension. Cardiomegaly and atherosclerosis. Lingular and bilateral lower lobe atelectasis/pneumonitis. CARDIAC TESTING:    TTE (9/2/2022)   Summary   Normal left ventricular systolic function. Ejection fraction is visually estimated at > 55%. Dilated right ventricle (RV:LV diastolic diameter ratio > 1) and reduced right ventricular function. There is doppler evidence of stage II diastolic dysfunction. Mild tricuspid regurgitation. PASP is estimated at 62 mmHg. Telemetry  Sinus Rhythm with occasional PVC      ASSESSMENT:  Chronic HFpEF/ RHF  ACC stage C / NYHA class III  Euvolemic   Dilated RV with reduced RV function and mild TR and evidence for pulmonary hypertension - WHO group 3  Chronic hypoxic respiratory failure/COPD - intermittent wears supplemental O2 (she takes it off frequently)  HTN  Coronary artery calcifications on chest CTA 9/2022 - medically managed  Hx of bradycardia - no BB  CKD  Chronic anemia   HLD - on statin therapy  Probable SHAVONNE - refuses testing   Ongoing tobacco abuse     PLAN:  Get blood work today  Continue current cardiac medications  Please monitor oxygen saturation and encourage to wear if O2 <93%  CardioMEMS information given to patient to review  Follow up in CHF clinic in 7-10 days  Follow up with Dr. Binu Sparrow in 3 months [facility] office)    Weigh yourself daily    -Stay Hydrated, 64 oz     -Diet should sodium restricted to 2 grams    -Again watch your daily weight trends and if you gain water weight please follow below instructions.    -If you gain 3-5 pounds in 2-3 days OR notice that you are retaining fluid in anyway just like you did before then take an extra dose of your water pill (furosemide/Lasix) every day until you lose the weight or feel better.     -If you notice that you have taken more than 2 extra doses in 1 week then please call and let us know.     -If at any time you feel that you are retaining fluid, your medications are not working, or you feel ill in anyway, then please call us for either same day appointment or the next day, and for instructions. Our goal is to keep you out of the emergency room and the hospital and we have ways to do it. You just need to call us in a timely manner.     -If you become sick for other reasons, and notice that you are not urinating as much, the urine is very dark, you have significant diarrhea or vomiting, then please DO NOT take your water pill and CALL US immediately.      Nicolle Chan APRN-CNP  [id number] [facility] Cardiology

## 2023-03-03 NOTE — DISCHARGE INSTRUCTIONS
Get blood work today    Continue current cardiac medications    Please monitor oxygen saturation and encourage to wear if O2 <93%    CardioMEMS information given to patient to review    Follow up in CHF clinic in 7-10 days    Follow up with Dr. Leidy Givens in 3 months [facility] office)    Weigh yourself daily    -Stay Hydrated, 64 oz     -Diet should sodium restricted to 2 grams    -Again watch your daily weight trends and if you gain water weight please follow below instructions.    -If you gain 3-5 pounds in 2-3 days OR notice that you are retaining fluid in anyway just like you did before then take an extra dose of your water pill (furosemide/Lasix) every day until you lose the weight or feel better.     -If you notice that you have taken more than 2 extra doses in 1 week then please call and let us know. -If at any time you feel that you are retaining fluid, your medications are not working, or you feel ill in anyway, then please call us for either same day appointment or the next day, and for instructions. Our goal is to keep you out of the emergency room and the hospital and we have ways to do it. You just need to call us in a timely manner.     -If you become sick for other reasons, and notice that you are not urinating as much, the urine is very dark, you have significant diarrhea or vomiting, then please DO NOT take your water pill and CALL US immediately. HEART FAILURE  / CONGESTIVE HEART FAILURE  DISCHARGE INSTRUCTIONS:  GUIDELINES TO FOLLOW AT HOME    Self- Managed Care:     MEDICATIONS:  Take your medication as directed. If you are experiencing any side effects, inform your doctor, Do not stop taking any of your medications without letting your doctor know. Check with your doctor before taking any over-the-counter medications / herbal / or dietary supplements. They may interfere with your other medications.   Do not take ibuprofen (Advil or Motrin) and naproxen (Aleve) without talking to your doctor first. They could make your heart failure worse. WEIGHT MONITORING:   Weigh yourself everyday (with the same scale) around the same time of the day and write it down. (you can chart them on a calendar or keep track of them on paper. Notify your doctor of a weight gain of 3 pounds or more in 1 day   OR a total of 5 pounds or more in 1 week    Take your weight record to your doctor visits  Also, the same goes if you loose more than 3# in one day, let your heart doctor know. DIET:   Cardiac heart healthy diet- Low saturated / low trans fat, no added salt, caffeine restricted, Low sodium diet-   No more than 2,000mg (2 grams) of salt / sodium per day (which equals to a little less than  a teaspoon of salt)  If your doctor wants you on a fluid restriction. ..it is usually recommended a fluid limit of 2,000cc -  Fluid restriction- 2,000 ml (milliliters) = 64 ounces = you can have 8 glasses of fluid per day (each glass 8 ounces)    Follow a low salt diet - avoid using salt at the table, avoid / limit use of canned soups, processed / packaged foods, salted snacks, olives and pickles. Do not use a salt substitute without checking with your doctor, they may contain a high amount of potassioum. (Mrs. Yefri Lucero is safe to use). Limit the use of alcohol       CALL YOUR DOCTOR THE FIRST DAY YOU NOTICE ANY OF THESE   SYMPTOMS:  You have a weight gain of 3 pounds or more in 1 day         OR 5 pounds or more in one week  More shortness of breath  More swelling of your stomach, legs, ankles or feet  Feeling more tired, No energy  Dry hacky cough  Dizziness  More chest pain / discomfort       (CALL 911 IF ANY OF THE FOLLOWING OCCURS  Chest pain (not relieved with nitroglycerine, if you have been prescribed this medication)  Severe shortness of breath  Faint / Pass out  Confusion / cannot think clearly  If symptoms get worse           SMOKING - TOBACCO USE:  * IF YOU SMOKE - STOP! Kick the habit. [address] Program is offered at [facility] [address] and [address] [facility]. Call [phone] extension [phone] for more information. ACTIVITY:   (Ask your doctor when you will be able to return to work and before starting any exercise program.  Do not drive unless unless your doctor has given you permission to do so). Start light exercise. Even if you can only do a small amount, exercise will help you get stronger, have more energy, help manage your weight and decrease  stress. Walking is an easy way to get exercise. Start out slowly and  increase the amount you walk as tolerated  If you become short of breath, dizzy or have chest pain; stop, sit down, and rest.  If you feel \"wiped out\" the day after you exercise, walk at a slower pace or for a shorter distance. You can gradually increase the pace or amount of time. (Do not exercise right after a meal or in extreme temperatures, such as above 85 degrees, if the air is really humid, or wind chill is less than 20 degrees)                                             ADDITIONAL INFORMATION:  Avoid getting sick from colds and the flu. Stay away from friends or family that you know may have a contagious illness  Get plenty of rest   Get a flu shot each year. Get a pneumococcal vaccine shot. If you have had one before, ask your doctor whether you need another dose. My Goal for Self-management of Heart Failure Includes 5 steps :    1. Notice a change in symptoms ( weight gain, short of breath, leg swelling, decreased activity level, bloating. ...)    2. Evaluate the change: (use the Heart Failure Zones )     3. Decide to take action: decide what your options are, such as: (call your doctor for an extra visit, take a prescribed medication, such as your water pill if your doctor has given you directions to do so, [address])    4.  Come up with a strategy:  (now you call the doctor for advice / appointment. This is where you take action!!! Do not wait, catch the symptom early and treat it before it worsens. 5. Evaluate the response: The next day, check your Heart Failure Zones: are you in the GREEN ZONE (safe zone)? Worsening symptoms of YELLOW ZONE? Or have you moved to the RED ZONE and need to call 911 or go to the Emergency Room for evaluation? Call your doctor's office to update them on your symptoms of heart failure. Advance Directives: Care Instructions  Overview  An advance directive is a legal way to state your wishes at the end of your life. It tells your family and your doctor what to do if you can't say what you want. There are two main types of advance directives. You can change them any time your wishes change. Living will. This form tells your family and your doctor your wishes about life support and other treatment. The form is also called a declaration. Medical power of . This form lets you name a person to make treatment decisions for you when you can't speak for yourself. This person is called a health care agent (health care proxy, health care surrogate). The form is also called a durable power of  for health care. If you do not have an advance directive, decisions about your medical care may be made by a family member, or by a doctor or a  who doesn't know you. It may help to think of an advance directive as a gift to the people who care for you. If you have one, they won't have to make tough decisions by themselves. For more information, including forms for your state, see the [address] website (www.caringinfo.org/planning/advance-directives/). Follow-up care is a key part of your treatment and safety. Be sure to make and go to all appointments, and call your doctor if you are having problems. It's also a good idea to know your test results and keep a list of the medicines you take.   What should you include in an advance directive? Many states have a unique advance directive form. (It may ask you to address specific issues.) Or you might use a universal form that's approved by many states. If your form doesn't tell you what to address, it may be hard to know what to include in your advance directive. Use the questions below to help you get started. Who do you want to make decisions about your medical care if you are not able to? What life-support measures do you want if you have a serious illness that gets worse over time or can't be cured? What are you most afraid of that might happen? (Maybe you're afraid of having pain, losing your independence, or being kept alive by machines.)  Where would you prefer to die? (Your home? A hospital? A nursing home?)  Do you want to donate your organs when you die? Do you want certain Sabianist practices performed before you die? When should you call for help? Be sure to contact your doctor if you have any questions. Where can you learn more? Go to http://www.galvez.com/ and enter R264 to learn more about \"Advance Directives: Care Instructions. \"  Current as of: June 16, 2022               Content Version: 13.5  © 1944-2192 [employer]. Care instructions adapted under license by [employer]. If you have questions about a medical condition or this instruction, always ask your healthcare professional. [place] [address] any warranty or liability for your use of this information. Preventing Falls: Care Instructions  Overview     Getting around your home safely can be a challenge if you have injuries or health problems that make it easy for you to fall. Loose rugs and furniture in walkways are among the dangers for many older people who have problems walking or who have poor eyesight. People who have conditions such as arthritis, osteoporosis, or dementia also have to be careful not to fall.   You can make your home safer with a few simple measures. Follow-up care is a key part of your treatment and safety. Be sure to make and go to all appointments, and call your doctor if you are having problems. It's also a good idea to know your test results and keep a list of the medicines you take. How can you care for yourself at home? Taking care of yourself  Exercise regularly to improve your strength, muscle tone, and balance. Walk if you can. Swimming may be a good choice if you cannot walk easily. Have your vision and hearing checked each year or any time you notice a change. If you have trouble seeing and hearing, you might not be able to avoid objects and could lose your balance. Know the side effects of the medicines you take. Ask your doctor or pharmacist whether the medicines you take can affect your balance. Sleeping pills or sedatives can affect your balance. Limit the amount of alcohol you drink. Alcohol can impair your balance and other senses. Ask your doctor whether calluses or corns on your feet need to be removed. If you wear loose-fitting shoes because of calluses or corns, you can lose your balance and fall. Talk to your doctor if you have numbness in your feet. You may get dizzy if you do not drink enough water. To prevent dehydration, drink plenty of fluids. Choose water and other clear liquids. If you have kidney, heart, or liver disease and have to limit fluids, talk with your doctor before you increase the amount of fluids you drink. Preventing falls at home  Remove raised doorway thresholds, throw rugs, and clutter. Repair loose carpet or raised areas in the floor. Move furniture and electrical cords to keep them out of walking paths. Use nonskid floor wax, and wipe up spills right away, especially on ceramic tile floors. If you use a walker or cane, put rubber tips on it. If you use crutches, clean the bottoms of them regularly with an abrasive pad, such as steel wool.   Keep your house well lit, especially stairways, porches, and outside walkways. Use night-lights in areas such as hallways and bathrooms. Add extra light switches or use remote switches (such as switches that go on or off when you clap your hands) to make it easier to turn lights on if you have to get up during the night. Install sturdy handrails on stairways. Move items in your cabinets so that the things you use a lot are on the lower shelves (about waist level). Keep a cordless phone and a flashlight with new batteries by your bed. If possible, put a phone in each of the main rooms of your house, or carry a cell phone in case you fall and cannot reach a phone. Or, you can wear a device around your neck or wrist. You push a button that sends a signal for help. Wear low-heeled shoes that fit well and give your feet good support. Use footwear with nonskid soles. Check the heels and soles of your shoes for wear. Repair or replace worn heels or soles. Do not wear socks without shoes on smooth floors, such as wood. Walk on the grass when the sidewalks are slippery. If you live in an area that gets snow and ice in the winter, sprinkle salt on slippery steps and sidewalks. Or ask a family member or friend to do this for you. Preventing falls in the bath  Install grab bars and nonskid mats inside and outside your shower or tub and near the toilet and sinks. Use shower chairs and bath benches. Use a hand-held shower head that will allow you to sit while showering. Get into a tub or shower by putting the weaker leg in first. Get out of a tub or shower with your strong side first.  Repair loose toilet seats and consider installing a raised toilet seat to make getting on and off the toilet easier. Keep your bathroom door unlocked while you are in the shower. Where can you learn more? Go to http://www.galvez.com/ and enter G117 to learn more about \"Preventing Falls: Care Instructions. \"  Current as of:  May 4, 2022               Content Version: 13.5  © 8516-5703 [employer]. Care instructions adapted under license by [facility] ([facility]). If you have questions about a medical condition or this instruction, always ask your healthcare professional. [address] any warranty or liability for your use of this information.

## 2023-03-06 RX ORDER — ONDANSETRON 4 MG/1
4 TABLET, FILM COATED ORAL DAILY PRN
Qty: 30 TABLET | Refills: 0 | Status: SHIPPED
Start: 2023-03-06 | End: 2023-03-08 | Stop reason: SDUPTHER

## 2023-03-06 NOTE — TELEPHONE ENCOUNTER
Pt asking if she is still supposed to take metformin.  Was only given insulin twice while in hospital

## 2023-03-07 ENCOUNTER — TELEPHONE (OUTPATIENT)
Dept: PRIMARY CARE CLINIC | Age: 73
End: 2023-03-07

## 2023-03-07 NOTE — TELEPHONE ENCOUNTER
Is that on her list from discharge from nursing home,she needs to bring that list and all her meds from home when she comes for her next appt

## 2023-03-08 RX ORDER — ONDANSETRON 4 MG/1
4 TABLET, FILM COATED ORAL DAILY PRN
Qty: 30 TABLET | Refills: 0 | Status: SHIPPED | OUTPATIENT
Start: 2023-03-08

## 2023-03-13 ENCOUNTER — HOSPITAL ENCOUNTER (OUTPATIENT)
Dept: OTHER | Age: 73
Discharge: HOME | End: 2023-03-13

## 2023-03-13 ENCOUNTER — TELEPHONE (OUTPATIENT)
Dept: CARDIOLOGY CLINIC | Age: 73
End: 2023-03-13

## 2023-03-13 DIAGNOSIS — I50.33 ACUTE ON CHRONIC HEART FAILURE WITH PRESERVED EJECTION FRACTION (HFPEF) (HCC): Primary | ICD-10-CM

## 2023-03-13 RX ORDER — GLIPIZIDE 5 MG/1
TABLET ORAL
Qty: 90 TABLET | Refills: 0 | OUTPATIENT
Start: 2023-03-13

## 2023-03-15 ENCOUNTER — OFFICE VISIT (OUTPATIENT)
Dept: PRIMARY CARE CLINIC | Age: 73
End: 2023-03-15

## 2023-03-15 VITALS
WEIGHT: 187 LBS | BODY MASS INDEX: 31.92 KG/M2 | HEART RATE: 110 BPM | HEIGHT: 64 IN | OXYGEN SATURATION: 90 % | DIASTOLIC BLOOD PRESSURE: 74 MMHG | TEMPERATURE: 98.2 F | SYSTOLIC BLOOD PRESSURE: 124 MMHG

## 2023-03-15 DIAGNOSIS — I10 PRIMARY HYPERTENSION: ICD-10-CM

## 2023-03-15 DIAGNOSIS — E78.2 MIXED HYPERLIPIDEMIA: ICD-10-CM

## 2023-03-15 DIAGNOSIS — I27.20 SEVERE PULMONARY HYPERTENSION (HCC): ICD-10-CM

## 2023-03-15 DIAGNOSIS — E11.65 TYPE 2 DIABETES MELLITUS WITH HYPERGLYCEMIA, WITHOUT LONG-TERM CURRENT USE OF INSULIN (HCC): ICD-10-CM

## 2023-03-15 DIAGNOSIS — I50.33 ACUTE ON CHRONIC HEART FAILURE WITH PRESERVED EJECTION FRACTION (HFPEF) (HCC): ICD-10-CM

## 2023-03-15 DIAGNOSIS — Z13.9 SCREENING DUE: ICD-10-CM

## 2023-03-15 DIAGNOSIS — Z09 HOSPITAL DISCHARGE FOLLOW-UP: Primary | ICD-10-CM

## 2023-03-15 RX ORDER — LISINOPRIL 10 MG/1
10 TABLET ORAL DAILY
Qty: 90 TABLET | Refills: 0 | Status: SHIPPED | OUTPATIENT
Start: 2023-03-15

## 2023-03-15 RX ORDER — ASPIRIN 81 MG/1
81 TABLET ORAL DAILY
COMMUNITY

## 2023-03-15 RX ORDER — FUROSEMIDE 40 MG/1
40 TABLET ORAL 2 TIMES DAILY
Qty: 180 TABLET | Refills: 0 | Status: SHIPPED | OUTPATIENT
Start: 2023-03-15

## 2023-03-15 RX ORDER — ATENOLOL 100 MG/1
100 TABLET ORAL DAILY
Qty: 90 TABLET | Refills: 0 | Status: SHIPPED | OUTPATIENT
Start: 2023-03-15

## 2023-03-15 RX ORDER — ATORVASTATIN CALCIUM 40 MG/1
40 TABLET, FILM COATED ORAL NIGHTLY
Qty: 90 TABLET | Refills: 0 | Status: SHIPPED | OUTPATIENT
Start: 2023-03-15

## 2023-03-15 RX ORDER — ONDANSETRON 4 MG/1
4 TABLET, FILM COATED ORAL DAILY PRN
Qty: 30 TABLET | Refills: 0 | Status: SHIPPED | OUTPATIENT
Start: 2023-03-15

## 2023-03-15 RX ORDER — POTASSIUM CHLORIDE 1500 MG/1
40 TABLET, FILM COATED, EXTENDED RELEASE ORAL DAILY
Qty: 180 TABLET | Refills: 0 | Status: SHIPPED | OUTPATIENT
Start: 2023-03-15

## 2023-03-15 SDOH — ECONOMIC STABILITY: INCOME INSECURITY: HOW HARD IS IT FOR YOU TO PAY FOR THE VERY BASICS LIKE FOOD, HOUSING, MEDICAL CARE, AND HEATING?: NOT HARD AT ALL

## 2023-03-15 SDOH — ECONOMIC STABILITY: FOOD INSECURITY: WITHIN THE PAST 12 MONTHS, YOU WORRIED THAT YOUR FOOD WOULD RUN OUT BEFORE YOU GOT MONEY TO BUY MORE.: NEVER TRUE

## 2023-03-15 SDOH — ECONOMIC STABILITY: HOUSING INSECURITY
IN THE LAST 12 MONTHS, WAS THERE A TIME WHEN YOU DID NOT HAVE A STEADY PLACE TO SLEEP OR SLEPT IN A SHELTER (INCLUDING NOW)?: NO

## 2023-03-15 SDOH — ECONOMIC STABILITY: FOOD INSECURITY: WITHIN THE PAST 12 MONTHS, THE FOOD YOU BOUGHT JUST DIDN'T LAST AND YOU DIDN'T HAVE MONEY TO GET MORE.: NEVER TRUE

## 2023-03-15 ASSESSMENT — PATIENT HEALTH QUESTIONNAIRE - PHQ9
SUM OF ALL RESPONSES TO PHQ9 QUESTIONS 1 & 2: 0
1. LITTLE INTEREST OR PLEASURE IN DOING THINGS: 0
SUM OF ALL RESPONSES TO PHQ QUESTIONS 1-9: 0
2. FEELING DOWN, DEPRESSED OR HOPELESS: 0
SUM OF ALL RESPONSES TO PHQ QUESTIONS 1-9: 0

## 2023-03-15 ASSESSMENT — ENCOUNTER SYMPTOMS
APNEA: 0
EYE ITCHING: 0
CONSTIPATION: 0
EYE DISCHARGE: 0
COUGH: 0
BACK PAIN: 0
DIARRHEA: 0
SINUS PAIN: 0
ABDOMINAL PAIN: 0
BLOOD IN STOOL: 0
SORE THROAT: 0
VOMITING: 0
TROUBLE SWALLOWING: 0
NAUSEA: 0
PHOTOPHOBIA: 0
ABDOMINAL DISTENTION: 0
SHORTNESS OF BREATH: 0
WHEEZING: 0

## 2023-03-15 NOTE — PROGRESS NOTES
Amos Parra (:  1950) is a 81 y.o. female,Established patient, here for evaluation of the following chief complaint(s):  Follow-Up from [address] [place] [address] [place]:  HPI:  1) chf: well controlled  2)Hypertension:  Patient is here for follow up chronic hypertension. This is  generally controlled on current medication regimen. BP Readings from Last 1 Encounters:   03/15/23 124/74        Takes meds as directed and tolerates them well. Most recent labs reviewed with patient and are not remarkable. No symptoms from htn standpoint per ROS. Patient is  compliant with lifestyle modifications. Patient does not smoke. Comorbid conditions include obesity 2)Hyperlipidemia:  Patient is here to follow up regarding chronic hyperlipidemia. This is  generally controlled. Treatment includes simvastatin. Patient is  compliant with lifestyle modifications. Patient is not a smoker. Most recent labs reviewed with patient today and are not remarkable. Comorbid conditions include obesity. Review of Systems   Constitutional:  Negative for activity change, appetite change, fever and unexpected weight change. HENT:  Negative for congestion, ear pain, hearing loss, sinus pain, sore throat, tinnitus and trouble swallowing. Eyes:  Negative for photophobia, discharge, itching and visual disturbance. Respiratory:  Negative for apnea, cough, shortness of breath and wheezing. Cardiovascular:  Negative for chest pain, palpitations and leg swelling. Gastrointestinal:  Negative for abdominal distention, abdominal pain, blood in stool, constipation, diarrhea, nausea and vomiting. Endocrine: Negative for cold intolerance, polydipsia and polyuria. Genitourinary:  Negative for difficulty urinating, dysuria, frequency and pelvic pain. Musculoskeletal:  Negative for arthralgias, back pain, joint swelling, myalgias, neck pain and neck stiffness. Skin:  Negative for rash and wound. Neurological:  Negative for dizziness, tremors, syncope, light-headedness and headaches.    CBC with Differential:    Lab Results   Component Value Date/Time    WBC 7.3 02/20/2023 06:03 AM    RBC 4.50 02/20/2023 06:03 AM    HGB 9.2 02/20/2023 06:03 AM    HCT 33.1 02/20/2023 06:03 AM     02/20/2023 06:03 AM    MCV 73.6 02/20/2023 06:03 AM    MCH 20.4 02/20/2023 06:03 AM    MCHC 27.8 02/20/2023 06:03 AM    RDW 19.5 02/20/2023 06:03 AM    NRBC 0.9 02/17/2023 05:55 AM    LYMPHOPCT 17.5 02/20/2023 06:03 AM    MONOPCT 9.9 02/20/2023 06:03 AM    BASOPCT 0.8 02/20/2023 06:03 AM    MONOSABS 0.72 02/20/2023 06:03 AM    LYMPHSABS 1.28 02/20/2023 06:03 AM    EOSABS 0.60 02/20/2023 06:03 AM    BASOSABS 0.06 02/20/2023 06:03 AM     CMP:    Lab Results   Component Value Date/Time     03/03/2023 09:00 AM    K 4.2 03/03/2023 09:00 AM    K 3.2 02/15/2023 01:13 PM     03/03/2023 09:00 AM    CO2 31 03/03/2023 09:00 AM    BUN 13 03/03/2023 09:00 AM    CREATININE 1.0 03/03/2023 09:00 AM    GFRAA >60 09/14/2022 07:00 AM    LABGLOM >60 03/03/2023 09:00 AM    GLUCOSE 101 03/03/2023 09:00 AM    PROT 6.1 02/20/2023 06:03 AM    LABALBU 3.0 02/20/2023 06:03 AM    CALCIUM 9.4 03/03/2023 09:00 AM    BILITOT 0.4 02/20/2023 06:03 AM    ALKPHOS 88 02/20/2023 06:03 AM    AST 16 02/20/2023 06:03 AM    ALT 9 02/20/2023 06:03 AM     HgBA1c:    Lab Results   Component Value Date/Time    LABA1C 5.7 02/22/2023 10:45 AM            Objective   /74   Pulse (!) 110   Temp 98.2 °F (36.8 °C) (Temporal)   Ht 5' 4\" (1.626 m)   Wt 187 lb (84.8 kg)   SpO2 90%   BMI 32.10 kg/m²   Current Outpatient Medications   Medication Sig Dispense Refill    aspirin 81 MG EC tablet Take 81 mg by mouth daily      atenolol (TENORMIN) 100 MG tablet Take 1 tablet by mouth daily 90 tablet 0    atorvastatin (LIPITOR) 40 MG tablet Take 1 tablet by mouth nightly 90 tablet 0    furosemide (LASIX) 40 MG tablet Take 1 tablet by mouth 2 times daily 180 tablet 0    lisinopril (PRINIVIL;ZESTRIL) 10 MG tablet Take 1 tablet by mouth daily 90 tablet 0    potassium chloride (KLOR-CON M) 20 MEQ TBCR extended release tablet Take 2 tablets by mouth daily 180 tablet 0    ondansetron (ZOFRAN) 4 MG tablet Take 1 tablet by mouth daily as needed for Nausea or Vomiting 30 tablet 0    acetaminophen (TYLENOL) 325 MG tablet Take 650 mg by mouth every 4 hours as needed for Pain       No current facility-administered medications for this visit.       No Known Allergies     Past Medical History:   Diagnosis Date    Chronic back pain 2018    10/10 on the pain scale    COPD (chronic obstructive pulmonary disease) (HCC)     GERD (gastroesophageal reflux disease)     Headache     Hyperlipidemia     Hypertension     Obesity     Scoliosis     Type 2 diabetes mellitus without complication [facility])      Past Surgical History:   Procedure Laterality Date    FINGER TRIGGER RELEASE Right 2018    right thumb    HYSTERECTOMY (CERVIX STATUS UNKNOWN)      FL TENDON SHEATH INCISION Right 2018    RIGHT THUMB TRIGGER THUMB RELEASE performed by Shira Sawyer DO at [place] [address] Right 2002    RCR    WISDOM TOOTH EXTRACTION       Family History   Problem Relation Age of Onset    Cancer Mother     Unknown Father       Social History     Socioeconomic History    Marital status: Single     Spouse name: Not on file    Number of children: 1    Years of education: 13    Highest education level: Not on file   Occupational History    Not on file   Tobacco Use    Smoking status: Former     Packs/day: 0.50     Years: 47.00     Pack years: 23.50     Types: Cigarettes     Start date:      Quit date: 2023     Years since quittin.2    Smokeless tobacco: Never   Vaping Use    Vaping Use: Never used   Substance and Sexual Activity    Alcohol use: No     Comment: caffeine , coffee 1 cup occasional    Drug use: No    Sexual activity: Not Currently     Partners: Male   Other Topics Concern    Not on file   Social History Narrative    Not on file     Social Determinants of Health     Financial Resource Strain: Low Risk     Difficulty of Paying Living Expenses: Not hard at all   Food Insecurity: No Food Insecurity    Worried About [address] in the Last Year: Never true    [address] in the Last Year: Never true   Transportation Needs: Unknown    Lack of Transportation (Medical): Not on file    Lack of Transportation (Non-Medical): No   Physical Activity: Not on file   Stress: Not on file   Social Connections: Not on file   Intimate Partner Violence: Not on file   Housing Stability: Unknown    Unable to Pay for Housing in the Last Year: Not on file    Number of Places Lived in the Last Year: Not on file    Unstable Housing in the Last Year: No      Health Maintenance Due   Topic Date Due    Pneumococcal 65+ years Vaccine (1 - PCV) Never done    Diabetic foot exam  Never done    Diabetic retinal exam  Never done    Hepatitis C screen  Never done    DTaP/Tdap/Td vaccine (1 - Tdap) Never done    Shingles vaccine (1 of 2) Never done    Low dose CT lung screening  Never done    DEXA (modify frequency per FRAX score)  Never done    COVID-19 Vaccine (2 - Booster for Esme series) 05/13/2021    Flu vaccine (1) Never done    Annual Wellness Visit (AWV)  12/23/2022        Physical Exam  Constitutional:       Appearance: Normal appearance. She is normal weight. HENT:      Head: Normocephalic and atraumatic. Right Ear: Tympanic membrane and ear canal normal.      Left Ear: Tympanic membrane and ear canal normal.      Nose: Nose normal.      Mouth/Throat:      Mouth: Mucous membranes are moist.      Pharynx: Oropharynx is clear. Eyes:      Extraocular Movements: Extraocular movements intact. Conjunctiva/sclera: Conjunctivae normal.      Pupils: Pupils are equal, round, and reactive to light. Cardiovascular:      Rate and Rhythm: Normal rate and regular rhythm.       Pulses: Normal pulses. Heart sounds: Normal heart sounds. Pulmonary:      Effort: Pulmonary effort is normal.      Breath sounds: Normal breath sounds. Abdominal:      General: Abdomen is flat. Bowel sounds are normal.      Palpations: Abdomen is soft. Musculoskeletal:         General: Normal range of motion. Right shoulder: Normal.      Left shoulder: Normal.      Right upper arm: Normal.      Left upper arm: Normal.      Right elbow: Normal.      Left elbow: Normal.      Right forearm: Normal.      Left forearm: Normal.      Right wrist: Normal.      Left wrist: Normal.      Right hand: Normal.      Left hand: Normal.      Cervical back: Normal, normal range of motion and neck supple. Lumbar back: Normal.   Skin:     General: Skin is warm and dry. Neurological:      General: No focal deficit present. Mental Status: She is alert and oriented to person, place, and time. Mental status is at baseline. Psychiatric:         Mood and Affect: Mood normal.         Behavior: Behavior normal.         Thought Content: Thought content normal.         Judgment: Judgment normal.             ASSESSMENT/PLAN:  1. Hospital discharge follow-up  -     [place] DISCHARGE MEDS RECONCILED W/ CURRENT OUTPATIENT MED LIST  2. Acute on chronic heart failure with preserved ejection fraction (HFpEF) ([facility] [address]  3. Primary hypertension  4. Severe pulmonary hypertension ([facility] [address]  5. Type 2 diabetes mellitus with hyperglycemia, without long-term current use of insulin (HCC)  -     Hemoglobin A1C; Future  -     Microalbumin / Creatinine Urine Ratio; Future  6. Mixed hyperlipidemia  -     TSH; Future  -     Comprehensive Metabolic Panel; Future  -     Lipid, Fasting; Future  7. Screening due  -     Hepatitis C Antibody; Future      No follow-ups on file. An electronic signature was used to authenticate this note.     --Dread Torre MD

## 2023-03-15 NOTE — PROGRESS NOTES
CHF cancelled her CHF clinic visit for tomorrow d/t conflicting appointment.  Pt denies any s/s symptomatic Rescheduled for:    Future Appointments   Date Time Provider Kierra Angela   3/22/2023 11:30 AM [facility] CHF ROOM 1 [facility]

## 2023-03-16 ENCOUNTER — HOSPITAL ENCOUNTER (OUTPATIENT)
Dept: OTHER | Age: 73
Setting detail: THERAPIES SERIES
Discharge: HOME | End: 2023-03-16
Payer: MEDICARE

## 2023-03-18 PROBLEM — R79.89 ELEVATED TROPONIN: Status: RESOLVED | Noted: 2023-02-16 | Resolved: 2023-03-18

## 2023-03-18 PROBLEM — R77.8 ELEVATED TROPONIN: Status: RESOLVED | Noted: 2023-02-16 | Resolved: 2023-03-18

## 2023-03-27 NOTE — TELEPHONE ENCOUNTER
Pt states she has a cough and sneezing after being cold all weekend from having no power. Would like for you to send her in something to [facility].

## 2023-03-28 RX ORDER — AMLODIPINE BESYLATE 5 MG/1
TABLET ORAL
COMMUNITY
Start: 2023-02-22 | End: 2023-03-28 | Stop reason: SDUPTHER

## 2023-03-28 RX ORDER — AMLODIPINE BESYLATE 5 MG/1
5 TABLET ORAL DAILY
Qty: 90 TABLET | Refills: 0 | Status: SHIPPED | OUTPATIENT
Start: 2023-03-28

## 2023-03-28 RX ORDER — ONDANSETRON 4 MG/1
4 TABLET, FILM COATED ORAL DAILY PRN
Qty: 30 TABLET | Refills: 0 | Status: SHIPPED | OUTPATIENT
Start: 2023-03-28

## 2023-03-28 ASSESSMENT — ENCOUNTER SYMPTOMS
GASTROINTESTINAL NEGATIVE: 1
SINUS PRESSURE: 0
WHEEZING: 0
EYE DISCHARGE: 0
VOICE CHANGE: 0
RHINORRHEA: 0
EYE REDNESS: 0
TROUBLE SWALLOWING: 0
EYE ITCHING: 0
SORE THROAT: 0
SHORTNESS OF BREATH: 0
COUGH: 0
SINUS PAIN: 0

## 2023-03-28 ASSESSMENT — COPD QUESTIONNAIRES: COPD: 1

## 2023-03-28 ASSESSMENT — VISUAL ACUITY: OU: 1

## 2023-03-30 ENCOUNTER — HOSPITAL ENCOUNTER (OUTPATIENT)
Dept: OTHER | Age: 73
Setting detail: THERAPIES SERIES
Discharge: HOME | End: 2023-03-30
Payer: MEDICARE

## 2023-03-30 RX ORDER — GLIPIZIDE 5 MG/1
TABLET ORAL
Qty: 90 TABLET | Refills: 0 | OUTPATIENT
Start: 2023-03-30

## 2023-04-05 ENCOUNTER — TELEPHONE (OUTPATIENT)
Dept: PRIMARY CARE CLINIC | Age: 73
End: 2023-04-05

## 2023-04-05 NOTE — TELEPHONE ENCOUNTER
Pt called in and wanted to know if she was supposed to still be on glipizide and metformin since her discharge from the hospital

## 2023-04-06 NOTE — TELEPHONE ENCOUNTER
No,none of them , she is supposed to have a fup appt in 3 months and have fasting lab work before her appt

## 2023-04-18 ENCOUNTER — TELEPHONE (OUTPATIENT)
Dept: OTHER | Age: 73
End: 2023-04-18

## 2023-04-18 NOTE — TELEPHONE ENCOUNTER
2:58 PM   Patient scheduled for chf clinic appt tomorrow 4/19/23. Patient called today to cancel due to a headache.  Rescheduled for:     Future Appointments   Date Time Provider Kierra Angela   4/26/2023  9:30 AM [facility] CHF ROOM 1 [facility] Gala Gilbert     Per patient's request.     Electronically signed by Isidro Lund RN on 4/18/2023 at 2:58 PM

## 2023-04-19 ENCOUNTER — HOSPITAL ENCOUNTER (OUTPATIENT)
Dept: OTHER | Age: 73
Setting detail: THERAPIES SERIES
Discharge: HOME | End: 2023-04-19
Payer: MEDICARE

## 2023-04-19 ENCOUNTER — TELEPHONE (OUTPATIENT)
Dept: PHARMACY | Facility: CLINIC | Age: 73
End: 2023-04-19

## 2023-04-20 NOTE — TELEPHONE ENCOUNTER
CLINICAL PHARMACY: [facility] REVIEW    2nd Attempt Documentation:   Attempting to reach patient to review; letter mailed to home address        ===================================================================    For Pharmacy Admin Tracking Only    Program: [address] in place:  No  Gap Closed?: No   Time Spent (min): 15
following reasons: The valid total cholesterol range is 130 to 320 mg/dL     PLAN  The following are interventions that have been identified:   Patient overdue refilling Atorvastatin and active on home medication list.   Patient eligible for 100 day supply of Atorvastatin & Lisinopril for $0 copay (per Samantha Zuñigack MyScript)  [facility]    Outreach:  Attempting to reach patient to review. Left message asking for return call.          Edward Benson, [address] [facility]  Phone: toll free [phone]

## 2023-04-21 ENCOUNTER — TELEPHONE (OUTPATIENT)
Dept: PRIMARY CARE CLINIC | Age: 73
End: 2023-04-21

## 2023-04-21 RX ORDER — OMEPRAZOLE 20 MG/1
20 CAPSULE, DELAYED RELEASE ORAL
Qty: 90 CAPSULE | Refills: 0 | Status: SHIPPED | OUTPATIENT
Start: 2023-04-21

## 2023-04-26 ENCOUNTER — HOSPITAL ENCOUNTER (OUTPATIENT)
Dept: OTHER | Age: 73
Setting detail: THERAPIES SERIES
Discharge: HOME | End: 2023-04-26
Payer: MEDICARE

## 2023-04-26 NOTE — PROGRESS NOTES
Patient called into the CHF clinic and cancelled her CHF clinic appointment d/t not feeling well and a headache. Denies any s/s symptomatic CHF.        Future Appointments   Date Time Provider Kierra Angela   5/5/2023  1:00 PM [facility] CHF ROOM 1 [facility] CHF Robel Smith RN

## 2023-04-28 RX ORDER — ONDANSETRON 4 MG/1
4 TABLET, FILM COATED ORAL DAILY PRN
Qty: 30 TABLET | Refills: 0 | Status: SHIPPED | OUTPATIENT
Start: 2023-04-28

## 2023-05-04 RX ORDER — FUROSEMIDE 40 MG/1
TABLET ORAL
Qty: 90 TABLET | OUTPATIENT
Start: 2023-05-04

## 2023-05-05 ENCOUNTER — HOSPITAL ENCOUNTER (OUTPATIENT)
Dept: OTHER | Age: 73
Setting detail: THERAPIES SERIES
Discharge: HOME | End: 2023-05-05
Payer: MEDICARE

## 2023-05-05 VITALS
DIASTOLIC BLOOD PRESSURE: 55 MMHG | RESPIRATION RATE: 18 BRPM | SYSTOLIC BLOOD PRESSURE: 129 MMHG | BODY MASS INDEX: 34.67 KG/M2 | OXYGEN SATURATION: 91 % | WEIGHT: 202 LBS | HEART RATE: 71 BPM

## 2023-05-05 LAB
ANION GAP SERPL CALCULATED.3IONS-SCNC: 10 MMOL/L (ref 7–16)
BNP BLD-MCNC: 2712 PG/ML (ref 0–125)
BUN SERPL-MCNC: 15 MG/DL (ref 6–23)
CALCIUM SERPL-MCNC: 8.6 MG/DL (ref 8.6–10.2)
CHLORIDE SERPL-SCNC: 109 MMOL/L (ref 98–107)
CO2 SERPL-SCNC: 24 MMOL/L (ref 22–29)
CREAT SERPL-MCNC: 1 MG/DL (ref 0.5–1)
GLUCOSE SERPL-MCNC: 101 MG/DL (ref 74–99)
POTASSIUM SERPL-SCNC: 3.6 MMOL/L (ref 3.5–5)
SODIUM SERPL-SCNC: 143 MMOL/L (ref 132–146)

## 2023-05-05 PROCEDURE — 6360000002 HC RX W HCPCS: Performed by: NURSE PRACTITIONER

## 2023-05-05 PROCEDURE — 96374 THER/PROPH/DIAG INJ IV PUSH: CPT

## 2023-05-05 PROCEDURE — 36415 COLL VENOUS BLD VENIPUNCTURE: CPT

## 2023-05-05 PROCEDURE — 2580000003 HC RX 258: Performed by: NURSE PRACTITIONER

## 2023-05-05 PROCEDURE — 99214 OFFICE O/P EST MOD 30 MIN: CPT

## 2023-05-05 PROCEDURE — 80048 BASIC METABOLIC PNL TOTAL CA: CPT

## 2023-05-05 PROCEDURE — 83880 ASSAY OF NATRIURETIC PEPTIDE: CPT

## 2023-05-05 RX ORDER — FUROSEMIDE 10 MG/ML
40 INJECTION INTRAMUSCULAR; INTRAVENOUS ONCE
Status: COMPLETED | OUTPATIENT
Start: 2023-05-05 | End: 2023-05-05

## 2023-05-05 RX ORDER — SODIUM CHLORIDE 0.9 % (FLUSH) 0.9 %
5-40 SYRINGE (ML) INJECTION ONCE
Status: COMPLETED | OUTPATIENT
Start: 2023-05-05 | End: 2023-05-05

## 2023-05-05 RX ADMIN — Medication 10 ML: at 11:09

## 2023-05-05 RX ADMIN — FUROSEMIDE 40 MG: 10 INJECTION, SOLUTION INTRAMUSCULAR; INTRAVENOUS at 11:09

## 2023-05-05 NOTE — RESULT ENCOUNTER NOTE
CHF clinic note and labs reviewed. Admits to some dietary indiscretion. Unable to verify medications.    Given IV Lasix     >>[id number]  Renal function stable with potassium of 3.6     Scheduled for follow up clinic visit on May 11

## 2023-05-05 NOTE — PROGRESS NOTES
Congestive Heart Failure [address] [place] [address] CAROL Manriquezer   1950    Referring Provider: [facility]  Primary Care Physician: Omi  Cardiologist: Omkar  Nephrologist: N/A    History of Present Illness: Raphael Moraes is a 81 y.o. female with a history of HFpEF, most recent EF 55% 9/12/22. Patient Story:    She does  have increasing dyspnea with exertion within the last couple of days,  denies shortness of breath, or decline in overall functional capacity. She does not have orthopnea, PND, nocturnal cough or hemoptysis. She does not have abdominal distention or bloating, early satiety, anorexia/change in appetite. She does has a good urinary response to  oral diuretic. She does have  lower extremity edema. She denies lightheadedness, dizziness. She denies palpitations, syncope or near syncope. She does not complain of chest pain, pressure, discomfort. No Known Allergies      No outpatient medications have been marked as taking for the 5/5/23 encounter [facility] Encounter) with [facility] CHF ROOM 1.      Current Outpatient Medications on File Prior to Encounter   Medication Sig Dispense Refill    ondansetron (ZOFRAN) 4 MG tablet Take 1 tablet by mouth daily as needed for Nausea or Vomiting 30 tablet 0    omeprazole (PRILOSEC) 20 MG delayed release capsule Take 1 capsule by mouth every morning (before breakfast) 90 capsule 0    amLODIPine (NORVASC) 5 MG tablet Take 1 tablet by mouth daily 90 tablet 0    aspirin 81 MG EC tablet Take 1 tablet by mouth daily      atenolol (TENORMIN) 100 MG tablet Take 1 tablet by mouth daily 90 tablet 0    atorvastatin (LIPITOR) 40 MG tablet Take 1 tablet by mouth nightly 90 tablet 0    furosemide (LASIX) 40 MG tablet Take 1 tablet by mouth 2 times daily 180 tablet 0    lisinopril (PRINIVIL;ZESTRIL) 10 MG tablet Take 1 tablet by mouth daily 90 tablet 0    potassium chloride (KLOR-CON M) 20 MEQ TBCR extended release

## 2023-05-11 ENCOUNTER — HOSPITAL ENCOUNTER (OUTPATIENT)
Dept: OTHER | Age: 73
Setting detail: THERAPIES SERIES
Discharge: HOME | End: 2023-05-11
Payer: MEDICARE

## 2023-05-11 NOTE — PROGRESS NOTES
Patient cancelled her CHF clinic appointment.  Rescheduled for:     Future Appointments   Date Time Provider Kierra Angela   5/17/2023  9:30 AM [facility] CHF ROOM 1 [facility] CHF [place]

## 2023-05-16 RX ORDER — LISINOPRIL 10 MG/1
10 TABLET ORAL DAILY
Qty: 90 TABLET | OUTPATIENT
Start: 2023-05-16

## 2023-05-16 RX ORDER — ATENOLOL 100 MG/1
100 TABLET ORAL DAILY
Qty: 30 TABLET | Refills: 0 | Status: SHIPPED | OUTPATIENT
Start: 2023-05-16

## 2023-05-16 RX ORDER — OMEPRAZOLE 20 MG/1
20 CAPSULE, DELAYED RELEASE ORAL
Qty: 30 CAPSULE | Refills: 0 | Status: SHIPPED | OUTPATIENT
Start: 2023-05-16

## 2023-05-16 RX ORDER — AMLODIPINE BESYLATE 5 MG/1
5 TABLET ORAL DAILY
Qty: 30 TABLET | Refills: 0 | Status: SHIPPED | OUTPATIENT
Start: 2023-05-16

## 2023-05-16 RX ORDER — FUROSEMIDE 40 MG/1
40 TABLET ORAL 2 TIMES DAILY
Qty: 180 TABLET | Refills: 0 | Status: SHIPPED | OUTPATIENT
Start: 2023-05-16

## 2023-05-16 RX ORDER — ATORVASTATIN CALCIUM 40 MG/1
40 TABLET, FILM COATED ORAL NIGHTLY
Qty: 30 TABLET | Refills: 0 | Status: SHIPPED | OUTPATIENT
Start: 2023-05-16

## 2023-05-16 RX ORDER — ONDANSETRON 4 MG/1
4 TABLET, FILM COATED ORAL DAILY PRN
Qty: 30 TABLET | Refills: 0 | Status: SHIPPED | OUTPATIENT
Start: 2023-05-16

## 2023-05-16 RX ORDER — POTASSIUM CHLORIDE 1500 MG/1
40 TABLET, FILM COATED, EXTENDED RELEASE ORAL DAILY
Qty: 60 TABLET | Refills: 0 | Status: SHIPPED | OUTPATIENT
Start: 2023-05-16

## 2023-05-16 RX ORDER — ATORVASTATIN CALCIUM 40 MG/1
TABLET, FILM COATED ORAL
Qty: 90 TABLET | OUTPATIENT
Start: 2023-05-16

## 2023-05-16 RX ORDER — LISINOPRIL 10 MG/1
10 TABLET ORAL DAILY
Qty: 30 TABLET | Refills: 0 | Status: ON HOLD
Start: 2023-05-16 | End: 2023-05-19 | Stop reason: HOSPADM

## 2023-05-16 NOTE — TELEPHONE ENCOUNTER
Pt can not make appt right now. She is unsure of her sons schedule. She will call back to reschedule.

## 2023-05-17 ENCOUNTER — APPOINTMENT (OUTPATIENT)
Dept: GENERAL RADIOLOGY | Age: 73
DRG: 291 | End: 2023-05-17
Payer: MEDICARE

## 2023-05-17 ENCOUNTER — HOSPITAL ENCOUNTER (INPATIENT)
Age: 73
LOS: 3 days | Discharge: HOME | DRG: 291 | End: 2023-05-20
Attending: EMERGENCY MEDICINE | Admitting: INTERNAL MEDICINE
Payer: MEDICARE

## 2023-05-17 ENCOUNTER — HOSPITAL ENCOUNTER (OUTPATIENT)
Dept: OTHER | Age: 73
Setting detail: THERAPIES SERIES
Discharge: HOME | End: 2023-05-17
Payer: MEDICARE

## 2023-05-17 VITALS
SYSTOLIC BLOOD PRESSURE: 148 MMHG | BODY MASS INDEX: 35.36 KG/M2 | HEART RATE: 74 BPM | RESPIRATION RATE: 20 BRPM | OXYGEN SATURATION: 95 % | WEIGHT: 206 LBS | DIASTOLIC BLOOD PRESSURE: 64 MMHG

## 2023-05-17 DIAGNOSIS — I50.9 ACUTE ON CHRONIC CONGESTIVE HEART FAILURE, UNSPECIFIED HEART FAILURE TYPE (HCC): ICD-10-CM

## 2023-05-17 DIAGNOSIS — J96.01 ACUTE RESPIRATORY FAILURE WITH HYPOXIA (HCC): Primary | ICD-10-CM

## 2023-05-17 PROBLEM — I25.10 CORONARY ARTERY DISEASE INVOLVING NATIVE CORONARY ARTERY OF NATIVE HEART WITHOUT ANGINA PECTORIS: Status: ACTIVE | Noted: 2023-05-17

## 2023-05-17 LAB
ALBUMIN SERPL-MCNC: 3.7 G/DL (ref 3.5–5.2)
ALP SERPL-CCNC: 106 U/L (ref 35–104)
ALT SERPL-CCNC: 5 U/L (ref 0–32)
ANION GAP SERPL CALCULATED.3IONS-SCNC: 10 MMOL/L (ref 7–16)
ANISOCYTOSIS: ABNORMAL
AST SERPL-CCNC: 13 U/L (ref 0–31)
BASOPHILS # BLD: 0.04 E9/L (ref 0–0.2)
BASOPHILS NFR BLD: 0.6 % (ref 0–2)
BILIRUB SERPL-MCNC: 0.3 MG/DL (ref 0–1.2)
BNP BLD-MCNC: 2921 PG/ML (ref 0–125)
BUN SERPL-MCNC: 9 MG/DL (ref 6–23)
CALCIUM SERPL-MCNC: 8.8 MG/DL (ref 8.6–10.2)
CHLORIDE SERPL-SCNC: 103 MMOL/L (ref 98–107)
CO2 SERPL-SCNC: 30 MMOL/L (ref 22–29)
CREAT SERPL-MCNC: 1 MG/DL (ref 0.5–1)
EKG ATRIAL RATE: 55 BPM
EKG P AXIS: 58 DEGREES
EKG P-R INTERVAL: 144 MS
EKG Q-T INTERVAL: 422 MS
EKG QRS DURATION: 82 MS
EKG QTC CALCULATION (BAZETT): 403 MS
EKG R AXIS: 133 DEGREES
EKG T AXIS: 45 DEGREES
EKG VENTRICULAR RATE: 55 BPM
EOSINOPHIL # BLD: 0.3 E9/L (ref 0.05–0.5)
EOSINOPHIL NFR BLD: 4.3 % (ref 0–6)
ERYTHROCYTE [DISTWIDTH] IN BLOOD BY AUTOMATED COUNT: 18.5 FL (ref 11.5–15)
GLUCOSE SERPL-MCNC: 82 MG/DL (ref 74–99)
HCT VFR BLD AUTO: 41.1 % (ref 34–48)
HGB BLD-MCNC: 11.1 G/DL (ref 11.5–15.5)
HYPOCHROMIA: ABNORMAL
IMM GRANULOCYTES # BLD: 0.01 E9/L
IMM GRANULOCYTES NFR BLD: 0.1 % (ref 0–5)
LYMPHOCYTES # BLD: 0.93 E9/L (ref 1.5–4)
LYMPHOCYTES NFR BLD: 13.4 % (ref 20–42)
MCH RBC QN AUTO: 21.6 PG (ref 26–35)
MCHC RBC AUTO-ENTMCNC: 27 % (ref 32–34.5)
MCV RBC AUTO: 80.1 FL (ref 80–99.9)
MONOCYTES # BLD: 0.38 E9/L (ref 0.1–0.95)
MONOCYTES NFR BLD: 5.5 % (ref 2–12)
NEUTROPHILS # BLD: 5.27 E9/L (ref 1.8–7.3)
NEUTS SEG NFR BLD: 76.1 % (ref 43–80)
OVALOCYTES: ABNORMAL
PLATELET # BLD AUTO: 363 E9/L (ref 130–450)
PMV BLD AUTO: 10.2 FL (ref 7–12)
POIKILOCYTES: ABNORMAL
POLYCHROMASIA: ABNORMAL
POTASSIUM SERPL-SCNC: 4 MMOL/L (ref 3.5–5)
PROT SERPL-MCNC: 7 G/DL (ref 6.4–8.3)
RBC # BLD AUTO: 5.13 E12/L (ref 3.5–5.5)
REASON FOR REJECTION: NORMAL
REJECTED TEST: NORMAL
SODIUM SERPL-SCNC: 143 MMOL/L (ref 132–146)
TARGET CELLS: ABNORMAL
TROPONIN, HIGH SENSITIVITY: 18 NG/L (ref 0–9)
TROPONIN, HIGH SENSITIVITY: 19 NG/L (ref 0–9)
WBC # BLD: 6.9 E9/L (ref 4.5–11.5)

## 2023-05-17 PROCEDURE — 80053 COMPREHEN METABOLIC PANEL: CPT

## 2023-05-17 PROCEDURE — 84484 ASSAY OF TROPONIN QUANT: CPT

## 2023-05-17 PROCEDURE — 71045 X-RAY EXAM CHEST 1 VIEW: CPT

## 2023-05-17 PROCEDURE — 6370000000 HC RX 637 (ALT 250 FOR IP): Performed by: INTERNAL MEDICINE

## 2023-05-17 PROCEDURE — 85025 COMPLETE CBC W/AUTO DIFF WBC: CPT

## 2023-05-17 PROCEDURE — 83880 ASSAY OF NATRIURETIC PEPTIDE: CPT

## 2023-05-17 PROCEDURE — 96374 THER/PROPH/DIAG INJ IV PUSH: CPT

## 2023-05-17 PROCEDURE — 2060000000 HC ICU INTERMEDIATE R&B

## 2023-05-17 PROCEDURE — 99285 EMERGENCY DEPT VISIT HI MDM: CPT

## 2023-05-17 PROCEDURE — 36415 COLL VENOUS BLD VENIPUNCTURE: CPT

## 2023-05-17 PROCEDURE — APPSS60 APP SPLIT SHARED TIME 46-60 MINUTES: Performed by: PHYSICIAN ASSISTANT

## 2023-05-17 PROCEDURE — 2700000000 HC OXYGEN THERAPY PER DAY

## 2023-05-17 PROCEDURE — 99215 OFFICE O/P EST HI 40 MIN: CPT

## 2023-05-17 PROCEDURE — 93010 ELECTROCARDIOGRAM REPORT: CPT | Performed by: INTERNAL MEDICINE

## 2023-05-17 PROCEDURE — 99223 1ST HOSP IP/OBS HIGH 75: CPT | Performed by: INTERNAL MEDICINE

## 2023-05-17 PROCEDURE — 93005 ELECTROCARDIOGRAM TRACING: CPT | Performed by: STUDENT IN AN ORGANIZED HEALTH CARE EDUCATION/TRAINING PROGRAM

## 2023-05-17 PROCEDURE — 6360000002 HC RX W HCPCS: Performed by: PHYSICIAN ASSISTANT

## 2023-05-17 PROCEDURE — 99214 OFFICE O/P EST MOD 30 MIN: CPT

## 2023-05-17 RX ORDER — ACETAMINOPHEN 325 MG/1
650 TABLET ORAL EVERY 4 HOURS PRN
Status: DISCONTINUED | OUTPATIENT
Start: 2023-05-17 | End: 2023-05-17

## 2023-05-17 RX ORDER — FUROSEMIDE 10 MG/ML
40 INJECTION INTRAMUSCULAR; INTRAVENOUS ONCE
Status: DISCONTINUED | OUTPATIENT
Start: 2023-05-17 | End: 2023-05-17

## 2023-05-17 RX ORDER — PANTOPRAZOLE SODIUM 40 MG/1
40 TABLET, DELAYED RELEASE ORAL
Status: DISCONTINUED | OUTPATIENT
Start: 2023-05-18 | End: 2023-05-20 | Stop reason: HOSPADM

## 2023-05-17 RX ORDER — AMLODIPINE BESYLATE 5 MG/1
5 TABLET ORAL DAILY
Status: DISCONTINUED | OUTPATIENT
Start: 2023-05-18 | End: 2023-05-20 | Stop reason: HOSPADM

## 2023-05-17 RX ORDER — FUROSEMIDE 10 MG/ML
40 INJECTION INTRAMUSCULAR; INTRAVENOUS 2 TIMES DAILY
Status: DISCONTINUED | OUTPATIENT
Start: 2023-05-17 | End: 2023-05-20 | Stop reason: HOSPADM

## 2023-05-17 RX ORDER — ATORVASTATIN CALCIUM 40 MG/1
40 TABLET, FILM COATED ORAL NIGHTLY
Status: DISCONTINUED | OUTPATIENT
Start: 2023-05-17 | End: 2023-05-20 | Stop reason: HOSPADM

## 2023-05-17 RX ORDER — LISINOPRIL 10 MG/1
10 TABLET ORAL DAILY
Status: DISCONTINUED | OUTPATIENT
Start: 2023-05-18 | End: 2023-05-18

## 2023-05-17 RX ORDER — ONDANSETRON 4 MG/1
4 TABLET, FILM COATED ORAL DAILY PRN
Status: DISCONTINUED | OUTPATIENT
Start: 2023-05-17 | End: 2023-05-18

## 2023-05-17 RX ORDER — ATENOLOL 50 MG/1
100 TABLET ORAL DAILY
Status: DISCONTINUED | OUTPATIENT
Start: 2023-05-18 | End: 2023-05-20 | Stop reason: HOSPADM

## 2023-05-17 RX ORDER — ASPIRIN 81 MG/1
81 TABLET ORAL DAILY
Status: DISCONTINUED | OUTPATIENT
Start: 2023-05-18 | End: 2023-05-20 | Stop reason: HOSPADM

## 2023-05-17 RX ORDER — ACETAMINOPHEN 325 MG/1
650 TABLET ORAL EVERY 8 HOURS PRN
Status: DISCONTINUED | OUTPATIENT
Start: 2023-05-17 | End: 2023-05-20 | Stop reason: HOSPADM

## 2023-05-17 RX ORDER — SODIUM CHLORIDE 0.9 % (FLUSH) 0.9 %
5-40 SYRINGE (ML) INJECTION ONCE
Status: DISCONTINUED | OUTPATIENT
Start: 2023-05-17 | End: 2023-05-17

## 2023-05-17 RX ORDER — POTASSIUM CHLORIDE 20 MEQ/1
40 TABLET, EXTENDED RELEASE ORAL DAILY
Status: DISCONTINUED | OUTPATIENT
Start: 2023-05-18 | End: 2023-05-20 | Stop reason: HOSPADM

## 2023-05-17 RX ADMIN — FUROSEMIDE 40 MG: 10 INJECTION, SOLUTION INTRAMUSCULAR; INTRAVENOUS at 15:39

## 2023-05-17 RX ADMIN — ACETAMINOPHEN 650 MG: 325 TABLET ORAL at 19:21

## 2023-05-17 RX ADMIN — ATORVASTATIN CALCIUM 40 MG: 40 TABLET, FILM COATED ORAL at 22:15

## 2023-05-17 ASSESSMENT — PAIN DESCRIPTION - LOCATION
LOCATION: HIP

## 2023-05-17 ASSESSMENT — PAIN DESCRIPTION - ORIENTATION: ORIENTATION: RIGHT

## 2023-05-17 ASSESSMENT — LIFESTYLE VARIABLES
HOW MANY STANDARD DRINKS CONTAINING ALCOHOL DO YOU HAVE ON A TYPICAL DAY: PATIENT DOES NOT DRINK
HOW OFTEN DO YOU HAVE A DRINK CONTAINING ALCOHOL: NEVER

## 2023-05-17 ASSESSMENT — PAIN SCALES - GENERAL
PAINLEVEL_OUTOF10: 6
PAINLEVEL_OUTOF10: 6
PAINLEVEL_OUTOF10: 5

## 2023-05-17 NOTE — ED PROVIDER NOTES
for monitoring. EKG is ordered to evaluate patient's current cardiac rhythm, and to interpret QT interval prior to administering medications. CBC is ordered to evaluate for any signs of infection or inflammation by obtaining a WBC count, or any signs of acute anemia by interpreting hemoglobin. CMP was ordered to evaluate for any electrolyte imbalances, kidney function, hepatic injury or any elevations in anion gap. Troponin ordered to evaluate for possible cardiac etiology of symptoms including but not limited to STEMI or NSTEMI. BNP ordered to evaluate for possible cardiac failure or worsening cardiac function. Chest x-ray is ordered to evaluate for any possible signs of, but without limitation to, pneumonia, pleural effusions, cardiomegaly, pneumothorax, atelectasis, rib or sternal abnormalities including fractures. Patient was placed on 4 L via nasal cannula after she was found to be 80% on room air. CBC revealed mildly with a hemoglobin 11.1. CMP was markedly benign within normal.  Troponin was 19. BNP was elevated 2921. Chest x-ray revealed emphysematous changes with cardiomegaly and mild vascular congestion. Patient was given a dose of Lasix in the ED. Dr. Olivia Wolfe was consulted regarding the patient findings will follow the patient once admitted to the hospital.  Dr. Meenu Yousif will admit the patient for further work-up and treatment of her CHF exacerbation with hypoxic respiratory failure at this time. Disposition Considerations (Tests not ordered but considered, Shared Decision Making, Pt Expectation of Test or Tx.):     FINAL IMPRESSION      1. Acute respiratory failure with hypoxia ([facility] [address]    2. Acute on chronic congestive heart failure, unspecified heart failure type [facility])          DISPOSITION/PLAN     DISPOSITION Admitted 05/17/2023 02:08:01 PM    PATIENT REFERRED TO:  No follow-up provider specified.     DISCHARGE MEDICATIONS:  Current Discharge Medication List          DISCONTINUED

## 2023-05-17 NOTE — PROGRESS NOTES
Congestive Heart Failure [address] [place] [address] CAROL Aquinoncer   1950    Referring Provider: [facility]  Primary Care Physician: Omi  Cardiologist: Omkar  Nephrologist: N/A    History of Present Illness: Crissy Cooley is a 81 y.o. female with a history of HFpEF, most recent EF 55% 9/12/22. Patient Story:    She does  have increasing dyspnea with exertion within the last couple of days,  denies shortness of breath at rest, or decline in overall functional capacity. She does not have orthopnea, PND, nocturnal cough or hemoptysis. She does not have abdominal distention or bloating, early satiety, anorexia/change in appetite. She does has a good urinary response to  oral diuretic. She does have lower extremity edema. She denies lightheadedness, dizziness. She denies palpitations, syncope or near syncope. She does not complain of chest pain, pressure, discomfort. No Known Allergies      No outpatient medications have been marked as taking for the 5/17/23 encounter [facility] Encounter) with [facility] CHF ROOM 1.      Current Outpatient Medications on File Prior to Encounter   Medication Sig Dispense Refill    amLODIPine (NORVASC) 5 MG tablet Take 1 tablet by mouth daily 30 tablet 0    atorvastatin (LIPITOR) 40 MG tablet Take 1 tablet by mouth nightly 30 tablet 0    furosemide (LASIX) 40 MG tablet Take 1 tablet by mouth 2 times daily 180 tablet 0    potassium chloride (KLOR-CON M) 20 MEQ TBCR extended release tablet Take 2 tablets by mouth daily 60 tablet 0    atenolol (TENORMIN) 100 MG tablet Take 1 tablet by mouth daily 30 tablet 0    lisinopril (PRINIVIL;ZESTRIL) 10 MG tablet Take 1 tablet by mouth daily 30 tablet 0    ondansetron (ZOFRAN) 4 MG tablet Take 1 tablet by mouth daily as needed for Nausea or Vomiting 30 tablet 0    omeprazole (PRILOSEC) 20 MG delayed release capsule Take 1 capsule by mouth every morning (before breakfast) 30 capsule 0

## 2023-05-18 LAB
ANION GAP SERPL CALCULATED.3IONS-SCNC: 6 MMOL/L (ref 7–16)
BUN SERPL-MCNC: 8 MG/DL (ref 6–23)
CALCIUM SERPL-MCNC: 8.6 MG/DL (ref 8.6–10.2)
CHLORIDE SERPL-SCNC: 102 MMOL/L (ref 98–107)
CO2 SERPL-SCNC: 35 MMOL/L (ref 22–29)
CREAT SERPL-MCNC: 0.9 MG/DL (ref 0.5–1)
GLUCOSE SERPL-MCNC: 84 MG/DL (ref 74–99)
POTASSIUM SERPL-SCNC: 3.2 MMOL/L (ref 3.5–5)
SODIUM SERPL-SCNC: 143 MMOL/L (ref 132–146)

## 2023-05-18 PROCEDURE — 6360000002 HC RX W HCPCS: Performed by: PHYSICIAN ASSISTANT

## 2023-05-18 PROCEDURE — 2060000000 HC ICU INTERMEDIATE R&B

## 2023-05-18 PROCEDURE — 99233 SBSQ HOSP IP/OBS HIGH 50: CPT | Performed by: INTERNAL MEDICINE

## 2023-05-18 PROCEDURE — 6370000000 HC RX 637 (ALT 250 FOR IP): Performed by: INTERNAL MEDICINE

## 2023-05-18 PROCEDURE — 36415 COLL VENOUS BLD VENIPUNCTURE: CPT

## 2023-05-18 PROCEDURE — 99223 1ST HOSP IP/OBS HIGH 75: CPT | Performed by: INTERNAL MEDICINE

## 2023-05-18 PROCEDURE — 80048 BASIC METABOLIC PNL TOTAL CA: CPT

## 2023-05-18 PROCEDURE — 2700000000 HC OXYGEN THERAPY PER DAY

## 2023-05-18 RX ORDER — LISINOPRIL 20 MG/1
20 TABLET ORAL DAILY
Status: DISCONTINUED | OUTPATIENT
Start: 2023-05-19 | End: 2023-05-20 | Stop reason: HOSPADM

## 2023-05-18 RX ORDER — ONDANSETRON 4 MG/1
4 TABLET, FILM COATED ORAL EVERY 6 HOURS PRN
Status: DISCONTINUED | OUTPATIENT
Start: 2023-05-19 | End: 2023-05-20 | Stop reason: HOSPADM

## 2023-05-18 RX ADMIN — ACETAMINOPHEN 650 MG: 325 TABLET ORAL at 03:49

## 2023-05-18 RX ADMIN — ACETAMINOPHEN 650 MG: 325 TABLET ORAL at 22:27

## 2023-05-18 RX ADMIN — ATENOLOL 100 MG: 50 TABLET ORAL at 07:38

## 2023-05-18 RX ADMIN — POTASSIUM CHLORIDE 40 MEQ: 1500 TABLET, EXTENDED RELEASE ORAL at 07:38

## 2023-05-18 RX ADMIN — FUROSEMIDE 40 MG: 10 INJECTION, SOLUTION INTRAMUSCULAR; INTRAVENOUS at 07:38

## 2023-05-18 RX ADMIN — ASPIRIN 81 MG: 81 TABLET, COATED ORAL at 07:38

## 2023-05-18 RX ADMIN — ACETAMINOPHEN 650 MG: 325 TABLET ORAL at 13:52

## 2023-05-18 RX ADMIN — ATORVASTATIN CALCIUM 40 MG: 40 TABLET, FILM COATED ORAL at 20:53

## 2023-05-18 RX ADMIN — LISINOPRIL 10 MG: 10 TABLET ORAL at 07:38

## 2023-05-18 RX ADMIN — PANTOPRAZOLE SODIUM 40 MG: 40 TABLET, DELAYED RELEASE ORAL at 06:15

## 2023-05-18 RX ADMIN — ONDANSETRON HYDROCHLORIDE 4 MG: 4 TABLET, FILM COATED ORAL at 15:59

## 2023-05-18 RX ADMIN — AMLODIPINE BESYLATE 5 MG: 5 TABLET ORAL at 07:38

## 2023-05-18 RX ADMIN — FUROSEMIDE 40 MG: 10 INJECTION, SOLUTION INTRAMUSCULAR; INTRAVENOUS at 16:01

## 2023-05-18 ASSESSMENT — PAIN SCALES - GENERAL
PAINLEVEL_OUTOF10: 7
PAINLEVEL_OUTOF10: 0
PAINLEVEL_OUTOF10: 6
PAINLEVEL_OUTOF10: 4
PAINLEVEL_OUTOF10: 0

## 2023-05-18 ASSESSMENT — PAIN DESCRIPTION - LOCATION
LOCATION: HIP
LOCATION: HIP;LEG;HEAD
LOCATION: LEG;HIP

## 2023-05-18 ASSESSMENT — PAIN DESCRIPTION - DESCRIPTORS
DESCRIPTORS: ACHING
DESCRIPTORS: SHOOTING

## 2023-05-18 ASSESSMENT — PAIN DESCRIPTION - ORIENTATION
ORIENTATION: RIGHT
ORIENTATION: RIGHT

## 2023-05-18 NOTE — ACP (ADVANCE CARE PLANNING)
Advance Care Planning   Healthcare Decision Maker:    Primary Decision Maker: Mendez Patton - Child - [phone]    Secondary Decision Maker: Siddhartha Rachel - Other - [phone]    Supplemental (Other) Decision Maker: Lucrezia Goldberg - Niece/Nephew - [phone]    Click here to complete Healthcare Decision Makers including selection of the Healthcare Decision Maker Relationship (ie \"Primary\").

## 2023-05-18 NOTE — CARE COORDINATION
SS NOTE: Pt here after having respiratory distress in the CHF Clinic. Pt was 80% on room air and placed on 3 liters of O2. Pt does not have home O2. SW met with pt today. Pt relates that she drove here and her car is in our parking lot. Pt relates that she resides alone in her own 2 story home with 13 steps with railing. Pt relates that she was I pta with adls iadls and driving. She relates to having no dme or hx of HHC. She did go to [facility] for a SNF stay in February 2023. Pt relates that her PCP is Dr Godwin Zhu. Omi. Pt plans on returning home after dch. SS to continue. MOHAMUD Martinez.5/18/2023.12:35 PM.

## 2023-05-18 NOTE — H&P
H&P Note        CHIEF COMPLAINT:  sob      HISTORY OF PRESENT ILLNESS:      The patient is a 81 y.o. female  who presents to the ED due to shortness of breath. Patient was short of breath at the CHF clinic morning found to be 81% on room air. She does not wear any oxygen baseline. States that her shortness of breath is worsened with exertion. She denies any associated chest pain. States that her bilateral lower extremity edema has become worse over the past several days as well. She denies any associated cough, fever or chills. Past Medical History:    Past Medical History:   Diagnosis Date    Chronic back pain 2018    10/10 on the pain scale    COPD (chronic obstructive pulmonary disease) (HCC)     GERD (gastroesophageal reflux disease)     Headache     Hyperlipidemia     Hypertension     Obesity     Scoliosis     Type 2 diabetes mellitus without complication [facility])        Past Surgical History:    Past Surgical History:   Procedure Laterality Date    FINGER TRIGGER RELEASE Right 08/14/2018    right thumb    HYSTERECTOMY (CERVIX STATUS UNKNOWN)      MD TENDON SHEATH INCISION Right 8/14/2018    RIGHT THUMB TRIGGER THUMB RELEASE performed by Bunny Frost DO at [place] [address] Right 2002    RCR    WISDOM TOOTH EXTRACTION         Medications Prior to Admission:    Prior to Admission medications    Medication Sig Start Date End Date Taking?  Authorizing Provider   amLODIPine (NORVASC) 5 MG tablet Take 1 tablet by mouth daily 5/16/23   Sobeida Ponce MD   atorvastatin (LIPITOR) 40 MG tablet Take 1 tablet by mouth nightly 5/16/23   Sobeida Ponce MD   furosemide (LASIX) 40 MG tablet Take 1 tablet by mouth 2 times daily 5/16/23   Sobeida Ponce MD   potassium chloride (KLOR-CON M) 20 MEQ TBCR extended release tablet Take 2 tablets by mouth daily 5/16/23   Sobeida Ponce MD   atenolol (TENORMIN) 100 MG tablet Take 1 tablet by mouth daily 5/16/23   Sobeida Ponce MD   lisinopril

## 2023-05-18 NOTE — CARE COORDINATION
Case Management Assessment  Initial Evaluation    Date/Time of Evaluation: 5/18/2023 12:37 PM  Assessment Completed by: Milind Simental. Medina Aranda    If patient is discharged prior to next notation, then this note serves as note for discharge by case management. Patient Name: Geary Mortimer                   YOB: 1950  Diagnosis: Acute respiratory failure with hypoxia ([facility] [address] [J96.01]  Acute on chronic congestive heart failure, unspecified heart failure type ([facility] [address] [I50.9]                   Date / Time: 5/17/2023  9:55 AM    Patient Admission Status: Inpatient   Readmission Risk (Low < 19, Mod (19-27), High > 27): Readmission Risk Score: 13.9    Current PCP: Manan Monaco MD  PCP verified by CM? Yes    Chart Reviewed: Yes      History Provided by: Patient  Patient Orientation: Alert and Oriented    Patient Cognition: Alert    Hospitalization in the last 30 days (Readmission):  No    If yes, Readmission Assessment in CM Navigator will be completed. Advance Directives:      Code Status: Full Code   Patient's Primary Decision Maker is: Legal Next of Kin    Primary Decision Maker: Rodney Duncan - Child - [phone]    Secondary Decision Maker: Yady Delgado  Other - [phone]    Supplemental (Other) Decision Maker: Ariel Flores - [phone]    Discharge Planning:    Patient lives with: Alone Type of Home: House  Primary Care Giver: Self  Patient Support Systems include: Children   Current Financial resources:    Current community resources:    Current services prior to admission: None            Current DME:              Type of Home Care services:  None    ADLS  Prior functional level: Independent in ADLs/IADLs  Current functional level: Independent in ADLs/IADLs    PT AM-PAC:   /24  OT AM-PAC:   /24    Family can provide assistance at DC:  Yes  Would you like Case Management to discuss the discharge plan with any other family members/significant others, and if so, who?

## 2023-05-19 ENCOUNTER — TELEPHONE (OUTPATIENT)
Dept: CARDIOLOGY CLINIC | Age: 73
End: 2023-05-19

## 2023-05-19 LAB
ANION GAP SERPL CALCULATED.3IONS-SCNC: 6 MMOL/L (ref 7–16)
BUN SERPL-MCNC: 8 MG/DL (ref 6–23)
CALCIUM SERPL-MCNC: 9 MG/DL (ref 8.6–10.2)
CHLORIDE SERPL-SCNC: 99 MMOL/L (ref 98–107)
CO2 SERPL-SCNC: 38 MMOL/L (ref 22–29)
CREAT SERPL-MCNC: 1.1 MG/DL (ref 0.5–1)
GLUCOSE SERPL-MCNC: 88 MG/DL (ref 74–99)
POTASSIUM SERPL-SCNC: 3.6 MMOL/L (ref 3.5–5)
SODIUM SERPL-SCNC: 143 MMOL/L (ref 132–146)

## 2023-05-19 PROCEDURE — 6370000000 HC RX 637 (ALT 250 FOR IP): Performed by: INTERNAL MEDICINE

## 2023-05-19 PROCEDURE — 2700000000 HC OXYGEN THERAPY PER DAY

## 2023-05-19 PROCEDURE — 2060000000 HC ICU INTERMEDIATE R&B

## 2023-05-19 PROCEDURE — 6360000002 HC RX W HCPCS: Performed by: PHYSICIAN ASSISTANT

## 2023-05-19 PROCEDURE — 36415 COLL VENOUS BLD VENIPUNCTURE: CPT

## 2023-05-19 PROCEDURE — 99232 SBSQ HOSP IP/OBS MODERATE 35: CPT | Performed by: INTERNAL MEDICINE

## 2023-05-19 PROCEDURE — 80048 BASIC METABOLIC PNL TOTAL CA: CPT

## 2023-05-19 PROCEDURE — 99233 SBSQ HOSP IP/OBS HIGH 50: CPT | Performed by: INTERNAL MEDICINE

## 2023-05-19 RX ORDER — LISINOPRIL 20 MG/1
20 TABLET ORAL DAILY
Qty: 30 TABLET | Refills: 3 | Status: SHIPPED | OUTPATIENT
Start: 2023-05-20

## 2023-05-19 RX ORDER — METOLAZONE 2.5 MG/1
TABLET ORAL
Qty: 30 TABLET | Refills: 3 | Status: SHIPPED | OUTPATIENT
Start: 2023-05-19

## 2023-05-19 RX ADMIN — ASPIRIN 81 MG: 81 TABLET, COATED ORAL at 08:25

## 2023-05-19 RX ADMIN — ATORVASTATIN CALCIUM 40 MG: 40 TABLET, FILM COATED ORAL at 19:56

## 2023-05-19 RX ADMIN — FUROSEMIDE 40 MG: 10 INJECTION, SOLUTION INTRAMUSCULAR; INTRAVENOUS at 16:41

## 2023-05-19 RX ADMIN — ONDANSETRON HYDROCHLORIDE 4 MG: 4 TABLET, FILM COATED ORAL at 13:03

## 2023-05-19 RX ADMIN — ACETAMINOPHEN 650 MG: 325 TABLET ORAL at 19:56

## 2023-05-19 RX ADMIN — AMLODIPINE BESYLATE 5 MG: 5 TABLET ORAL at 08:25

## 2023-05-19 RX ADMIN — LISINOPRIL 20 MG: 20 TABLET ORAL at 08:24

## 2023-05-19 RX ADMIN — ACETAMINOPHEN 650 MG: 325 TABLET ORAL at 06:36

## 2023-05-19 RX ADMIN — ACETAMINOPHEN 650 MG: 325 TABLET ORAL at 13:03

## 2023-05-19 RX ADMIN — FUROSEMIDE 40 MG: 10 INJECTION, SOLUTION INTRAMUSCULAR; INTRAVENOUS at 08:23

## 2023-05-19 RX ADMIN — ONDANSETRON HYDROCHLORIDE 4 MG: 4 TABLET, FILM COATED ORAL at 00:11

## 2023-05-19 RX ADMIN — POTASSIUM CHLORIDE 40 MEQ: 1500 TABLET, EXTENDED RELEASE ORAL at 08:24

## 2023-05-19 RX ADMIN — PANTOPRAZOLE SODIUM 40 MG: 40 TABLET, DELAYED RELEASE ORAL at 06:21

## 2023-05-19 ASSESSMENT — PAIN SCALES - GENERAL
PAINLEVEL_OUTOF10: 7
PAINLEVEL_OUTOF10: 5

## 2023-05-19 ASSESSMENT — PAIN DESCRIPTION - LOCATION
LOCATION: LEG
LOCATION: HIP;GROIN

## 2023-05-19 ASSESSMENT — PAIN DESCRIPTION - PAIN TYPE: TYPE: ACUTE PAIN

## 2023-05-19 ASSESSMENT — PAIN DESCRIPTION - ONSET: ONSET: ON-GOING

## 2023-05-19 ASSESSMENT — PAIN DESCRIPTION - DESCRIPTORS
DESCRIPTORS: ACHING
DESCRIPTORS: ACHING;DISCOMFORT

## 2023-05-19 ASSESSMENT — PAIN - FUNCTIONAL ASSESSMENT: PAIN_FUNCTIONAL_ASSESSMENT: ACTIVITIES ARE NOT PREVENTED

## 2023-05-19 ASSESSMENT — PAIN DESCRIPTION - ORIENTATION: ORIENTATION: RIGHT

## 2023-05-19 ASSESSMENT — PAIN DESCRIPTION - FREQUENCY: FREQUENCY: INTERMITTENT

## 2023-05-19 NOTE — PLAN OF CARE
Problem: Discharge Planning  Goal: Discharge to home or other facility with appropriate resources  Outcome: Progressing     Problem: Pain  Goal: Verbalizes/displays adequate comfort level or baseline comfort level  Outcome: Progressing     Problem: ABCDS Injury Assessment  Goal: Absence of physical injury  Outcome: Progressing     Problem: Safety - Adult  Goal: Free from fall injury  Outcome: Progressing     Problem: Nutrition Deficit:  Goal: Optimize nutritional status  5/18/2023 2303 by Kelly Elmore RN  Outcome: Progressing

## 2023-05-19 NOTE — CONSULTS
CHF NURSE NAVIGATOR CONSULT NOTE:      Patient currently admitted with diagnosis of HFpEF heart failure. Patient was awake and alert sitting in the chair anxious to go home during the consultation. She was engaged and asked appropriate questions throughout the education session. She is agreeable to improving symptom monitoring, daily weights, and following a low sodium diet and keeping CHF clinic appointments. Scheduling with the CHF clinic Yes. - pt is established. Future Appointments   Date Time Provider Kierra Henriquezi   5/25/2023  2:00 PM [facility] CHF ROOM 1 [facility] Mary Martinezois   5/31/2023  9:15 AM MD VALERIA Mathews [facility]   7/6/2023 11:45 AM MD VALERIA Mathews [facility]       Barriers to Care:  Contributing risk factors for Heart Failure are identified as lack of motivation, overwhelmed by complexity of regimen, and multiple comorbidites . Follows with Dr. Niesha Cabello- inbox message sent to Girish Schultz to schedule HFU appointment in [place]. Pt admitted with acute decompensated HF, acute respiratory failure. Need for oxygen to maintain O2 sat in the CHF clinic. Pt does not wear O2 at home, continues to smoke. Pt has missed and rescheduled several CHF clinic appointments and admits to dietary indiscretions. Reviewed and reinforced importance of keeping upcoming HFU and CHF clinic appointments to avoid readmission. Reviewed the HF zones, signs and symptoms to report on day 1 of onset, medications, medication compliance, the importance of obtaining daily weights, following a low sodium diet, reading food labels for the sodium content, keeping physician appointments, and smoking cessation. The patient is ordered:  Diet: ADULT DIET; Regular; Low Fat/Low Chol/High Fiber/2 gm Na  ADULT ORAL NUTRITION SUPPLEMENT; Dinner;  Other Oral Supplement; Gelatein 20   Sodium controlled diet Yes  Fluid restriction daily ordered (fluid restriction recommended if patient is hyponatremic and/or
MD Omi   atenolol (TENORMIN) 100 MG tablet Take 1 tablet by mouth daily 5/16/23   Madison Galicia MD   lisinopril (PRINIVIL;ZESTRIL) 10 MG tablet Take 1 tablet by mouth daily 5/16/23   Madison Galicia MD   ondansetron (ZOFRAN) 4 MG tablet Take 1 tablet by mouth daily as needed for Nausea or Vomiting 5/16/23   Madison Galicia MD   omeprazole (PRILOSEC) 20 MG delayed release capsule Take 1 capsule by mouth every morning (before breakfast) 5/16/23   Madison Galicia MD   aspirin 81 MG EC tablet Take 1 tablet by mouth daily    Historical Provider, MD   acetaminophen (TYLENOL) 325 MG tablet Take 2 tablets by mouth every 4 hours as needed for Pain    Historical Provider, MD       CURRENT MEDICATIONS:      Current Facility-Administered Medications:     furosemide (LASIX) injection 40 mg, 40 mg, IntraVENous, Once, Mackenzie Saenz DO    Current Outpatient Medications:     amLODIPine (NORVASC) 5 MG tablet, Take 1 tablet by mouth daily, Disp: 30 tablet, Rfl: 0    atorvastatin (LIPITOR) 40 MG tablet, Take 1 tablet by mouth nightly, Disp: 30 tablet, Rfl: 0    furosemide (LASIX) 40 MG tablet, Take 1 tablet by mouth 2 times daily, Disp: 180 tablet, Rfl: 0    potassium chloride (KLOR-CON M) 20 MEQ TBCR extended release tablet, Take 2 tablets by mouth daily, Disp: 60 tablet, Rfl: 0    atenolol (TENORMIN) 100 MG tablet, Take 1 tablet by mouth daily, Disp: 30 tablet, Rfl: 0    lisinopril (PRINIVIL;ZESTRIL) 10 MG tablet, Take 1 tablet by mouth daily, Disp: 30 tablet, Rfl: 0    ondansetron (ZOFRAN) 4 MG tablet, Take 1 tablet by mouth daily as needed for Nausea or Vomiting, Disp: 30 tablet, Rfl: 0    omeprazole (PRILOSEC) 20 MG delayed release capsule, Take 1 capsule by mouth every morning (before breakfast), Disp: 30 capsule, Rfl: 0    aspirin 81 MG EC tablet, Take 1 tablet by mouth daily, Disp: , Rfl:     acetaminophen (TYLENOL) 325 MG tablet, Take 2 tablets by mouth every 4 hours as needed for Pain, Disp: , Rfl:     ALLERGIES:

## 2023-05-19 NOTE — CARE COORDINATION
SS NOTE: Pt here CHF exacerbation. Pt's heart rate 40-50s. BP stable per nursing. Pt being diuresed with IV Lasix. Pt is on 2 liters here with no home O2. Pt's legs are swollen. She does not want to use a CPAP here. Pt relates that she drove here and her car is in our parking lot. Pt plans on returning home after dch. SS to continue. MOHAMUD Luke.5/19/2023.11:09AM.

## 2023-05-19 NOTE — PLAN OF CARE
Problem: Discharge Planning  Goal: Discharge to home or other facility with appropriate resources  5/19/2023 0905 by Beatriz Rashid RN  Outcome: Progressing  5/18/2023 2303 by Tiffanie Jack RN  Outcome: Progressing     Problem: Pain  Goal: Verbalizes/displays adequate comfort level or baseline comfort level  5/19/2023 0905 by Beatriz Rashid RN  Outcome: Progressing  5/18/2023 2303 by Tiffanie Jack RN  Outcome: Progressing     Problem: ABCDS Injury Assessment  Goal: Absence of physical injury  5/19/2023 0905 by Beatriz Rashid RN  Outcome: Progressing  5/18/2023 2303 by Tiffanie Jack RN  Outcome: Progressing     Problem: Nutrition Deficit:  Goal: Optimize nutritional status  5/19/2023 0905 by Beatriz Rashid RN  Outcome: Progressing  5/18/2023 2303 by Tiffanie Jack RN  Outcome: Progressing     Problem: Chronic Conditions and Co-morbidities  Goal: Patient's chronic conditions and co-morbidity symptoms are monitored and maintained or improved  5/19/2023 0905 by Beatriz Rashid RN  Outcome: Progressing  5/18/2023 2303 by Tiffanie Jack RN  Outcome: Progressing

## 2023-05-19 NOTE — PLAN OF CARE
Patient's chart updated to reflect:      . - HF discharge instructions.  -Orders: 2 gram sodium diet, daily weights, I/O.  -PCP and cardiology follow up appointments to be scheduled within 7 days of hospital discharge. -CHF education session will be provided to the patient prior to hospital discharge.     Nini Justice, RN MSN,RN  Heart Failure Navigator

## 2023-05-19 NOTE — TELEPHONE ENCOUNTER
----- Message from Jose Rodriguez RN sent at 5/19/2023 10:54 AM EDT -----  Regarding: Belén Bailey Round  1950  Admit 5/17 from CHF clinic. Needs HFU appointment with Dr. Sofia Yoo in the [place] office.    Thanks,  [facility]

## 2023-05-20 VITALS
DIASTOLIC BLOOD PRESSURE: 54 MMHG | WEIGHT: 198 LBS | OXYGEN SATURATION: 94 % | HEIGHT: 64 IN | RESPIRATION RATE: 18 BRPM | TEMPERATURE: 98.6 F | SYSTOLIC BLOOD PRESSURE: 114 MMHG | HEART RATE: 62 BPM | BODY MASS INDEX: 33.8 KG/M2

## 2023-05-20 LAB
ANION GAP SERPL CALCULATED.3IONS-SCNC: 9 MMOL/L (ref 7–16)
BUN SERPL-MCNC: 16 MG/DL (ref 6–23)
CALCIUM SERPL-MCNC: 9 MG/DL (ref 8.6–10.2)
CHLORIDE SERPL-SCNC: 99 MMOL/L (ref 98–107)
CO2 SERPL-SCNC: 33 MMOL/L (ref 22–29)
CREAT SERPL-MCNC: 1.4 MG/DL (ref 0.5–1)
GLUCOSE SERPL-MCNC: 94 MG/DL (ref 74–99)
POTASSIUM SERPL-SCNC: 4.1 MMOL/L (ref 3.5–5)
SODIUM SERPL-SCNC: 141 MMOL/L (ref 132–146)

## 2023-05-20 PROCEDURE — 6360000002 HC RX W HCPCS: Performed by: PHYSICIAN ASSISTANT

## 2023-05-20 PROCEDURE — 80048 BASIC METABOLIC PNL TOTAL CA: CPT

## 2023-05-20 PROCEDURE — 6370000000 HC RX 637 (ALT 250 FOR IP): Performed by: INTERNAL MEDICINE

## 2023-05-20 PROCEDURE — 2700000000 HC OXYGEN THERAPY PER DAY

## 2023-05-20 PROCEDURE — 36415 COLL VENOUS BLD VENIPUNCTURE: CPT

## 2023-05-20 PROCEDURE — 99238 HOSP IP/OBS DSCHRG MGMT 30/<: CPT | Performed by: INTERNAL MEDICINE

## 2023-05-20 RX ADMIN — PANTOPRAZOLE SODIUM 40 MG: 40 TABLET, DELAYED RELEASE ORAL at 05:58

## 2023-05-20 RX ADMIN — LISINOPRIL 20 MG: 20 TABLET ORAL at 10:02

## 2023-05-20 RX ADMIN — ATENOLOL 100 MG: 50 TABLET ORAL at 10:02

## 2023-05-20 RX ADMIN — POTASSIUM CHLORIDE 40 MEQ: 1500 TABLET, EXTENDED RELEASE ORAL at 10:02

## 2023-05-20 RX ADMIN — AMLODIPINE BESYLATE 5 MG: 5 TABLET ORAL at 10:03

## 2023-05-20 RX ADMIN — ACETAMINOPHEN 650 MG: 325 TABLET ORAL at 03:32

## 2023-05-20 RX ADMIN — ONDANSETRON HYDROCHLORIDE 4 MG: 4 TABLET, FILM COATED ORAL at 03:32

## 2023-05-20 RX ADMIN — ASPIRIN 81 MG: 81 TABLET, COATED ORAL at 10:03

## 2023-05-20 RX ADMIN — FUROSEMIDE 40 MG: 10 INJECTION, SOLUTION INTRAMUSCULAR; INTRAVENOUS at 10:03

## 2023-05-20 ASSESSMENT — PAIN DESCRIPTION - FREQUENCY: FREQUENCY: INTERMITTENT

## 2023-05-20 ASSESSMENT — PAIN SCALES - GENERAL: PAINLEVEL_OUTOF10: 8

## 2023-05-20 ASSESSMENT — PAIN DESCRIPTION - LOCATION: LOCATION: LEG

## 2023-05-20 ASSESSMENT — PAIN DESCRIPTION - ORIENTATION: ORIENTATION: RIGHT

## 2023-05-20 ASSESSMENT — PAIN DESCRIPTION - PAIN TYPE: TYPE: ACUTE PAIN

## 2023-05-20 ASSESSMENT — PAIN DESCRIPTION - DESCRIPTORS: DESCRIPTORS: ACHING;DISCOMFORT

## 2023-05-20 ASSESSMENT — PAIN - FUNCTIONAL ASSESSMENT: PAIN_FUNCTIONAL_ASSESSMENT: ACTIVITIES ARE NOT PREVENTED

## 2023-05-20 ASSESSMENT — PAIN DESCRIPTION - ONSET: ONSET: ON-GOING

## 2023-05-20 NOTE — PLAN OF CARE
Problem: Discharge Planning  Goal: Discharge to home or other facility with appropriate resources  5/19/2023 2143 by Arnel Blood RN  Outcome: Progressing     Problem: Pain  Goal: Verbalizes/displays adequate comfort level or baseline comfort level  5/19/2023 2143 by Arnel Blood RN  Outcome: Progressing     Problem: ABCDS Injury Assessment  Goal: Absence of physical injury  5/19/2023 2143 by Arnel Blood RN  Outcome: Progressing     Problem: Safety - Adult  Goal: Free from fall injury  5/19/2023 2143 by Arnel Blood RN  Outcome: Progressing     Problem: Nutrition Deficit:  Goal: Optimize nutritional status  5/19/2023 2143 by Arnel Blood RN  Outcome: Progressing     Problem: Chronic Conditions and Co-morbidities  Goal: Patient's chronic conditions and co-morbidity symptoms are monitored and maintained or improved  5/19/2023 0905 by Tristan Jack RN  Outcome: Progressing

## 2023-05-20 NOTE — CARE COORDINATION
SOCIAL WORK / DISCHARGE PLANNING:   Sw spoke with pt via phone regarding qualifying for Oxygen. Sw discussed dme providers and reviewed options, chose [id number] [facility]. Sw called [id number] [facility] - [phone], left message for on call person, Alberto Vásquez, referral info provided and request call back. Await silvio of portable for pt to be able to discharge home.            Electronically signed by MOHAMUD Moya on 5/20/2023 at 9:33 AM

## 2023-05-20 NOTE — PROGRESS NOTES
Comprehensive Nutrition Assessment    Type and Reason for Visit:  Initial, Positive Nutrition Screen    Nutrition Recommendations/Plan:   Patient reporting disliking food that reflects poor intake per flow sheet. Will start Gelatein daily. Malnutrition Assessment:  Malnutrition Status: At risk for malnutrition (Comment) (05/18/23 1150)    Context:  Chronic Illness     Findings of the 6 clinical characteristics of malnutrition:  Energy Intake:  Mild decrease in energy intake (Comment)  Weight Loss:  Unable to assess     Body Fat Loss:  No significant body fat loss     Muscle Mass Loss:  No significant muscle mass loss    Fluid Accumulation:  No significant fluid accumulation     Strength:  Not Performed    Nutrition Assessment:    Pt adm from CHF clinic d/t SOB. Hx CKD, DM, COPD. Start ONS w/ decreased PO & monitor. Nutrition Related Findings:    Pt alert, abd WNL, +2 edema, -I/Os   Wound Type: None       Current Nutrition Intake & Therapies:    Average Meal Intake: 26-50% (x 1 meal)  Average Supplements Intake: None Ordered  ADULT DIET; Regular; Low Fat/Low Chol/High Fiber/2 gm Na    Anthropometric Measures:  Height: 5' 4\" (162.6 cm)  Ideal Body Weight (IBW): 120 lbs (55 kg)    Admission Body Weight: 198 lb (89.8 kg) (5/18 standing)  Current Body Weight: 198 lb (89.8 kg) (5/18 standing),   IBW. Current BMI (kg/m2): 34  Usual Body Weight:  (YVAN d/t CHF/fluid shifts)  Weight Adjustment For: No Adjustment  BMI Categories: Obese Class 1 (BMI 30.0-34. 9)    Estimated Daily Nutrient Needs:  Energy Requirements Based On: Formula  Weight Used for Energy Requirements: Current  Energy (kcal/day): 8575-0595  Weight Used for Protein Requirements: Ideal  Protein (g/day): 75-85  Method Used for Fluid Requirements: 1 ml/kcal  Fluid (ml/day): 9366-3475    Nutrition Diagnosis:   Inadequate oral intake related to inadequate protein-energy intake (reports disliking food) as evidenced by intake 26-50% (pt
INPATIENT CARDIOLOGY FOLLOW-UP    Name: Dougie Denny    Age: 81 y.o. Date of Admission: 5/17/2023  9:55 AM    Date of Service: 5/18/2023    Chief Complaint: Follow-up for acute on chronic HFpEF    Interim History:  Admitted for further evaluation of volume overload. Currently with no chest pain, respiratory distress, or palpitations. SB/SR on EKG and telemetry. +recent dietary indiscretions. No new overnight cardiac complaints. Clinically improved with ongoing diuresis.     Review of Systems:   Cardiac: As per HPI  General: No fever, chills  Pulmonary: As per HPI  HEENT: No visual disturbances, difficult swallowing  GI: No nausea, vomiting  : No dysuria, hematuria  Endocrine: No thyroid disease, +DM  Musculoskeletal: CASILLAS x 4, no focal motor deficits  Skin: Intact, no rashes  Neuro: No headache, seizures  Psych: Currently with no depression, anxiety    Problem List:  Patient Active Problem List   Diagnosis    Primary hypertension    Type 2 diabetes mellitus without complication, without long-term current use of insulin (HCC)    Severe pulmonary hypertension (HCC)    Tobacco abuse    Anemia    Morbid obesity (HCC)    SHAVONNE (obstructive sleep apnea)    Chronic systolic (congestive) heart failure    Chronic obstructive pulmonary disease, unspecified COPD type ([facility]    Acute respiratory failure with hypoxia (HCC)    Acute on chronic heart failure with preserved ejection fraction (HFpEF) (HCC)    RVF (right ventricular failure) (HCC)    VHD (valvular heart disease)    Coronary artery disease involving native coronary artery of native heart without angina pectoris       Allergies:  No Known Allergies    Current Medications:  Current Facility-Administered Medications   Medication Dose Route Frequency Provider Last Rate Last Admin    furosemide (LASIX) injection 40 mg  40 mg IntraVENous BID Jose Ansari PA-C   40 mg at 05/18/23 0738    acetaminophen (TYLENOL) tablet 650 mg  650 mg Oral Q8H DONOVAN BARRETT
Patient c/o nausea and headache. Message sent to Dr Diop Host requesting Hetal Silva order changed from daily. Notified HR sustaining in high 40s. [facility] cardiology also notified.
Patient c/o pain in Right Hip, states starting within last few days. Describes as \"shooting pain through groin, hip and thigh\". States worse when standing. Patient states relief with tylenol administered.
Pulse ox was 84% on room air at rest.    Oxygen applied. Recovery pulse ox was 96% on 2 liters of oxygen at rest.     Ambulated patient on 2 L pulse ox dropped to 84% while ambulating. Turned O2 up to 3 L and recovery was 91% while ambulating.
Subjective: The patient is awake and alert. No problems overnight. Denies chest pain, angina, and dyspnea. Denies abdominal pain. Tolerating diet. No nausea or vomiting.had bradycardia and nausea last night     Objective:  Pt oriented, alert, coherent and logical    BP (!) 91/52   Pulse 64   Temp 99.1 °F (37.3 °C) (Oral)   Resp 18   Ht 5' 4\" (1.626 m)   Wt 198 lb (89.8 kg)   SpO2 97%   BMI 33.99 kg/m²     Head and Neck: normal atraumatic, no neck masses, normal thyroid, no jvd    Heart:  regular rate and rhythm, S1, S2 normal, no murmur, click, rub or gallop    Lungs:  chest clear, no wheezing, rales, normal symmetric air entry, pulse oximetry on 2 lnc is 97%, no chest wall deformities or tenderness    Abd: soft, non-tender, without masses or organomegaly    Extrem:  No clubbing, cyanosis, or edema    Neuro:   Cranial nerves: grossly intact  Motor system: bilateral upper extremity normal, bilateral lower extremity normal   Sensory system: bilateral upper extremity, bilateral lower extremity normal  no focal neurological deficit and DTRs Brisk    Musculoskeletal:   Spine no back tilt cervical, thoracic, lumbar, no vertebral tenderness, straight leg raising test negative on bilateral.   Upper extremity ROM within normal limits bilateral. Shoulder no tenderness bilateral. Elbow no tenderness bilateral. Wrist no tenderness bilateral. Hand bilateral. Fingers bilateral.   Lower extremity hip bilateral nl. Knee bilateral nl. Ankle bilateral nl. Foot bilateral nl. Toes bilateral nl     Skin: No rashes, lesions, or ecchymosis, no ulcers. Psych: No apparent anxiety, agitation, or depression.      CBC with Differential:    Lab Results   Component Value Date/Time    WBC 6.9 05/17/2023 10:37 AM    RBC 5.13 05/17/2023 10:37 AM    HGB 11.1 05/17/2023 10:37 AM    HCT 41.1 05/17/2023 10:37 AM     05/17/2023 10:37 AM    MCV 80.1 05/17/2023 10:37 AM    MCH 21.6 05/17/2023 10:37 AM    MCHC 27.0 05/17/2023 10:37
05/18/23  0552 05/19/23  0608    143 143   K 4.0 3.2* 3.6    102 99   CO2 30* 35* 38*   BUN 9 8 8   CREATININE 1.0 0.9 1.1*   GLUCOSE 82 84 88   CALCIUM 8.8 8.6 9.0     Lab Results   Component Value Date/Time    MG 2.0 02/15/2023 01:13 PM     Recent Labs     05/17/23  1037   ALKPHOS 106*   ALT 5   AST 13   PROT 7.0   BILITOT 0.3   LABALBU 3.7     Recent Labs     05/17/23  1037   WBC 6.9   RBC 5.13   HGB 11.1*   HCT 41.1   MCV 80.1   MCH 21.6*   MCHC 27.0*   RDW 18.5*      MPV 10.2     Lab Results   Component Value Date    CKTOTAL 48 09/09/2022     Recent Labs     05/17/23  1037 05/17/23  1535   TROPHS 19* 18*     Lab Results   Component Value Date    INR 1.1 09/09/2022    PROTIME 12.6 (H) 09/09/2022     Lab Results   Component Value Date    TSH 2.460 09/06/2022     Lab Results   Component Value Date    LABA1C 5.7 (H) 02/22/2023     No results found for: EAG  Lab Results   Component Value Date    CHOL 101 09/02/2022     Lab Results   Component Value Date    TRIG 85 09/02/2022     Lab Results   Component Value Date    HDL 38 09/02/2022     Lab Results   Component Value Date    LDLCALC 46 09/02/2022     Lab Results   Component Value Date    LABVLDL 17 09/02/2022     No results found for: [facility]     05/17/23  1037   PROBNP 2,921*       Cardiac Tests:  EKG personally reviewed: SB, rate 55, NSSTT changes     Telemetry personally reviewed (date: 5/19/2023): SB/SR, rate 55-60     Echocardiogram reviewed: 9/2/22   Normal left ventricular systolic function. Ejection fraction is visually estimated at > 55%. Dilated right ventricle (RV:LV diastolic diameter ratio > 1) and reduced right ventricular function. There is doppler evidence of stage II diastolic dysfunction. Mild tricuspid regurgitation. PASP is estimated at 62 mmHg.     ASSESSMENT / PLAN:  Acute hypoxic respiratory failure -- currently on 2L NC   Acute on chronic HFpEF/RHF -- appears hypervolemic (improving)  Mild TR /

## 2023-05-20 NOTE — DISCHARGE SUMMARY
CONTINUE these medications which have NOT CHANGED    Details   amLODIPine (NORVASC) 5 MG tablet Take 1 tablet by mouth daily  Qty: 30 tablet, Refills: 0      atorvastatin (LIPITOR) 40 MG tablet Take 1 tablet by mouth nightly  Qty: 30 tablet, Refills: 0      furosemide (LASIX) 40 MG tablet Take 1 tablet by mouth 2 times daily  Qty: 180 tablet, Refills: 0      potassium chloride (KLOR-CON M) 20 MEQ TBCR extended release tablet Take 2 tablets by mouth daily  Qty: 60 tablet, Refills: 0      atenolol (TENORMIN) 100 MG tablet Take 1 tablet by mouth daily  Qty: 30 tablet, Refills: 0      ondansetron (ZOFRAN) 4 MG tablet Take 1 tablet by mouth daily as needed for Nausea or Vomiting  Qty: 30 tablet, Refills: 0      omeprazole (PRILOSEC) 20 MG delayed release capsule Take 1 capsule by mouth every morning (before breakfast)  Qty: 30 capsule, Refills: 0      aspirin 81 MG EC tablet Take 1 tablet by mouth daily      acetaminophen (TYLENOL) 325 MG tablet Take 2 tablets by mouth every 4 hours as needed for Pain           Activity: {discharge activity:25260}  Diet: {diet:00776}    Follow-up with *** in {0-10:15078} {units:11}.     Note that over 30 minutes was spent in preparing discharge papers, discussing discharge with patient, medication review, etc.      Electronically signed by Colonel Fei MD on 5/20/2023 at 1:20 PM

## 2023-05-22 ENCOUNTER — TELEPHONE (OUTPATIENT)
Dept: PRIMARY CARE CLINIC | Age: 73
End: 2023-05-22

## 2023-05-22 DIAGNOSIS — J96.01 ACUTE RESPIRATORY FAILURE WITH HYPOXIA (HCC): ICD-10-CM

## 2023-05-22 DIAGNOSIS — J44.9 CHRONIC OBSTRUCTIVE PULMONARY DISEASE, UNSPECIFIED COPD TYPE (HCC): ICD-10-CM

## 2023-05-22 DIAGNOSIS — I50.33 ACUTE ON CHRONIC HEART FAILURE WITH PRESERVED EJECTION FRACTION (HFPEF) (HCC): Primary | ICD-10-CM

## 2023-05-22 NOTE — TELEPHONE ENCOUNTER
Care Transitions Initial Follow Up Call    Outreach made within 2 business days of discharge: Yes    Patient: Sourav Olson Patient : 1950   MRN: [id number]  Reason for Admission: acute respiratory failure with hypoxia Discharge Date: 23       Spoke with: Dalila Villarreal    Discharge department/facility: [address] Interactive Patient Contact:  Was patient able to fill all prescriptions: Yes  Was patient instructed to bring all medications to the follow-up visit: Yes  Is patient taking all medications as directed in the discharge summary?  Yes  Does patient understand their discharge instructions: Yes  Does patient have questions or concerns that need addressed prior to 7-14 day follow up office visit: no    Scheduled appointment with PCP within 7-14 days    Follow Up  Future Appointments   Date Time Provider Kierra Angela   2023  2:00 PM [facility] CHF ROOM 1 [facility]F [facility]   2023  9:15 [facility]   2023 11:45 AM Justino Rosales MD [facility]       Ryen Bocanegra MA

## 2023-05-25 ENCOUNTER — HOSPITAL ENCOUNTER (OUTPATIENT)
Dept: OTHER | Age: 73
Setting detail: THERAPIES SERIES
Discharge: HOME | End: 2023-05-25

## 2023-05-26 ENCOUNTER — TELEPHONE (OUTPATIENT)
Dept: PRIMARY CARE CLINIC | Age: 73
End: 2023-05-26

## 2023-05-26 DIAGNOSIS — E11.65 TYPE 2 DIABETES MELLITUS WITH HYPERGLYCEMIA, WITHOUT LONG-TERM CURRENT USE OF INSULIN (HCC): Primary | ICD-10-CM

## 2023-05-31 ENCOUNTER — OFFICE VISIT (OUTPATIENT)
Dept: PRIMARY CARE CLINIC | Age: 73
End: 2023-05-31

## 2023-05-31 VITALS
OXYGEN SATURATION: 96 % | WEIGHT: 196 LBS | TEMPERATURE: 98.2 F | DIASTOLIC BLOOD PRESSURE: 76 MMHG | HEART RATE: 91 BPM | BODY MASS INDEX: 33.46 KG/M2 | SYSTOLIC BLOOD PRESSURE: 120 MMHG | HEIGHT: 64 IN

## 2023-05-31 DIAGNOSIS — I50.33 ACUTE ON CHRONIC HEART FAILURE WITH PRESERVED EJECTION FRACTION (HFPEF) (HCC): ICD-10-CM

## 2023-05-31 DIAGNOSIS — G47.33 OSA (OBSTRUCTIVE SLEEP APNEA): ICD-10-CM

## 2023-05-31 DIAGNOSIS — I25.10 CORONARY ARTERY DISEASE INVOLVING NATIVE CORONARY ARTERY OF NATIVE HEART WITHOUT ANGINA PECTORIS: ICD-10-CM

## 2023-05-31 DIAGNOSIS — I10 PRIMARY HYPERTENSION: ICD-10-CM

## 2023-05-31 DIAGNOSIS — Z09 HOSPITAL DISCHARGE FOLLOW-UP: Primary | ICD-10-CM

## 2023-05-31 LAB
ANION GAP SERPL CALCULATED.3IONS-SCNC: 11 MMOL/L (ref 7–16)
BUN SERPL-MCNC: 15 MG/DL (ref 6–23)
CALCIUM SERPL-MCNC: 9.8 MG/DL (ref 8.6–10.2)
CHLORIDE SERPL-SCNC: 97 MMOL/L (ref 98–107)
CO2 SERPL-SCNC: 35 MMOL/L (ref 22–29)
CREAT SERPL-MCNC: 1 MG/DL (ref 0.5–1)
GLUCOSE SERPL-MCNC: 100 MG/DL (ref 74–99)
POTASSIUM SERPL-SCNC: 4 MMOL/L (ref 3.5–5)
SODIUM SERPL-SCNC: 143 MMOL/L (ref 132–146)

## 2023-05-31 RX ORDER — LISINOPRIL 40 MG/1
TABLET ORAL
COMMUNITY
Start: 2023-02-22 | End: 2023-05-31

## 2023-05-31 RX ORDER — ONDANSETRON 4 MG/1
TABLET, ORALLY DISINTEGRATING ORAL
COMMUNITY
Start: 2023-02-22

## 2023-05-31 RX ORDER — QUINAPRIL 20 MG/1
TABLET ORAL
COMMUNITY
Start: 2023-02-22

## 2023-05-31 ASSESSMENT — ENCOUNTER SYMPTOMS
WHEEZING: 0
BACK PAIN: 0
SORE THROAT: 0
APNEA: 0
SHORTNESS OF BREATH: 0
BLOOD IN STOOL: 0
ABDOMINAL DISTENTION: 0
DIARRHEA: 0
SINUS PAIN: 0
EYE ITCHING: 0
VOMITING: 0
ABDOMINAL PAIN: 0
NAUSEA: 0
TROUBLE SWALLOWING: 0
COUGH: 0
EYE DISCHARGE: 0
PHOTOPHOBIA: 0
CONSTIPATION: 0

## 2023-05-31 NOTE — PROGRESS NOTES
Post-Discharge Transitional Care  Follow Up      Jessica Edwards   YOB: 1950    Date of Office Visit:  5/31/2023  Date of Hospital Admission: 5/17/23  Date of Hospital Discharge: 5/20/23  Risk of hospital readmission (high >=14%. Medium >=10%) :Readmission Risk Score: 13.6      Care management risk score Rising risk (score 2-5) and Complex Care (Scores >=6): No Risk Score On File     Non face to face  following discharge, date last encounter closed (first attempt may have been earlier): 05/22/2023    Call initiated 2 business days of discharge: Yes    ASSESSMENT/PLAN:   Hospital discharge follow-up  -     TX DISCHARGE MEDS RECONCILED W/ CURRENT OUTPATIENT MED LIST    Medical Decision Making: high complexity  No follow-ups on file. Subjective:   HPI:  Follow up of Hospital problems/diagnosis(es):   1) diastolic dysfunction chf: pt symptoms under control, pt educated about applying oxygen  2)ckd: metalazone was added: we will ck bmp today  3) htn: bp under control  4) linda: pt refusing cpap  Inpatient course: Discharge summary reviewed- see chart.     Interval history/Current status: stable    Patient Active Problem List   Diagnosis    Primary hypertension    Type 2 diabetes mellitus without complication, without long-term current use of insulin (HCC)    Severe pulmonary hypertension (HCC)    Tobacco abuse    Anemia    Morbid obesity (HCC)    LINDA (obstructive sleep apnea)    Chronic systolic (congestive) heart failure    Chronic obstructive pulmonary disease, unspecified COPD type ([address]    Acute respiratory failure with hypoxia (HCC)    Acute on chronic heart failure with preserved ejection fraction (HFpEF) ([address]    RVF (right ventricular failure) (HCC)    VHD (valvular heart disease)    Coronary artery disease involving native coronary artery of native heart without angina pectoris       Medications listed as ordered at the time of discharge from hospital     Medication List

## 2023-06-08 ENCOUNTER — TELEPHONE (OUTPATIENT)
Dept: OTHER | Age: 73
End: 2023-06-08

## 2023-06-08 ENCOUNTER — OFFICE VISIT (OUTPATIENT)
Dept: PODIATRY | Age: 73
End: 2023-06-08

## 2023-06-08 VITALS — WEIGHT: 196 LBS | BODY MASS INDEX: 33.46 KG/M2 | HEIGHT: 64 IN

## 2023-06-08 DIAGNOSIS — B35.1 ONYCHOMYCOSIS: Primary | ICD-10-CM

## 2023-06-08 DIAGNOSIS — R26.2 DIFFICULTY WALKING: ICD-10-CM

## 2023-06-08 DIAGNOSIS — M79.675 PAIN OF TOE OF LEFT FOOT: ICD-10-CM

## 2023-06-08 DIAGNOSIS — I87.2 VENOUS INSUFFICIENCY (CHRONIC) (PERIPHERAL): ICD-10-CM

## 2023-06-08 DIAGNOSIS — E11.51 TYPE II DIABETES MELLITUS WITH PERIPHERAL CIRCULATORY DISORDER (HCC): ICD-10-CM

## 2023-06-08 DIAGNOSIS — M79.674 PAIN OF TOE OF RIGHT FOOT: ICD-10-CM

## 2023-06-08 DIAGNOSIS — I73.9 PVD (PERIPHERAL VASCULAR DISEASE) (HCC): ICD-10-CM

## 2023-06-08 RX ORDER — AMMONIUM LACTATE 12 G/100G
CREAM TOPICAL
Qty: 385 G | Refills: 4 | Status: SHIPPED | OUTPATIENT
Start: 2023-06-08

## 2023-06-08 NOTE — PROGRESS NOTES
23     Racquel Alarcon    : 1950 Sex: female   Age: 81 y.o. Patient was referred by: Amy Stout. MD Omi  Patient's PCP/Provider is:  Ariel Solano MD    Subjective:    Patient seen today for diabetic foot evaluation and care    Chief Complaint   Patient presents with    New Patient    Diabetes     Diabetic foot exam        HPI: Patient is in need of diabetic foot care and nail debridement. Patient has been hospitalized recently and has been unable to take care of her lower extremities. Patient does wear appropriate shoe gear on a daily basis and denies any additional issues. ROS:  Const: Positives and pertinent negatives as per HPI. Musculo: Denies symptoms other than stated above. Neuro: Denies symptoms other than stated above. Skin: Denies symptoms other than stated above. Current Medications:    Current Outpatient Medications:     ammonium lactate (LAC-HYDRIN) 12 % cream, Apply topically as needed. , Disp: 385 g, Rfl: 4    ondansetron (ZOFRAN-ODT) 4 MG disintegrating tablet, , Disp: , Rfl:     quinapril (ACCUPRIL) 20 MG tablet, , Disp: , Rfl:     lisinopril (PRINIVIL;ZESTRIL) 20 MG tablet, Take 1 tablet by mouth daily, Disp: 30 tablet, Rfl: 3    metOLazone (ZAROXOLYN) 2.5 MG tablet, 1 po q MWF, Disp: 30 tablet, Rfl: 3    amLODIPine (NORVASC) 5 MG tablet, Take 1 tablet by mouth daily, Disp: 30 tablet, Rfl: 0    atorvastatin (LIPITOR) 40 MG tablet, Take 1 tablet by mouth nightly, Disp: 30 tablet, Rfl: 0    furosemide (LASIX) 40 MG tablet, Take 1 tablet by mouth 2 times daily, Disp: 180 tablet, Rfl: 0    potassium chloride (KLOR-CON M) 20 MEQ TBCR extended release tablet, Take 2 tablets by mouth daily, Disp: 60 tablet, Rfl: 0    atenolol (TENORMIN) 100 MG tablet, Take 1 tablet by mouth daily, Disp: 30 tablet, Rfl: 0    ondansetron (ZOFRAN) 4 MG tablet, Take 1 tablet by mouth daily as needed for Nausea or Vomiting, Disp: 30 tablet, Rfl: 0    omeprazole (PRILOSEC) 20 MG delayed

## 2023-06-08 NOTE — TELEPHONE ENCOUNTER
Patient called into CHF clinic to reschedule tomorrow's appointment because she states she has to many things to do. Encouraged pt to keep appointment due to recent discharge from the hospital (5/20) and previously rescheduling the appointment. Patient states that she has had a death in the family. Offered condolences and concern for readmission/ CHF exacerbation. Appointment rescheduled for  next available appointment June 12, 2023 and told to call sooner if experiencing signs of CHF. Patient denies shortness or breath, weight gain, or edema.

## 2023-06-08 NOTE — PROGRESS NOTES
Lindsay Gray is here today as a new patient for an initial diabetic foot exam.  She denies neuropathy or any open areas. She reports dryness and thick nails. PCP is Dr. Nishi Ramos, last seen 05/31/2023.

## 2023-06-19 RX ORDER — LISINOPRIL 10 MG/1
10 TABLET ORAL DAILY
Qty: 30 TABLET | Refills: 0 | OUTPATIENT
Start: 2023-06-19

## 2023-06-19 RX ORDER — ATORVASTATIN CALCIUM 40 MG/1
TABLET, FILM COATED ORAL
Qty: 90 TABLET | OUTPATIENT
Start: 2023-06-19

## 2023-06-23 ENCOUNTER — HOSPITAL ENCOUNTER (OUTPATIENT)
Dept: OTHER | Age: 73
Setting detail: THERAPIES SERIES
Discharge: HOME | End: 2023-06-23
Payer: MEDICARE

## 2023-06-23 RX ORDER — ONDANSETRON 4 MG/1
4 TABLET, FILM COATED ORAL DAILY PRN
Qty: 30 TABLET | Refills: 0 | Status: SHIPPED | OUTPATIENT
Start: 2023-06-23

## 2023-06-23 RX ORDER — FUROSEMIDE 40 MG/1
40 TABLET ORAL 2 TIMES DAILY
Qty: 180 TABLET | Refills: 0 | Status: SHIPPED | OUTPATIENT
Start: 2023-06-23 | End: 2023-09-21

## 2023-06-23 RX ORDER — ATENOLOL 100 MG/1
100 TABLET ORAL DAILY
Qty: 90 TABLET | Refills: 0 | Status: SHIPPED | OUTPATIENT
Start: 2023-06-23 | End: 2023-09-21

## 2023-06-23 RX ORDER — OMEPRAZOLE 20 MG/1
20 CAPSULE, DELAYED RELEASE ORAL
Qty: 90 CAPSULE | Refills: 0 | Status: SHIPPED | OUTPATIENT
Start: 2023-06-23 | End: 2023-09-21

## 2023-06-23 RX ORDER — POTASSIUM CHLORIDE 1500 MG/1
40 TABLET, EXTENDED RELEASE ORAL 2 TIMES DAILY
Qty: 360 TABLET | Refills: 0 | Status: SHIPPED | OUTPATIENT
Start: 2023-06-23 | End: 2023-09-21

## 2023-06-23 RX ORDER — AMLODIPINE BESYLATE 5 MG/1
5 TABLET ORAL DAILY
Qty: 90 TABLET | Refills: 0 | Status: SHIPPED | OUTPATIENT
Start: 2023-06-23 | End: 2023-09-21

## 2023-06-23 RX ORDER — LISINOPRIL 20 MG/1
20 TABLET ORAL DAILY
Qty: 90 TABLET | Refills: 0 | Status: SHIPPED | OUTPATIENT
Start: 2023-06-23 | End: 2023-09-21

## 2023-06-23 RX ORDER — ATORVASTATIN CALCIUM 40 MG/1
40 TABLET, FILM COATED ORAL NIGHTLY
Qty: 90 TABLET | Refills: 0 | Status: SHIPPED | OUTPATIENT
Start: 2023-06-23 | End: 2023-09-21

## 2023-07-13 ENCOUNTER — HOSPITAL ENCOUNTER (OUTPATIENT)
Dept: OTHER | Age: 73
Setting detail: THERAPIES SERIES
Discharge: HOME | End: 2023-07-13
Payer: MEDICARE

## 2023-07-13 ENCOUNTER — TELEPHONE (OUTPATIENT)
Dept: CARDIOLOGY CLINIC | Age: 73
End: 2023-07-13

## 2023-07-13 VITALS
BODY MASS INDEX: 34.84 KG/M2 | SYSTOLIC BLOOD PRESSURE: 116 MMHG | RESPIRATION RATE: 18 BRPM | DIASTOLIC BLOOD PRESSURE: 61 MMHG | WEIGHT: 203 LBS | HEART RATE: 65 BPM | OXYGEN SATURATION: 97 %

## 2023-07-13 LAB
ANION GAP SERPL CALCULATED.3IONS-SCNC: 9 MMOL/L (ref 7–16)
BNP BLD-MCNC: 315 PG/ML (ref 0–125)
BUN SERPL-MCNC: 18 MG/DL (ref 6–23)
CALCIUM SERPL-MCNC: 9.2 MG/DL (ref 8.6–10.2)
CHLORIDE SERPL-SCNC: 98 MMOL/L (ref 98–107)
CO2 SERPL-SCNC: 32 MMOL/L (ref 22–29)
CREAT SERPL-MCNC: 1.1 MG/DL (ref 0.5–1)
GLUCOSE SERPL-MCNC: 96 MG/DL (ref 74–99)
POTASSIUM SERPL-SCNC: 3.9 MMOL/L (ref 3.5–5)
SODIUM SERPL-SCNC: 139 MMOL/L (ref 132–146)

## 2023-07-13 PROCEDURE — 83880 ASSAY OF NATRIURETIC PEPTIDE: CPT

## 2023-07-13 PROCEDURE — 36415 COLL VENOUS BLD VENIPUNCTURE: CPT

## 2023-07-13 PROCEDURE — 99214 OFFICE O/P EST MOD 30 MIN: CPT

## 2023-07-13 PROCEDURE — 80048 BASIC METABOLIC PNL TOTAL CA: CPT

## 2023-07-13 ASSESSMENT — PATIENT HEALTH QUESTIONNAIRE - PHQ9
SUM OF ALL RESPONSES TO PHQ9 QUESTIONS 1 & 2: 2
2. FEELING DOWN, DEPRESSED OR HOPELESS: SEVERAL DAYS
10. IF YOU CHECKED OFF ANY PROBLEMS, HOW DIFFICULT HAVE THESE PROBLEMS MADE IT FOR YOU TO DO YOUR WORK, TAKE CARE OF THINGS AT HOME, OR GET ALONG WITH OTHER PEOPLE: NOT DIFFICULT AT ALL
1. LITTLE INTEREST OR PLEASURE IN DOING THINGS: SEVERAL DAYS

## 2023-07-13 NOTE — PROGRESS NOTES
Sent a message to [facility] re; adding patient to the recall list to Dr. Sintia Saenz.
98   06/12/2023 101   05/31/2023 97 (L)     CO2 (mmol/L)   Date Value   07/13/2023 32 (H)   06/12/2023 31 (H)   05/31/2023 35 (H)     BUN (mg/dL)   Date Value   07/13/2023 18   06/12/2023 26 (H)   05/31/2023 15     Glucose (mg/dL)   Date Value   07/13/2023 96   06/12/2023 201 (H)   05/31/2023 100 (H)     Calcium (mg/dL)   Date Value   07/13/2023 9.2   06/12/2023 8.5 (L)   05/31/2023 9.8       Last 3 BNP       Pro-BNP (pg/mL)   Date Value   07/13/2023 315 (H)   06/12/2023 727 (H)   05/17/2023 2,921 (H)          CBC: No results for input(s): WBC, HGB, PLT in the last 72 hours. BMP:    Recent Labs     07/13/23  1138      K 3.9   CL 98   CO2 32*   BUN 18   CREATININE 1.1*   GLUCOSE 96       Hepatic: No results for input(s): AST, ALT, ALB, BILITOT, ALKPHOS in the last 72 hours. Troponin: No results for input(s): TROPONINI in the last 72 hours. BNP: No results for input(s): BNP in the last 72 hours. Lipids: No results for input(s): CHOL, HDL in the last 72 hours. Invalid input(s): LDLCALCU  INR: No results for input(s): INR in the last 72 hours. WEIGHTS:    Wt Readings from Last 3 Encounters:   07/13/23 203 lb (92.1 kg)   06/12/23 201 lb 12.8 oz (91.5 kg)   06/08/23 196 lb (88.9 kg)       TELEMETRY:  Cardiac Regularity: Regular  Cardiac Rhythm/Interpretation: NSR    ASSESSMENT:  Missy Burton present for a follow up appointment. Patient now with supplemental oxygen. Patient now taking Metolazone MWF (in addition to Lasix) and reports a good urinary response. Repeat labs obtained. Denies SOB at rest. Improvement in [place]. One month follow up appointment.      Interventions completed this visit:  IV diuretics given : No  Lab work obtained  : YES pro-BNP, BMP  Reviewed currently prescribed medications with patient, educated on importance of compliance and answered any questions regarding their medication  Educated on signs and symptoms of HF  Educated on low sodium diet      PLAN:  Scheduled to follow

## 2023-07-13 NOTE — TELEPHONE ENCOUNTER
----- Message from Sushma Hernandez RN sent at 7/13/2023  4:03 PM EDT -----  Regarding: recall list  Pt is known to DR. Espitia.  Pt was recently hospitalized for Acute on chronic HF in 5/2023 and does not look like he has not had a HFU with cardiology since than.  If you could add her onto the recall list. Thank you

## 2023-07-13 NOTE — RESULT ENCOUNTER NOTE
Labs and CHF clinic note reviewed  Improved potassium level   Continue current management     Thank you

## 2023-07-28 ASSESSMENT — EJECTION FRACTION
EF_VALUE: >55
EF_SOURCE: 2D ECHO

## 2023-07-31 ENCOUNTER — HOSPITAL ENCOUNTER (OUTPATIENT)
Dept: OTHER | Age: 73
Setting detail: THERAPIES SERIES
Discharge: HOME | End: 2023-07-31
Payer: MEDICARE

## 2023-07-31 VITALS
DIASTOLIC BLOOD PRESSURE: 50 MMHG | HEIGHT: 64 IN | SYSTOLIC BLOOD PRESSURE: 133 MMHG | RESPIRATION RATE: 16 BRPM | OXYGEN SATURATION: 92 % | HEART RATE: 58 BPM | WEIGHT: 207.8 LBS | BODY MASS INDEX: 35.48 KG/M2

## 2023-07-31 LAB
ANION GAP SERPL CALCULATED.3IONS-SCNC: 11 MMOL/L (ref 7–16)
BNP SERPL-MCNC: 457 PG/ML (ref 0–450)
BUN SERPL-MCNC: 19 MG/DL (ref 6–23)
CALCIUM SERPL-MCNC: 9.4 MG/DL (ref 8.6–10.2)
CHLORIDE SERPL-SCNC: 97 MMOL/L (ref 98–107)
CO2 SERPL-SCNC: 32 MMOL/L (ref 22–29)
CREAT SERPL-MCNC: 1.3 MG/DL (ref 0.5–1)
GFR SERPL CREATININE-BSD FRML MDRD: 46 ML/MIN/1.73M2
GLUCOSE SERPL-MCNC: 103 MG/DL (ref 74–107)
POTASSIUM SERPL-SCNC: 4.5 MMOL/L (ref 3.5–5)
SODIUM SERPL-SCNC: 140 MMOL/L (ref 132–146)

## 2023-07-31 PROCEDURE — 96374 THER/PROPH/DIAG INJ IV PUSH: CPT

## 2023-07-31 PROCEDURE — 99214 OFFICE O/P EST MOD 30 MIN: CPT | Performed by: NURSE PRACTITIONER

## 2023-07-31 PROCEDURE — 80048 BASIC METABOLIC PNL TOTAL CA: CPT

## 2023-07-31 PROCEDURE — 2580000003 HC RX 258: Performed by: NURSE PRACTITIONER

## 2023-07-31 PROCEDURE — 6360000002 HC RX W HCPCS: Performed by: NURSE PRACTITIONER

## 2023-07-31 PROCEDURE — 83880 ASSAY OF NATRIURETIC PEPTIDE: CPT

## 2023-07-31 PROCEDURE — 99214 OFFICE O/P EST MOD 30 MIN: CPT

## 2023-07-31 RX ORDER — METOLAZONE 2.5 MG/1
TABLET ORAL
Qty: 38 TABLET | Refills: 0 | Status: SHIPPED | OUTPATIENT
Start: 2023-07-31

## 2023-07-31 RX ORDER — FUROSEMIDE 10 MG/ML
40 INJECTION INTRAMUSCULAR; INTRAVENOUS ONCE
Status: COMPLETED | OUTPATIENT
Start: 2023-07-31 | End: 2023-07-31

## 2023-07-31 RX ORDER — METOLAZONE 2.5 MG/1
TABLET ORAL
Qty: 30 TABLET | Refills: 3 | Status: SHIPPED
Start: 2023-07-31 | End: 2023-07-31

## 2023-07-31 RX ORDER — SODIUM CHLORIDE 0.9 % (FLUSH) 0.9 %
5-40 SYRINGE (ML) INJECTION ONCE
Status: COMPLETED | OUTPATIENT
Start: 2023-07-31 | End: 2023-07-31

## 2023-07-31 RX ADMIN — FUROSEMIDE 40 MG: 10 INJECTION INTRAVENOUS at 09:45

## 2023-07-31 RX ADMIN — Medication 10 ML: at 09:46

## 2023-07-31 ASSESSMENT — SLEEP AND FATIGUE QUESTIONNAIRES
HOW LIKELY ARE YOU TO NOD OFF OR FALL ASLEEP WHILE LYING DOWN TO REST IN THE AFTERNOON WHEN CIRCUMSTANCES PERMIT: 1
HOW LIKELY ARE YOU TO NOD OFF OR FALL ASLEEP WHILE SITTING AND TALKING TO SOMEONE: 0
HOW LIKELY ARE YOU TO NOD OFF OR FALL ASLEEP WHILE SITTING QUIETLY AFTER LUNCH WITHOUT ALCOHOL: 2
HOW LIKELY ARE YOU TO NOD OFF OR FALL ASLEEP WHILE SITTING INACTIVE IN A PUBLIC PLACE: 0
HOW LIKELY ARE YOU TO NOD OFF OR FALL ASLEEP WHILE WATCHING TV: 3
NECK CIRCUMFERENCE (INCHES): 15
HOW LIKELY ARE YOU TO NOD OFF OR FALL ASLEEP WHILE SITTING AND READING: 3
HOW LIKELY ARE YOU TO NOD OFF OR FALL ASLEEP IN A CAR, WHILE STOPPED FOR A FEW MINUTES IN TRAFFIC: 0
HOW LIKELY ARE YOU TO NOD OFF OR FALL ASLEEP WHEN YOU ARE A PASSENGER IN A CAR FOR AN HOUR WITHOUT A BREAK: 0
ESS TOTAL SCORE: 9

## 2023-07-31 NOTE — DISCHARGE INSTRUCTIONS
Will give dose of IV lasix  Get blood work in CHF clinic today   Start Jardiance 10 mg daily   Continue rest of current cardiac medications  Elevate your legs as much as possible    Consider sleep study  Follow up in CHF clinic in 7-10 days  Follow up with Dr. Richelle Kelly in 3 months   Weigh yourself daily    -Stay Hydrated, 64 oz     -Diet should sodium restricted to 2 grams    -Again watch your daily weight trends and if you gain water weight please follow below instructions.    -If you gain 3-5 pounds in 2-3 days OR notice that you are retaining fluid in anyway just like you did before then take an extra dose of your water pill (furosemide/Lasix) every day until you lose the weight or feel better.     -If you notice that you have taken more than 2 extra doses in 1 week then please call and let us know. -If at any time you feel that you are retaining fluid, your medications are not working, or you feel ill in anyway, then please call us for either same day appointment or the next day, and for instructions. Our goal is to keep you out of the emergency room and the hospital and we have ways to do it. You just need to call us in a timely manner.     -If you become sick for other reasons, and notice that you are not urinating as much, the urine is very dark, you have significant diarrhea or vomiting, then please DO NOT take your water pill and CALL US immediately. Heart Failure and Sleep Apnea: Care Instructions  Overview     Sleep apnea is fairly common in people with heart failure. Sleep apnea means you stop breathing for 10 seconds or longer during sleep. Types of sleep apnea include central sleep apnea (CSA) and obstructive sleep apnea (SHAVONNE). CSA is caused by a problem with how the brain signals the breathing muscles. SHAVONNE happens when your airway gets blocked while you sleep. Some people have both types.   When you stop breathing, the amount of oxygen in your blood drops, so your

## 2023-07-31 NOTE — PLAN OF CARE
Again at visit today reviewed Cardio[employer] purpose. She still declines. Encouraged daily weights and HF zones use.      Admissions:  2/15/23  5/17/23      Future Appointments   Date Time Provider [address]   8/10/2023 10:30 AM [address] CLAU Singer, DPM [facility]   8/11/2023 11:45 AM [facility]   8/15/2023 11:15 AM [facility] CHF ROOM 1 [facility] CHF [place]

## 2023-07-31 NOTE — PROGRESS NOTES
[facility] CHF Clinic  [facility]KA Clinic Visit         Reason for Visit: Heart Failure    Primary Cardiologist: Dr. Christiano Healy       History of Present Illness:     Ms. Mecca Alvarez is a 81year old female with a PMHx of chronic HFpEF/RHF, RV dysfunction, pulmonary hypertension, COPD with chronic hypoxic respiratory failure, ongoing tobacco abuse, bradycardia, CKD, chronic anemia, HLD, probable SHAVONNE. She presents today in follow-up, she has remained out of the hospital since 5/2023 she has also been discharged from SNF facility and now is residing at home. She reports compliance with her current cardiac medications and good urinary response to oral diuretic with clear yellow urine production. She is likely eating a diet higher in sodium and is monitoring her weight at home with a noted 7 pound weight gain over the past month. She also has noted increased bilateral lower extremity edema. She has chronic [place] and wears supplemental O2. She denies any dizziness, lightheadedness, near-syncope, syncope, strokelike symptoms, chest pain, pressure, heaviness, palpitations, abdominal bloating, early satiety.       Patient Active Problem List    Diagnosis Date Noted    Acute on chronic heart failure with preserved ejection fraction (HFpEF) ([address]) 02/16/2023     Priority: Medium    RVF (right ventricular failure) ([address]) 02/16/2023     Priority: Medium    VHD (valvular heart disease) 02/16/2023     Priority: Medium    Chronic obstructive pulmonary disease, unspecified COPD type ([address]) 02/15/2023     Priority: Medium    Acute respiratory failure with hypoxia ([address]) 02/15/2023     Priority: Medium    Chronic systolic (congestive) heart failure 11/02/2022     Priority: Medium    Morbid obesity ([address]) 09/06/2022     Priority: Medium    SHAVONNE (obstructive sleep apnea) 09/06/2022     Priority: Medium    Primary hypertension 09/02/2022     Priority: Medium    Type 2 diabetes mellitus without complication, without long-term current use of insulin ([address])

## 2023-08-15 ENCOUNTER — TELEPHONE (OUTPATIENT)
Dept: OTHER | Age: 73
End: 2023-08-15

## 2023-08-15 ENCOUNTER — HOSPITAL ENCOUNTER (OUTPATIENT)
Dept: OTHER | Age: 73
Setting detail: THERAPIES SERIES
Discharge: HOME | End: 2023-08-15
Payer: MEDICARE

## 2023-08-15 VITALS
WEIGHT: 207 LBS | HEART RATE: 60 BPM | DIASTOLIC BLOOD PRESSURE: 55 MMHG | OXYGEN SATURATION: 98 % | BODY MASS INDEX: 35.53 KG/M2 | SYSTOLIC BLOOD PRESSURE: 107 MMHG | RESPIRATION RATE: 16 BRPM

## 2023-08-15 DIAGNOSIS — I50.33 ACUTE ON CHRONIC HEART FAILURE WITH PRESERVED EJECTION FRACTION (HFPEF) (HCC): Primary | ICD-10-CM

## 2023-08-15 LAB
ANION GAP SERPL CALCULATED.3IONS-SCNC: 10 MMOL/L (ref 7–16)
BNP SERPL-MCNC: 284 PG/ML (ref 0–450)
BUN SERPL-MCNC: 23 MG/DL (ref 6–23)
CALCIUM SERPL-MCNC: 9.3 MG/DL (ref 8.6–10.2)
CHLORIDE SERPL-SCNC: 100 MMOL/L (ref 98–107)
CO2 SERPL-SCNC: 28 MMOL/L (ref 22–29)
CREAT SERPL-MCNC: 1.4 MG/DL (ref 0.5–1)
GFR SERPL CREATININE-BSD FRML MDRD: 39 ML/MIN/1.73M2
GLUCOSE SERPL-MCNC: 153 MG/DL (ref 74–99)
POTASSIUM SERPL-SCNC: 4.1 MMOL/L (ref 3.5–5)
SODIUM SERPL-SCNC: 138 MMOL/L (ref 132–146)

## 2023-08-15 PROCEDURE — 80048 BASIC METABOLIC PNL TOTAL CA: CPT

## 2023-08-15 PROCEDURE — 36415 COLL VENOUS BLD VENIPUNCTURE: CPT

## 2023-08-15 PROCEDURE — 83880 ASSAY OF NATRIURETIC PEPTIDE: CPT

## 2023-08-15 PROCEDURE — 99214 OFFICE O/P EST MOD 30 MIN: CPT

## 2023-08-15 RX ORDER — FUROSEMIDE 40 MG/1
40 TABLET ORAL DAILY
Qty: 90 TABLET | Refills: 0 | Status: SHIPPED | OUTPATIENT
Start: 2023-08-15 | End: 2023-11-13

## 2023-08-15 NOTE — PROGRESS NOTES
Congestive Heart Failure [id number]   1950    Referring Provider: [facility]  Primary Care Physician: Omi  Cardiologist: Omkar  Nephrologist: N/A    History of Present Illness: Stiven Kothari is a 81 y.o. female with a history of HFpEF, most recent EF 55% 9/12/22. Patient Story:  She does have mild dyspnea with exertion, however denies shortness of breath at rest, or decline in overall functional capacity. She does not have orthopnea, PND, nocturnal cough or hemoptysis. She does not have abdominal distention or bloating, early satiety, anorexia/change in appetite. She does have a good urinary response to oral diuretic. She does have trace lower extremity edema. She denies lightheadedness, dizziness. She denies palpitations, syncope or near syncope. She does not complain of chest pain, pressure, discomfort. No Known Allergies      No outpatient medications have been marked as taking for the 8/15/23 encounter [facility] Encounter) with [facility] CHF ROOM 1.      Current Outpatient Medications on File Prior to Encounter   Medication Sig Dispense Refill    empagliflozin (JARDIANCE) 10 MG tablet Take 1 tablet by mouth daily 30 tablet 6    metOLazone (ZAROXOLYN) 2.5 MG tablet TAKE 1 TABLET BY MOUTH MONDAY, WEDNESDAY AND FRIDAY 38 tablet 0    amLODIPine (NORVASC) 5 MG tablet Take 1 tablet by mouth daily 90 tablet 0    atenolol (TENORMIN) 100 MG tablet Take 1 tablet by mouth daily 90 tablet 0    atorvastatin (LIPITOR) 40 MG tablet Take 1 tablet by mouth nightly 90 tablet 0    furosemide (LASIX) 40 MG tablet Take 1 tablet by mouth 2 times daily 180 tablet 0    lisinopril (PRINIVIL;ZESTRIL) 20 MG tablet Take 1 tablet by mouth daily 90 tablet 0    omeprazole (PRILOSEC) 20 MG delayed release capsule Take 1 capsule by mouth every morning (before breakfast) 90 capsule 0    ondansetron (ZOFRAN) 4 MG tablet Take 1 tablet by mouth daily as needed

## 2023-08-15 NOTE — TELEPHONE ENCOUNTER
Labs reviewed  Spoke with Adilene Holley RN in CHF clinic  Will reduce lasix to 40 mg daily   Follow up labs in 1 week     Thank you

## 2023-08-22 ENCOUNTER — PROCEDURE VISIT (OUTPATIENT)
Dept: PODIATRY | Age: 73
End: 2023-08-22
Payer: MEDICARE

## 2023-08-22 VITALS — HEIGHT: 64 IN | BODY MASS INDEX: 35.34 KG/M2 | WEIGHT: 207 LBS

## 2023-08-22 DIAGNOSIS — E11.51 TYPE II DIABETES MELLITUS WITH PERIPHERAL CIRCULATORY DISORDER (HCC): ICD-10-CM

## 2023-08-22 DIAGNOSIS — R26.2 DIFFICULTY WALKING: ICD-10-CM

## 2023-08-22 DIAGNOSIS — I73.9 PVD (PERIPHERAL VASCULAR DISEASE) (HCC): ICD-10-CM

## 2023-08-22 DIAGNOSIS — M79.675 PAIN OF TOE OF LEFT FOOT: ICD-10-CM

## 2023-08-22 DIAGNOSIS — B35.1 ONYCHOMYCOSIS: Primary | ICD-10-CM

## 2023-08-22 DIAGNOSIS — M79.674 PAIN OF TOE OF RIGHT FOOT: ICD-10-CM

## 2023-08-22 DIAGNOSIS — I87.2 VENOUS INSUFFICIENCY (CHRONIC) (PERIPHERAL): ICD-10-CM

## 2023-08-22 PROCEDURE — 99999 PR OFFICE/OUTPT VISIT,PROCEDURE ONLY: CPT | Performed by: PODIATRIST

## 2023-08-22 PROCEDURE — 11721 DEBRIDE NAIL 6 OR MORE: CPT | Performed by: PODIATRIST

## 2023-08-22 NOTE — PROGRESS NOTES
Patient in today for nail care. Patient does not have any complaints of pain at this time. Patient's PCP is Nancy Gregory MD date of last ov 05/31/2023.        Marina Martinez LPN

## 2023-08-22 NOTE — PROGRESS NOTES
23     Saadia Ruiz    : 1950  Sex: female  Age: 81 y.o. Subjective: The patient is seen today for evaluation regarding diabetic foot evaluation and mycotic nail care. No other complaints noted. Chief Complaint   Patient presents with    Nail Problem     Routine nail care        Current Medications:    Current Outpatient Medications:     furosemide (LASIX) 40 MG tablet, Take 1 tablet by mouth daily, Disp: 90 tablet, Rfl: 0    empagliflozin (JARDIANCE) 10 MG tablet, Take 1 tablet by mouth daily, Disp: 30 tablet, Rfl: 6    metOLazone (ZAROXOLYN) 2.5 MG tablet, TAKE 1 TABLET BY MOUTH MONDAY, WEDNESDAY AND FRIDAY, Disp: 38 tablet, Rfl: 0    amLODIPine (NORVASC) 5 MG tablet, Take 1 tablet by mouth daily, Disp: 90 tablet, Rfl: 0    atenolol (TENORMIN) 100 MG tablet, Take 1 tablet by mouth daily, Disp: 90 tablet, Rfl: 0    atorvastatin (LIPITOR) 40 MG tablet, Take 1 tablet by mouth nightly, Disp: 90 tablet, Rfl: 0    lisinopril (PRINIVIL;ZESTRIL) 20 MG tablet, Take 1 tablet by mouth daily, Disp: 90 tablet, Rfl: 0    omeprazole (PRILOSEC) 20 MG delayed release capsule, Take 1 capsule by mouth every morning (before breakfast), Disp: 90 capsule, Rfl: 0    ondansetron (ZOFRAN) 4 MG tablet, Take 1 tablet by mouth daily as needed for Nausea or Vomiting, Disp: 30 tablet, Rfl: 0    potassium chloride (KLOR-CON M) 20 MEQ TBCR extended release tablet, Take 2 tablets by mouth 2 times daily, Disp: 360 tablet, Rfl: 0    ammonium lactate (LAC-HYDRIN) 12 % cream, Apply topically as needed. , Disp: 385 g, Rfl: 4    ondansetron (ZOFRAN-ODT) 4 MG disintegrating tablet, , Disp: , Rfl:     aspirin 81 MG EC tablet, Take 1 tablet by mouth daily, Disp: , Rfl:     acetaminophen (TYLENOL) 325 MG tablet, Take 2 tablets by mouth every 4 hours as needed for Pain, Disp: , Rfl:     Allergies:  No Known Allergies    Past Surgical History:   Procedure Laterality Date    FINGER TRIGGER RELEASE Right 2018    right thumb

## 2023-09-14 ENCOUNTER — HOSPITAL ENCOUNTER (OUTPATIENT)
Dept: OTHER | Age: 73
Setting detail: THERAPIES SERIES
Discharge: HOME | End: 2023-09-14
Payer: MEDICARE

## 2023-09-14 VITALS
WEIGHT: 210 LBS | BODY MASS INDEX: 35.85 KG/M2 | OXYGEN SATURATION: 95 % | SYSTOLIC BLOOD PRESSURE: 107 MMHG | RESPIRATION RATE: 16 BRPM | DIASTOLIC BLOOD PRESSURE: 54 MMHG | HEIGHT: 64 IN | HEART RATE: 56 BPM

## 2023-09-14 PROCEDURE — 36415 COLL VENOUS BLD VENIPUNCTURE: CPT

## 2023-09-14 PROCEDURE — 99214 OFFICE O/P EST MOD 30 MIN: CPT

## 2023-09-14 NOTE — PROGRESS NOTES
compliance  [] Refuses to monitor diet  [] Socioeconomic difficulties  [] Unable to cook for self (use of frozen meals, can goods, etc)  [] CHF CHW consulted  [] Low sodium meal delivery options given to patient  [] Dietician consulted   [] Low sodium recipes provided  [] Sodium free seasoning provided   [x] Fluid intake (64 oz)  [x] Reviewed currently prescribed medications with patient, educated on importance of compliance and answered any questions regarding their medication  [] Pill box provided to patient  [] Patient using pill packing pharmacy   [] CPAP/BiPAP use  [] Low impact exercise / cardiac rehab   [] LifeVest use  [x] Patient aware of signs and symptoms of worsening HF, CHF clinic phone number provided and made aware to call clinic for sooner if evaluation if needed     [] Difficulty affording medications  [] CHF CHW consulted  [] Prescription assistance information given   [] [facility] medication assistance program information given   [] Sample medications provided to patient to help bridge gap until affordability N/A    Additional Notes:  Pt does not know her medications. She will call to review medications. Scheduled to follow up in CHF clinic on:    Future Appointments   Date Time Provider [address]   9/15/2023 11:30 AM Angie Perdue MD [facility]   10/12/2023 11:00 AM [facility] CHF ROOM 2 [facility]   10/24/2023 10:30 AM Kevin Sher., HARRISON [facility]

## 2023-09-15 ENCOUNTER — OFFICE VISIT (OUTPATIENT)
Dept: PRIMARY CARE CLINIC | Age: 73
End: 2023-09-15

## 2023-09-15 VITALS
HEIGHT: 64 IN | SYSTOLIC BLOOD PRESSURE: 124 MMHG | TEMPERATURE: 98.6 F | OXYGEN SATURATION: 97 % | WEIGHT: 210 LBS | HEART RATE: 89 BPM | BODY MASS INDEX: 35.85 KG/M2 | DIASTOLIC BLOOD PRESSURE: 72 MMHG

## 2023-09-15 DIAGNOSIS — E11.22 TYPE 2 DIABETES MELLITUS WITH STAGE 3B CHRONIC KIDNEY DISEASE, WITHOUT LONG-TERM CURRENT USE OF INSULIN (HCC): ICD-10-CM

## 2023-09-15 DIAGNOSIS — E78.2 MIXED HYPERLIPIDEMIA: ICD-10-CM

## 2023-09-15 DIAGNOSIS — I50.33 ACUTE ON CHRONIC HEART FAILURE WITH PRESERVED EJECTION FRACTION (HFPEF) (HCC): ICD-10-CM

## 2023-09-15 DIAGNOSIS — I10 PRIMARY HYPERTENSION: ICD-10-CM

## 2023-09-15 DIAGNOSIS — N18.32 STAGE 3B CHRONIC KIDNEY DISEASE (HCC): ICD-10-CM

## 2023-09-15 DIAGNOSIS — Z00.00 MEDICARE ANNUAL WELLNESS VISIT, SUBSEQUENT: Primary | ICD-10-CM

## 2023-09-15 DIAGNOSIS — Z13.9 SCREENING DUE: ICD-10-CM

## 2023-09-15 DIAGNOSIS — E11.65 TYPE 2 DIABETES MELLITUS WITH HYPERGLYCEMIA, WITHOUT LONG-TERM CURRENT USE OF INSULIN (HCC): ICD-10-CM

## 2023-09-15 DIAGNOSIS — G47.33 OSA (OBSTRUCTIVE SLEEP APNEA): ICD-10-CM

## 2023-09-15 DIAGNOSIS — N18.32 TYPE 2 DIABETES MELLITUS WITH STAGE 3B CHRONIC KIDNEY DISEASE, WITHOUT LONG-TERM CURRENT USE OF INSULIN (HCC): ICD-10-CM

## 2023-09-15 LAB
ALBUMIN SERPL-MCNC: 4.3 G/DL (ref 3.5–5.2)
ALP BLD-CCNC: 110 U/L (ref 35–104)
ALT SERPL-CCNC: 6 U/L (ref 0–32)
ANION GAP SERPL CALCULATED.3IONS-SCNC: 15 MMOL/L (ref 7–16)
AST SERPL-CCNC: 14 U/L (ref 0–31)
BILIRUB SERPL-MCNC: 0.2 MG/DL (ref 0–1.2)
BUN BLDV-MCNC: 15 MG/DL (ref 6–23)
CALCIUM SERPL-MCNC: 9.8 MG/DL (ref 8.6–10.2)
CHLORIDE BLD-SCNC: 97 MMOL/L (ref 98–107)
CHOLESTEROL, FASTING: 190 MG/DL
CO2: 29 MMOL/L (ref 22–29)
CREAT SERPL-MCNC: 1.1 MG/DL (ref 0.5–1)
CREATININE URINE: 77.6 MG/DL (ref 29–226)
GFR SERPL CREATININE-BSD FRML MDRD: 53 ML/MIN/1.73M2
GLUCOSE BLD-MCNC: 111 MG/DL (ref 74–99)
HBA1C MFR BLD: 6.2 % (ref 4–5.6)
HDLC SERPL-MCNC: 79 MG/DL
HEPATITIS C ANTIBODY: NONREACTIVE
LDL CHOLESTEROL: 95 MG/DL
MICROALBUMIN/CREAT 24H UR: 24 MG/L (ref 0–19)
MICROALBUMIN/CREAT UR-RTO: 31 MCG/MG CREAT (ref 0–30)
POTASSIUM SERPL-SCNC: 4.6 MMOL/L (ref 3.5–5)
SODIUM BLD-SCNC: 141 MMOL/L (ref 132–146)
TOTAL PROTEIN: 7.8 G/DL (ref 6.4–8.3)
TRIGLYCERIDE, FASTING: 82 MG/DL
TSH SERPL DL<=0.05 MIU/L-ACNC: 0.61 UIU/ML (ref 0.27–4.2)
VLDLC SERPL CALC-MCNC: 16 MG/DL

## 2023-09-15 RX ORDER — AMLODIPINE BESYLATE 5 MG/1
5 TABLET ORAL DAILY
Qty: 90 TABLET | Refills: 0 | Status: SHIPPED | OUTPATIENT
Start: 2023-09-15 | End: 2023-12-14

## 2023-09-15 RX ORDER — LISINOPRIL 20 MG/1
20 TABLET ORAL DAILY
Qty: 90 TABLET | Refills: 0 | Status: SHIPPED | OUTPATIENT
Start: 2023-09-15 | End: 2023-12-14

## 2023-09-15 RX ORDER — POTASSIUM CHLORIDE 1500 MG/1
40 TABLET, EXTENDED RELEASE ORAL 2 TIMES DAILY
Qty: 360 TABLET | Refills: 0 | Status: SHIPPED | OUTPATIENT
Start: 2023-09-15 | End: 2023-12-14

## 2023-09-15 RX ORDER — LISINOPRIL 10 MG/1
10 TABLET ORAL DAILY
Qty: 90 TABLET | Refills: 0 | OUTPATIENT
Start: 2023-09-15

## 2023-09-15 RX ORDER — METOLAZONE 2.5 MG/1
TABLET ORAL
Qty: 38 TABLET | Refills: 0 | Status: SHIPPED | OUTPATIENT
Start: 2023-09-15

## 2023-09-15 RX ORDER — ATORVASTATIN CALCIUM 40 MG/1
40 TABLET, FILM COATED ORAL NIGHTLY
Qty: 90 TABLET | Refills: 0 | Status: SHIPPED | OUTPATIENT
Start: 2023-09-15 | End: 2023-12-14

## 2023-09-15 RX ORDER — ATENOLOL 100 MG/1
100 TABLET ORAL DAILY
Qty: 90 TABLET | Refills: 0 | Status: SHIPPED | OUTPATIENT
Start: 2023-09-15 | End: 2023-12-14

## 2023-09-15 RX ORDER — OMEPRAZOLE 20 MG/1
20 CAPSULE, DELAYED RELEASE ORAL
Qty: 90 CAPSULE | Refills: 0 | Status: SHIPPED | OUTPATIENT
Start: 2023-09-15 | End: 2023-12-14

## 2023-09-15 ASSESSMENT — ENCOUNTER SYMPTOMS
COUGH: 0
SORE THROAT: 0
PHOTOPHOBIA: 0
VOMITING: 0
CONSTIPATION: 0
TROUBLE SWALLOWING: 0
APNEA: 0
NAUSEA: 0
EYE ITCHING: 0
ABDOMINAL PAIN: 0
SHORTNESS OF BREATH: 0
BLOOD IN STOOL: 0
ABDOMINAL DISTENTION: 0
BACK PAIN: 0
WHEEZING: 0
DIARRHEA: 0
EYE DISCHARGE: 0
SINUS PAIN: 0

## 2023-09-15 ASSESSMENT — PATIENT HEALTH QUESTIONNAIRE - PHQ9
SUM OF ALL RESPONSES TO PHQ9 QUESTIONS 1 & 2: 2
2. FEELING DOWN, DEPRESSED OR HOPELESS: 1
SUM OF ALL RESPONSES TO PHQ QUESTIONS 1-9: 2
1. LITTLE INTEREST OR PLEASURE IN DOING THINGS: 1

## 2023-09-18 RX ORDER — ONDANSETRON 4 MG/1
4 TABLET, FILM COATED ORAL DAILY PRN
Qty: 30 TABLET | Refills: 0 | OUTPATIENT
Start: 2023-09-18

## 2023-09-20 ENCOUNTER — CARE COORDINATION (OUTPATIENT)
Dept: CARE COORDINATION | Age: 73
End: 2023-09-20

## 2023-09-20 RX ORDER — ONDANSETRON 4 MG/1
4 TABLET, FILM COATED ORAL DAILY PRN
Qty: 30 TABLET | Refills: 0 | Status: SHIPPED | OUTPATIENT
Start: 2023-09-20

## 2023-09-20 NOTE — CARE COORDINATION
Left HIPAA compliant message for patient to return call to [phone].   Re: Follow up: ACM reached out to assess needs, message left to return call, will continue to reach out., Review POC     FOLLOW-UP PLAN:    -Assess needs for coordination, RPM    Next Anticipated Outreach by [address] [place] [address] Team:  2 Weeks

## 2023-10-10 NOTE — PROGRESS NOTES
Patient cancelled her CHF clinic appointment d/t \"personal issues in her life at this time\" Pt did want to reschedule for an afternoon time.        Future Appointments   Date Time Provider [address]   10/23/2023 12:00 PM [facility] CHF ROOM 1 [facility] Marck Cea   10/24/2023 10:30 AM Raymundo Hassaning Sánchez Coombs, DPCOLEEN [facility]

## 2023-10-12 ENCOUNTER — HOSPITAL ENCOUNTER (OUTPATIENT)
Dept: OTHER | Age: 73
Setting detail: THERAPIES SERIES
Discharge: HOME | End: 2023-10-12

## 2023-10-23 ENCOUNTER — HOSPITAL ENCOUNTER (OUTPATIENT)
Dept: OTHER | Age: 73
Discharge: HOME | End: 2023-10-23

## 2023-11-02 ENCOUNTER — HOSPITAL ENCOUNTER (OUTPATIENT)
Dept: OTHER | Age: 73
Discharge: HOME | End: 2023-11-02

## 2023-11-02 NOTE — PROGRESS NOTES
Patient cancelled CHF clinic d/t having a  bad headache. Denies any s/s symptomatic CHF. Pt will call back to reschedule.        Martha Hurt RN

## 2023-11-22 ENCOUNTER — TELEPHONE (OUTPATIENT)
Dept: PODIATRY | Age: 73
End: 2023-11-22

## 2023-11-22 DIAGNOSIS — I73.9 PVD (PERIPHERAL VASCULAR DISEASE) (HCC): ICD-10-CM

## 2023-11-22 DIAGNOSIS — E11.51 TYPE II DIABETES MELLITUS WITH PERIPHERAL CIRCULATORY DISORDER (HCC): ICD-10-CM

## 2023-11-22 RX ORDER — AMMONIUM LACTATE 12 G/100G
CREAM TOPICAL
Qty: 385 G | Refills: 4 | Status: SHIPPED | OUTPATIENT
Start: 2023-11-22

## 2023-11-22 NOTE — TELEPHONE ENCOUNTER
Patient is requesting a refill of the Lac-hydrin lotion to be sent to her local pharmacy. Last office visit was 8/22/23. Patient did cancel her follow up appointment. Thank you.

## 2023-11-24 RX ORDER — ONDANSETRON 4 MG/1
4 TABLET, FILM COATED ORAL DAILY PRN
Qty: 30 TABLET | Refills: 0 | Status: SHIPPED | OUTPATIENT
Start: 2023-11-24

## 2023-11-30 ENCOUNTER — TELEPHONE (OUTPATIENT)
Dept: PRIMARY CARE CLINIC | Age: 73
End: 2023-11-30

## 2023-12-15 DIAGNOSIS — E11.22 TYPE 2 DIABETES MELLITUS WITH STAGE 3B CHRONIC KIDNEY DISEASE, WITHOUT LONG-TERM CURRENT USE OF INSULIN (HCC): ICD-10-CM

## 2023-12-15 DIAGNOSIS — N18.32 STAGE 3B CHRONIC KIDNEY DISEASE (HCC): ICD-10-CM

## 2023-12-15 DIAGNOSIS — E78.2 MIXED HYPERLIPIDEMIA: ICD-10-CM

## 2023-12-15 DIAGNOSIS — N18.32 TYPE 2 DIABETES MELLITUS WITH STAGE 3B CHRONIC KIDNEY DISEASE, WITHOUT LONG-TERM CURRENT USE OF INSULIN (HCC): ICD-10-CM

## 2023-12-15 DIAGNOSIS — I50.33 ACUTE ON CHRONIC HEART FAILURE WITH PRESERVED EJECTION FRACTION (HFPEF) (HCC): ICD-10-CM

## 2023-12-15 LAB
ALBUMIN SERPL-MCNC: 3.7 G/DL (ref 3.5–5.2)
ALP BLD-CCNC: 91 U/L (ref 35–104)
ALT SERPL-CCNC: 6 U/L (ref 0–32)
ANION GAP SERPL CALCULATED.3IONS-SCNC: 18 MMOL/L (ref 7–16)
AST SERPL-CCNC: 12 U/L (ref 0–31)
BASOPHILS ABSOLUTE: 0.06 K/UL (ref 0–0.2)
BASOPHILS RELATIVE PERCENT: 1 % (ref 0–2)
BILIRUB SERPL-MCNC: 0.2 MG/DL (ref 0–1.2)
BUN BLDV-MCNC: 12 MG/DL (ref 6–23)
CALCIUM SERPL-MCNC: 9.4 MG/DL (ref 8.6–10.2)
CHLORIDE BLD-SCNC: 100 MMOL/L (ref 98–107)
CHOLESTEROL, FASTING: 237 MG/DL
CO2: 26 MMOL/L (ref 22–29)
CREAT SERPL-MCNC: 1.1 MG/DL (ref 0.5–1)
EOSINOPHILS ABSOLUTE: 0.37 K/UL (ref 0.05–0.5)
EOSINOPHILS RELATIVE PERCENT: 5 % (ref 0–6)
GFR SERPL CREATININE-BSD FRML MDRD: 52 ML/MIN/1.73M2
GLUCOSE BLD-MCNC: 106 MG/DL (ref 74–99)
HBA1C MFR BLD: 6.4 % (ref 4–5.6)
HCT VFR BLD CALC: 41.2 % (ref 34–48)
HDLC SERPL-MCNC: 74 MG/DL
HEMOGLOBIN: 11.6 G/DL (ref 11.5–15.5)
LDL CHOLESTEROL: 144 MG/DL
LYMPHOCYTES ABSOLUTE: 0.85 K/UL (ref 1.5–4)
MCH RBC QN AUTO: 25.9 PG (ref 26–35)
MCHC RBC AUTO-ENTMCNC: 28.2 G/DL (ref 32–34.5)
MCV RBC AUTO: 92 FL (ref 80–99.9)
MONOCYTES ABSOLUTE: 0.55 K/UL (ref 0.1–0.95)
MONOCYTES RELATIVE PERCENT: 8 % (ref 2–12)
NEUTROPHILS ABSOLUTE: 5.17 K/UL (ref 1.8–7.3)
NEUTROPHILS RELATIVE PERCENT: 74 % (ref 43–80)
PDW BLD-RTO: 13.7 % (ref 11.5–15)
PLATELET # BLD: 286 K/UL (ref 130–450)
PMV BLD AUTO: 10.7 FL (ref 7–12)
POTASSIUM SERPL-SCNC: 4.6 MMOL/L (ref 3.5–5)
PRO-BNP: 263 PG/ML (ref 0–125)
RBC # BLD: 4.48 M/UL (ref 3.5–5.5)
RBC # BLD: ABNORMAL 10*6/UL
SODIUM BLD-SCNC: 144 MMOL/L (ref 132–146)
TOTAL PROTEIN: 7.2 G/DL (ref 6.4–8.3)
TRIGLYCERIDE, FASTING: 93 MG/DL
TSH SERPL DL<=0.05 MIU/L-ACNC: 0.43 UIU/ML (ref 0.27–4.2)
VLDLC SERPL CALC-MCNC: 19 MG/DL
WBC # BLD: 7 K/UL (ref 4.5–11.5)
WBC # BLD: ABNORMAL 10*3/UL

## 2024-01-10 ENCOUNTER — PROCEDURE VISIT (OUTPATIENT)
Dept: PODIATRY | Age: 74
End: 2024-01-10
Payer: MEDICARE

## 2024-01-10 VITALS — BODY MASS INDEX: 39.27 KG/M2 | HEIGHT: 64 IN | WEIGHT: 230 LBS

## 2024-01-10 DIAGNOSIS — I87.2 VENOUS INSUFFICIENCY (CHRONIC) (PERIPHERAL): ICD-10-CM

## 2024-01-10 DIAGNOSIS — M79.675 PAIN OF TOE OF LEFT FOOT: ICD-10-CM

## 2024-01-10 DIAGNOSIS — B35.1 ONYCHOMYCOSIS: Primary | ICD-10-CM

## 2024-01-10 DIAGNOSIS — I73.9 PVD (PERIPHERAL VASCULAR DISEASE) (HCC): ICD-10-CM

## 2024-01-10 DIAGNOSIS — M79.674 PAIN OF TOE OF RIGHT FOOT: ICD-10-CM

## 2024-01-10 DIAGNOSIS — E11.51 TYPE II DIABETES MELLITUS WITH PERIPHERAL CIRCULATORY DISORDER (HCC): ICD-10-CM

## 2024-01-10 DIAGNOSIS — R26.2 DIFFICULTY WALKING: ICD-10-CM

## 2024-01-10 PROCEDURE — 99999 PR OFFICE/OUTPT VISIT,PROCEDURE ONLY: CPT | Performed by: PODIATRIST

## 2024-01-10 PROCEDURE — 11721 DEBRIDE NAIL 6 OR MORE: CPT | Performed by: PODIATRIST

## 2024-01-10 NOTE — PROGRESS NOTES
1/10/24     Mildred Duncan    : 1950  Sex: female  Age: 73 y.o.    Subjective:  The patient is seen today for evaluation regarding diabetic foot evaluation and mycotic nail care.  No other complaints noted.    Chief Complaint   Patient presents with    Nail Problem     Nail care       Current Medications:    Current Outpatient Medications:     amLODIPine (NORVASC) 5 MG tablet, Take 1 tablet by mouth daily, Disp: 90 tablet, Rfl: 0    atenolol (TENORMIN) 100 MG tablet, Take 1 tablet by mouth daily, Disp: 90 tablet, Rfl: 0    atorvastatin (LIPITOR) 40 MG tablet, Take 1 tablet by mouth nightly, Disp: 90 tablet, Rfl: 0    lisinopril (PRINIVIL;ZESTRIL) 20 MG tablet, Take 1 tablet by mouth daily, Disp: 90 tablet, Rfl: 0    metOLazone (ZAROXOLYN) 2.5 MG tablet, TAKE 1 TABLET BY MOUTH MONDAY, WEDNESDAY AND FRIDAY, Disp: 38 tablet, Rfl: 0    omeprazole (PRILOSEC) 20 MG delayed release capsule, Take 1 capsule by mouth every morning (before breakfast), Disp: 90 capsule, Rfl: 0    ondansetron (ZOFRAN) 4 MG tablet, Take 1 tablet by mouth daily as needed for Nausea or Vomiting, Disp: 30 tablet, Rfl: 0    potassium chloride (KLOR-CON M) 20 MEQ TBCR extended release tablet, Take 2 tablets by mouth 2 times daily, Disp: 360 tablet, Rfl: 0    ammonium lactate (LAC-HYDRIN) 12 % cream, Apply topically as needed., Disp: 385 g, Rfl: 4    empagliflozin (JARDIANCE) 10 MG tablet, Take 1 tablet by mouth daily, Disp: 30 tablet, Rfl: 6    aspirin 81 MG EC tablet, Take 1 tablet by mouth daily, Disp: , Rfl:     acetaminophen (TYLENOL) 325 MG tablet, Take 2 tablets by mouth every 4 hours as needed for Pain, Disp: , Rfl:     furosemide (LASIX) 40 MG tablet, Take 1 tablet by mouth daily (Patient taking differently: Take 1 tablet by mouth daily Patient takes approx every other day), Disp: 90 tablet, Rfl: 0    Allergies:  No Known Allergies    Past Surgical History:   Procedure Laterality Date    FINGER TRIGGER RELEASE Right 2018

## 2024-01-10 NOTE — PROGRESS NOTES
Patient in today for nail care. Patient does not have any complaints of pain at this time. Patient's PCP is Silvana Braga MD date of last ov 12/21/23          Frances Edmond LPN

## 2024-01-26 ENCOUNTER — HOSPITAL ENCOUNTER (OUTPATIENT)
Dept: OTHER | Age: 74
Setting detail: THERAPIES SERIES
Discharge: HOME | End: 2024-01-26

## 2024-01-29 RX ORDER — ONDANSETRON 4 MG/1
4 TABLET, FILM COATED ORAL DAILY PRN
Qty: 30 TABLET | Refills: 0 | Status: SHIPPED | OUTPATIENT
Start: 2024-01-29

## 2024-02-20 RX ORDER — ONDANSETRON 4 MG/1
4 TABLET, FILM COATED ORAL DAILY PRN
Qty: 30 TABLET | Refills: 0 | Status: SHIPPED | OUTPATIENT
Start: 2024-02-20

## 2024-02-27 RX ORDER — EMPAGLIFLOZIN 10 MG/1
10 TABLET, FILM COATED ORAL DAILY
Qty: 30 TABLET | Refills: 6 | Status: SHIPPED | OUTPATIENT
Start: 2024-02-27

## 2024-03-19 RX ORDER — AMLODIPINE BESYLATE 5 MG/1
5 TABLET ORAL DAILY
Qty: 90 TABLET | Refills: 0 | Status: SHIPPED | OUTPATIENT
Start: 2024-03-19 | End: 2024-06-17

## 2024-03-19 RX ORDER — ATORVASTATIN CALCIUM 40 MG/1
40 TABLET, FILM COATED ORAL NIGHTLY
Qty: 30 TABLET | Refills: 0 | Status: SHIPPED | OUTPATIENT
Start: 2024-03-19

## 2024-03-19 RX ORDER — ATORVASTATIN CALCIUM 40 MG/1
40 TABLET, FILM COATED ORAL NIGHTLY
Qty: 90 TABLET | OUTPATIENT
Start: 2024-03-19

## 2024-03-19 RX ORDER — ONDANSETRON 4 MG/1
4 TABLET, FILM COATED ORAL DAILY PRN
Qty: 30 TABLET | Refills: 0 | Status: SHIPPED | OUTPATIENT
Start: 2024-03-19

## 2024-03-19 RX ORDER — ATENOLOL 100 MG/1
100 TABLET ORAL DAILY
Qty: 90 TABLET | Refills: 0 | Status: SHIPPED | OUTPATIENT
Start: 2024-03-19 | End: 2024-06-17

## 2024-03-21 RX ORDER — POTASSIUM CHLORIDE 1500 MG/1
40 TABLET, EXTENDED RELEASE ORAL 2 TIMES DAILY
Qty: 60 TABLET | Refills: 0 | Status: SHIPPED | OUTPATIENT
Start: 2024-03-21

## 2024-04-20 ENCOUNTER — HOSPITAL ENCOUNTER (OUTPATIENT)
Age: 74
Discharge: HOME | End: 2024-04-20
Payer: MEDICARE

## 2024-04-20 DIAGNOSIS — N18.32 STAGE 3B CHRONIC KIDNEY DISEASE (HCC): ICD-10-CM

## 2024-04-20 DIAGNOSIS — N18.32 TYPE 2 DIABETES MELLITUS WITH STAGE 3B CHRONIC KIDNEY DISEASE, WITHOUT LONG-TERM CURRENT USE OF INSULIN (HCC): ICD-10-CM

## 2024-04-20 DIAGNOSIS — E78.2 MIXED HYPERLIPIDEMIA: ICD-10-CM

## 2024-04-20 DIAGNOSIS — E11.22 TYPE 2 DIABETES MELLITUS WITH STAGE 3B CHRONIC KIDNEY DISEASE, WITHOUT LONG-TERM CURRENT USE OF INSULIN (HCC): ICD-10-CM

## 2024-04-20 LAB
ALBUMIN SERPL-MCNC: 3.7 G/DL (ref 3.5–5.2)
ALP SERPL-CCNC: 79 U/L (ref 35–104)
ALT SERPL-CCNC: 8 U/L (ref 0–32)
ANION GAP SERPL CALCULATED.3IONS-SCNC: 8 MMOL/L (ref 7–16)
AST SERPL-CCNC: 12 U/L (ref 0–31)
BASOPHILS # BLD: 0.04 K/UL (ref 0–0.2)
BASOPHILS NFR BLD: 1 % (ref 0–2)
BILIRUB SERPL-MCNC: 0.2 MG/DL (ref 0–1.2)
BUN SERPL-MCNC: 14 MG/DL (ref 6–23)
CALCIUM SERPL-MCNC: 9 MG/DL (ref 8.6–10.2)
CHLORIDE SERPL-SCNC: 101 MMOL/L (ref 98–107)
CHOLEST SERPL-MCNC: 219 MG/DL
CO2 SERPL-SCNC: 31 MMOL/L (ref 22–29)
CREAT SERPL-MCNC: 1.2 MG/DL (ref 0.5–1)
EOSINOPHIL # BLD: 0.37 K/UL (ref 0.05–0.5)
EOSINOPHILS RELATIVE PERCENT: 5 % (ref 0–6)
ERYTHROCYTE [DISTWIDTH] IN BLOOD BY AUTOMATED COUNT: 13.8 % (ref 11.5–15)
GFR SERPL CREATININE-BSD FRML MDRD: 51 ML/MIN/1.73M2
GLUCOSE SERPL-MCNC: 103 MG/DL (ref 74–99)
HBA1C MFR BLD: 5.9 % (ref 4–5.6)
HCT VFR BLD AUTO: 39.4 % (ref 34–48)
HDLC SERPL-MCNC: 58 MG/DL
HGB BLD-MCNC: 11.2 G/DL (ref 11.5–15.5)
IMM GRANULOCYTES # BLD AUTO: <0.03 K/UL (ref 0–0.58)
IMM GRANULOCYTES NFR BLD: 0 % (ref 0–5)
LDLC SERPL CALC-MCNC: 140 MG/DL
LYMPHOCYTES NFR BLD: 1.05 K/UL (ref 1.5–4)
LYMPHOCYTES RELATIVE PERCENT: 14 % (ref 20–42)
MCH RBC QN AUTO: 25.7 PG (ref 26–35)
MCHC RBC AUTO-ENTMCNC: 28.4 G/DL (ref 32–34.5)
MCV RBC AUTO: 90.4 FL (ref 80–99.9)
MONOCYTES NFR BLD: 0.5 K/UL (ref 0.1–0.95)
MONOCYTES NFR BLD: 7 % (ref 2–12)
NEUTROPHILS NFR BLD: 74 % (ref 43–80)
NEUTS SEG NFR BLD: 5.7 K/UL (ref 1.8–7.3)
PLATELET # BLD AUTO: 298 K/UL (ref 130–450)
PMV BLD AUTO: 10.3 FL (ref 7–12)
POTASSIUM SERPL-SCNC: 4.6 MMOL/L (ref 3.5–5)
PROT SERPL-MCNC: 7.2 G/DL (ref 6.4–8.3)
RBC # BLD AUTO: 4.36 M/UL (ref 3.5–5.5)
SODIUM SERPL-SCNC: 140 MMOL/L (ref 132–146)
T4 FREE SERPL-MCNC: 1 NG/DL (ref 0.9–1.7)
TRIGL SERPL-MCNC: 103 MG/DL
TSH SERPL DL<=0.05 MIU/L-ACNC: 0.19 UIU/ML (ref 0.27–4.2)
VLDLC SERPL CALC-MCNC: 21 MG/DL
WBC OTHER # BLD: 7.7 K/UL (ref 4.5–11.5)

## 2024-04-20 PROCEDURE — 85025 COMPLETE CBC W/AUTO DIFF WBC: CPT

## 2024-04-20 PROCEDURE — 83036 HEMOGLOBIN GLYCOSYLATED A1C: CPT

## 2024-04-20 PROCEDURE — 80061 LIPID PANEL: CPT

## 2024-04-20 PROCEDURE — 36415 COLL VENOUS BLD VENIPUNCTURE: CPT

## 2024-04-20 PROCEDURE — 84439 ASSAY OF FREE THYROXINE: CPT

## 2024-04-20 PROCEDURE — 84443 ASSAY THYROID STIM HORMONE: CPT

## 2024-04-20 PROCEDURE — 80053 COMPREHEN METABOLIC PANEL: CPT

## 2024-04-24 ENCOUNTER — OFFICE VISIT (OUTPATIENT)
Dept: PRIMARY CARE CLINIC | Age: 74
End: 2024-04-24

## 2024-04-24 VITALS
HEIGHT: 64 IN | OXYGEN SATURATION: 96 % | WEIGHT: 240 LBS | SYSTOLIC BLOOD PRESSURE: 120 MMHG | HEART RATE: 55 BPM | BODY MASS INDEX: 40.97 KG/M2 | TEMPERATURE: 98.2 F | DIASTOLIC BLOOD PRESSURE: 70 MMHG

## 2024-04-24 DIAGNOSIS — F33.0 MAJOR DEPRESSIVE DISORDER, RECURRENT, MILD (HCC): ICD-10-CM

## 2024-04-24 DIAGNOSIS — Z87.891 PERSONAL HISTORY OF TOBACCO USE: ICD-10-CM

## 2024-04-24 DIAGNOSIS — N18.32 STAGE 3B CHRONIC KIDNEY DISEASE (HCC): ICD-10-CM

## 2024-04-24 DIAGNOSIS — Z12.31 ENCOUNTER FOR SCREENING MAMMOGRAM FOR MALIGNANT NEOPLASM OF BREAST: ICD-10-CM

## 2024-04-24 DIAGNOSIS — Z00.00 MEDICARE ANNUAL WELLNESS VISIT, SUBSEQUENT: Primary | ICD-10-CM

## 2024-04-24 DIAGNOSIS — J44.9 CHRONIC OBSTRUCTIVE PULMONARY DISEASE, UNSPECIFIED COPD TYPE (HCC): ICD-10-CM

## 2024-04-24 DIAGNOSIS — I50.22 CHRONIC SYSTOLIC (CONGESTIVE) HEART FAILURE (HCC): ICD-10-CM

## 2024-04-24 DIAGNOSIS — N18.32 TYPE 2 DIABETES MELLITUS WITH STAGE 3B CHRONIC KIDNEY DISEASE, WITHOUT LONG-TERM CURRENT USE OF INSULIN (HCC): ICD-10-CM

## 2024-04-24 DIAGNOSIS — J96.01 ACUTE RESPIRATORY FAILURE WITH HYPOXIA (HCC): ICD-10-CM

## 2024-04-24 DIAGNOSIS — E66.01 OBESITY, CLASS III, BMI 40-49.9 (MORBID OBESITY) (HCC): ICD-10-CM

## 2024-04-24 DIAGNOSIS — E78.2 MIXED HYPERLIPIDEMIA: ICD-10-CM

## 2024-04-24 DIAGNOSIS — E11.22 TYPE 2 DIABETES MELLITUS WITH STAGE 3B CHRONIC KIDNEY DISEASE, WITHOUT LONG-TERM CURRENT USE OF INSULIN (HCC): ICD-10-CM

## 2024-04-24 RX ORDER — TRAZODONE HYDROCHLORIDE 50 MG/1
50 TABLET ORAL NIGHTLY
Qty: 90 TABLET | Refills: 0 | Status: SHIPPED | OUTPATIENT
Start: 2024-04-24

## 2024-04-24 RX ORDER — AMLODIPINE BESYLATE 5 MG/1
5 TABLET ORAL DAILY
Qty: 90 TABLET | Refills: 0 | Status: SHIPPED | OUTPATIENT
Start: 2024-04-24 | End: 2024-07-23

## 2024-04-24 RX ORDER — ATORVASTATIN CALCIUM 40 MG/1
40 TABLET, FILM COATED ORAL NIGHTLY
Qty: 90 TABLET | Refills: 0 | Status: SHIPPED | OUTPATIENT
Start: 2024-04-24

## 2024-04-24 RX ORDER — ONDANSETRON 4 MG/1
4 TABLET, FILM COATED ORAL DAILY PRN
Qty: 30 TABLET | Refills: 0 | Status: SHIPPED | OUTPATIENT
Start: 2024-04-24

## 2024-04-24 RX ORDER — LISINOPRIL 20 MG/1
20 TABLET ORAL DAILY
Qty: 90 TABLET | Refills: 0 | Status: SHIPPED | OUTPATIENT
Start: 2024-04-24 | End: 2024-07-23

## 2024-04-24 RX ORDER — METOLAZONE 2.5 MG/1
TABLET ORAL
Qty: 38 TABLET | Refills: 0 | Status: SHIPPED | OUTPATIENT
Start: 2024-04-24

## 2024-04-24 RX ORDER — POTASSIUM CHLORIDE 1500 MG/1
40 TABLET, EXTENDED RELEASE ORAL 2 TIMES DAILY
Qty: 360 TABLET | Refills: 0 | Status: SHIPPED | OUTPATIENT
Start: 2024-04-24

## 2024-04-24 RX ORDER — OMEPRAZOLE 20 MG/1
20 CAPSULE, DELAYED RELEASE ORAL
Qty: 90 CAPSULE | Refills: 0 | Status: SHIPPED | OUTPATIENT
Start: 2024-04-24 | End: 2024-07-23

## 2024-04-24 RX ORDER — ATENOLOL 100 MG/1
100 TABLET ORAL DAILY
Qty: 90 TABLET | Refills: 0 | Status: SHIPPED | OUTPATIENT
Start: 2024-04-24 | End: 2024-07-23

## 2024-04-24 SDOH — ECONOMIC STABILITY: FOOD INSECURITY: WITHIN THE PAST 12 MONTHS, YOU WORRIED THAT YOUR FOOD WOULD RUN OUT BEFORE YOU GOT MONEY TO BUY MORE.: NEVER TRUE

## 2024-04-24 SDOH — ECONOMIC STABILITY: FOOD INSECURITY: WITHIN THE PAST 12 MONTHS, THE FOOD YOU BOUGHT JUST DIDN'T LAST AND YOU DIDN'T HAVE MONEY TO GET MORE.: NEVER TRUE

## 2024-04-24 SDOH — ECONOMIC STABILITY: INCOME INSECURITY: HOW HARD IS IT FOR YOU TO PAY FOR THE VERY BASICS LIKE FOOD, HOUSING, MEDICAL CARE, AND HEATING?: NOT HARD AT ALL

## 2024-04-24 ASSESSMENT — ENCOUNTER SYMPTOMS
SHORTNESS OF BREATH: 0
APNEA: 0
COUGH: 0
EYE ITCHING: 0
DIARRHEA: 0
PHOTOPHOBIA: 0
SINUS PAIN: 0
ABDOMINAL PAIN: 0
CONSTIPATION: 0
EYE DISCHARGE: 0
TROUBLE SWALLOWING: 0
WHEEZING: 0
ABDOMINAL DISTENTION: 0
VOMITING: 0
BACK PAIN: 0
BLOOD IN STOOL: 0
NAUSEA: 0
SORE THROAT: 0

## 2024-04-24 ASSESSMENT — PATIENT HEALTH QUESTIONNAIRE - PHQ9
SUM OF ALL RESPONSES TO PHQ QUESTIONS 1-9: 12
1. LITTLE INTEREST OR PLEASURE IN DOING THINGS: NEARLY EVERY DAY
SUM OF ALL RESPONSES TO PHQ QUESTIONS 1-9: 12
8. MOVING OR SPEAKING SO SLOWLY THAT OTHER PEOPLE COULD HAVE NOTICED. OR THE OPPOSITE, BEING SO FIGETY OR RESTLESS THAT YOU HAVE BEEN MOVING AROUND A LOT MORE THAN USUAL: NOT AT ALL
SUM OF ALL RESPONSES TO PHQ QUESTIONS 1-9: 12
6. FEELING BAD ABOUT YOURSELF - OR THAT YOU ARE A FAILURE OR HAVE LET YOURSELF OR YOUR FAMILY DOWN: NOT AT ALL
4. FEELING TIRED OR HAVING LITTLE ENERGY: NEARLY EVERY DAY
9. THOUGHTS THAT YOU WOULD BE BETTER OFF DEAD, OR OF HURTING YOURSELF: NOT AT ALL
SUM OF ALL RESPONSES TO PHQ QUESTIONS 1-9: 12
2. FEELING DOWN, DEPRESSED OR HOPELESS: MORE THAN HALF THE DAYS
3. TROUBLE FALLING OR STAYING ASLEEP: NEARLY EVERY DAY
7. TROUBLE CONCENTRATING ON THINGS, SUCH AS READING THE NEWSPAPER OR WATCHING TELEVISION: NOT AT ALL
10. IF YOU CHECKED OFF ANY PROBLEMS, HOW DIFFICULT HAVE THESE PROBLEMS MADE IT FOR YOU TO DO YOUR WORK, TAKE CARE OF THINGS AT HOME, OR GET ALONG WITH OTHER PEOPLE: SOMEWHAT DIFFICULT
5. POOR APPETITE OR OVEREATING: SEVERAL DAYS
SUM OF ALL RESPONSES TO PHQ9 QUESTIONS 1 & 2: 5

## 2024-04-24 NOTE — PROGRESS NOTES
Medicare Annual Wellness Visit    Mildred Duncan is here for Medicare AW    Assessment & Plan   Encounter for screening mammogram for malignant neoplasm of breast  Medicare annual wellness visit, subsequent    Recommendations for Preventive Services Due: see orders and patient instructions/AVS.  Recommended screening schedule for the next 5-10 years is provided to the patient in written form: see Patient Instructions/AVS.     Return for Medicare Annual Wellness Visit in 1 year.     Subjective       Patient's complete Health Risk Assessment and screening values have been reviewed and are found in Flowsheets. The following problems were reviewed today and where indicated follow up appointments were made and/or referrals ordered.    Positive Risk Factor Screenings with Interventions:       Cognitive:   Clock Drawing Test (CDT): Normal  Words recalled: 0 Words Recalled  Total Score: (!) 2  Total Score Interpretation: Abnormal Mini-Cog  Interventions:  Patient comments: pt does not want to take any meds    Depression:  PHQ-2 Score: 5  PHQ-9 Total Score: 12    Interpretation:  5-9 mild   10-14 moderate   15-19 moderately severe   20-27 severe   Interventions:  Patient comments: pt refusing meds         Self-assessment of health:  In general, how would you say your health is?: (!) Poor    Interventions:  Patient declines any further evaluation or treatment    General HRA Questions:  Select all that apply: (!) Loneliness    Loneliness Interventions:  Patient comments: pt still has family around      Activity, Diet, and Weight:  On average, how many days per week do you engage in moderate to strenuous exercise (like a brisk walk)?: 0 days  On average, how many minutes do you engage in exercise at this level?: 0 min    Do you eat balanced/healthy meals regularly?: (!) No    Body mass index is 41.2 kg/m². (!) Abnormal      Inactivity Interventions:  Recommendations: patient agrees to exercise for at least 150 
smoking about 49 years ago. She has a 24.0 pack-year smoking history. She has never been exposed to tobacco smoke. She has never used smokeless tobacco.. Discussed with patient the risks and benefits of screening, including over-diagnosis, false positive rate, and total radiation exposure.  The patient currently exhibits no signs or symptoms suggestive of lung cancer.  Discussed with patient the importance of compliance with yearly annual lung cancer screenings and willingness to undergo diagnosis and treatment if screening scan is positive.  In addition, the patient was counseled regarding the importance of remaining smoke free and/or total smoking cessation.    Also reviewed the following if the patient has Medicare that as of February 10, 2022, Medicare only covers LDCT screening in patients aged 50-77 with at least a 20 pack-year smoking history who currently smoke or have quit in the last 15 years

## 2024-05-23 RX ORDER — ONDANSETRON 4 MG/1
4 TABLET, FILM COATED ORAL DAILY PRN
Qty: 30 TABLET | Refills: 0 | Status: SHIPPED | OUTPATIENT
Start: 2024-05-23

## 2024-06-07 ENCOUNTER — HOSPITAL ENCOUNTER (OUTPATIENT)
Dept: OTHER | Age: 74
Discharge: HOME | End: 2024-06-07

## 2024-06-21 ENCOUNTER — HOSPITAL ENCOUNTER (OUTPATIENT)
Dept: OTHER | Age: 74
Setting detail: THERAPIES SERIES
Discharge: HOME | End: 2024-06-21
Payer: MEDICARE

## 2024-06-21 ENCOUNTER — TELEPHONE (OUTPATIENT)
Dept: PRIMARY CARE CLINIC | Age: 74
End: 2024-06-21

## 2024-06-21 VITALS
DIASTOLIC BLOOD PRESSURE: 74 MMHG | BODY MASS INDEX: 39.48 KG/M2 | SYSTOLIC BLOOD PRESSURE: 135 MMHG | OXYGEN SATURATION: 100 % | HEART RATE: 110 BPM | RESPIRATION RATE: 20 BRPM | WEIGHT: 230 LBS

## 2024-06-21 LAB
ANION GAP SERPL CALCULATED.3IONS-SCNC: 11 MMOL/L (ref 7–16)
BNP SERPL-MCNC: 326 PG/ML (ref 0–450)
BUN SERPL-MCNC: 16 MG/DL (ref 6–23)
CALCIUM SERPL-MCNC: 8.8 MG/DL (ref 8.6–10.2)
CHLORIDE SERPL-SCNC: 101 MMOL/L (ref 98–107)
CO2 SERPL-SCNC: 28 MMOL/L (ref 22–29)
CREAT SERPL-MCNC: 1.1 MG/DL (ref 0.5–1)
GFR, ESTIMATED: 54 ML/MIN/1.73M2
GLUCOSE SERPL-MCNC: 140 MG/DL (ref 74–99)
POTASSIUM SERPL-SCNC: 3.8 MMOL/L (ref 3.5–5)
SODIUM SERPL-SCNC: 140 MMOL/L (ref 132–146)

## 2024-06-21 PROCEDURE — 99214 OFFICE O/P EST MOD 30 MIN: CPT

## 2024-06-21 PROCEDURE — 36415 COLL VENOUS BLD VENIPUNCTURE: CPT

## 2024-06-21 PROCEDURE — 80048 BASIC METABOLIC PNL TOTAL CA: CPT

## 2024-06-21 PROCEDURE — 83880 ASSAY OF NATRIURETIC PEPTIDE: CPT

## 2024-06-21 ASSESSMENT — PATIENT HEALTH QUESTIONNAIRE - PHQ9
SUM OF ALL RESPONSES TO PHQ QUESTIONS 1-9: 2
1. LITTLE INTEREST OR PLEASURE IN DOING THINGS: SEVERAL DAYS
SUM OF ALL RESPONSES TO PHQ9 QUESTIONS 1 & 2: 2
SUM OF ALL RESPONSES TO PHQ QUESTIONS 1-9: 2
2. FEELING DOWN, DEPRESSED OR HOPELESS: SEVERAL DAYS

## 2024-06-21 NOTE — RESULT ENCOUNTER NOTE
Labs and CHF clinic note reviewed  Vitals: 135/74, 110    Was HR confirmed seems trend of vitals with HR 50/60's?

## 2024-06-21 NOTE — TELEPHONE ENCOUNTER
Pt called in she had went to the heart doc she thought she had a blood clot but it is sciatica and wants to know if u can give her something for pain please advise

## 2024-06-21 NOTE — PROGRESS NOTES
omeprazole (PRILOSEC) 20 MG delayed release capsule Take 1 capsule by mouth every morning (before breakfast)  Silvana Braga MD   amLODIPine (NORVASC) 5 MG tablet Take 1 tablet by mouth daily  Silvana Braga MD   atenolol (TENORMIN) 100 MG tablet Take 1 tablet by mouth daily  Silvana Braga MD   atorvastatin (LIPITOR) 40 MG tablet Take 1 tablet by mouth nightly  Silvana Braga MD   potassium chloride (K-TAB) 20 MEQ TBCR extended release tablet Take 2 tablets by mouth 2 times daily  Silvana Braga MD   traZODone (DESYREL) 50 MG tablet Take 1 tablet by mouth nightly  Silvana Braga MD   JARDIANCE 10 MG tablet TAKE 1 TABLET BY MOUTH DAILY  Patient not taking: Reported on 6/21/2024  Kvng Espitia MD   ammonium lactate (LAC-HYDRIN) 12 % cream Apply topically as needed.  Jabari Beckford Jr., DPM   furosemide (LASIX) 40 MG tablet Take 1 tablet by mouth daily  Patient taking differently: Take 1 tablet by mouth daily Patient takes approx every other day  Nathalia Mendes, APRN - CNP   aspirin 81 MG EC tablet Take 1 tablet by mouth daily  Provider, MD Tom   acetaminophen (TYLENOL) 325 MG tablet Take 2 tablets by mouth every 4 hours as needed for Pain  Provider, MD Tom       GUIDELINE DIRECTED MEDICAL THERAPY for HFpEF:  SGLT2i:  (2a indication)  No  Aldosterone antagonist: (2b indication)  No  ARNI/ACE I/ARB: (2b indication, with LVEF on lower end of spectrum)  Lisinopril      HEART FAILURE FOCUSED PHYSICAL EXAMINATION:    Vitals:    06/21/24 1015   BP: 135/74   Pulse: (!) 110   Resp: 20   SpO2: 100%        Assessment  Charting Type: Shift assessment (chf clinic)  Neurological  Level of Consciousness: Alert (0)     HEENT (Head, Ears, Eyes, Nose, & Throat)  HEENT (WDL): Exceptions to WDL  Right Eye: Glasses  Left Eye: Glasses  Respiratory  Respiratory Pattern: Regular  Respiratory Depth: Normal  Respiratory Quality/Effort: Unlabored  Chest Assessment: Chest expansion symmetrical  L

## 2024-06-26 ENCOUNTER — TELEPHONE (OUTPATIENT)
Dept: PRIMARY CARE CLINIC | Age: 74
End: 2024-06-26

## 2024-06-26 ENCOUNTER — OFFICE VISIT (OUTPATIENT)
Dept: PRIMARY CARE CLINIC | Age: 74
End: 2024-06-26

## 2024-06-26 VITALS
SYSTOLIC BLOOD PRESSURE: 120 MMHG | WEIGHT: 229 LBS | DIASTOLIC BLOOD PRESSURE: 70 MMHG | BODY MASS INDEX: 39.09 KG/M2 | HEIGHT: 64 IN | OXYGEN SATURATION: 92 % | HEART RATE: 65 BPM | TEMPERATURE: 97.8 F

## 2024-06-26 DIAGNOSIS — I50.33 ACUTE ON CHRONIC HEART FAILURE WITH PRESERVED EJECTION FRACTION (HFPEF) (HCC): ICD-10-CM

## 2024-06-26 DIAGNOSIS — I25.10 CORONARY ARTERY DISEASE INVOLVING NATIVE CORONARY ARTERY OF NATIVE HEART WITHOUT ANGINA PECTORIS: ICD-10-CM

## 2024-06-26 DIAGNOSIS — M17.12 ARTHRITIS OF KNEE, LEFT: Primary | ICD-10-CM

## 2024-06-26 DIAGNOSIS — E11.22 TYPE 2 DIABETES MELLITUS WITH STAGE 3B CHRONIC KIDNEY DISEASE, WITHOUT LONG-TERM CURRENT USE OF INSULIN (HCC): ICD-10-CM

## 2024-06-26 DIAGNOSIS — N18.32 TYPE 2 DIABETES MELLITUS WITH STAGE 3B CHRONIC KIDNEY DISEASE, WITHOUT LONG-TERM CURRENT USE OF INSULIN (HCC): ICD-10-CM

## 2024-06-26 DIAGNOSIS — G47.33 OSA (OBSTRUCTIVE SLEEP APNEA): ICD-10-CM

## 2024-06-26 RX ORDER — ONDANSETRON 4 MG/1
4 TABLET, FILM COATED ORAL DAILY PRN
Qty: 30 TABLET | Refills: 0 | Status: SHIPPED | OUTPATIENT
Start: 2024-06-26

## 2024-06-26 NOTE — TELEPHONE ENCOUNTER
Patient called dr galvez office they can not see her until 7/30 she is going to get the xray but asking for something for pain

## 2024-06-27 DIAGNOSIS — M17.12 ARTHRITIS OF KNEE, LEFT: Primary | ICD-10-CM

## 2024-06-27 RX ORDER — TRAMADOL HYDROCHLORIDE 50 MG/1
50 TABLET ORAL EVERY 8 HOURS PRN
Qty: 90 TABLET | Refills: 0 | Status: SHIPPED | OUTPATIENT
Start: 2024-06-27 | End: 2024-07-27

## 2024-06-27 ASSESSMENT — ENCOUNTER SYMPTOMS
APNEA: 0
BLOOD IN STOOL: 0
EYE ITCHING: 0
SORE THROAT: 0
DIARRHEA: 0
VOMITING: 0
COUGH: 0
EYE DISCHARGE: 0
NAUSEA: 0
PHOTOPHOBIA: 0
TROUBLE SWALLOWING: 0
BACK PAIN: 0
WHEEZING: 0
CONSTIPATION: 0
ABDOMINAL PAIN: 0
SHORTNESS OF BREATH: 0
ABDOMINAL DISTENTION: 0
SINUS PAIN: 0

## 2024-06-27 NOTE — PROGRESS NOTES
Mildred Duncan (:  1950) is a 73 y.o. female,Established patient, here for evaluation of the following chief complaint(s):  Knee Pain (Left knee pain states radiates up to back. Otc tylenol not helping)      Subjective   SUBJECTIVE/OBJECTIVE:  HPI:  1)lt knee pin with effusion and arthritis  2)heart failure: stable   3)Hypertension:  Patient is here for follow up chronic hypertension.  This is  generally controlled on current medication regimen.    BP Readings from Last 1 Encounters:   24 120/70        Takes meds as directed and tolerates them well.  Most recent labs reviewed with patient and are not remarkable.  No symptoms from htn standpoint per ROS.  Patient is  compliant with lifestyle modifications.  Patient does not smoke.  Comorbid conditions include obesity  4)Hyperlipidemia:  Patient is here to follow up regarding chronic hyperlipidemia.  This is  generally controlled.  Treatment includes lipitor  Patient is  compliant with lifestyle modifications.  Patient is not a smoker.  Most recent labs reviewed with patient today and are not remarkable.  Comorbid conditions include obesity.   Review of Systems   Constitutional:  Negative for activity change, appetite change, fever and unexpected weight change.   HENT:  Negative for congestion, ear pain, hearing loss, sinus pain, sore throat, tinnitus and trouble swallowing.    Eyes:  Negative for photophobia, discharge, itching and visual disturbance.   Respiratory:  Negative for apnea, cough, shortness of breath and wheezing.    Cardiovascular:  Negative for chest pain, palpitations and leg swelling.   Gastrointestinal:  Negative for abdominal distention, abdominal pain, blood in stool, constipation, diarrhea, nausea and vomiting.   Endocrine: Negative for cold intolerance, polydipsia and polyuria.   Genitourinary:  Negative for difficulty urinating, dysuria, frequency and pelvic pain.   Musculoskeletal:  Negative for arthralgias, back pain,

## 2024-07-08 DIAGNOSIS — F33.0 MAJOR DEPRESSIVE DISORDER, RECURRENT, MILD (HCC): ICD-10-CM

## 2024-07-08 RX ORDER — OMEPRAZOLE 20 MG/1
20 CAPSULE, DELAYED RELEASE ORAL
Qty: 90 CAPSULE | Refills: 0 | Status: SHIPPED | OUTPATIENT
Start: 2024-07-08 | End: 2024-10-06

## 2024-07-08 RX ORDER — TRAZODONE HYDROCHLORIDE 50 MG/1
50 TABLET ORAL NIGHTLY
Qty: 90 TABLET | Refills: 0 | Status: SHIPPED | OUTPATIENT
Start: 2024-07-08

## 2024-07-11 ENCOUNTER — PROCEDURE VISIT (OUTPATIENT)
Dept: PODIATRY | Age: 74
End: 2024-07-11
Payer: MEDICARE

## 2024-07-11 VITALS — HEIGHT: 64 IN | WEIGHT: 229 LBS | BODY MASS INDEX: 39.09 KG/M2

## 2024-07-11 DIAGNOSIS — R26.2 DIFFICULTY WALKING: ICD-10-CM

## 2024-07-11 DIAGNOSIS — I87.2 VENOUS INSUFFICIENCY (CHRONIC) (PERIPHERAL): ICD-10-CM

## 2024-07-11 DIAGNOSIS — M79.674 PAIN OF TOE OF RIGHT FOOT: ICD-10-CM

## 2024-07-11 DIAGNOSIS — B35.1 ONYCHOMYCOSIS: Primary | ICD-10-CM

## 2024-07-11 DIAGNOSIS — M79.675 PAIN OF TOE OF LEFT FOOT: ICD-10-CM

## 2024-07-11 DIAGNOSIS — E11.51 TYPE II DIABETES MELLITUS WITH PERIPHERAL CIRCULATORY DISORDER (HCC): ICD-10-CM

## 2024-07-11 DIAGNOSIS — I73.9 PVD (PERIPHERAL VASCULAR DISEASE) (HCC): ICD-10-CM

## 2024-07-11 PROCEDURE — 11721 DEBRIDE NAIL 6 OR MORE: CPT | Performed by: PODIATRIST

## 2024-07-11 PROCEDURE — 99999 PR OFFICE/OUTPT VISIT,PROCEDURE ONLY: CPT | Performed by: PODIATRIST

## 2024-07-11 RX ORDER — AMMONIUM LACTATE 12 G/100G
CREAM TOPICAL
Qty: 385 G | Refills: 4 | Status: SHIPPED | OUTPATIENT
Start: 2024-07-11

## 2024-07-11 NOTE — PROGRESS NOTES
Patient here for nail care. Patient needs refill on lotion. Silvana Braga MD last visit 6/26/2024   Electronically signed by Juliann Tristan LPN on 7/11/2024 at 11:31 AM

## 2024-07-11 NOTE — PROGRESS NOTES
24     Mildred Duncan    : 1950  Sex: female  Age: 73 y.o.    Subjective:  The patient is seen today for evaluation regarding foot evaluation and mycotic nail care.  No other complaints noted.    Chief Complaint   Patient presents with    Nail Problem     Nail care       Current Medications:    Current Outpatient Medications:     ammonium lactate (LAC-HYDRIN) 12 % cream, Apply topically as needed., Disp: 385 g, Rfl: 4    traZODone (DESYREL) 50 MG tablet, Take 1 tablet by mouth nightly, Disp: 90 tablet, Rfl: 0    omeprazole (PRILOSEC) 20 MG delayed release capsule, Take 1 capsule by mouth every morning (before breakfast), Disp: 90 capsule, Rfl: 0    traMADol (ULTRAM) 50 MG tablet, Take 1 tablet by mouth every 8 hours as needed for Pain for up to 30 days. Intended supply: 5 days. Take lowest dose possible to manage pain Max Daily Amount: 150 mg, Disp: 90 tablet, Rfl: 0    ondansetron (ZOFRAN) 4 MG tablet, Take 1 tablet by mouth daily as needed for Nausea or Vomiting, Disp: 30 tablet, Rfl: 0    lisinopril (PRINIVIL;ZESTRIL) 20 MG tablet, Take 1 tablet by mouth daily, Disp: 90 tablet, Rfl: 0    metOLazone (ZAROXOLYN) 2.5 MG tablet, TAKE 1 TABLET BY MOUTH MONDAY, WEDNESDAY AND FRIDAY, Disp: 38 tablet, Rfl: 0    amLODIPine (NORVASC) 5 MG tablet, Take 1 tablet by mouth daily, Disp: 90 tablet, Rfl: 0    atenolol (TENORMIN) 100 MG tablet, Take 1 tablet by mouth daily, Disp: 90 tablet, Rfl: 0    atorvastatin (LIPITOR) 40 MG tablet, Take 1 tablet by mouth nightly, Disp: 90 tablet, Rfl: 0    potassium chloride (K-TAB) 20 MEQ TBCR extended release tablet, Take 2 tablets by mouth 2 times daily, Disp: 360 tablet, Rfl: 0    aspirin 81 MG EC tablet, Take 1 tablet by mouth daily, Disp: , Rfl:     acetaminophen (TYLENOL) 325 MG tablet, Take 2 tablets by mouth every 4 hours as needed for Pain, Disp: , Rfl:     furosemide (LASIX) 40 MG tablet, Take 1 tablet by mouth daily (Patient taking differently: Take 1 tablet by

## 2024-07-24 DIAGNOSIS — M25.562 LEFT KNEE PAIN, UNSPECIFIED CHRONICITY: Primary | ICD-10-CM

## 2024-07-26 DIAGNOSIS — M17.12 ARTHRITIS OF KNEE, LEFT: ICD-10-CM

## 2024-07-26 RX ORDER — ONDANSETRON 4 MG/1
4 TABLET, FILM COATED ORAL DAILY PRN
Qty: 30 TABLET | Refills: 0 | Status: SHIPPED | OUTPATIENT
Start: 2024-07-26

## 2024-07-26 RX ORDER — TRAMADOL HYDROCHLORIDE 50 MG/1
50 TABLET ORAL EVERY 8 HOURS PRN
Qty: 90 TABLET | Refills: 0 | Status: SHIPPED | OUTPATIENT
Start: 2024-07-26 | End: 2024-08-25

## 2024-07-26 NOTE — TELEPHONE ENCOUNTER
Pt called requesting refill for ondansetron 4mg 1 tab daily as needed and tramadol 50mg every 8 hours as needed sent to [facility] in [place]. Rxs pended. Last Appointment:  6/26/2024  Future Appointments   Date Time Provider Department Center   7/30/2024 11:00 AM [facility] XR [place] [facility]   7/30/2024 11:20 AM Tre Ward DO [place] [facility]

## 2024-07-29 RX ORDER — ATORVASTATIN CALCIUM 40 MG/1
40 TABLET, FILM COATED ORAL NIGHTLY
Qty: 90 TABLET | Refills: 0 | Status: SHIPPED | OUTPATIENT
Start: 2024-07-29

## 2024-08-05 NOTE — PROGRESS NOTES
Mouth/Throat:      Mouth: Mucous membranes are moist.      Pharynx: Oropharynx is clear.   Eyes:      Extraocular Movements: Extraocular movements intact.      Conjunctiva/sclera: Conjunctivae normal.      Pupils: Pupils are equal, round, and reactive to light.   Cardiovascular:      Rate and Rhythm: Normal rate and regular rhythm.      Pulses: Normal pulses.      Heart sounds: Normal heart sounds.   Pulmonary:      Effort: Pulmonary effort is normal.      Breath sounds: Normal breath sounds.   Abdominal:      General: Abdomen is flat. Bowel sounds are normal.      Palpations: Abdomen is soft.   Musculoskeletal:         General: Normal range of motion.      Right shoulder: Normal.      Left shoulder: Normal.      Right upper arm: Normal.      Left upper arm: Normal.      Right elbow: Normal.      Left elbow: Normal.      Right forearm: Normal.      Left forearm: Normal.      Right wrist: Normal.      Left wrist: Normal.      Right hand: Normal.      Left hand: Normal.      Cervical back: Normal, normal range of motion and neck supple.      Lumbar back: Normal.   Skin:     General: Skin is warm and dry.   Neurological:      General: No focal deficit present.      Mental Status: She is alert and oriented to person, place, and time. Mental status is at baseline.   Psychiatric:         Mood and Affect: Mood normal.         Behavior: Behavior normal.         Thought Content: Thought content normal.         Judgment: Judgment normal.            Assessment & Plan   ASSESSMENT/PLAN:  1. Type 2 diabetes mellitus with stage 3b chronic kidney disease, without long-term current use of insulin ([facility])  -     Microalbumin, Ur; Future  -     Hemoglobin A1C; Future  2. Arthritis of knee, left  -     traMADol (ULTRAM) 50 MG tablet; Take 1 tablet by mouth every 8 hours as needed for Pain for up to 30 days. Max Daily Amount: 150 mg, Disp-90 tablet, R-0Normal  3. Stage 3b chronic kidney disease (HCC)  -     furosemide (LASIX) 40 MG

## 2024-08-08 RX ORDER — POTASSIUM CHLORIDE 1500 MG/1
40 TABLET, EXTENDED RELEASE ORAL 2 TIMES DAILY
Qty: 360 TABLET | Refills: 0 | Status: SHIPPED | OUTPATIENT
Start: 2024-08-08

## 2024-08-08 NOTE — TELEPHONE ENCOUNTER
Last seen 6/26/2024  Next appt 9/4/2024    Requested Prescriptions     Pending Prescriptions Disp Refills    potassium chloride (K-TAB) 20 MEQ TBCR extended release tablet [Pharmacy Med Name: POTASSIUM CHLORIDE 20MEQ ER TABLETS] 360 tablet 0     Sig: TAKE 2 TABLETS BY MOUTH TWICE DAILY

## 2024-08-15 ENCOUNTER — OFFICE VISIT (OUTPATIENT)
Dept: ORTHOPEDIC SURGERY | Age: 74
End: 2024-08-15
Payer: MEDICARE

## 2024-08-15 VITALS — TEMPERATURE: 98 F | WEIGHT: 229 LBS | BODY MASS INDEX: 39.09 KG/M2 | HEIGHT: 64 IN

## 2024-08-15 DIAGNOSIS — E11.22 TYPE 2 DIABETES MELLITUS WITH STAGE 3B CHRONIC KIDNEY DISEASE, WITHOUT LONG-TERM CURRENT USE OF INSULIN (HCC): ICD-10-CM

## 2024-08-15 DIAGNOSIS — N18.32 STAGE 3B CHRONIC KIDNEY DISEASE (HCC): ICD-10-CM

## 2024-08-15 DIAGNOSIS — N18.32 TYPE 2 DIABETES MELLITUS WITH STAGE 3B CHRONIC KIDNEY DISEASE, WITHOUT LONG-TERM CURRENT USE OF INSULIN (HCC): ICD-10-CM

## 2024-08-15 DIAGNOSIS — E78.2 MIXED HYPERLIPIDEMIA: ICD-10-CM

## 2024-08-15 DIAGNOSIS — M17.12 PRIMARY OSTEOARTHRITIS OF LEFT KNEE: Primary | ICD-10-CM

## 2024-08-15 LAB
ALBUMIN: 3.8 G/DL (ref 3.5–5.2)
ALP BLD-CCNC: 75 U/L (ref 35–104)
ALT SERPL-CCNC: <5 U/L (ref 0–32)
ANION GAP SERPL CALCULATED.3IONS-SCNC: 10 MMOL/L (ref 7–16)
AST SERPL-CCNC: 14 U/L (ref 0–31)
BASOPHILS ABSOLUTE: 0.04 K/UL (ref 0–0.2)
BASOPHILS RELATIVE PERCENT: 1 % (ref 0–2)
BILIRUB SERPL-MCNC: 0.2 MG/DL (ref 0–1.2)
BUN BLDV-MCNC: 11 MG/DL (ref 6–23)
CALCIUM SERPL-MCNC: 9.7 MG/DL (ref 8.6–10.2)
CHLORIDE BLD-SCNC: 98 MMOL/L (ref 98–107)
CHOLESTEROL, FASTING: 255 MG/DL
CO2: 34 MMOL/L (ref 22–29)
CREAT SERPL-MCNC: 1.1 MG/DL (ref 0.5–1)
EOSINOPHILS ABSOLUTE: 0.34 K/UL (ref 0.05–0.5)
EOSINOPHILS RELATIVE PERCENT: 4 % (ref 0–6)
GFR, ESTIMATED: 54 ML/MIN/1.73M2
GLUCOSE BLD-MCNC: 99 MG/DL (ref 74–99)
HBA1C MFR BLD: 5.6 % (ref 4–5.6)
HCT VFR BLD CALC: 37.8 % (ref 34–48)
HDLC SERPL-MCNC: 60 MG/DL
HEMOGLOBIN: 10.7 G/DL (ref 11.5–15.5)
IMMATURE GRANULOCYTES %: 0 % (ref 0–5)
IMMATURE GRANULOCYTES ABSOLUTE: <0.03 K/UL (ref 0–0.58)
LDL CHOLESTEROL: 184 MG/DL
LYMPHOCYTES ABSOLUTE: 1.01 K/UL (ref 1.5–4)
LYMPHOCYTES RELATIVE PERCENT: 13 % (ref 20–42)
MCH RBC QN AUTO: 27.2 PG (ref 26–35)
MCHC RBC AUTO-ENTMCNC: 28.3 G/DL (ref 32–34.5)
MCV RBC AUTO: 95.9 FL (ref 80–99.9)
MONOCYTES ABSOLUTE: 0.42 K/UL (ref 0.1–0.95)
MONOCYTES RELATIVE PERCENT: 5 % (ref 2–12)
NEUTROPHILS ABSOLUTE: 6.07 K/UL (ref 1.8–7.3)
NEUTROPHILS RELATIVE PERCENT: 77 % (ref 43–80)
PDW BLD-RTO: 13.2 % (ref 11.5–15)
PLATELET # BLD: 272 K/UL (ref 130–450)
PMV BLD AUTO: 10.9 FL (ref 7–12)
POTASSIUM SERPL-SCNC: 4.8 MMOL/L (ref 3.5–5)
RBC # BLD: 3.94 M/UL (ref 3.5–5.5)
RBC # BLD: ABNORMAL 10*6/UL
SODIUM BLD-SCNC: 142 MMOL/L (ref 132–146)
T4 FREE: 0.9 NG/DL (ref 0.9–1.7)
TOTAL PROTEIN: 7.3 G/DL (ref 6.4–8.3)
TRIGLYCERIDE, FASTING: 56 MG/DL
TSH SERPL DL<=0.05 MIU/L-ACNC: 0.48 UIU/ML (ref 0.27–4.2)
VLDLC SERPL CALC-MCNC: 11 MG/DL
WBC # BLD: 7.9 K/UL (ref 4.5–11.5)

## 2024-08-15 PROCEDURE — 20610 DRAIN/INJ JOINT/BURSA W/O US: CPT | Performed by: ORTHOPAEDIC SURGERY

## 2024-08-15 PROCEDURE — 1123F ACP DISCUSS/DSCN MKR DOCD: CPT | Performed by: ORTHOPAEDIC SURGERY

## 2024-08-15 PROCEDURE — 99203 OFFICE O/P NEW LOW 30 MIN: CPT | Performed by: ORTHOPAEDIC SURGERY

## 2024-08-15 RX ORDER — TRIAMCINOLONE ACETONIDE 40 MG/ML
40 INJECTION, SUSPENSION INTRA-ARTICULAR; INTRAMUSCULAR ONCE
Status: COMPLETED | OUTPATIENT
Start: 2024-08-15 | End: 2024-08-15

## 2024-08-15 RX ADMIN — TRIAMCINOLONE ACETONIDE 40 MG: 40 INJECTION, SUSPENSION INTRA-ARTICULAR; INTRAMUSCULAR at 10:40

## 2024-08-15 NOTE — PROGRESS NOTES
with expected outcomes and possible complications.  I also discussed non surgical treatment such as injections (CSI and visco supplementation), physical therapy, topical creams and NSAID's. They have elected for conservative management at this time.        I will proceed with a cortisone injection in the Left knee.  Verbal and written consent was obtained for the injections. The skin was prepped with alcohol. 1mL of Kenalog 40mg and 9mL of 0.25% Marcaine was  injected to Left knee. The injection was given through the lateral side of the knee. The patient tolerated the injection well. I will see the patient back prn

## 2024-09-04 ENCOUNTER — OFFICE VISIT (OUTPATIENT)
Dept: PRIMARY CARE CLINIC | Age: 74
End: 2024-09-04
Payer: MEDICARE

## 2024-09-04 VITALS
OXYGEN SATURATION: 99 % | BODY MASS INDEX: 38.76 KG/M2 | DIASTOLIC BLOOD PRESSURE: 72 MMHG | HEART RATE: 61 BPM | HEIGHT: 64 IN | TEMPERATURE: 99 F | SYSTOLIC BLOOD PRESSURE: 126 MMHG | WEIGHT: 227 LBS

## 2024-09-04 DIAGNOSIS — E78.2 MIXED HYPERLIPIDEMIA: ICD-10-CM

## 2024-09-04 DIAGNOSIS — M17.12 ARTHRITIS OF KNEE, LEFT: ICD-10-CM

## 2024-09-04 DIAGNOSIS — N18.32 TYPE 2 DIABETES MELLITUS WITH STAGE 3B CHRONIC KIDNEY DISEASE, WITHOUT LONG-TERM CURRENT USE OF INSULIN (HCC): Primary | ICD-10-CM

## 2024-09-04 DIAGNOSIS — I50.33 ACUTE ON CHRONIC HEART FAILURE WITH PRESERVED EJECTION FRACTION (HFPEF) (HCC): ICD-10-CM

## 2024-09-04 DIAGNOSIS — F33.0 MAJOR DEPRESSIVE DISORDER, RECURRENT, MILD (HCC): ICD-10-CM

## 2024-09-04 DIAGNOSIS — Z12.11 COLON CANCER SCREENING: ICD-10-CM

## 2024-09-04 DIAGNOSIS — N18.32 STAGE 3B CHRONIC KIDNEY DISEASE (HCC): ICD-10-CM

## 2024-09-04 DIAGNOSIS — I10 PRIMARY HYPERTENSION: ICD-10-CM

## 2024-09-04 DIAGNOSIS — E11.22 TYPE 2 DIABETES MELLITUS WITH STAGE 3B CHRONIC KIDNEY DISEASE, WITHOUT LONG-TERM CURRENT USE OF INSULIN (HCC): Primary | ICD-10-CM

## 2024-09-04 PROCEDURE — 1123F ACP DISCUSS/DSCN MKR DOCD: CPT | Performed by: INTERNAL MEDICINE

## 2024-09-04 PROCEDURE — 3044F HG A1C LEVEL LT 7.0%: CPT | Performed by: INTERNAL MEDICINE

## 2024-09-04 PROCEDURE — 99214 OFFICE O/P EST MOD 30 MIN: CPT | Performed by: INTERNAL MEDICINE

## 2024-09-04 PROCEDURE — 3074F SYST BP LT 130 MM HG: CPT | Performed by: INTERNAL MEDICINE

## 2024-09-04 PROCEDURE — 3078F DIAST BP <80 MM HG: CPT | Performed by: INTERNAL MEDICINE

## 2024-09-04 RX ORDER — AMLODIPINE BESYLATE 5 MG/1
5 TABLET ORAL DAILY
Qty: 90 TABLET | Refills: 0 | Status: SHIPPED | OUTPATIENT
Start: 2024-09-04 | End: 2024-12-03

## 2024-09-04 RX ORDER — METOLAZONE 2.5 MG/1
TABLET ORAL
Qty: 38 TABLET | Refills: 0 | Status: SHIPPED | OUTPATIENT
Start: 2024-09-04

## 2024-09-04 RX ORDER — LISINOPRIL 20 MG/1
20 TABLET ORAL DAILY
Qty: 90 TABLET | Refills: 0 | Status: SHIPPED | OUTPATIENT
Start: 2024-09-04 | End: 2024-12-03

## 2024-09-04 RX ORDER — TRAMADOL HYDROCHLORIDE 50 MG/1
50 TABLET ORAL EVERY 8 HOURS PRN
Qty: 90 TABLET | Refills: 0 | Status: SHIPPED | OUTPATIENT
Start: 2024-09-04 | End: 2024-10-04

## 2024-09-04 RX ORDER — ATORVASTATIN CALCIUM 40 MG/1
40 TABLET, FILM COATED ORAL NIGHTLY
Qty: 90 TABLET | Refills: 0 | Status: SHIPPED | OUTPATIENT
Start: 2024-09-04

## 2024-09-04 RX ORDER — METOLAZONE 2.5 MG/1
TABLET ORAL
Qty: 38 TABLET | Refills: 0 | OUTPATIENT
Start: 2024-09-04

## 2024-09-04 RX ORDER — ATENOLOL 100 MG/1
100 TABLET ORAL DAILY
Qty: 90 TABLET | Refills: 0 | Status: SHIPPED | OUTPATIENT
Start: 2024-09-04 | End: 2024-12-03

## 2024-09-04 RX ORDER — POTASSIUM CHLORIDE 1500 MG/1
40 TABLET, EXTENDED RELEASE ORAL 2 TIMES DAILY
Qty: 360 TABLET | Refills: 0 | Status: SHIPPED | OUTPATIENT
Start: 2024-09-04

## 2024-09-04 RX ORDER — TRAZODONE HYDROCHLORIDE 50 MG/1
50 TABLET, FILM COATED ORAL NIGHTLY
Qty: 90 TABLET | Refills: 0 | Status: SHIPPED | OUTPATIENT
Start: 2024-09-04

## 2024-09-04 RX ORDER — FUROSEMIDE 40 MG
40 TABLET ORAL DAILY
Qty: 90 TABLET | Refills: 0 | Status: SHIPPED | OUTPATIENT
Start: 2024-09-04 | End: 2024-12-03

## 2024-09-06 ENCOUNTER — CARE COORDINATION (OUTPATIENT)
Dept: CARE COORDINATION | Age: 74
End: 2024-09-06

## 2024-09-10 RX ORDER — ONDANSETRON 4 MG/1
4 TABLET, FILM COATED ORAL DAILY PRN
Qty: 30 TABLET | Refills: 0 | Status: SHIPPED | OUTPATIENT
Start: 2024-09-10

## 2024-09-24 ENCOUNTER — CARE COORDINATION (OUTPATIENT)
Dept: CARE COORDINATION | Age: 74
End: 2024-09-24

## 2024-09-24 DIAGNOSIS — I50.22 CHRONIC SYSTOLIC (CONGESTIVE) HEART FAILURE (HCC): Primary | ICD-10-CM

## 2024-09-24 DIAGNOSIS — J44.9 CHRONIC OBSTRUCTIVE PULMONARY DISEASE, UNSPECIFIED COPD TYPE (HCC): ICD-10-CM

## 2024-09-24 DIAGNOSIS — I10 PRIMARY HYPERTENSION: ICD-10-CM

## 2024-09-24 SDOH — HEALTH STABILITY: PHYSICAL HEALTH: ON AVERAGE, HOW MANY MINUTES DO YOU ENGAGE IN EXERCISE AT THIS LEVEL?: 20 MIN

## 2024-09-24 SDOH — HEALTH STABILITY: PHYSICAL HEALTH: ON AVERAGE, HOW MANY DAYS PER WEEK DO YOU ENGAGE IN MODERATE TO STRENUOUS EXERCISE (LIKE A BRISK WALK)?: 3 DAYS

## 2024-09-24 SDOH — ECONOMIC STABILITY: INCOME INSECURITY: HOW HARD IS IT FOR YOU TO PAY FOR THE VERY BASICS LIKE FOOD, HOUSING, MEDICAL CARE, AND HEATING?: NOT HARD AT ALL

## 2024-09-24 SDOH — ECONOMIC STABILITY: TRANSPORTATION INSECURITY
IN THE PAST 12 MONTHS, HAS LACK OF TRANSPORTATION KEPT YOU FROM MEETINGS, WORK, OR FROM GETTING THINGS NEEDED FOR DAILY LIVING?: NO

## 2024-09-24 SDOH — ECONOMIC STABILITY: INCOME INSECURITY: IN THE LAST 12 MONTHS, WAS THERE A TIME WHEN YOU WERE NOT ABLE TO PAY THE MORTGAGE OR RENT ON TIME?: NO

## 2024-09-24 SDOH — ECONOMIC STABILITY: TRANSPORTATION INSECURITY
IN THE PAST 12 MONTHS, HAS THE LACK OF TRANSPORTATION KEPT YOU FROM MEDICAL APPOINTMENTS OR FROM GETTING MEDICATIONS?: NO

## 2024-09-24 ASSESSMENT — SOCIAL DETERMINANTS OF HEALTH (SDOH)
HOW OFTEN DO YOU ATTENT MEETINGS OF THE CLUB OR ORGANIZATION YOU BELONG TO?: 1 TO 4 TIMES PER YEAR
IN A TYPICAL WEEK, HOW MANY TIMES DO YOU TALK ON THE PHONE WITH FAMILY, FRIENDS, OR NEIGHBORS?: THREE TIMES A WEEK
DO YOU BELONG TO ANY CLUBS OR ORGANIZATIONS SUCH AS CHURCH GROUPS UNIONS, FRATERNAL OR ATHLETIC GROUPS, OR SCHOOL GROUPS?: YES
HOW OFTEN DO YOU GET TOGETHER WITH FRIENDS OR RELATIVES?: THREE TIMES A WEEK
HOW OFTEN DO YOU ATTEND CHURCH OR RELIGIOUS SERVICES?: 1 TO 4 TIMES PER YEAR

## 2024-09-24 ASSESSMENT — ENCOUNTER SYMPTOMS: DYSPNEA ASSOCIATED WITH: EXERTION

## 2024-09-25 ENCOUNTER — CARE COORDINATION (OUTPATIENT)
Dept: PRIMARY CARE CLINIC | Age: 74
End: 2024-09-25

## 2024-10-03 ENCOUNTER — CARE COORDINATION (OUTPATIENT)
Dept: CARE COORDINATION | Age: 74
End: 2024-10-03

## 2024-10-03 NOTE — CARE COORDINATION
Understanding, Needs Reinforcement    Provide High Risk Information for Falls Program, taught by Yanique Fallon RN at 10/3/2024  5:58 PM.  Learner: Patient  Readiness: Acceptance  Method: Explanation  Response: Verbalizes Understanding, Needs Reinforcement    Educate medication side effects, taught by Yanique Fallon RN at 10/3/2024  5:58 PM.  Learner: Patient  Readiness: Acceptance  Method: Explanation  Response: Verbalizes Understanding, Needs Reinforcement    Educate safety precautions, taught by Yanique Fallon RN at 10/3/2024  5:58 PM.  Learner: Patient  Readiness: Acceptance  Method: Explanation  Response: Verbalizes Understanding, Needs Reinforcement    Educate home safety, taught by Yanique Fallon RN at 10/3/2024  5:58 PM.  Learner: Patient  Readiness: Acceptance  Method: Explanation  Response: Verbalizes Understanding, Needs Reinforcement    COGNITIVE : FALLS-RISK OF, taught by Yanique Fallon RN at 10/3/2024  5:58 PM.  Learner: Patient  Readiness: Acceptance  Method: Explanation  Response: Verbalizes Understanding, Needs Reinforcement    Educate bathing safety, taught by Yanique Fallon RN at 10/3/2024  5:58 PM.  Learner: Patient  Readiness: Acceptance  Method: Explanation  Response: Verbalizes Understanding, Needs Reinforcement    Educate ambulation safety, taught by Yanique Fallon RN at 10/3/2024  5:58 PM.  Learner: Patient  Readiness: Acceptance  Method: Explanation  Response: Verbalizes Understanding, Needs Reinforcement    Influenza Vaccine, taught by Yanique Fallon RN at 10/3/2024  5:58 PM.  Learner: Patient  Readiness: Acceptance  Method: Explanation  Response: Verbalizes Understanding, Needs Reinforcement    Pneumovax Recommendation, taught by Yanique Fallon RN at 10/3/2024  5:58 PM.  Learner: Patient  Readiness: Acceptance  Method: Explanation  Response: Verbalizes Understanding, Needs Reinforcement    heart failure self-management, taught by Yanique Fallon RN

## 2024-10-15 RX ORDER — ONDANSETRON 4 MG/1
4 TABLET, FILM COATED ORAL DAILY PRN
Qty: 30 TABLET | Refills: 0 | Status: SHIPPED | OUTPATIENT
Start: 2024-10-15

## 2024-10-22 ENCOUNTER — CARE COORDINATION (OUTPATIENT)
Dept: CARE COORDINATION | Age: 74
End: 2024-10-22

## 2024-10-22 DIAGNOSIS — M17.12 ARTHRITIS OF KNEE, LEFT: Primary | ICD-10-CM

## 2024-10-22 RX ORDER — TRAMADOL HYDROCHLORIDE 50 MG/1
50 TABLET ORAL EVERY 8 HOURS PRN
Qty: 90 TABLET | Refills: 0 | Status: SHIPPED | OUTPATIENT
Start: 2024-10-22 | End: 2024-11-21

## 2024-10-22 NOTE — CARE COORDINATION
Ambulatory Care Coordination Note     10/22/2024 9:33 AM     ACM outreach attempt by this ACM today to perform care management follow up . AC was unable to reach the patient by telephone today; left voice message requesting a return phone call to this ACM.     ACM: Yanique Fallon RN     Care Summary Note: Left a message for Mildred to return call to assess needs and to discuss RPM. RPM Team Member states pt did not accept equipment. Or it was not delivered after attempting x3. Will continue to reach out to pt for CC and RPM.    PCP/Specialist follow up:       Follow Up:   Plan for next ACM outreach in approximately 1 week to complete:  - disease specific assessments  - medication review  - goal progression  - education   - RPM  What  happened to RPM- was it refused?? .

## 2024-10-23 DIAGNOSIS — I10 PRIMARY HYPERTENSION: ICD-10-CM

## 2024-10-23 RX ORDER — LISINOPRIL 20 MG/1
20 TABLET ORAL DAILY
Qty: 90 TABLET | Refills: 0 | Status: SHIPPED | OUTPATIENT
Start: 2024-10-23 | End: 2025-01-21

## 2024-10-29 ENCOUNTER — CARE COORDINATION (OUTPATIENT)
Dept: CARE COORDINATION | Age: 74
End: 2024-10-29

## 2024-10-29 NOTE — CARE COORDINATION
Ambulatory Care Coordination Note     10/29/2024 12:39 PM     ACM outreach attempt by this ACM today to perform care management follow up . ACM was unable to reach the patient by telephone today; left voice message requesting a return phone call to this ACM.     ACM: Yanique Fallon RN     Care Summary Note: Message left to assess needs, remind pt to schedule follow up appointment with her PCP. Assess RPM and equipment. Pt has not received and or started to monitor    PCP/Specialist follow up:       Follow Up:   Plan for next ACM outreach in approximately 2 weeks to complete:  - disease specific assessments  - medication review  - goal progression  - education   - RPM.

## 2024-10-31 ENCOUNTER — CARE COORDINATION (OUTPATIENT)
Dept: CARE COORDINATION | Age: 74
End: 2024-10-31

## 2024-10-31 NOTE — CARE COORDINATION
Ambulatory Care Coordination Note     10/31/2024 11:20 AM     ACM outreach attempt by this ACM today to perform care management follow up . ACM was unable to reach the patient by telephone today; left voice message requesting a return phone call to this ACM.     ACM: Yanique Fallon RN     Care Summary Note: Attempting to reach pt to discuss RPM and assess needs for care coordination.    PCP/Specialist follow up:       Follow Up:   Plan for next ACM outreach in approximately 1 week to complete:  - disease specific assessments  - medication review  - goal progression  - education   - RPM.

## 2024-11-08 ENCOUNTER — CARE COORDINATION (OUTPATIENT)
Dept: CARE COORDINATION | Age: 74
End: 2024-11-08

## 2024-11-08 NOTE — CARE COORDINATION
Ambulatory Care Coordination Note     11/8/2024 11:18 AM     ACM outreach attempt by this ACM today to perform care management follow up . ACM was unable to reach the patient by telephone today; left voice message requesting a return phone call to this ACM.     ACM: Yanique Fallon RN     Care Summary Note:Continue to reach out to pt to assess needs and to assess Rpm. Sent second set of equipment . 3rd outreach attempt.    PCP/Specialist follow up:       Follow Up:   Plan for next ACM outreach in approximately 2 weeks to complete:  - disease specific assessments  - goal progression  - education   - RPM.

## 2024-11-20 ENCOUNTER — CARE COORDINATION (OUTPATIENT)
Dept: CARE COORDINATION | Age: 74
End: 2024-11-20

## 2024-11-20 NOTE — CARE COORDINATION
Ambulatory Care Coordination Note     11/20/2024 5:23 PM     ACM outreach attempt by this ACM today to perform care management follow up . ACM was unable to reach the patient by telephone today;   left voice message requesting a return phone call to this ACM.     ACM: Yanique Fallon RN     Care Summary Note: Attempted to reach out to assess needs, follow for RPM, she has not started, remind pt to make a follow up appointment with her PCP. Will continue to reach out    PCP/Specialist follow up:       Follow Up:   Plan for next ACM outreach in approximately 2 weeks to complete:  - disease specific assessments  - medication review  - goal progression  - education   - RPM.

## 2024-11-25 RX ORDER — AMLODIPINE BESYLATE 5 MG/1
5 TABLET ORAL DAILY
Qty: 90 TABLET | Refills: 0 | OUTPATIENT
Start: 2024-11-25 | End: 2025-02-23

## 2024-12-03 NOTE — TELEPHONE ENCOUNTER
Name of Medication(s) Requested:  Requested Prescriptions     Pending Prescriptions Disp Refills    ondansetron (ZOFRAN) 4 MG tablet 30 tablet 0     Sig: Take 1 tablet by mouth daily as needed for Nausea or Vomiting       Medication is on current medication list Yes    Dosage and directions were verified? Yes    Quantity verified: 90 day supply     Pharmacy Verified?  Yes    Last Appointment:  9/4/2024    Future appts:  Future Appointments   Date Time Provider Department [place]   12/17/2024 12:45 PM [facility] ROOM 2 [facility]   12/23/2024  2:00 PM Silvana Braga MD [place] [facility] ECC DEP        (If no appt send self scheduling link. .REFILLAPPT)  Scheduling request sent?     [] Yes  [] No    Does patient need updated?  [] Yes  [] No

## 2024-12-04 RX ORDER — ONDANSETRON 4 MG/1
4 TABLET, FILM COATED ORAL DAILY PRN
Qty: 30 TABLET | Refills: 0 | Status: SHIPPED | OUTPATIENT
Start: 2024-12-04

## 2024-12-05 ENCOUNTER — CARE COORDINATION (OUTPATIENT)
Dept: CARE COORDINATION | Age: 74
End: 2024-12-05

## 2024-12-05 NOTE — CARE COORDINATION
Ambulatory Care Coordination Note     12/5/2024 3:36 PM     ACM outreach attempt by this ACM today to perform care management follow up . ACM was unable to reach the patient by telephone today;   left voice message requesting a return phone call to this ACM.     ACM: Yanique Fallon RN     Care Summary Note: Will continue to reach out and assess needs and discuss RPM    PCP/Specialist follow up:   Future Appointments         Provider Specialty Dept Phone    12/17/2024 12:45 PM [facility] CHF ROOM 2 Cardiology [phone]    12/23/2024 2:00 PM Silvana Braga MD Primary Care [phone]            Follow Up:   Plan for next ACM outreach in approximately 2 weeks to complete:  - disease specific assessments  - medication review  - goal progression  - education   - RPM.

## 2024-12-19 RX ORDER — ATENOLOL 100 MG/1
100 TABLET ORAL DAILY
Qty: 90 TABLET | Refills: 0 | Status: SHIPPED | OUTPATIENT
Start: 2024-12-19 | End: 2025-03-19

## 2024-12-19 NOTE — TELEPHONE ENCOUNTER
Name of Medication(s) Requested:  Requested Prescriptions     Pending Prescriptions Disp Refills    atenolol (TENORMIN) 100 MG tablet [Pharmacy Med Name: ATENOLOL 100MG TABLETS] 90 tablet 0     Sig: TAKE 1 TABLET BY MOUTH DAILY       Medication is on current medication list Yes    Dosage and directions were verified? Yes    Quantity verified: 90 day supply     Pharmacy Verified?  Yes    Last Appointment:  9/4/2024    Future appts:  Future Appointments   Date Time Provider Department Center   12/23/2024  2:00 PM Silvana Braga MD [place] [facility] DEP   1/8/2025 12:00 PM [facility] ROOM 3 [facility]        (If no appt send self scheduling link. .REFILLAPPT)  Scheduling request sent?     [] Yes  [x] No    Does patient need updated?  [] Yes  [x] No

## 2024-12-27 DIAGNOSIS — N18.32 STAGE 3B CHRONIC KIDNEY DISEASE (HCC): Primary | ICD-10-CM

## 2024-12-30 NOTE — TELEPHONE ENCOUNTER
Name of Medication(s) Requested:  Requested Prescriptions     Pending Prescriptions Disp Refills    omeprazole (PRILOSEC) 20 MG delayed release capsule [Pharmacy Med Name: OMEPRAZOLE 20MG CAPSULES] 90 capsule 0     Sig: TAKE 1 CAPSULE BY MOUTH EVERY MORNING BEFORE BREAKFAST       Medication is on current medication list Yes    Dosage and directions were verified? Yes    Quantity verified: 90 day supply     Pharmacy Verified?  Yes    Last Appointment:  9/4/2024    Future appts:  Future Appointments   Date Time Provider Department Center   1/8/2025  9:15 AM Silvana Braga MD [place] [facility] DEP   1/8/2025 12:00 PM [facility] ROOM 3 [facility]        (If no appt send self scheduling link. .REFILLAPPT)  Scheduling request sent?     [] Yes  [x] No    Does patient need updated?  [] Yes  [x] No

## 2025-01-09 ENCOUNTER — CARE COORDINATION (OUTPATIENT)
Dept: CARE COORDINATION | Age: 75
End: 2025-01-09

## 2025-01-09 NOTE — CARE COORDINATION
Ambulatory Care Coordination Note     1/9/2025 5:18 PM     ACM outreach attempt by this ACM today to perform care management follow up . ACM was unable to reach the patient by telephone today;   left voice message requesting a return phone call to this ACM.     ACM: Yanique Fallon RN     Care Summary Note: Attempting to reach out to discuss RPM and returning equipment and discharging from CC. Messages have been left. Will continue to reach out. Pt is not engaged at this time She does have follow up appointments    PCP/Specialist follow up:   Future Appointments         Provider Specialty Dept Phone    1/14/2025 1:45 PM Silvana Braga MD Primary Care [phone]    1/22/2025 9:45 AM [facility] CHF ROOM 1 Cardiology [phone]            Follow Up:   Plan for next ACM outreach in approximately 2 weeks to complete:  Discharging and returning RPM equipment .

## 2025-01-13 ENCOUNTER — TELEPHONE (OUTPATIENT)
Dept: PRIMARY CARE CLINIC | Age: 75
End: 2025-01-13

## 2025-01-14 DIAGNOSIS — F33.0 MAJOR DEPRESSIVE DISORDER, RECURRENT, MILD: ICD-10-CM

## 2025-01-14 RX ORDER — TRAZODONE HYDROCHLORIDE 50 MG/1
50 TABLET ORAL NIGHTLY
Qty: 90 TABLET | Refills: 0 | OUTPATIENT
Start: 2025-01-14

## 2025-01-14 RX ORDER — ATORVASTATIN CALCIUM 40 MG/1
40 TABLET, FILM COATED ORAL NIGHTLY
Qty: 90 TABLET | Refills: 0 | OUTPATIENT
Start: 2025-01-14

## 2025-01-20 DIAGNOSIS — M17.12 ARTHRITIS OF KNEE, LEFT: Primary | ICD-10-CM

## 2025-01-20 DIAGNOSIS — F33.0 MAJOR DEPRESSIVE DISORDER, RECURRENT, MILD (HCC): ICD-10-CM

## 2025-01-20 RX ORDER — TRAZODONE HYDROCHLORIDE 50 MG/1
50 TABLET, FILM COATED ORAL NIGHTLY
Qty: 30 TABLET | Refills: 0 | Status: SHIPPED | OUTPATIENT
Start: 2025-01-20

## 2025-01-20 RX ORDER — ATORVASTATIN CALCIUM 40 MG/1
40 TABLET, FILM COATED ORAL NIGHTLY
Qty: 90 TABLET | OUTPATIENT
Start: 2025-01-20

## 2025-01-20 RX ORDER — TRAMADOL HYDROCHLORIDE 50 MG/1
50 TABLET ORAL EVERY 8 HOURS PRN
Qty: 33 TABLET | Refills: 0 | Status: SHIPPED | OUTPATIENT
Start: 2025-01-20 | End: 2025-01-31

## 2025-01-20 RX ORDER — ATORVASTATIN CALCIUM 40 MG/1
40 TABLET, FILM COATED ORAL NIGHTLY
Qty: 30 TABLET | Refills: 0 | Status: SHIPPED | OUTPATIENT
Start: 2025-01-20

## 2025-01-20 RX ORDER — ONDANSETRON 4 MG/1
4 TABLET, FILM COATED ORAL DAILY PRN
Qty: 30 TABLET | Refills: 0 | Status: SHIPPED | OUTPATIENT
Start: 2025-01-20

## 2025-02-03 ENCOUNTER — TELEPHONE (OUTPATIENT)
Dept: OTHER | Age: 75
End: 2025-02-03

## 2025-02-03 NOTE — TELEPHONE ENCOUNTER
7:59 AM 2/3/2025 Patient cancelled the CHF clinic for 2/5. Pt denies any s/s symptomatic CHF. Pt states she has a lot of things going on right now and will call back to reschedule her appointment.

## 2025-02-10 ENCOUNTER — CARE COORDINATION (OUTPATIENT)
Dept: CARE COORDINATION | Age: 75
End: 2025-02-10

## 2025-02-10 NOTE — CARE COORDINATION
Ambulatory Care Coordination Note     2/10/2025 5:44 PM     Patient Current Location:  Home: [address] [place] [id number]     ACM contacted the patient by telephone. Verified name and  with patient as identifiers.     Patient graduated from the High Risk Care Management program on 2/10/2025.  Patient verbalizes confidence in the ability to self-manage at this time..  Care management goals have been completed. No further Ambulatory Care Manager follow up scheduled.      ACM: Yanique Fallon RN     Challenges to be reviewed by the provider   Additional needs identified to be addressed with provider No  none               Method of communication with provider: phone.    Utilization: Patient has not had any utilization since our last call.     Care Summary Note: Graduation from . Discharged from [facility] as pt was not engaged. Sending for equipment.    Remote Patient Monitoring Disenrollment      Date/Time:  2/10/2025 5:46 PM  Patient Current Location: Home: [address] [place] [id number]  Patient has been dis-enrolled from Remote Patient Monitoring program on 2/10/2025 due to disengagement. Patient has been provided instruction on process to return RPM equipment and RPM has been deactivated.     Patient has ACM's contact information for any further questions, concerns, or needs.     Offered patient enrollment in the Remote Patient Monitoring (RPM) program for in-home monitoring: Patient being discharged from remote patient monitoring program today.Not engaged     PCP/Specialist follow up:   Future Appointments         Provider Specialty Dept Phone    3/6/2025 10:15 AM Silvana Braga MD Primary Care [phone]            Follow Up:   No further Ambulatory Care Management follow-up scheduled at this time.  Patient  has Ambulatory Care Manager's contact information for any further questions, concerns or needs.

## 2025-02-11 ENCOUNTER — CARE COORDINATION (OUTPATIENT)
Dept: CARE COORDINATION | Age: 75
End: 2025-02-11

## 2025-02-11 ENCOUNTER — CARE COORDINATION (OUTPATIENT)
Dept: PRIMARY CARE CLINIC | Age: 75
End: 2025-02-11

## 2025-02-11 DIAGNOSIS — I50.22 CHRONIC SYSTOLIC (CONGESTIVE) HEART FAILURE (HCC): Primary | ICD-10-CM

## 2025-02-11 DIAGNOSIS — J44.9 CHRONIC OBSTRUCTIVE PULMONARY DISEASE, UNSPECIFIED COPD TYPE (HCC): ICD-10-CM

## 2025-02-11 DIAGNOSIS — I10 PRIMARY HYPERTENSION: ICD-10-CM

## 2025-02-11 NOTE — PROGRESS NOTES
Remote Patient Order Discontinued    Received request from Yanique Fallon RN   to discontinue order for remote patient monitoring of CHF, COPD, and HTN and order completed.

## 2025-02-11 NOTE — CARE COORDINATION
Patient Mildred Duncan  02/11/25     Care Coordination  placed call to patient to arrange RPM kit  through [employer]. Left HIPAA Compliant Message     provided return and how to pack equipment in original packing via the patients voicemail if available and provided call back number should patient have questions.    Patient made aware [employer] will  equipment in 2-4 days.

## 2025-02-16 DIAGNOSIS — F33.0 MAJOR DEPRESSIVE DISORDER, RECURRENT, MILD: ICD-10-CM

## 2025-02-17 RX ORDER — ATORVASTATIN CALCIUM 40 MG/1
40 TABLET, FILM COATED ORAL NIGHTLY
Qty: 30 TABLET | Refills: 0 | Status: SHIPPED | OUTPATIENT
Start: 2025-02-17

## 2025-02-17 RX ORDER — TRAZODONE HYDROCHLORIDE 50 MG/1
50 TABLET ORAL NIGHTLY
Qty: 30 TABLET | Refills: 0 | Status: SHIPPED | OUTPATIENT
Start: 2025-02-17

## 2025-02-17 NOTE — TELEPHONE ENCOUNTER
Name of Medication(s) Requested:  Requested Prescriptions     Pending Prescriptions Disp Refills    traZODone (DESYREL) 50 MG tablet [Pharmacy Med Name: TRAZODONE 50MG TABLETS] 30 tablet 0     Sig: TAKE 1 TABLET BY MOUTH EVERY NIGHT    atorvastatin (LIPITOR) 40 MG tablet [Pharmacy Med Name: ATORVASTATIN 40MG TABLETS] 30 tablet 0     Sig: TAKE 1 TABLET BY MOUTH EVERY NIGHT       Medication is on current medication list Yes    Dosage and directions were verified? Yes    Quantity verified: 90 day supply     Pharmacy Verified?  Yes    Last Appointment:  9/4/2024    Future appts:  Future Appointments   Date Time Provider Department Center   3/6/2025 10:15 AM Silvana Braga MD [place] [facility] ECC DEP        (If no appt send self scheduling link. .REFILLAPPT)  Scheduling request sent?     [] Yes  [x] No    Does patient need updated?  [] Yes  [x] No

## 2025-02-24 ENCOUNTER — CARE COORDINATION (OUTPATIENT)
Dept: CARE COORDINATION | Age: 75
End: 2025-02-24

## 2025-02-24 NOTE — CARE COORDINATION
Mildred is concerned her equipment has not been picked up and it has been a while. Will send additional message to RPM team.

## 2025-03-03 SDOH — HEALTH STABILITY: PHYSICAL HEALTH: ON AVERAGE, HOW MANY MINUTES DO YOU ENGAGE IN EXERCISE AT THIS LEVEL?: 0 MIN

## 2025-03-03 SDOH — HEALTH STABILITY: PHYSICAL HEALTH: ON AVERAGE, HOW MANY DAYS PER WEEK DO YOU ENGAGE IN MODERATE TO STRENUOUS EXERCISE (LIKE A BRISK WALK)?: 0 DAYS

## 2025-03-03 ASSESSMENT — PATIENT HEALTH QUESTIONNAIRE - PHQ9
2. FEELING DOWN, DEPRESSED OR HOPELESS: SEVERAL DAYS
SUM OF ALL RESPONSES TO PHQ QUESTIONS 1-9: 2
SUM OF ALL RESPONSES TO PHQ QUESTIONS 1-9: 2
1. LITTLE INTEREST OR PLEASURE IN DOING THINGS: SEVERAL DAYS
SUM OF ALL RESPONSES TO PHQ QUESTIONS 1-9: 2
SUM OF ALL RESPONSES TO PHQ QUESTIONS 1-9: 2

## 2025-03-03 ASSESSMENT — LIFESTYLE VARIABLES
HOW OFTEN DO YOU HAVE SIX OR MORE DRINKS ON ONE OCCASION: 1
HOW MANY STANDARD DRINKS CONTAINING ALCOHOL DO YOU HAVE ON A TYPICAL DAY: 0
HOW OFTEN DO YOU HAVE A DRINK CONTAINING ALCOHOL: 1
HOW OFTEN DO YOU HAVE A DRINK CONTAINING ALCOHOL: NEVER
HOW MANY STANDARD DRINKS CONTAINING ALCOHOL DO YOU HAVE ON A TYPICAL DAY: PATIENT DOES NOT DRINK

## 2025-03-06 ENCOUNTER — OFFICE VISIT (OUTPATIENT)
Dept: PRIMARY CARE CLINIC | Age: 75
End: 2025-03-06

## 2025-03-06 VITALS
BODY MASS INDEX: 40.72 KG/M2 | HEART RATE: 76 BPM | WEIGHT: 238.5 LBS | SYSTOLIC BLOOD PRESSURE: 136 MMHG | DIASTOLIC BLOOD PRESSURE: 84 MMHG | TEMPERATURE: 97 F | HEIGHT: 64 IN

## 2025-03-06 DIAGNOSIS — F33.0 MAJOR DEPRESSIVE DISORDER, RECURRENT, MILD: ICD-10-CM

## 2025-03-06 DIAGNOSIS — I10 PRIMARY HYPERTENSION: ICD-10-CM

## 2025-03-06 DIAGNOSIS — J44.9 CHRONIC OBSTRUCTIVE PULMONARY DISEASE, UNSPECIFIED COPD TYPE (HCC): ICD-10-CM

## 2025-03-06 DIAGNOSIS — N18.32 TYPE 2 DIABETES MELLITUS WITH STAGE 3B CHRONIC KIDNEY DISEASE, WITHOUT LONG-TERM CURRENT USE OF INSULIN (HCC): ICD-10-CM

## 2025-03-06 DIAGNOSIS — Z12.31 ENCOUNTER FOR SCREENING MAMMOGRAM FOR MALIGNANT NEOPLASM OF BREAST: ICD-10-CM

## 2025-03-06 DIAGNOSIS — N18.32 STAGE 3B CHRONIC KIDNEY DISEASE (HCC): ICD-10-CM

## 2025-03-06 DIAGNOSIS — M17.12 ARTHRITIS OF KNEE, LEFT: ICD-10-CM

## 2025-03-06 DIAGNOSIS — Z00.00 MEDICARE ANNUAL WELLNESS VISIT, SUBSEQUENT: Primary | ICD-10-CM

## 2025-03-06 DIAGNOSIS — E78.2 MIXED HYPERLIPIDEMIA: ICD-10-CM

## 2025-03-06 DIAGNOSIS — Z87.891 PERSONAL HISTORY OF TOBACCO USE: ICD-10-CM

## 2025-03-06 DIAGNOSIS — E11.22 TYPE 2 DIABETES MELLITUS WITH STAGE 3B CHRONIC KIDNEY DISEASE, WITHOUT LONG-TERM CURRENT USE OF INSULIN (HCC): ICD-10-CM

## 2025-03-06 DIAGNOSIS — I50.33 ACUTE ON CHRONIC HEART FAILURE WITH PRESERVED EJECTION FRACTION (HFPEF) (HCC): ICD-10-CM

## 2025-03-06 DIAGNOSIS — E66.813 OBESITY, CLASS 3: ICD-10-CM

## 2025-03-06 RX ORDER — ATENOLOL 100 MG/1
100 TABLET ORAL DAILY
Qty: 90 TABLET | Refills: 0 | Status: SHIPPED | OUTPATIENT
Start: 2025-03-06 | End: 2025-06-04

## 2025-03-06 RX ORDER — AMLODIPINE BESYLATE 5 MG/1
5 TABLET ORAL DAILY
Qty: 90 TABLET | Refills: 0 | Status: SHIPPED | OUTPATIENT
Start: 2025-03-06 | End: 2025-06-04

## 2025-03-06 RX ORDER — OMEPRAZOLE 20 MG/1
20 CAPSULE, DELAYED RELEASE ORAL
Qty: 90 CAPSULE | Refills: 0 | Status: SHIPPED | OUTPATIENT
Start: 2025-03-06

## 2025-03-06 RX ORDER — ATORVASTATIN CALCIUM 40 MG/1
40 TABLET, FILM COATED ORAL NIGHTLY
Qty: 30 TABLET | Refills: 0 | Status: SHIPPED | OUTPATIENT
Start: 2025-03-06

## 2025-03-06 RX ORDER — TRAZODONE HYDROCHLORIDE 50 MG/1
50 TABLET ORAL NIGHTLY
Qty: 30 TABLET | Refills: 0 | Status: SHIPPED | OUTPATIENT
Start: 2025-03-06

## 2025-03-06 RX ORDER — TRAMADOL HYDROCHLORIDE 50 MG/1
50 TABLET ORAL NIGHTLY
Qty: 90 TABLET | Refills: 0 | Status: SHIPPED | OUTPATIENT
Start: 2025-03-06 | End: 2025-06-04

## 2025-03-06 ASSESSMENT — ENCOUNTER SYMPTOMS
DIARRHEA: 0
EYE ITCHING: 0
WHEEZING: 0
NAUSEA: 0
SINUS PAIN: 0
EYE DISCHARGE: 0
APNEA: 0
VOMITING: 0
BACK PAIN: 0
ABDOMINAL PAIN: 0
CONSTIPATION: 0
SORE THROAT: 0
BLOOD IN STOOL: 0
SHORTNESS OF BREATH: 0
PHOTOPHOBIA: 0
TROUBLE SWALLOWING: 0
ABDOMINAL DISTENTION: 0
COUGH: 0

## 2025-03-06 ASSESSMENT — PATIENT HEALTH QUESTIONNAIRE - PHQ9
10. IF YOU CHECKED OFF ANY PROBLEMS, HOW DIFFICULT HAVE THESE PROBLEMS MADE IT FOR YOU TO DO YOUR WORK, TAKE CARE OF THINGS AT HOME, OR GET ALONG WITH OTHER PEOPLE: NOT DIFFICULT AT ALL
9. THOUGHTS THAT YOU WOULD BE BETTER OFF DEAD, OR OF HURTING YOURSELF: NOT AT ALL
SUM OF ALL RESPONSES TO PHQ QUESTIONS 1-9: 10
5. POOR APPETITE OR OVEREATING: MORE THAN HALF THE DAYS
6. FEELING BAD ABOUT YOURSELF - OR THAT YOU ARE A FAILURE OR HAVE LET YOURSELF OR YOUR FAMILY DOWN: NOT AT ALL
SUM OF ALL RESPONSES TO PHQ QUESTIONS 1-9: 10
4. FEELING TIRED OR HAVING LITTLE ENERGY: NEARLY EVERY DAY
7. TROUBLE CONCENTRATING ON THINGS, SUCH AS READING THE NEWSPAPER OR WATCHING TELEVISION: NOT AT ALL
3. TROUBLE FALLING OR STAYING ASLEEP: NEARLY EVERY DAY
SUM OF ALL RESPONSES TO PHQ QUESTIONS 1-9: 10
1. LITTLE INTEREST OR PLEASURE IN DOING THINGS: SEVERAL DAYS
SUM OF ALL RESPONSES TO PHQ QUESTIONS 1-9: 10
2. FEELING DOWN, DEPRESSED OR HOPELESS: SEVERAL DAYS
8. MOVING OR SPEAKING SO SLOWLY THAT OTHER PEOPLE COULD HAVE NOTICED. OR THE OPPOSITE, BEING SO FIGETY OR RESTLESS THAT YOU HAVE BEEN MOVING AROUND A LOT MORE THAN USUAL: NOT AT ALL

## 2025-03-06 NOTE — PROGRESS NOTES
Medicare Annual Wellness Visit    Mildred Duncan is here for Medicare AWV and Hypertension    Assessment & Plan   Primary hypertension  Stage 3b chronic kidney disease (HCC)  The following orders have not been finalized:  -     omeprazole (PRILOSEC) 20 MG delayed release capsule  Major depressive disorder, recurrent, mild  The following orders have not been finalized:  -     traZODone (DESYREL) 50 MG tablet  Acute on chronic heart failure with preserved ejection fraction (HFpEF) ([facility])  Chronic obstructive pulmonary disease, unspecified COPD type ([facility])  Type 2 diabetes mellitus with stage 3b chronic kidney disease, without long-term current use of insulin (HCC)       No follow-ups on file.     Subjective       Patient's complete Health Risk Assessment and screening values have been reviewed and are found in Flowsheets. The following problems were reviewed today and where indicated follow up appointments were made and/or referrals ordered.    Positive Risk Factor Screenings with Interventions:    Fall Risk:  Do you feel unsteady or are you worried about falling? : (!) yes  2 or more falls in past year?: no  Fall with injury in past year?: no     Interventions:    Reviewed medications, home hazards, visual acuity, and co-morbidities that can increase risk for falls  Patient declines any further evaluation or treatment     Depression:  PHQ-2 Score: 2  PHQ-9 Total Score: 10  Total Score Interpretation: 10-14 = moderate depression    Interventions:  Patient comments: pt taking trazadone          General HRA Questions:  Select all that apply: (!) Stress    Interventions - Stress:  Patient comments: pt taking trazadone      Inactivity:  On average, how many days per week do you engage in moderate to strenuous exercise (like a brisk walk)?: 0 days (!) Abnormal  On average, how many minutes do you engage in exercise at this level?: 0 min    Interventions:  Recommendations: patient agrees to exercise for at least 150

## 2025-03-06 NOTE — PATIENT INSTRUCTIONS
people.  What are the benefits of screening?  Your scan may be normal (negative).  For some people who are at higher risk, screening lowers the chance of dying of lung cancer. How much and how long you smoked helps to determine your risk level. Screening can find some cancers early, when treatment may be more likely to work.  What happens after screening?  The results of your CT scan will be sent to your doctor. Someone from your care team will explain the results of your scan and answer any questions you may have. If you need any follow-up, he or she will help you understand what to do next.  After a lung cancer screening, you can go back to your usual activities right away.  A lung cancer screening test can't tell if you have lung cancer. If your results are positive, your doctor can't tell whether an abnormal finding is a harmless nodule, cancer, or something else without doing more tests.  What can you do to help prevent lung cancer?  Some lung cancers can't be prevented. But if you smoke, quitting smoking is the best step you can take to prevent lung cancer. If you want to quit, your doctor can recommend medicines or other ways to help.  Follow-up care is a key part of your treatment and safety. Be sure to make and go to all appointments, and call your doctor if you are having problems. It's also a good idea to know your test results and keep a list of the medicines you take.  Where can you learn more?  Go to https://www.[employer].net/patientEd and enter Q940 to learn more about \"Learning About Lung Cancer Screening.\"  Current as of: October 25, 2023  Content Version: 14.3  © 2024 [employer].   Care instructions adapted under license by [employer]. If you have questions about a medical condition or this instruction, always ask your healthcare professional. [employer], [employer], disclaims any warranty or liability for your use of this information.         A Healthy Heart: Care

## 2025-03-07 NOTE — PROGRESS NOTES
Mildred Duncan (:  1950) is a 74 y.o. female,Established patient, here for evaluation of the following chief complaint(s):  Medicare AWV and Hypertension      Subjective   SUBJECTIVE/OBJECTIVE:  HPI:  fup    Review of Systems   Constitutional:  Negative for activity change, appetite change, fever and unexpected weight change.   HENT:  Negative for congestion, ear pain, hearing loss, sinus pain, sore throat, tinnitus and trouble swallowing.    Eyes:  Negative for photophobia, discharge, itching and visual disturbance.   Respiratory:  Negative for apnea, cough, shortness of breath and wheezing.    Cardiovascular:  Negative for chest pain, palpitations and leg swelling.   Gastrointestinal:  Negative for abdominal distention, abdominal pain, blood in stool, constipation, diarrhea, nausea and vomiting.   Endocrine: Negative for cold intolerance, polydipsia and polyuria.   Genitourinary:  Negative for difficulty urinating, dysuria, frequency and pelvic pain.   Musculoskeletal:  Negative for arthralgias, back pain, joint swelling, myalgias, neck pain and neck stiffness.   Skin:  Negative for rash and wound.   Neurological:  Negative for dizziness, tremors, syncope, light-headedness and headaches.     CBC with Differential:    Lab Results   Component Value Date/Time    WBC 7.9 08/15/2024 11:02 AM    RBC 3.94 08/15/2024 11:02 AM    HGB 10.7 08/15/2024 11:02 AM    HCT 37.8 08/15/2024 11:02 AM     08/15/2024 11:02 AM    MCV 95.9 08/15/2024 11:02 AM    MCH 27.2 08/15/2024 11:02 AM    MCHC 28.3 08/15/2024 11:02 AM    RDW 13.2 08/15/2024 11:02 AM    NRBC 0.9 2023 05:55 AM    LYMPHOPCT 13 08/15/2024 11:02 AM    MONOPCT 5 08/15/2024 11:02 AM    EOSPCT 4 08/15/2024 11:02 AM    BASOPCT 1 08/15/2024 11:02 AM    MONOSABS 0.42 08/15/2024 11:02 AM    LYMPHSABS 1.01 08/15/2024 11:02 AM    EOSABS 0.34 08/15/2024 11:02 AM    BASOSABS 0.04 08/15/2024 11:02 AM     CMP:    Lab Results   Component Value Date/Time

## 2025-03-12 RX ORDER — ATORVASTATIN CALCIUM 40 MG/1
40 TABLET, FILM COATED ORAL NIGHTLY
Qty: 90 TABLET | OUTPATIENT
Start: 2025-03-12

## 2025-03-18 RX ORDER — ONDANSETRON 4 MG/1
4 TABLET, FILM COATED ORAL DAILY PRN
Qty: 30 TABLET | Refills: 0 | Status: SHIPPED | OUTPATIENT
Start: 2025-03-18

## 2025-04-17 DIAGNOSIS — F33.0 MAJOR DEPRESSIVE DISORDER, RECURRENT, MILD: ICD-10-CM

## 2025-04-17 DIAGNOSIS — I10 PRIMARY HYPERTENSION: ICD-10-CM

## 2025-04-17 RX ORDER — TRAZODONE HYDROCHLORIDE 50 MG/1
50 TABLET ORAL NIGHTLY
Qty: 90 TABLET | Refills: 0 | Status: SHIPPED | OUTPATIENT
Start: 2025-04-17

## 2025-04-17 RX ORDER — LISINOPRIL 20 MG/1
20 TABLET ORAL DAILY
Qty: 90 TABLET | Refills: 0 | Status: SHIPPED | OUTPATIENT
Start: 2025-04-17 | End: 2025-07-16

## 2025-04-17 RX ORDER — ATORVASTATIN CALCIUM 40 MG/1
40 TABLET, FILM COATED ORAL NIGHTLY
Qty: 90 TABLET | Refills: 0 | Status: SHIPPED | OUTPATIENT
Start: 2025-04-17

## 2025-05-02 ENCOUNTER — CARE COORDINATION (OUTPATIENT)
Dept: CARE COORDINATION | Age: 75
End: 2025-05-02

## 2025-05-02 NOTE — CARE COORDINATION
Ambulatory Care Coordination Note     2025 5:27 PM     Patient Current Location:  Home: [address] [place] [id number]     ACM contacted the patient by telephone. Verified name and  with patient as identifiers.         ACM: Yanique Fallon RN     Challenges to be reviewed by the provider   Additional needs identified to be addressed with provider No  none               Method of communication with provider: phone.    Utilization: Patient has not had any utilization since our last call.     Care Summary Note: Will continue to reach out, plan to graduate soon. Denies any new concerns. No c/o dizziness, headache or dyspnea. Has scheduled follow up appointments    Offered patient enrollment in the Remote Patient Monitoring (RPM) program for in-home monitoring: Deferred at this time because completion; will discuss at next outreach.     Assessments Completed:   COPD Assessment    Does the patient understand envrionmental exposure?: Yes  Is the patient able to verbalize Rescue vs. Long Acting medications?: Yes  Does the patient have a nebulizer?: No  Does the patient use a space with inhaled medications?: No     No patient-reported symptoms         Symptoms:               Medications Reviewed:   Patient denies any changes with medications and reports taking all medications as prescribed.    Advance Care Planning:   Reviewed and current     Care Planning:   Education Documentation  No documentation found.  Education Comments  No comments found.     ,    Goals Addressed    None          PCP/Specialist follow up:   Future Appointments         Provider Specialty Dept Phone    2025 11:45 AM Silvana Braga MD Primary Care [phone]            Follow Up:   Plan for next [facility] outreach in approximately 1 month to complete:  - prepare for graduation .   Patient  is agreeable to this plan.

## 2025-06-04 RX ORDER — ONDANSETRON 4 MG/1
4 TABLET, FILM COATED ORAL DAILY PRN
Qty: 30 TABLET | Refills: 0 | Status: SHIPPED | OUTPATIENT
Start: 2025-06-04

## 2025-06-04 NOTE — TELEPHONE ENCOUNTER
Name of Medication(s) Requested:  Requested Prescriptions     Pending Prescriptions Disp Refills    ondansetron (ZOFRAN) 4 MG tablet 30 tablet 0     Sig: Take 1 tablet by mouth daily as needed for Nausea or Vomiting       Medication is on current medication list Yes    Dosage and directions were verified? Yes    Quantity verified: 90 day supply     Pharmacy Verified?  Yes    Last Appointment:  3/6/2025    Future appts:  Future Appointments   Date Time Provider Department Center   6/30/2025  2:30 PM Silvana Braga MD [facility]        (If no appt send self scheduling link. .REFILLAPPT)  Scheduling request sent?     [] Yes  [] No    Does patient need updated?  [] Yes  [] No

## (undated) DEVICE — HOOK LOCK LATEX FREE ELASTIC BANDAGE 3INX5YD

## (undated) DEVICE — TIBURON EXTREMITY SHEET: Brand: CONVERTORS

## (undated) DEVICE — ENCORE® LATEX TEXTURED SIZE 8, STERILE LATEX POWDER-FREE SURGICAL GLOVE: Brand: ENCORE

## (undated) DEVICE — SOLUTION IRRIG 1000ML 09% SOD CHL USP PIC PLAS CONTAINER

## (undated) DEVICE — INTENDED FOR TISSUE SEPARATION, AND OTHER PROCEDURES THAT REQUIRE A SHARP SURGICAL BLADE TO PUNCTURE OR CUT.: Brand: BARD-PARKER ® STAINLESS STEEL BLADES

## (undated) DEVICE — BANDAGE COMPR W4XL108IN WHT LAYERED NO CLSR SYN RUB ESMARCH

## (undated) DEVICE — BASIC PACK: Brand: CONVERTORS

## (undated) DEVICE — 1810 FOAM BLOCK NEEDLE COUNTER: Brand: DEVON

## (undated) DEVICE — 20 ML SYRINGE REGULAR TIP: Brand: MONOJECT

## (undated) DEVICE — DRESSING GZ XRFRM 4X4(25/BX 6BX/CS)

## (undated) DEVICE — TOWEL OR BLUEE 16X26IN ST 8 PACK ORB08 16X26ORTWL

## (undated) DEVICE — GAUZE,SPONGE,4"X4",16PLY,XRAY,STRL,LF: Brand: MEDLINE

## (undated) DEVICE — SOLUTION SURG PREP ANTIMICROBIAL 4 OZ SKIN WND EXIDINE

## (undated) DEVICE — GAUZE,SPONGE,4"X4",16PLY,STRL,LF,10/TRAY: Brand: MEDLINE

## (undated) DEVICE — HANDLE CVR PATENTED RETENTION DISC STRL LIGHT SHLD

## (undated) DEVICE — PEN: MARKING STD 100/CS: Brand: MEDICAL ACTION INDUSTRIES

## (undated) DEVICE — GOWN SURG XL SMS FAB NONREINFORCED RAGLAN SLV HK LOOP CLSR

## (undated) DEVICE — ELECTRODE NDL 2.8IN COAT VALLEYLAB

## (undated) DEVICE — SUTURE NONABSORBABLE MONOFILAMENT 4-0 PS-2 18 IN BLU PROLENE 8682H

## (undated) DEVICE — CHLORAPREP 26ML ORANGE

## (undated) DEVICE — PENCIL ES L3M BTTN SWCH HOLSTER W/ BLDE ELECTRD EDGE

## (undated) DEVICE — BANDAGE,GAUZE,BULKEE II,4.5"X4.1YD,STRL: Brand: MEDLINE

## (undated) DEVICE — GLOVE SURG 7 LTX TRIFLEX WIDE FINGER PWDR